# Patient Record
Sex: MALE | Race: WHITE | URBAN - METROPOLITAN AREA
[De-identification: names, ages, dates, MRNs, and addresses within clinical notes are randomized per-mention and may not be internally consistent; named-entity substitution may affect disease eponyms.]

---

## 2018-01-05 ENCOUNTER — OUTPATIENT (OUTPATIENT)
Dept: OUTPATIENT SERVICES | Facility: HOSPITAL | Age: 83
LOS: 1 days | Discharge: HOME | End: 2018-01-05
Payer: MEDICARE

## 2018-01-05 DIAGNOSIS — Z12.11 ENCOUNTER FOR SCREENING FOR MALIGNANT NEOPLASM OF COLON: ICD-10-CM

## 2018-01-05 DIAGNOSIS — Z01.818 ENCOUNTER FOR OTHER PREPROCEDURAL EXAMINATION: ICD-10-CM

## 2018-01-05 DIAGNOSIS — J31.0 CHRONIC RHINITIS: ICD-10-CM

## 2018-01-05 DIAGNOSIS — K57.30 DIVERTICULOSIS OF LARGE INTESTINE WITHOUT PERFORATION OR ABSCESS WITHOUT BLEEDING: ICD-10-CM

## 2018-01-05 DIAGNOSIS — Z86.010 PERSONAL HISTORY OF COLONIC POLYPS: ICD-10-CM

## 2018-01-05 DIAGNOSIS — I10 ESSENTIAL (PRIMARY) HYPERTENSION: ICD-10-CM

## 2018-01-05 DIAGNOSIS — K21.9 GASTRO-ESOPHAGEAL REFLUX DISEASE WITHOUT ESOPHAGITIS: ICD-10-CM

## 2018-01-05 DIAGNOSIS — R10.13 EPIGASTRIC PAIN: ICD-10-CM

## 2018-01-05 DIAGNOSIS — I35.1 NONRHEUMATIC AORTIC (VALVE) INSUFFICIENCY: ICD-10-CM

## 2018-01-05 LAB
ANION GAP SERPL CALC-SCNC: 6 MMOL/L — SIGNIFICANT CHANGE UP (ref 5–17)
BASOPHILS # BLD AUTO: 0.1 K/UL — SIGNIFICANT CHANGE UP (ref 0–0.2)
BASOPHILS NFR BLD AUTO: 1.5 % — SIGNIFICANT CHANGE UP (ref 0–2)
BUN SERPL-MCNC: 24 MG/DL — HIGH (ref 7–23)
CALCIUM SERPL-MCNC: 8.8 MG/DL — SIGNIFICANT CHANGE UP (ref 8.5–10.1)
CHLORIDE SERPL-SCNC: 107 MMOL/L — SIGNIFICANT CHANGE UP (ref 96–108)
CO2 SERPL-SCNC: 30 MMOL/L — SIGNIFICANT CHANGE UP (ref 22–31)
CREAT SERPL-MCNC: 0.93 MG/DL — SIGNIFICANT CHANGE UP (ref 0.5–1.3)
EOSINOPHIL # BLD AUTO: 0.4 K/UL — SIGNIFICANT CHANGE UP (ref 0–0.5)
EOSINOPHIL NFR BLD AUTO: 5.8 % — SIGNIFICANT CHANGE UP (ref 0–6)
GLUCOSE SERPL-MCNC: 94 MG/DL — SIGNIFICANT CHANGE UP (ref 70–99)
HCT VFR BLD CALC: 40.2 % — SIGNIFICANT CHANGE UP (ref 39–50)
HGB BLD-MCNC: 13.4 G/DL — SIGNIFICANT CHANGE UP (ref 13–17)
LYMPHOCYTES # BLD AUTO: 1.3 K/UL — SIGNIFICANT CHANGE UP (ref 1–3.3)
LYMPHOCYTES # BLD AUTO: 20.7 % — SIGNIFICANT CHANGE UP (ref 13–44)
MCHC RBC-ENTMCNC: 28.2 PG — SIGNIFICANT CHANGE UP (ref 27–34)
MCHC RBC-ENTMCNC: 33.2 GM/DL — SIGNIFICANT CHANGE UP (ref 32–36)
MCV RBC AUTO: 84.9 FL — SIGNIFICANT CHANGE UP (ref 80–100)
MONOCYTES # BLD AUTO: 0.5 K/UL — SIGNIFICANT CHANGE UP (ref 0–0.9)
MONOCYTES NFR BLD AUTO: 8.1 % — SIGNIFICANT CHANGE UP (ref 2–14)
NEUTROPHILS # BLD AUTO: 4.1 K/UL — SIGNIFICANT CHANGE UP (ref 1.8–7.4)
NEUTROPHILS NFR BLD AUTO: 64 % — SIGNIFICANT CHANGE UP (ref 43–77)
PLATELET # BLD AUTO: 208 K/UL — SIGNIFICANT CHANGE UP (ref 150–400)
POTASSIUM SERPL-MCNC: 4.5 MMOL/L — SIGNIFICANT CHANGE UP (ref 3.5–5.3)
POTASSIUM SERPL-SCNC: 4.5 MMOL/L — SIGNIFICANT CHANGE UP (ref 3.5–5.3)
RBC # BLD: 4.74 M/UL — SIGNIFICANT CHANGE UP (ref 4.2–5.8)
RBC # FLD: 12.5 % — SIGNIFICANT CHANGE UP (ref 10.3–14.5)
SODIUM SERPL-SCNC: 143 MMOL/L — SIGNIFICANT CHANGE UP (ref 135–145)
WBC # BLD: 6.4 K/UL — SIGNIFICANT CHANGE UP (ref 3.8–10.5)
WBC # FLD AUTO: 6.4 K/UL — SIGNIFICANT CHANGE UP (ref 3.8–10.5)

## 2018-01-05 PROCEDURE — 93010 ELECTROCARDIOGRAM REPORT: CPT

## 2018-01-23 ENCOUNTER — OUTPATIENT (OUTPATIENT)
Dept: OUTPATIENT SERVICES | Facility: HOSPITAL | Age: 83
LOS: 1 days | Discharge: HOME | End: 2018-01-23
Payer: MEDICARE

## 2018-01-23 VITALS
HEART RATE: 82 BPM | RESPIRATION RATE: 14 BRPM | OXYGEN SATURATION: 98 % | TEMPERATURE: 97 F | WEIGHT: 119.93 LBS | DIASTOLIC BLOOD PRESSURE: 108 MMHG | HEIGHT: 66 IN | SYSTOLIC BLOOD PRESSURE: 175 MMHG

## 2018-01-23 DIAGNOSIS — Z98.890 OTHER SPECIFIED POSTPROCEDURAL STATES: Chronic | ICD-10-CM

## 2018-01-23 PROCEDURE — 88305 TISSUE EXAM BY PATHOLOGIST: CPT | Mod: 26

## 2018-01-23 PROCEDURE — 88312 SPECIAL STAINS GROUP 1: CPT | Mod: 26

## 2018-01-23 PROCEDURE — 88313 SPECIAL STAINS GROUP 2: CPT | Mod: 26

## 2018-01-24 LAB — SURGICAL PATHOLOGY FINAL REPORT - CH: SIGNIFICANT CHANGE UP

## 2018-01-26 DIAGNOSIS — I10 ESSENTIAL (PRIMARY) HYPERTENSION: ICD-10-CM

## 2018-01-26 DIAGNOSIS — K21.9 GASTRO-ESOPHAGEAL REFLUX DISEASE WITHOUT ESOPHAGITIS: ICD-10-CM

## 2018-01-26 DIAGNOSIS — Z12.11 ENCOUNTER FOR SCREENING FOR MALIGNANT NEOPLASM OF COLON: ICD-10-CM

## 2018-01-26 DIAGNOSIS — K57.30 DIVERTICULOSIS OF LARGE INTESTINE WITHOUT PERFORATION OR ABSCESS WITHOUT BLEEDING: ICD-10-CM

## 2018-01-26 DIAGNOSIS — R13.10 DYSPHAGIA, UNSPECIFIED: ICD-10-CM

## 2018-01-26 DIAGNOSIS — Z86.010 PERSONAL HISTORY OF COLONIC POLYPS: ICD-10-CM

## 2018-01-26 DIAGNOSIS — I35.1 NONRHEUMATIC AORTIC (VALVE) INSUFFICIENCY: ICD-10-CM

## 2018-01-26 DIAGNOSIS — G20 PARKINSON'S DISEASE: ICD-10-CM

## 2018-01-26 DIAGNOSIS — Z85.46 PERSONAL HISTORY OF MALIGNANT NEOPLASM OF PROSTATE: ICD-10-CM

## 2018-01-26 DIAGNOSIS — H90.5 UNSPECIFIED SENSORINEURAL HEARING LOSS: ICD-10-CM

## 2018-01-26 DIAGNOSIS — J31.0 CHRONIC RHINITIS: ICD-10-CM

## 2018-03-01 ENCOUNTER — EMERGENCY (EMERGENCY)
Facility: HOSPITAL | Age: 83
LOS: 0 days | Discharge: HOME | End: 2018-03-01
Attending: FAMILY MEDICINE | Admitting: FAMILY MEDICINE
Payer: MEDICARE

## 2018-03-01 VITALS
HEART RATE: 74 BPM | RESPIRATION RATE: 18 BRPM | OXYGEN SATURATION: 99 % | HEIGHT: 66 IN | SYSTOLIC BLOOD PRESSURE: 159 MMHG | TEMPERATURE: 98 F | WEIGHT: 119.93 LBS | DIASTOLIC BLOOD PRESSURE: 98 MMHG

## 2018-03-01 VITALS
HEART RATE: 68 BPM | SYSTOLIC BLOOD PRESSURE: 146 MMHG | DIASTOLIC BLOOD PRESSURE: 80 MMHG | RESPIRATION RATE: 15 BRPM | OXYGEN SATURATION: 100 %

## 2018-03-01 DIAGNOSIS — G20 PARKINSON'S DISEASE: ICD-10-CM

## 2018-03-01 DIAGNOSIS — I10 ESSENTIAL (PRIMARY) HYPERTENSION: ICD-10-CM

## 2018-03-01 DIAGNOSIS — F43.29 ADJUSTMENT DISORDER WITH OTHER SYMPTOMS: ICD-10-CM

## 2018-03-01 DIAGNOSIS — Z98.890 OTHER SPECIFIED POSTPROCEDURAL STATES: Chronic | ICD-10-CM

## 2018-03-01 PROCEDURE — 93010 ELECTROCARDIOGRAM REPORT: CPT

## 2018-03-01 PROCEDURE — 99283 EMERGENCY DEPT VISIT LOW MDM: CPT

## 2018-03-01 RX ORDER — METOPROLOL TARTRATE 50 MG
1 TABLET ORAL
Qty: 0 | Refills: 0 | COMMUNITY

## 2018-03-01 RX ORDER — FLUOCINONIDE/EMOLLIENT BASE 0.05 %
0 CREAM (GRAM) TOPICAL
Qty: 0 | Refills: 0 | COMMUNITY

## 2018-03-01 RX ORDER — PANTOPRAZOLE SODIUM 20 MG/1
1 TABLET, DELAYED RELEASE ORAL
Qty: 0 | Refills: 0 | COMMUNITY

## 2018-03-01 RX ORDER — CARBIDOPA AND LEVODOPA 25; 100 MG/1; MG/1
0 TABLET ORAL
Qty: 0 | Refills: 0 | COMMUNITY
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
## 2019-05-21 NOTE — ED ADULT TRIAGE NOTE - CCCP TRG CHIEF CMPLNT
hypertension Plastic Surgeon Procedure Text (D): After obtaining clear surgical margins the patient was sent to plastics for surgical repair.  The patient understands they will receive post-surgical care and follow-up from the referring physician's office.

## 2020-05-16 ENCOUNTER — EMERGENCY (EMERGENCY)
Facility: HOSPITAL | Age: 85
LOS: 0 days | Discharge: HOME | End: 2020-05-16
Attending: FAMILY MEDICINE
Payer: MEDICARE

## 2020-05-16 VITALS — WEIGHT: 154.98 LBS

## 2020-05-16 VITALS
TEMPERATURE: 98 F | SYSTOLIC BLOOD PRESSURE: 158 MMHG | OXYGEN SATURATION: 100 % | DIASTOLIC BLOOD PRESSURE: 96 MMHG | RESPIRATION RATE: 16 BRPM | HEART RATE: 66 BPM

## 2020-05-16 DIAGNOSIS — K59.00 CONSTIPATION, UNSPECIFIED: ICD-10-CM

## 2020-05-16 DIAGNOSIS — R11.0 NAUSEA: ICD-10-CM

## 2020-05-16 DIAGNOSIS — R10.9 UNSPECIFIED ABDOMINAL PAIN: ICD-10-CM

## 2020-05-16 DIAGNOSIS — I10 ESSENTIAL (PRIMARY) HYPERTENSION: ICD-10-CM

## 2020-05-16 DIAGNOSIS — Z98.890 OTHER SPECIFIED POSTPROCEDURAL STATES: Chronic | ICD-10-CM

## 2020-05-16 DIAGNOSIS — M81.0 AGE-RELATED OSTEOPOROSIS WITHOUT CURRENT PATHOLOGICAL FRACTURE: ICD-10-CM

## 2020-05-16 DIAGNOSIS — G20 PARKINSON'S DISEASE: ICD-10-CM

## 2020-05-16 DIAGNOSIS — R33.9 RETENTION OF URINE, UNSPECIFIED: ICD-10-CM

## 2020-05-16 DIAGNOSIS — R10.13 EPIGASTRIC PAIN: ICD-10-CM

## 2020-05-16 LAB
ADD ON TEST-SPECIMEN IN LAB: SIGNIFICANT CHANGE UP
ALBUMIN SERPL ELPH-MCNC: 3.7 G/DL — SIGNIFICANT CHANGE UP (ref 3.3–5)
ALP SERPL-CCNC: 246 U/L — HIGH (ref 40–120)
ALT FLD-CCNC: 19 U/L — SIGNIFICANT CHANGE UP (ref 12–78)
ANION GAP SERPL CALC-SCNC: 5 MMOL/L — SIGNIFICANT CHANGE UP (ref 5–17)
APPEARANCE UR: CLEAR — SIGNIFICANT CHANGE UP
APTT BLD: 28.3 SEC — SIGNIFICANT CHANGE UP (ref 27.5–36.3)
AST SERPL-CCNC: 39 U/L — HIGH (ref 15–37)
BASOPHILS # BLD AUTO: 0.04 K/UL — SIGNIFICANT CHANGE UP (ref 0–0.2)
BASOPHILS NFR BLD AUTO: 0.5 % — SIGNIFICANT CHANGE UP (ref 0–2)
BILIRUB SERPL-MCNC: 0.8 MG/DL — SIGNIFICANT CHANGE UP (ref 0.2–1.2)
BILIRUB UR-MCNC: NEGATIVE — SIGNIFICANT CHANGE UP
BUN SERPL-MCNC: 22 MG/DL — SIGNIFICANT CHANGE UP (ref 7–23)
CALCIUM SERPL-MCNC: 9.3 MG/DL — SIGNIFICANT CHANGE UP (ref 8.5–10.1)
CHLORIDE SERPL-SCNC: 106 MMOL/L — SIGNIFICANT CHANGE UP (ref 96–108)
CO2 SERPL-SCNC: 30 MMOL/L — SIGNIFICANT CHANGE UP (ref 22–31)
COLOR SPEC: YELLOW — SIGNIFICANT CHANGE UP
CREAT SERPL-MCNC: 0.85 MG/DL — SIGNIFICANT CHANGE UP (ref 0.5–1.3)
DIFF PNL FLD: ABNORMAL
EOSINOPHIL # BLD AUTO: 0.16 K/UL — SIGNIFICANT CHANGE UP (ref 0–0.5)
EOSINOPHIL NFR BLD AUTO: 2 % — SIGNIFICANT CHANGE UP (ref 0–6)
GLUCOSE SERPL-MCNC: 99 MG/DL — SIGNIFICANT CHANGE UP (ref 70–99)
GLUCOSE UR QL: NEGATIVE MG/DL — SIGNIFICANT CHANGE UP
HCT VFR BLD CALC: 38.4 % — LOW (ref 39–50)
HGB BLD-MCNC: 12.7 G/DL — LOW (ref 13–17)
IMM GRANULOCYTES NFR BLD AUTO: 0.1 % — SIGNIFICANT CHANGE UP (ref 0–1.5)
INR BLD: 1.05 RATIO — SIGNIFICANT CHANGE UP (ref 0.88–1.16)
KETONES UR-MCNC: NEGATIVE — SIGNIFICANT CHANGE UP
LACTATE SERPL-SCNC: 0.8 MMOL/L — SIGNIFICANT CHANGE UP (ref 0.7–2)
LEUKOCYTE ESTERASE UR-ACNC: NEGATIVE — SIGNIFICANT CHANGE UP
LIDOCAIN IGE QN: 232 U/L — SIGNIFICANT CHANGE UP (ref 73–393)
LYMPHOCYTES # BLD AUTO: 1.22 K/UL — SIGNIFICANT CHANGE UP (ref 1–3.3)
LYMPHOCYTES # BLD AUTO: 15.3 % — SIGNIFICANT CHANGE UP (ref 13–44)
MCHC RBC-ENTMCNC: 28.5 PG — SIGNIFICANT CHANGE UP (ref 27–34)
MCHC RBC-ENTMCNC: 33.1 GM/DL — SIGNIFICANT CHANGE UP (ref 32–36)
MCV RBC AUTO: 86.1 FL — SIGNIFICANT CHANGE UP (ref 80–100)
MONOCYTES # BLD AUTO: 0.8 K/UL — SIGNIFICANT CHANGE UP (ref 0–0.9)
MONOCYTES NFR BLD AUTO: 10.1 % — SIGNIFICANT CHANGE UP (ref 2–14)
NEUTROPHILS # BLD AUTO: 5.72 K/UL — SIGNIFICANT CHANGE UP (ref 1.8–7.4)
NEUTROPHILS NFR BLD AUTO: 72 % — SIGNIFICANT CHANGE UP (ref 43–77)
NITRITE UR-MCNC: NEGATIVE — SIGNIFICANT CHANGE UP
PH UR: 6 — SIGNIFICANT CHANGE UP (ref 5–8)
PLATELET # BLD AUTO: 208 K/UL — SIGNIFICANT CHANGE UP (ref 150–400)
POTASSIUM SERPL-MCNC: 3.6 MMOL/L — SIGNIFICANT CHANGE UP (ref 3.5–5.3)
POTASSIUM SERPL-SCNC: 3.6 MMOL/L — SIGNIFICANT CHANGE UP (ref 3.5–5.3)
PROT SERPL-MCNC: 7.2 GM/DL — SIGNIFICANT CHANGE UP (ref 6–8.3)
PROT UR-MCNC: NEGATIVE MG/DL — SIGNIFICANT CHANGE UP
PROTHROM AB SERPL-ACNC: 11.7 SEC — SIGNIFICANT CHANGE UP (ref 10–12.9)
RBC # BLD: 4.46 M/UL — SIGNIFICANT CHANGE UP (ref 4.2–5.8)
RBC # FLD: 13.5 % — SIGNIFICANT CHANGE UP (ref 10.3–14.5)
SARS-COV-2 RNA SPEC QL NAA+PROBE: SIGNIFICANT CHANGE UP
SODIUM SERPL-SCNC: 141 MMOL/L — SIGNIFICANT CHANGE UP (ref 135–145)
SP GR SPEC: 1.01 — SIGNIFICANT CHANGE UP (ref 1.01–1.02)
TROPONIN I SERPL-MCNC: <0.015 NG/ML — SIGNIFICANT CHANGE UP (ref 0.01–0.04)
UROBILINOGEN FLD QL: NEGATIVE MG/DL — SIGNIFICANT CHANGE UP
WBC # BLD: 7.95 K/UL — SIGNIFICANT CHANGE UP (ref 3.8–10.5)
WBC # FLD AUTO: 7.95 K/UL — SIGNIFICANT CHANGE UP (ref 3.8–10.5)

## 2020-05-16 PROCEDURE — 83605 ASSAY OF LACTIC ACID: CPT

## 2020-05-16 PROCEDURE — 71045 X-RAY EXAM CHEST 1 VIEW: CPT

## 2020-05-16 PROCEDURE — 83690 ASSAY OF LIPASE: CPT

## 2020-05-16 PROCEDURE — 85610 PROTHROMBIN TIME: CPT

## 2020-05-16 PROCEDURE — 93010 ELECTROCARDIOGRAM REPORT: CPT

## 2020-05-16 PROCEDURE — 87635 SARS-COV-2 COVID-19 AMP PRB: CPT

## 2020-05-16 PROCEDURE — 74177 CT ABD & PELVIS W/CONTRAST: CPT

## 2020-05-16 PROCEDURE — 71045 X-RAY EXAM CHEST 1 VIEW: CPT | Mod: 26

## 2020-05-16 PROCEDURE — 85730 THROMBOPLASTIN TIME PARTIAL: CPT

## 2020-05-16 PROCEDURE — 80053 COMPREHEN METABOLIC PANEL: CPT

## 2020-05-16 PROCEDURE — 84153 ASSAY OF PSA TOTAL: CPT

## 2020-05-16 PROCEDURE — 81001 URINALYSIS AUTO W/SCOPE: CPT

## 2020-05-16 PROCEDURE — 86850 RBC ANTIBODY SCREEN: CPT

## 2020-05-16 PROCEDURE — 99284 EMERGENCY DEPT VISIT MOD MDM: CPT | Mod: 25

## 2020-05-16 PROCEDURE — 36415 COLL VENOUS BLD VENIPUNCTURE: CPT

## 2020-05-16 PROCEDURE — 74177 CT ABD & PELVIS W/CONTRAST: CPT | Mod: 26

## 2020-05-16 PROCEDURE — 86901 BLOOD TYPING SEROLOGIC RH(D): CPT

## 2020-05-16 PROCEDURE — 96374 THER/PROPH/DIAG INJ IV PUSH: CPT | Mod: XU

## 2020-05-16 PROCEDURE — 93005 ELECTROCARDIOGRAM TRACING: CPT

## 2020-05-16 PROCEDURE — 99284 EMERGENCY DEPT VISIT MOD MDM: CPT

## 2020-05-16 PROCEDURE — 96361 HYDRATE IV INFUSION ADD-ON: CPT

## 2020-05-16 PROCEDURE — 85025 COMPLETE CBC W/AUTO DIFF WBC: CPT

## 2020-05-16 PROCEDURE — 86900 BLOOD TYPING SEROLOGIC ABO: CPT

## 2020-05-16 PROCEDURE — 84484 ASSAY OF TROPONIN QUANT: CPT

## 2020-05-16 PROCEDURE — C9113: CPT

## 2020-05-16 RX ORDER — POLYETHYLENE GLYCOL 3350 17 G/17G
17 POWDER, FOR SOLUTION ORAL ONCE
Refills: 0 | Status: COMPLETED | OUTPATIENT
Start: 2020-05-16 | End: 2020-05-16

## 2020-05-16 RX ORDER — SODIUM CHLORIDE 9 MG/ML
1000 INJECTION INTRAMUSCULAR; INTRAVENOUS; SUBCUTANEOUS ONCE
Refills: 0 | Status: COMPLETED | OUTPATIENT
Start: 2020-05-16 | End: 2020-05-16

## 2020-05-16 RX ORDER — PANTOPRAZOLE SODIUM 20 MG/1
40 TABLET, DELAYED RELEASE ORAL ONCE
Refills: 0 | Status: COMPLETED | OUTPATIENT
Start: 2020-05-16 | End: 2020-05-16

## 2020-05-16 RX ADMIN — SODIUM CHLORIDE 1000 MILLILITER(S): 9 INJECTION INTRAMUSCULAR; INTRAVENOUS; SUBCUTANEOUS at 17:22

## 2020-05-16 RX ADMIN — SODIUM CHLORIDE 1000 MILLILITER(S): 9 INJECTION INTRAMUSCULAR; INTRAVENOUS; SUBCUTANEOUS at 21:47

## 2020-05-16 RX ADMIN — POLYETHYLENE GLYCOL 3350 17 GRAM(S): 17 POWDER, FOR SOLUTION ORAL at 21:47

## 2020-05-16 RX ADMIN — PANTOPRAZOLE SODIUM 40 MILLIGRAM(S): 20 TABLET, DELAYED RELEASE ORAL at 17:22

## 2020-05-16 NOTE — ED ADULT TRIAGE NOTE - CHIEF COMPLAINT QUOTE
pt c/o abdominal pain with constipation and difficulty passing Bm for 3-4 weeks, pt denies fever NVD. pt states pain worsened last night

## 2020-05-16 NOTE — ED PROVIDER NOTE - OBJECTIVE STATEMENT
85 y/o male with a PMHx of HTN, osteoporosis, Parkinson's, h/o b/l inguinal hernia repair presents to the ED c/o epigastric pain x1 month with associated constipation. Denies fever and cough. Pt states he felt nauseated today so came to ED for evaluation. Pt concerned that he has COVID. No other complaints at this time. PMD: Dr. Arredondo.

## 2020-05-16 NOTE — ED PROVIDER NOTE - NSFOLLOWUPINSTRUCTIONS_ED_ALL_ED_FT
Maintain taylor in and empty regularly. Follow up with urology. Drink lots of fluids and take miralax daily until good bm produced. Follow up with urologist and your medical doctor. Return to ER if worse.

## 2020-05-16 NOTE — ED ADULT NURSE NOTE - CHPI ED NUR SYMPTOMS NEG
no fever/no dizziness/no vomiting/no chills/no nausea/no pain/no decreased eating/drinking/no tingling/no weakness

## 2020-05-16 NOTE — ED PROVIDER NOTE - CARE PLAN
Principal Discharge DX:	Urinary retention  Secondary Diagnosis:	Constipation, unspecified constipation type

## 2020-05-16 NOTE — ED PROVIDER NOTE - PROGRESS NOTE DETAILS
I Estela Cardoso attest that this documentation has been prepared under the direction and in the presence of Doctor Townsend. Documentation as noted above by leann Cardoso. Agree with notes. Pop BURGESS

## 2020-05-16 NOTE — ED PROVIDER NOTE - CLINICAL SUMMARY MEDICAL DECISION MAKING FREE TEXT BOX
Pt with abd pain, constipation and mild nausea. Exam with RLQ tenderness. Plan: labs, IV fluids, CT.

## 2020-05-16 NOTE — ED PROVIDER NOTE - PATIENT PORTAL LINK FT
You can access the FollowMyHealth Patient Portal offered by [facility] by registering at the following website: [email or web address]. By joining [facility]’s FollowMyHealth portal, you will also be able to view your health information using other applications (apps) compatible with our system.

## 2020-05-17 PROBLEM — G20 PARKINSON'S DISEASE: Chronic | Status: ACTIVE | Noted: 2018-01-23

## 2020-05-17 PROBLEM — M81.0 AGE-RELATED OSTEOPOROSIS WITHOUT CURRENT PATHOLOGICAL FRACTURE: Chronic | Status: ACTIVE | Noted: 2018-01-23

## 2020-05-17 PROBLEM — I10 ESSENTIAL (PRIMARY) HYPERTENSION: Chronic | Status: ACTIVE | Noted: 2018-01-23

## 2020-05-17 NOTE — ED POST DISCHARGE NOTE - DETAILS
Spoke with pt. Pt states he was dx with prostate cancer and had a doctor from [place] that he follows up with every 3-4 months. Is due to follow up with shortly. -Pola Ely PA-C

## 2020-08-23 ENCOUNTER — INPATIENT (INPATIENT)
Facility: HOSPITAL | Age: 85
LOS: 3 days | Discharge: HOME HEALTH | DRG: 312 | End: 2020-08-27
Attending: STUDENT IN AN ORGANIZED HEALTH CARE EDUCATION/TRAINING PROGRAM | Admitting: EMERGENCY MEDICINE
Payer: MEDICARE

## 2020-08-23 VITALS
RESPIRATION RATE: 19 BRPM | TEMPERATURE: 98 F | SYSTOLIC BLOOD PRESSURE: 127 MMHG | WEIGHT: 169.98 LBS | OXYGEN SATURATION: 98 % | HEIGHT: 69 IN | HEART RATE: 115 BPM | DIASTOLIC BLOOD PRESSURE: 67 MMHG

## 2020-08-23 DIAGNOSIS — R55 SYNCOPE AND COLLAPSE: ICD-10-CM

## 2020-08-23 DIAGNOSIS — Z98.890 OTHER SPECIFIED POSTPROCEDURAL STATES: Chronic | ICD-10-CM

## 2020-08-23 LAB
ALBUMIN SERPL ELPH-MCNC: 3.4 G/DL — SIGNIFICANT CHANGE UP (ref 3.3–5)
ALP SERPL-CCNC: 111 U/L — SIGNIFICANT CHANGE UP (ref 40–120)
ALT FLD-CCNC: 9 U/L — LOW (ref 12–78)
AMMONIA BLD-MCNC: 11 UMOL/L — SIGNIFICANT CHANGE UP (ref 11–32)
ANION GAP SERPL CALC-SCNC: 6 MMOL/L — SIGNIFICANT CHANGE UP (ref 5–17)
APPEARANCE UR: CLEAR — SIGNIFICANT CHANGE UP
APTT BLD: 25.3 SEC — LOW (ref 27.5–35.5)
AST SERPL-CCNC: 32 U/L — SIGNIFICANT CHANGE UP (ref 15–37)
BASOPHILS # BLD AUTO: 0.06 K/UL — SIGNIFICANT CHANGE UP (ref 0–0.2)
BASOPHILS NFR BLD AUTO: 0.6 % — SIGNIFICANT CHANGE UP (ref 0–2)
BILIRUB SERPL-MCNC: 0.6 MG/DL — SIGNIFICANT CHANGE UP (ref 0.2–1.2)
BILIRUB UR-MCNC: NEGATIVE — SIGNIFICANT CHANGE UP
BUN SERPL-MCNC: 25 MG/DL — HIGH (ref 7–23)
CALCIUM SERPL-MCNC: 8.7 MG/DL — SIGNIFICANT CHANGE UP (ref 8.5–10.1)
CHLORIDE SERPL-SCNC: 110 MMOL/L — HIGH (ref 96–108)
CO2 SERPL-SCNC: 26 MMOL/L — SIGNIFICANT CHANGE UP (ref 22–31)
COLOR SPEC: YELLOW — SIGNIFICANT CHANGE UP
CREAT SERPL-MCNC: 0.87 MG/DL — SIGNIFICANT CHANGE UP (ref 0.5–1.3)
DIFF PNL FLD: ABNORMAL
EOSINOPHIL # BLD AUTO: 0.12 K/UL — SIGNIFICANT CHANGE UP (ref 0–0.5)
EOSINOPHIL NFR BLD AUTO: 1.3 % — SIGNIFICANT CHANGE UP (ref 0–6)
GLUCOSE SERPL-MCNC: 100 MG/DL — HIGH (ref 70–99)
GLUCOSE UR QL: NEGATIVE MG/DL — SIGNIFICANT CHANGE UP
HCT VFR BLD CALC: 39.1 % — SIGNIFICANT CHANGE UP (ref 39–50)
HGB BLD-MCNC: 12.8 G/DL — LOW (ref 13–17)
IMM GRANULOCYTES NFR BLD AUTO: 0.2 % — SIGNIFICANT CHANGE UP (ref 0–1.5)
INR BLD: 1.04 RATIO — SIGNIFICANT CHANGE UP (ref 0.88–1.16)
KETONES UR-MCNC: NEGATIVE — SIGNIFICANT CHANGE UP
LACTATE SERPL-SCNC: 0.9 MMOL/L — SIGNIFICANT CHANGE UP (ref 0.7–2)
LEUKOCYTE ESTERASE UR-ACNC: ABNORMAL
LYMPHOCYTES # BLD AUTO: 0.96 K/UL — LOW (ref 1–3.3)
LYMPHOCYTES # BLD AUTO: 10.4 % — LOW (ref 13–44)
MCHC RBC-ENTMCNC: 28.2 PG — SIGNIFICANT CHANGE UP (ref 27–34)
MCHC RBC-ENTMCNC: 32.7 GM/DL — SIGNIFICANT CHANGE UP (ref 32–36)
MCV RBC AUTO: 86.1 FL — SIGNIFICANT CHANGE UP (ref 80–100)
MONOCYTES # BLD AUTO: 0.73 K/UL — SIGNIFICANT CHANGE UP (ref 0–0.9)
MONOCYTES NFR BLD AUTO: 7.9 % — SIGNIFICANT CHANGE UP (ref 2–14)
NEUTROPHILS # BLD AUTO: 7.38 K/UL — SIGNIFICANT CHANGE UP (ref 1.8–7.4)
NEUTROPHILS NFR BLD AUTO: 79.6 % — HIGH (ref 43–77)
NITRITE UR-MCNC: NEGATIVE — SIGNIFICANT CHANGE UP
PH UR: 7 — SIGNIFICANT CHANGE UP (ref 5–8)
PLATELET # BLD AUTO: 186 K/UL — SIGNIFICANT CHANGE UP (ref 150–400)
POTASSIUM SERPL-MCNC: 4 MMOL/L — SIGNIFICANT CHANGE UP (ref 3.5–5.3)
POTASSIUM SERPL-SCNC: 4 MMOL/L — SIGNIFICANT CHANGE UP (ref 3.5–5.3)
PROT SERPL-MCNC: 6.7 GM/DL — SIGNIFICANT CHANGE UP (ref 6–8.3)
PROT UR-MCNC: NEGATIVE MG/DL — SIGNIFICANT CHANGE UP
PROTHROM AB SERPL-ACNC: 12 SEC — SIGNIFICANT CHANGE UP (ref 10.6–13.6)
RBC # BLD: 4.54 M/UL — SIGNIFICANT CHANGE UP (ref 4.2–5.8)
RBC # FLD: 13.5 % — SIGNIFICANT CHANGE UP (ref 10.3–14.5)
SARS-COV-2 IGG SERPL QL IA: NEGATIVE — SIGNIFICANT CHANGE UP
SARS-COV-2 IGM SERPL IA-ACNC: 0.09 INDEX — SIGNIFICANT CHANGE UP
SARS-COV-2 RNA SPEC QL NAA+PROBE: SIGNIFICANT CHANGE UP
SODIUM SERPL-SCNC: 142 MMOL/L — SIGNIFICANT CHANGE UP (ref 135–145)
SP GR SPEC: 1.01 — SIGNIFICANT CHANGE UP (ref 1.01–1.02)
TROPONIN I SERPL-MCNC: <0.015 NG/ML — SIGNIFICANT CHANGE UP (ref 0.01–0.04)
UROBILINOGEN FLD QL: NEGATIVE MG/DL — SIGNIFICANT CHANGE UP
WBC # BLD: 9.27 K/UL — SIGNIFICANT CHANGE UP (ref 3.8–10.5)
WBC # FLD AUTO: 9.27 K/UL — SIGNIFICANT CHANGE UP (ref 3.8–10.5)

## 2020-08-23 PROCEDURE — 95816 EEG AWAKE AND DROWSY: CPT

## 2020-08-23 PROCEDURE — A9500: CPT

## 2020-08-23 PROCEDURE — 78452 HT MUSCLE IMAGE SPECT MULT: CPT

## 2020-08-23 PROCEDURE — 36415 COLL VENOUS BLD VENIPUNCTURE: CPT

## 2020-08-23 PROCEDURE — 70551 MRI BRAIN STEM W/O DYE: CPT

## 2020-08-23 PROCEDURE — 99223 1ST HOSP IP/OBS HIGH 75: CPT | Mod: AI

## 2020-08-23 PROCEDURE — 76376 3D RENDER W/INTRP POSTPROCES: CPT | Mod: 26

## 2020-08-23 PROCEDURE — 97116 GAIT TRAINING THERAPY: CPT | Mod: GP

## 2020-08-23 PROCEDURE — 70450 CT HEAD/BRAIN W/O DYE: CPT | Mod: 26

## 2020-08-23 PROCEDURE — 72125 CT NECK SPINE W/O DYE: CPT | Mod: 26

## 2020-08-23 PROCEDURE — 71045 X-RAY EXAM CHEST 1 VIEW: CPT | Mod: 26

## 2020-08-23 PROCEDURE — 93017 CV STRESS TEST TRACING ONLY: CPT

## 2020-08-23 PROCEDURE — 97530 THERAPEUTIC ACTIVITIES: CPT | Mod: GP

## 2020-08-23 PROCEDURE — 85027 COMPLETE CBC AUTOMATED: CPT

## 2020-08-23 PROCEDURE — 80048 BASIC METABOLIC PNL TOTAL CA: CPT

## 2020-08-23 PROCEDURE — 70486 CT MAXILLOFACIAL W/O DYE: CPT | Mod: 26

## 2020-08-23 PROCEDURE — 97162 PT EVAL MOD COMPLEX 30 MIN: CPT | Mod: GP

## 2020-08-23 PROCEDURE — 93306 TTE W/DOPPLER COMPLETE: CPT

## 2020-08-23 RX ORDER — PANTOPRAZOLE SODIUM 20 MG/1
40 TABLET, DELAYED RELEASE ORAL
Refills: 0 | Status: DISCONTINUED | OUTPATIENT
Start: 2020-08-23 | End: 2020-08-27

## 2020-08-23 RX ORDER — LISINOPRIL 2.5 MG/1
5 TABLET ORAL DAILY
Refills: 0 | Status: DISCONTINUED | OUTPATIENT
Start: 2020-08-23 | End: 2020-08-27

## 2020-08-23 RX ORDER — SODIUM CHLORIDE 9 MG/ML
500 INJECTION INTRAMUSCULAR; INTRAVENOUS; SUBCUTANEOUS ONCE
Refills: 0 | Status: COMPLETED | OUTPATIENT
Start: 2020-08-23 | End: 2020-08-23

## 2020-08-23 RX ORDER — ONDANSETRON 8 MG/1
4 TABLET, FILM COATED ORAL EVERY 6 HOURS
Refills: 0 | Status: DISCONTINUED | OUTPATIENT
Start: 2020-08-23 | End: 2020-08-27

## 2020-08-23 RX ORDER — ATORVASTATIN CALCIUM 80 MG/1
40 TABLET, FILM COATED ORAL AT BEDTIME
Refills: 0 | Status: DISCONTINUED | OUTPATIENT
Start: 2020-08-23 | End: 2020-08-27

## 2020-08-23 RX ORDER — CARBIDOPA AND LEVODOPA 25; 100 MG/1; MG/1
1 TABLET ORAL THREE TIMES A DAY
Refills: 0 | Status: DISCONTINUED | OUTPATIENT
Start: 2020-08-23 | End: 2020-08-27

## 2020-08-23 RX ORDER — METOPROLOL TARTRATE 50 MG
12.5 TABLET ORAL AT BEDTIME
Refills: 0 | Status: DISCONTINUED | OUTPATIENT
Start: 2020-08-23 | End: 2020-08-27

## 2020-08-23 RX ORDER — SODIUM CHLORIDE 9 MG/ML
3 INJECTION INTRAMUSCULAR; INTRAVENOUS; SUBCUTANEOUS ONCE
Refills: 0 | Status: COMPLETED | OUTPATIENT
Start: 2020-08-23 | End: 2020-08-23

## 2020-08-23 RX ORDER — ASPIRIN/CALCIUM CARB/MAGNESIUM 324 MG
81 TABLET ORAL DAILY
Refills: 0 | Status: DISCONTINUED | OUTPATIENT
Start: 2020-08-23 | End: 2020-08-27

## 2020-08-23 RX ORDER — HEPARIN SODIUM 5000 [USP'U]/ML
5000 INJECTION INTRAVENOUS; SUBCUTANEOUS EVERY 12 HOURS
Refills: 0 | Status: DISCONTINUED | OUTPATIENT
Start: 2020-08-23 | End: 2020-08-27

## 2020-08-23 RX ORDER — ACETAMINOPHEN 500 MG
650 TABLET ORAL EVERY 6 HOURS
Refills: 0 | Status: DISCONTINUED | OUTPATIENT
Start: 2020-08-23 | End: 2020-08-27

## 2020-08-23 RX ORDER — SODIUM CHLORIDE 9 MG/ML
1000 INJECTION, SOLUTION INTRAVENOUS
Refills: 0 | Status: DISCONTINUED | OUTPATIENT
Start: 2020-08-23 | End: 2020-08-27

## 2020-08-23 RX ADMIN — Medication 12.5 MILLIGRAM(S): at 21:28

## 2020-08-23 RX ADMIN — CARBIDOPA AND LEVODOPA 1 TABLET(S): 25; 100 TABLET ORAL at 17:41

## 2020-08-23 RX ADMIN — SODIUM CHLORIDE 3 MILLILITER(S): 9 INJECTION INTRAMUSCULAR; INTRAVENOUS; SUBCUTANEOUS at 14:25

## 2020-08-23 RX ADMIN — CARBIDOPA AND LEVODOPA 1 TABLET(S): 25; 100 TABLET ORAL at 21:28

## 2020-08-23 RX ADMIN — ATORVASTATIN CALCIUM 40 MILLIGRAM(S): 80 TABLET, FILM COATED ORAL at 21:28

## 2020-08-23 RX ADMIN — HEPARIN SODIUM 5000 UNIT(S): 5000 INJECTION INTRAVENOUS; SUBCUTANEOUS at 21:28

## 2020-08-23 RX ADMIN — SODIUM CHLORIDE 1000 MILLILITER(S): 9 INJECTION INTRAMUSCULAR; INTRAVENOUS; SUBCUTANEOUS at 12:45

## 2020-08-23 RX ADMIN — SODIUM CHLORIDE 100 MILLILITER(S): 9 INJECTION, SOLUTION INTRAVENOUS at 17:37

## 2020-08-23 RX ADMIN — SODIUM CHLORIDE 500 MILLILITER(S): 9 INJECTION INTRAMUSCULAR; INTRAVENOUS; SUBCUTANEOUS at 14:52

## 2020-08-23 RX ADMIN — Medication 1 TABLET(S): at 17:39

## 2020-08-23 NOTE — H&P ADULT - NSHPPHYSICALEXAM_GEN_ALL_CORE
Vital Signs Last 24 Hrs  T(C): 36.9 (23 Aug 2020 12:29), Max: 36.9 (23 Aug 2020 12:29)  T(F): 98.5 (23 Aug 2020 12:29), Max: 98.5 (23 Aug 2020 12:29)  HR: 60 (23 Aug 2020 13:15) (57 - 115)  BP: 139/70 (23 Aug 2020 13:15) (127/67 - 152/74)  BP(mean): --  RR: 14 (23 Aug 2020 13:15) (14 - 19)  SpO2: 99% (23 Aug 2020 13:15) (98% - 100%)

## 2020-08-23 NOTE — ED ADULT NURSE NOTE - NSIMPLEMENTINTERV_GEN_ALL_ED
Implemented All Fall Risk Interventions:  [place] to call system. Call bell, personal items and telephone within reach. Instruct patient to call for assistance. Room bathroom lighting operational. Non-slip footwear when patient is off stretcher. Physically safe environment: no spills, clutter or unnecessary equipment. Stretcher in lowest position, wheels locked, appropriate side rails in place. Provide visual cue, wrist band, yellow gown, etc. Monitor gait and stability. Monitor for mental status changes and reorient to person, place, and time. Review medications for side effects contributing to fall risk. Reinforce activity limits and safety measures with patient and family.

## 2020-08-23 NOTE — PATIENT PROFILE ADULT - FALL HARM RISK TYPE OF ASSESSMENT
RN called to pts room for ice pack. When RN entered room a large clot was on pts pad. Fundal massage was performed and there were no further clots or gushes. Fundus was firm and midline.  Clot was measured for 80 grams and pt instructed to call RN for any m admission

## 2020-08-23 NOTE — ED PROVIDER NOTE - SKIN, MLM
Skin normal color for race, warm, dry and intact. No evidence of rash. +skin contusion to right side, Steri strips in palce

## 2020-08-23 NOTE — H&P ADULT - NSHPLABSRESULTS_GEN_ALL_CORE
12.8   9.27  )-----------( 186      ( 23 Aug 2020 12:50 )             39.1     23 Aug 2020 12:50    142    |  110    |  25     ----------------------------<  100    4.0     |  26     |  0.87     Ca    8.7        23 Aug 2020 12:50    TPro  6.7    /  Alb  3.4    /  TBili  0.6    /  DBili  x      /  AST  32     /  ALT  9      /  AlkPhos  111    23 Aug 2020 12:50    LIVER FUNCTIONS - ( 23 Aug 2020 12:50 )  Alb: 3.4 g/dL / Pro: 6.7 gm/dL / ALK PHOS: 111 U/L / ALT: 9 U/L / AST: 32 U/L / GGT: x           PT/INR - ( 23 Aug 2020 12:50 )   PT: 12.0 sec;   INR: 1.04 ratio      PTT - ( 23 Aug 2020 12:50 )  PTT:25.3 sec    POCT Blood Glucose.: 101 mg/dL (23 Aug 2020 12:29)    CARDIAC MARKERS ( 23 Aug 2020 12:50 )  <0.015 ng/mL / x     / x     / x     / x          Urinalysis Basic - ( 23 Aug 2020 12:50 )  Color: Yellow / Appearance: Clear / S.010 / pH: x  Gluc: x / Ketone: Negative  / Bili: Negative / Urobili: Negative mg/dL   Blood: x / Protein: Negative mg/dL / Nitrite: Negative   Leuk Esterase: Trace / RBC: 0-2 /HPF / WBC 3-5   Sq Epi: x / Non Sq Epi: Moderate / Bacteria: Few

## 2020-08-23 NOTE — H&P ADULT - ASSESSMENT
#Syncope  Likely vasovagal by history  Admit to telemetry  Trend troponins, so far negative  Monitor for arrhythmias  Cardio eval DR Arredondo  Hold HCTZ- keep on IVF for now  Grossly non-focal on neuro exam, but still slightly confused- ?etiology  Initial CT head neg  Will get MRI brain to r/o stroke  Neuro eval DR Ramirez  PT eval/ OOB  Keep on baby aspirin, statin- if no acute neuro findings on MRI statin can be discontinued    #Parkinson's  Cont Sinemet. PT    #BPH- monitor for voiding difficulties    #COVID pending, low suspicion    #DVT proph- heparin SQ    #Dispo- inpatient admit. D/w pt

## 2020-08-23 NOTE — H&P ADULT - HISTORY OF PRESENT ILLNESS
83yo/M with PMH Parkinson's, HTN, BPH presented after syncopal episode. Patient is currently confused and not able to recall the history completely. Per chart, he stood up and syncopized. No fever. NO head trauma reported. Patient denies having palpitations or chest pain. He admits he is not drinking enough fluids as does not want to void frequently.

## 2020-08-23 NOTE — ED PROVIDER NOTE - OBJECTIVE STATEMENT
86 y/o male with PMHx of Osteopetrosis, HTN, Parkinson's Disease presents to the ED s/p syncope. Spoke with daughter, Lisa ([phone]). As reported by daughter, pt was home with wife and watching Mass on TV. Pt stood up and then experienced syncope episode. Pt reported as pale and unresponsive for 15 minutes during syncope episode. Pt is currently awake with AMS. Currently does not speak, but normally speaks in full sentences. Pt was in usual health until today. Pt has no history of dementia. No other complaints at this time. NKDA. PCP: Maria Elena

## 2020-08-23 NOTE — ED ADULT NURSE NOTE - OBJECTIVE STATEMENT
pt BIBEMS, s/p "15 minutes of unresponsiveness". as per EMS "when we arrived he was gray and pale, he was responsive but did not talk much. his daughter said he had a period of unresponsiveness after he woke up, he does have parkinson's and frequent falls". pt normally ambulatory and a/ox3. in HHED pt responds to all answers with "rachel". pt disoriented.

## 2020-08-23 NOTE — ED PROVIDER NOTE - MUSCULOSKELETAL, MLM
Spine appears normal, range of motion is not limited, no muscle or joint tenderness +2 pitting edema

## 2020-08-23 NOTE — ED PROVIDER NOTE - CLINICAL SUMMARY MEDICAL DECISION MAKING FREE TEXT BOX
Pt presents with episode of unresponsiveness and is not acting at baseline. CT scan, labs, reassess.

## 2020-08-23 NOTE — PROVIDER CONTACT NOTE (OTHER) - DATE AND TIME:
23-Aug-2020 21:52 Surgeon/Pathologist Verbiage (Will Incorporate Name Of Surgeon From Intro If Not Blank): operated in two distinct and integrated capacities as the surgeon and pathologist.

## 2020-08-24 LAB
ANION GAP SERPL CALC-SCNC: 4 MMOL/L — LOW (ref 5–17)
BUN SERPL-MCNC: 21 MG/DL — SIGNIFICANT CHANGE UP (ref 7–23)
CALCIUM SERPL-MCNC: 7.9 MG/DL — LOW (ref 8.5–10.1)
CHLORIDE SERPL-SCNC: 111 MMOL/L — HIGH (ref 96–108)
CO2 SERPL-SCNC: 26 MMOL/L — SIGNIFICANT CHANGE UP (ref 22–31)
CREAT SERPL-MCNC: 0.7 MG/DL — SIGNIFICANT CHANGE UP (ref 0.5–1.3)
CULTURE RESULTS: SIGNIFICANT CHANGE UP
GLUCOSE SERPL-MCNC: 93 MG/DL — SIGNIFICANT CHANGE UP (ref 70–99)
HCT VFR BLD CALC: 35.4 % — LOW (ref 39–50)
HGB BLD-MCNC: 11.6 G/DL — LOW (ref 13–17)
MCHC RBC-ENTMCNC: 28.6 PG — SIGNIFICANT CHANGE UP (ref 27–34)
MCHC RBC-ENTMCNC: 32.8 GM/DL — SIGNIFICANT CHANGE UP (ref 32–36)
MCV RBC AUTO: 87.4 FL — SIGNIFICANT CHANGE UP (ref 80–100)
PLATELET # BLD AUTO: 174 K/UL — SIGNIFICANT CHANGE UP (ref 150–400)
POTASSIUM SERPL-MCNC: 3.9 MMOL/L — SIGNIFICANT CHANGE UP (ref 3.5–5.3)
POTASSIUM SERPL-SCNC: 3.9 MMOL/L — SIGNIFICANT CHANGE UP (ref 3.5–5.3)
RBC # BLD: 4.05 M/UL — LOW (ref 4.2–5.8)
RBC # FLD: 13.6 % — SIGNIFICANT CHANGE UP (ref 10.3–14.5)
SODIUM SERPL-SCNC: 141 MMOL/L — SIGNIFICANT CHANGE UP (ref 135–145)
SPECIMEN SOURCE: SIGNIFICANT CHANGE UP
WBC # BLD: 5.13 K/UL — SIGNIFICANT CHANGE UP (ref 3.8–10.5)
WBC # FLD AUTO: 5.13 K/UL — SIGNIFICANT CHANGE UP (ref 3.8–10.5)

## 2020-08-24 PROCEDURE — 99233 SBSQ HOSP IP/OBS HIGH 50: CPT

## 2020-08-24 PROCEDURE — 93018 CV STRESS TEST I&R ONLY: CPT

## 2020-08-24 PROCEDURE — 78452 HT MUSCLE IMAGE SPECT MULT: CPT | Mod: 26

## 2020-08-24 PROCEDURE — 70551 MRI BRAIN STEM W/O DYE: CPT | Mod: 26

## 2020-08-24 PROCEDURE — 93016 CV STRESS TEST SUPVJ ONLY: CPT

## 2020-08-24 PROCEDURE — 99223 1ST HOSP IP/OBS HIGH 75: CPT

## 2020-08-24 PROCEDURE — 93306 TTE W/DOPPLER COMPLETE: CPT | Mod: 26

## 2020-08-24 RX ORDER — REGADENOSON 0.08 MG/ML
0.4 INJECTION, SOLUTION INTRAVENOUS ONCE
Refills: 0 | Status: COMPLETED | OUTPATIENT
Start: 2020-08-24 | End: 2020-08-24

## 2020-08-24 RX ADMIN — REGADENOSON 0.4 MILLIGRAM(S): 0.08 INJECTION, SOLUTION INTRAVENOUS at 09:45

## 2020-08-24 RX ADMIN — CARBIDOPA AND LEVODOPA 1 TABLET(S): 25; 100 TABLET ORAL at 11:56

## 2020-08-24 RX ADMIN — HEPARIN SODIUM 5000 UNIT(S): 5000 INJECTION INTRAVENOUS; SUBCUTANEOUS at 11:57

## 2020-08-24 RX ADMIN — Medication 81 MILLIGRAM(S): at 11:56

## 2020-08-24 RX ADMIN — CARBIDOPA AND LEVODOPA 1 TABLET(S): 25; 100 TABLET ORAL at 21:40

## 2020-08-24 RX ADMIN — CARBIDOPA AND LEVODOPA 1 TABLET(S): 25; 100 TABLET ORAL at 06:28

## 2020-08-24 RX ADMIN — Medication 1 TABLET(S): at 11:56

## 2020-08-24 RX ADMIN — HEPARIN SODIUM 5000 UNIT(S): 5000 INJECTION INTRAVENOUS; SUBCUTANEOUS at 21:40

## 2020-08-24 RX ADMIN — LISINOPRIL 5 MILLIGRAM(S): 2.5 TABLET ORAL at 11:56

## 2020-08-24 RX ADMIN — ATORVASTATIN CALCIUM 40 MILLIGRAM(S): 80 TABLET, FILM COATED ORAL at 21:40

## 2020-08-24 RX ADMIN — PANTOPRAZOLE SODIUM 40 MILLIGRAM(S): 20 TABLET, DELAYED RELEASE ORAL at 06:28

## 2020-08-24 RX ADMIN — Medication 12.5 MILLIGRAM(S): at 21:40

## 2020-08-24 NOTE — PROGRESS NOTE ADULT - ASSESSMENT
#Syncope  Likely vasovagal by history  Admit to telemetry  Trend troponins, so far negative  Monitor for arrhythmias  Cardio eval DR Arredondo  Hold HCTZ- keep on IVF for now  Grossly non-focal on neuro exam, but still slightly confused- ?etiology  Initial CT head neg  Will get MRI brain to r/o stroke  Neuro eval DR Ramirez  PT eval/ OOB  Keep on baby aspirin, statin- if no acute neuro findings on MRI statin can be discontinued    #Parkinson's  Cont Sinemet. PT    #BPH- monitor for voiding difficulties    #COVID pending, low suspicion    #DVT proph- heparin SQ    #Dispo- inpatient admit. D/w pt 3yo/M with PMH Parkinson's, HTN, BPH, Prostate CA admitted for:       # Syncope. h/o falls    vasovagal vs arrhythmia vs neuro  Unlikely ACS, trop neg. Stress test is neg   C/w tele: SR, one episode of SR in 50s   CT head neg   trauma work up neg   Hold HCTZ  S/p IVF check orthostatic VS   MRI and EEG pending   PT eval   fall precautions   D/w DR Goldstein     # Encephalopathy, likely metabolic due to dehydration   r/o seizure and arrhythmia, r/o infection   F/u BCX and UCX  CXR neg for PNA  Supportive care       #Parkinson's  Cont Sinemet. PT    #BPH/ prostate Cancer   - monitor UO       # HTN  C/w Lisinopril and metoprolol       #DVT proph- heparin SQ        Dispo: EEG, MRI, PT

## 2020-08-24 NOTE — CONSULT NOTE ADULT - SUBJECTIVE AND OBJECTIVE BOX
CHIEF COMPLAINT:    HPI:  85yo/M with PMH Parkinson's, HTN, BPH presented after syncopal episode. Patient is currently confused and not able to recall the history completely. Per chart, he stood up and syncopized. No fever. NO head trauma reported. Patient denies having palpitations or chest pain. He admits he is not drinking enough fluids as does not want to void frequently. (23 Aug 2020 15:05)    2nd EKG showed sinus kristy, rate 59, septal infarct. Trop negative x1. COVID negative. Sinus kristy on monitor, rate 58. Will get Lexiscan MPI and echo today. If MPI negative for ischemia, pt can be D/C ed today and F/U with me in office. Do not restart HCTZ.      PAST MEDICAL & SURGICAL HISTORY:  Osteoporosis  HTN (hypertension)  Parkinsons disease  H/O hernia repair      Allergies    apple (Anaphylaxis)  fresh fruits- reynaldo family (Anaphylaxis)  No Known Drug Allergies  Originally Entered as [Unknown] reaction to [fresh fruits] (Unknown)    Intolerances        Occupation:  Alochol: Denied  Smoking: Denied  Drug Use: Denied  Marital Status:         FAMILY HISTORY:  No pertinent family history in first degree relatives      REVIEW OF SYSTEMS:    CONSTITUTIONAL: No weakness, fevers or chills  EYES/ENT: No visual changes;  No vertigo or throat pain   NECK: No pain or stiffness  RESPIRATORY: No cough, wheezing, hemoptysis; No shortness of breath  CARDIOVASCULAR: No chest pain or palpitations  GASTROINTESTINAL: No abdominal or epigastric pain. No nausea, vomiting, or hematemesis; No diarrhea or constipation. No melena or hematochezia.  GENITOURINARY: No dysuria, frequency or hematuria  NEUROLOGICAL: No numbness or weakness  SKIN: No itching, burning, rashes, or lesions   All other review of systems is negative unless indicated above    Vital Signs Last 24 Hrs  T(C): 36.2 (24 Aug 2020 06:13), Max: 36.9 (23 Aug 2020 12:29)  T(F): 97.2 (24 Aug 2020 06:13), Max: 98.5 (23 Aug 2020 12:29)  HR: 57 (24 Aug 2020 06:13) (57 - 115)  BP: 133/69 (24 Aug 2020 06:13) (127/67 - 169/83)  BP(mean): 98 (23 Aug 2020 16:01) (98 - 98)  RR: 18 (24 Aug 2020 06:13) (13 - 19)  SpO2: 100% (24 Aug 2020 06:13) (98% - 100%)    I&O's Summary    23 Aug 2020 07:01  -  24 Aug 2020 07:00  --------------------------------------------------------  IN: 820 mL / OUT: 225 mL / NET: 595 mL        PHYSICAL EXAM:    Constitutional: NAD, awake and alert, well-developed  HEENT: PERR, EOMI,  No oral cyananosis.  Neck:  supple,  No JVD  Respiratory: Breath sounds are clear bilaterally, No wheezing, rales or rhonchi  Cardiovascular: S1 and S2, regular rate and rhythm, no Murmurs, gallops or rubs  Gastrointestinal: Bowel Sounds present, soft, nontender.   Extremities: No peripheral edema. No clubbing or cyanosis.  Vascular: 2+ peripheral pulses  Neurological: A/O x 3, no focal deficits  Musculoskeletal: no calf tenderness.  Skin: No rashes.    MEDICATIONS:  MEDICATIONS  (STANDING):  aspirin enteric coated 81 milliGRAM(s) Oral daily  atorvastatin 40 milliGRAM(s) Oral at bedtime  carbidopa/levodopa  25/100 1 Tablet(s) Oral three times a day  heparin   Injectable 5000 Unit(s) SubCutaneous every 12 hours  lactated ringers. 1000 milliLiter(s) (100 mL/Hr) IV Continuous <Continuous>  lisinopril 5 milliGRAM(s) Oral daily  metoprolol succinate ER 12.5 milliGRAM(s) Oral at bedtime  multivitamin 1 Tablet(s) Oral daily  pantoprazole    Tablet 40 milliGRAM(s) Oral before breakfast      LABS: All Labs Reviewed:                        12.8   9.27  )-----------( 186      ( 23 Aug 2020 12:50 )             39.1     23 Aug 2020 12:50    142    |  110    |  25     ----------------------------<  100    4.0     |  26     |  0.87     Ca    8.7        23 Aug 2020 12:50    TPro  6.7    /  Alb  3.4    /  TBili  0.6    /  DBili  x      /  AST  32     /  ALT  9      /  AlkPhos  111    23 Aug 2020 12:50    PT/INR - ( 23 Aug 2020 12:50 )   PT: 12.0 sec;   INR: 1.04 ratio         PTT - ( 23 Aug 2020 12:50 )  PTT:25.3 sec  CARDIAC MARKERS ( 23 Aug 2020 12:50 )  <0.015 ng/mL / x     / x     / x     / x          Blood Culture:         RADIOLOGY/EKG:

## 2020-08-24 NOTE — CONSULT NOTE ADULT - ASSESSMENT
#Parkinson's  Cont Sinemet. PT    #Syncope  Likely vasovagal by history    - MRI brain  no IV tpa given because…    - MRI brain    -ASA/PLAVIX  -Atorvastatin  -DVT prophylaxis  -Dysphagia screen  -Speech and swallow eval  -PT eval/ rehab eval    Mangement d/w Pt / f 83 yo/M with PMHx of HTN, Parkinson's, was on Carbidopa-Levodopa 25/100 MG QID,was reduced to TID 3 weeks (was having hallucinations) presented to ED after syncopal episode. Pt was home watching Mass on TV, he stood up and then collapsed, was pale and unresponsive for 15 minutes,  he reports he could hear his wife faintly but was unable to respond, no seizure activity or urinary incontinence was noted. In ED, CT head/C-spine - no acute findings,  EKG sinus kristy - 59.     # Syncope, most likely vasovagal - less likely seizure or neurogenic origin    - obtain EEG    # Parkinsons ds, remote possibility of freezing (off phenomenon), Sinemet dose has been reduced    - Continue Sinemet - 25/100 mg TID for now, consider Stalevo as OP after D/W Dr. Uriarte  - PT    Management of PD discussed with pt and his wife

## 2020-08-24 NOTE — CONSULT NOTE ADULT - SUBJECTIVE AND OBJECTIVE BOX
CC: 84 y old  Male who presents with a chief complaint of syncope (24 Aug 2020 09:54)      HPI:  85 yo/M with PMH Parkinson's, HTN presented to ED after syncopal episode. Per ED note, per patients daughter, Lisa ([phone]), pt was home with wife and watching Mass on TV, he stood up and then collapsed, was pale and unresponsive for 15 minutes. No reported fever, head trauma, palpitations, seizure like activity or urinary incontinence. In ED, CT head and C-spine reveal no acute findings,  EKG showed sinus kristy - 59,     He admits he is not drinking enough fluids as does not want to void frequently. (23 Aug 2020 15:05)      PAST MEDICAL & SURGICAL HISTORY:  Osteoporosis  HTN (hypertension)  Parkinsons disease  H/O hernia repair      FAMILY HISTORY:  No pertinent family history in first degree relatives      Social Hx:  Nonsmoker, no drug or alcohol use    MEDICATIONS  (STANDING):  aspirin enteric coated 81 milliGRAM(s) Oral daily  atorvastatin 40 milliGRAM(s) Oral at bedtime  carbidopa/levodopa  25/100 1 Tablet(s) Oral three times a day  heparin   Injectable 5000 Unit(s) SubCutaneous every 12 hours  lactated ringers. 1000 milliLiter(s) (100 mL/Hr) IV Continuous <Continuous>  lisinopril 5 milliGRAM(s) Oral daily  metoprolol succinate ER 12.5 milliGRAM(s) Oral at bedtime  multivitamin 1 Tablet(s) Oral daily  pantoprazole    Tablet 40 milliGRAM(s) Oral before breakfast       Allergies    apple (Anaphylaxis)  fresh fruits- reynaldo family (Anaphylaxis)  No Known Drug Allergies  Originally Entered as [Unknown] reaction to [fresh fruits] (Unknown)    Intolerances        ROS: Pertinent positives in HPI, all other ROS were reviewed and are negative.        Vital Signs Last 24 Hrs  T(C): 36.8 (24 Aug 2020 08:55), Max: 36.9 (23 Aug 2020 12:29)  T(F): 98.2 (24 Aug 2020 08:55), Max: 98.5 (23 Aug 2020 12:29)  HR: 59 (24 Aug 2020 08:55) (57 - 115)  BP: 158/76 (24 Aug 2020 08:55) (127/67 - 169/83)  BP(mean): 98 (23 Aug 2020 16:01) (98 - 98)  RR: 18 (24 Aug 2020 08:55) (13 - 19)  SpO2: 100% (24 Aug 2020 08:55) (98% - 100%)        Constitutional: awake and alert.  HEENT: PERRLA, EOMI,   Neck: Supple.  Respiratory: Breath sounds are clear bilaterally  Cardiovascular: S1 and S2, regular  Extremities:  no edema  Vascular: Caritid Bruit - no  Musculoskeletal: no joint swelling/tenderness, no abnormal movements  Skin: No rashes      Neurological exam:  HF: A x O x 3. Appropriately interactive, normal affect. Speech fluent, No Aphasia or paraphasic errors. Naming /repetition intact   CN: AKUA, EOMI, VFF, facial sensation normal, no NLFD, tongue midline, Palate moves equally, SCM equal bilaterally  Motor: No pronator drift, Strength 5/5 in all 4 ext, normal bulk and tone, no tremor, rigidity or bradykinesia.    Sens: Intact to light touch / PP/ VS/ JS    Reflexes: Symmetric and normal . BJ 2+, BR 2+, KJ 2+, AJ 2+, downgoing toes b/l  Coord:  No FNFA, dysmetria, FRANSISCO intact   Gait/Balance: Normal/Cannot test    NIHSS:      Labs:   08-24    141  |  111<H>  |  21  ----------------------------<  93  3.9   |  26  |  0.70    Ca    7.9<L>      24 Aug 2020 08:38    TPro  6.7  /  Alb  3.4  /  TBili  0.6  /  DBili  x   /  AST  32  /  ALT  9<L>  /  AlkPhos  111  08-23                        11.6   5.13  )-----------( 174      ( 24 Aug 2020 08:38 )             35.4       Radiology:  - CT Head: < from: CT Head No Cont (08.23.20 @ 13:43) >  IMPRESSION:    No evidence of skull fracture acute infarct, intracranial hemorrhage or mass effect.    There is no orbital, nasal, maxillary or mandibular fracture.    There is no cervical spine fracture. CC: 84 y old  Male who presents with a chief complaint of syncope (24 Aug 2020 09:54)      HPI:  85 yo/M with PMH Parkinson's, HTN presented to ED after syncopal episode. Per patients wife, who provided history, pt was home watching Mass on TV, he stood up and then collapsed, was pale and unresponsive for 15 minutes, she tried to arouse him, he reports he could hear her faintly but was unable to move or respond, no seizure activity or urinary incontinence was noted. In ED, CT head and C-spine reveal no acute findings,  EKG showed sinus kristy - 59. Pt did admit he is not drinking enough fluids as does not want to void frequently.     Pt has history of PD, diagnosed 4 years ago, is under care of Dr. Uriarte, was on Carbidopa-Levodopa four times daily, it was reduced to thrice daily 3 weeks back as patient was having hallucinations per his wife.      PAST MEDICAL & SURGICAL HISTORY:  Osteoporosis  HTN (hypertension)  Parkinsons disease  H/O hernia repair      FAMILY HISTORY:  No pertinent family history in first degree relatives      Social Hx:  Nonsmoker, no drug or alcohol use    MEDICATIONS  (STANDING):  aspirin enteric coated 81 milliGRAM(s) Oral daily  atorvastatin 40 milliGRAM(s) Oral at bedtime  carbidopa/levodopa  25/100 1 Tablet(s) Oral three times a day  heparin   Injectable 5000 Unit(s) SubCutaneous every 12 hours  lactated ringers. 1000 milliLiter(s) (100 mL/Hr) IV Continuous <Continuous>  lisinopril 5 milliGRAM(s) Oral daily  metoprolol succinate ER 12.5 milliGRAM(s) Oral at bedtime  multivitamin 1 Tablet(s) Oral daily  pantoprazole    Tablet 40 milliGRAM(s) Oral before breakfast       Allergies    apple (Anaphylaxis)  fresh fruits- reynaldo family (Anaphylaxis)  No Known Drug Allergies  Originally Entered as [Unknown] reaction to [fresh fruits] (Unknown)    Intolerances      ROS: Pertinent positives in HPI, all other ROS were reviewed and are negative.        Vital Signs Last 24 Hrs  T(C): 36.8 (24 Aug 2020 08:55), Max: 36.9 (23 Aug 2020 12:29)  T(F): 98.2 (24 Aug 2020 08:55), Max: 98.5 (23 Aug 2020 12:29)  HR: 59 (24 Aug 2020 08:55) (57 - 115)  BP: 158/76 (24 Aug 2020 08:55) (127/67 - 169/83)  BP(mean): 98 (23 Aug 2020 16:01) (98 - 98)  RR: 18 (24 Aug 2020 08:55) (13 - 19)  SpO2: 100% (24 Aug 2020 08:55) (98% - 100%)      Gen exam:  Normocephalic, in no distress, awake and alert.  HEENT: PERRLA, EOMI,   Neck: Supple.  Respiratory: Breath sounds are clear bilaterally  Cardiovascular: S1 and S2, regular  Extremities:  no edema  Vascular: Caritid Bruit - no  Musculoskeletal: no joint swelling/tenderness, no abnormal movements  Skin: No rashes      Neurological exam:  HF: A x O x 3-. interactive. Speech - hypomimia, no dysarthria or Aphasia. Naming intact   CN: AKUA, EOMI, VFF, facial sensation normal, no NLFD, tongue midline, Palate moves equally, SCM equal bilaterally  Motor: No pronator drift, moves all 4 ext antigravity, tremors of right > left hand at rest and during action, rigidity + bradykinesia right > left.    Sens: Intact to light touch    Reflexes: Symmetric and normal . downgoing toes b/l  Coord:  No FNFA, FRANSISCO intact   Gait/Balance: Not tested      Labs:   08-24    141  |  111<H>  |  21  ----------------------------<  93  3.9   |  26  |  0.70    Ca    7.9<L>      24 Aug 2020 08:38    TPro  6.7  /  Alb  3.4  /  TBili  0.6  /  DBili  x   /  AST  32  /  ALT  9<L>  /  AlkPhos  111  08-23                        11.6   5.13  )-----------( 174      ( 24 Aug 2020 08:38 )             35.4       Radiology:  - CT Head: < from: CT Head No Cont (08.23.20 @ 13:43) >  IMPRESSION:    No evidence of skull fracture acute infarct, intracranial hemorrhage or mass effect.    There is no orbital, nasal, maxillary or mandibular fracture.    There is no cervical spine fracture.

## 2020-08-24 NOTE — CONSULT NOTE ADULT - ASSESSMENT
#Syncope  Likely vasovagal by history  Admit to telemetry  Trend troponins, so far negative  Monitor for arrhythmias  Cardio eval,, get Lexiscan MPI and echo  Hold HCTZ- keep on IVF for now, do not restart  Grossly non-focal on neuro exam, but still slightly confused- ?etiology  Initial CT head neg  Will get MRI brain to r/o stroke  Neuro eval DR Ramirez  PT eval/ OOB  Keep on baby aspirin, statin- if no acute neuro findings on MRI statin can be discontinued    #Parkinson's  Cont Sinemet. PT    #BPH- monitor for voiding difficulties    #COVID pending, low suspicion    #DVT proph- heparin SQ

## 2020-08-25 PROCEDURE — 99232 SBSQ HOSP IP/OBS MODERATE 35: CPT

## 2020-08-25 PROCEDURE — 95816 EEG AWAKE AND DROWSY: CPT | Mod: 26

## 2020-08-25 PROCEDURE — 99233 SBSQ HOSP IP/OBS HIGH 50: CPT

## 2020-08-25 RX ORDER — HALOPERIDOL DECANOATE 100 MG/ML
2 INJECTION INTRAMUSCULAR ONCE
Refills: 0 | Status: DISCONTINUED | OUTPATIENT
Start: 2020-08-25 | End: 2020-08-25

## 2020-08-25 RX ORDER — HALOPERIDOL DECANOATE 100 MG/ML
2 INJECTION INTRAMUSCULAR ONCE
Refills: 0 | Status: COMPLETED | OUTPATIENT
Start: 2020-08-25 | End: 2020-08-25

## 2020-08-25 RX ORDER — QUETIAPINE FUMARATE 200 MG/1
12.5 TABLET, FILM COATED ORAL DAILY
Refills: 0 | Status: DISCONTINUED | OUTPATIENT
Start: 2020-08-25 | End: 2020-08-27

## 2020-08-25 RX ADMIN — CARBIDOPA AND LEVODOPA 1 TABLET(S): 25; 100 TABLET ORAL at 21:54

## 2020-08-25 RX ADMIN — CARBIDOPA AND LEVODOPA 1 TABLET(S): 25; 100 TABLET ORAL at 13:18

## 2020-08-25 RX ADMIN — HEPARIN SODIUM 5000 UNIT(S): 5000 INJECTION INTRAVENOUS; SUBCUTANEOUS at 21:56

## 2020-08-25 RX ADMIN — QUETIAPINE FUMARATE 12.5 MILLIGRAM(S): 200 TABLET, FILM COATED ORAL at 13:19

## 2020-08-25 RX ADMIN — CARBIDOPA AND LEVODOPA 1 TABLET(S): 25; 100 TABLET ORAL at 05:17

## 2020-08-25 RX ADMIN — Medication 12.5 MILLIGRAM(S): at 21:55

## 2020-08-25 RX ADMIN — Medication 1 MILLIGRAM(S): at 10:12

## 2020-08-25 RX ADMIN — HALOPERIDOL DECANOATE 2 MILLIGRAM(S): 100 INJECTION INTRAMUSCULAR at 03:20

## 2020-08-25 RX ADMIN — ATORVASTATIN CALCIUM 40 MILLIGRAM(S): 80 TABLET, FILM COATED ORAL at 21:57

## 2020-08-25 NOTE — CHART NOTE - NSCHARTNOTEFT_GEN_A_CORE
RN called, patient is screaming, agitated, and extremely weak, also trying to get out of bed.  Patient is a high fall risk, taking over 2-3 nurses to watch the patient and protect him.  Patient will be given Haldol for agitation and confusion.

## 2020-08-25 NOTE — PROGRESS NOTE ADULT - ASSESSMENT
83 yo/M with PMHx of HTN, Parkinson's, was on Carbidopa-Levodopa 25/100 MG QID,was reduced to TID 3 weeks (was having hallucinations) presented to ED after syncopal episode. Pt was home watching Mass on TV, he stood up and then collapsed, was pale and unresponsive for 15 minutes,  he reports he could hear his wife faintly but was unable to respond, no seizure activity or urinary incontinence was noted. In ED, CT head/C-spine - no acute findings,  EKG sinus kristy - 59.     # Syncope, most likely vasovagal - less likely seizure or neurogenic origin, EEG shows no epileptiform or seizures - slowing +, c/w encephalopathy    # Parkinsons ds with acute psychosis    - start low dose Seroquel 12.5 mg bid, it has least EPS  - Consider Risperidone if sx persist  - Continue Sinemet - 25/100 mg TID for now, consider Stalevo as OP after D/W Dr. Uriarte  - PT    # Chronic lacunar infarcts in the bilateral CR and right thalamocapsular junction.    - continue ASA/Lipitor for stroke prophylaxis    Management of PD /psychosis discussed with Dr. Ramirez

## 2020-08-25 NOTE — PROGRESS NOTE ADULT - SUBJECTIVE AND OBJECTIVE BOX
CC: syncope (24 Aug 2020 08:04)    HPI:  83yo/M with PMH Parkinson's, HTN, BPH, Prostate CA  presented after syncopal episode Pt and wife report that  Pt was watching TV and was falling asleep couple of times, he woke up and then wife noted he passed out and was not responding, his eyes were closed and he was making yawning movements. Family called 911. Pt reports woke up in the ambulance. As per ED records was confused.  Pt also reports that had few falls past 2 weeks, once aty night, and another time was getting up from sitting.  Felt lightheaded but denies Palpitations.     In ED:  Tachycardic, HR at 115, rest of vitals stable.  Labs  significant for azotemia. S/p IVF      INTERVAL HPI/ OVERNIGHT EVENTS: Pt was seen and examined at bedside after stress test, tolerated well, wife at bedside, results and further plan discussed.   Pt reports that feels much better today     REVIEW OF SYSTEMS:    All other review of systems is negative unless indicated above    Vital Signs Last 24 Hrs  T(C): 36.6 (24 Aug 2020 21:26), Max: 36.6 (24 Aug 2020 21:26)  T(F): 97.8 (24 Aug 2020 21:26), Max: 97.8 (24 Aug 2020 21:26)  HR: 67 (24 Aug 2020 21:30) (59 - 67)  BP: 129/69 (24 Aug 2020 21:26) (129/69 - 137/72)  BP(mean): --  RR: 18 (24 Aug 2020 21:26) (18 - 18)  SpO2: 99% (24 Aug 2020 21:26) (99% - 100%)    I&O's Summary    24 Aug 2020 07:01  -  25 Aug 2020 07:00  --------------------------------------------------------  IN: 0 mL / OUT: 200 mL / NET: -200 mL      PHYSICAL EXAM:  General: Well developed; malnourished; in no acute distress  Eyes: PERRLA, EOMI; conjunctiva and sclera clear  Head: Normocephalic; L orbital ecchymosis   ENMT: No nasal discharge; airway clear  Neck: Supple; non tender; no masses  Respiratory: No wheezes, rales or rhonchi  Cardiovascular: Regular rate and rhythm. S1 and S2 Normal;   Gastrointestinal: Soft non-tender non-distended; Normal bowel sounds  Genitourinary: No  suprapubic  tenderness  Extremities: Non pitting  edema  Vascular: Peripheral pulses palpable   Neurological: Alert and oriented x3, non focal,  forgetful, some bradykinesia   Skin: Warm and dry. No acute rash  Musculoskeletal: Normal muscle tone, without deformities  Psychiatric: Cooperative and appropriate      Medications:  MEDICATIONS  (STANDING):  aspirin enteric coated 81 milliGRAM(s) Oral daily  atorvastatin 40 milliGRAM(s) Oral at bedtime  carbidopa/levodopa  25/100 1 Tablet(s) Oral three times a day  heparin   Injectable 5000 Unit(s) SubCutaneous every 12 hours  lactated ringers. 1000 milliLiter(s) (100 mL/Hr) IV Continuous <Continuous>  lisinopril 5 milliGRAM(s) Oral daily  metoprolol succinate ER 12.5 milliGRAM(s) Oral at bedtime  multivitamin 1 Tablet(s) Oral daily  pantoprazole    Tablet 40 milliGRAM(s) Oral before breakfast  QUEtiapine 12.5 milliGRAM(s) Oral daily      Labs: All Labs Reviewed:                        11.6   5.13  )-----------( 174      ( 24 Aug 2020 08:38 )             35.4     08-24    141  |  111<H>  |  21  ----------------------------<  93  3.9   |  26  |  0.70    Ca    7.9<L>      24 Aug 2020 08:38    Blood Culture: 08-23 @ 12:51  Organism --  Gram Stain Blood -- Gram Stain --  Specimen Source .Blood Blood-Peripheral  Culture-Blood --    No growth to date.  08-23 @ 12:50  Organism --  Gram Stain Blood -- Gram Stain --  Specimen Source .Urine None  Culture-Blood --    <10,000 CFU/mL Normal Urogenital Sri    Urine Culture: Organism: --  gram stain: --  08-23 @ 12:51     No growth to date.  Organism: --  gram stain: --  08-23 @ 12:50     <10,000 CFU/mL Normal Urogenital Sri    RADIOLOGY & ADDITIONAL TESTS: all tsts were reviewed     EXAM:  NM NUCLEAR STRESS MULTI PHARM                       PROCEDURE DATE:  08/24/2020      FINDINGS: The technical quality of the resting and post-stress perfusion images was satisfactory.  Review of resting and post-stress myocardial scintigrams revealed normal left ventricular perfusion. There was no evidence of reversible or fixed perfusion defects.    Gated wall motion study revealed normalleft ventricular contractility. There were no regional wall motion abnormalities or regions of decreased wall thickening. There was no paradoxical septal motion.    Calculated left ventricular ejection fraction post-stress was 84%, based on a left ventricular end diastolic volume of 69 mL and an end systolic volume of 11 mL. Stroke volume was 58 mL.    IMPRESSION: Normal SPECT Myocardial Perfusion Imaging.  No scan  evidence of reversible or fixed perfusion defects.  Normal left ventricular contractility with an ejection fraction of 84% (Normal: 50% or greater).  No regional wall motion abnormalities.  Please refer to cardiac stress test report for dosage of Regadenoson administered, EKG findings and symptoms during the procedur      EXAM:  CT MAXILLOFACIAL                        EXAM:  CT 3D RECONSTRUCT WO [facility]                        EXAM:  CT BRAIN                        EXAM:  CT CERVICAL SPINE                          PROCEDURE DATE:  08/23/2020       IMPRESSION:  No evidence of skull fracture acute infarct, intracranial hemorrhage or mass effect.  There is no orbital, nasal, maxillary or mandibular fracture.  There is no cervical spine fracture.      EXAM:  MR BRAIN                            PROCEDURE DATE:  08/24/2020          INTERPRETATION:  CLINICAL INDICATION: Syncope with altered mental status, evaluate for CVA.    TECHNIQUE: Multi-planar multi-sequential MR imaging of the brain was performed without intravenous contrast.    COMPARISON: CT head 8/23/2020.    FINDINGS:    There are scattered and patchy T2/FLAIR hyperintense changes in the periventricular and subcortical white matter and also in the erika, nonspecific finding, but most likely representing sequelae of moderate chronic microvascular ischemic disease. There are tiny chronic lacunar infarcts in the bilateral corona radiata and right thalamocapsular junction. There is diffuse cortical sulcal prominence related to underlying brain parenchymal volume loss.    No acute infarction, intracranial hemorrhage or mass.    There is no evidence of hydrocephalus. There are no extra-axial fluid collections. The visualized skull base flow voids are patent.    The visualized intraorbital contents are normal. There are mild mucosal inflammatory changes of the ethmoid air cells and also thickening in the right maxillary sinus. There is a small left mastoid air cell effusion. The right mastoid air cells are clear. The visualized soft tissues and osseous structures appear markable.    IMPRESSION:    Moderate chronic microvascular ischemic disease.    Tiny chronic lacunar infarcts in the bilateral corona radiata and right thalamocapsular junction.    DVT prophylaxis: heparin sq    Code status: full code    Dispo: 1:1 observation

## 2020-08-25 NOTE — PROGRESS NOTE ADULT - SUBJECTIVE AND OBJECTIVE BOX
Pt has been agitated, restless, unmanageable, kicking staff, possibly with hallucinations.    EEG shows no epileptiform or seizures - slowing +, c/w encephalopathy  MRI brain reveals chronic lacunar infarcts in the bilateral CR and right thalamocapsular junction.    ROS: As above, other ROS unable to obtain    MEDICATIONS  (STANDING):  aspirin enteric coated 81 milliGRAM(s) Oral daily  atorvastatin 40 milliGRAM(s) Oral at bedtime  carbidopa/levodopa  25/100 1 Tablet(s) Oral three times a day  heparin   Injectable 5000 Unit(s) SubCutaneous every 12 hours  lactated ringers. 1000 milliLiter(s) (100 mL/Hr) IV Continuous <Continuous>  lisinopril 5 milliGRAM(s) Oral daily  metoprolol succinate ER 12.5 milliGRAM(s) Oral at bedtime  multivitamin 1 Tablet(s) Oral daily  pantoprazole    Tablet 40 milliGRAM(s) Oral before breakfast  QUEtiapine 12.5 milliGRAM(s) Oral daily      Vital Signs Last 24 Hrs  T(C): 36.6 (24 Aug 2020 21:26), Max: 36.6 (24 Aug 2020 21:26)  T(F): 97.8 (24 Aug 2020 21:26), Max: 97.8 (24 Aug 2020 21:26)  HR: 67 (24 Aug 2020 21:30) (59 - 67)  BP: 129/69 (24 Aug 2020 21:26) (129/69 - 129/69)  BP(mean): --  RR: 18 (24 Aug 2020 21:26) (18 - 18)  SpO2: 99% (24 Aug 2020 21:26) (99% - 99%)     Neurological exam:  HF: A x 1, Agitated, restless, kicking, hypomimia, no dysarthria or Aphasia. Naming intact   CN: AKUA, EOMI, VFF, facial sensation normal, no NLFD, tongue midline, Palate moves equally, SCM equal bilaterally  Motor: No pronator drift, moves all 4 ext antigravity, tremors of right > left hand at rest and during action, rigidity + bradykinesia right > left.    Sens: Intact to light touch    Reflexes: Symmetric and normal . downgoing toes b/l          Coord:  No FNFA, FRANSISCO intact   Gait/Balance: Not tested    Labs:             11.6   5.13  )-----------( 174      ( 24 Aug 2020 08:38 )             35.4     08-24    141  |  111<H>  |  21  ----------------------------<  93  3.9   |  26  |  0.70    Ca    7.9<L>      24 Aug 2020 08:38      Radiology report:  - MRI brain: < from: MR Head No Cont (08.24.20 @ 17:53) >  Moderate chronic microvascular ischemic disease. Tiny chronic lacunar infarcts in the bilateral corona radiata and right thalamocapsular junction.    - EEG: EEG shows no epileptiform or seizures - slowing +, c/w encephalopathy

## 2020-08-25 NOTE — PROGRESS NOTE ADULT - ASSESSMENT
85yo/M with PMH Parkinson's, HTN, BPH, Prostate CA admitted for:     # Syncope. h/o falls    vasovagal vs arrhythmia vs neuro  Unlikely ACS, trop neg. Stress test is neg   C/w tele: SR, one episode of SR in 50s   CT head neg   trauma work up neg   Hold HCTZ  S/p IVF   MRI shows tiny lacunar infarcts in corona radiata   Cardiology consult appreciated       # Encephalopathy, likely metabolic due to dehydration   r/o seizure and arrhythmia, r/o infection   BCx and UCx - no growth   CXR - normal   f/u EEG  Neuro consult appreciated    # Agitation   - 1:1 observation for safety  - start seroquel 12.5 mg po      #Parkinson's  Cont Sinemet. PT    #BPH/ prostate Cancer   - monitor UO       # HTN  C/w Lisinopril and metoprolol 85yo/M with PMH Parkinson's, HTN, BPH, Prostate CA admitted for:     # Syncope. h/o falls    vasovagal vs arrhythmia vs neuro  Unlikely ACS, trop neg. Stress test is neg   C/w tele: SR, one episode of SR in 50s   CT head neg   trauma work up neg   Hold HCTZ  S/p IVF   MRI shows tiny chronic lacunar infarcts in corona radiata   Cardiology consult appreciated       # Encephalopathy, likely metabolic due to dehydration   r/o seizure and arrhythmia, r/o infection   BCx and UCx - no growth   CXR - normal   f/u EEG  Neuro consult appreciated    # Agitation   - 1:1 observation for safety  - start seroquel 12.5 mg po      #Parkinson's  Cont Sinemet. PT    #BPH/ prostate Cancer   - monitor UO       # HTN  C/w Lisinopril and metoprolol

## 2020-08-26 LAB
ANION GAP SERPL CALC-SCNC: 4 MMOL/L — LOW (ref 5–17)
BUN SERPL-MCNC: 18 MG/DL — SIGNIFICANT CHANGE UP (ref 7–23)
CALCIUM SERPL-MCNC: 8.5 MG/DL — SIGNIFICANT CHANGE UP (ref 8.5–10.1)
CHLORIDE SERPL-SCNC: 109 MMOL/L — HIGH (ref 96–108)
CO2 SERPL-SCNC: 30 MMOL/L — SIGNIFICANT CHANGE UP (ref 22–31)
CREAT SERPL-MCNC: 0.86 MG/DL — SIGNIFICANT CHANGE UP (ref 0.5–1.3)
GLUCOSE SERPL-MCNC: 118 MG/DL — HIGH (ref 70–99)
HCT VFR BLD CALC: 41.6 % — SIGNIFICANT CHANGE UP (ref 39–50)
HGB BLD-MCNC: 13.4 G/DL — SIGNIFICANT CHANGE UP (ref 13–17)
MCHC RBC-ENTMCNC: 28.2 PG — SIGNIFICANT CHANGE UP (ref 27–34)
MCHC RBC-ENTMCNC: 32.2 GM/DL — SIGNIFICANT CHANGE UP (ref 32–36)
MCV RBC AUTO: 87.6 FL — SIGNIFICANT CHANGE UP (ref 80–100)
PLATELET # BLD AUTO: 196 K/UL — SIGNIFICANT CHANGE UP (ref 150–400)
POTASSIUM SERPL-MCNC: 3.4 MMOL/L — LOW (ref 3.5–5.3)
POTASSIUM SERPL-SCNC: 3.4 MMOL/L — LOW (ref 3.5–5.3)
RBC # BLD: 4.75 M/UL — SIGNIFICANT CHANGE UP (ref 4.2–5.8)
RBC # FLD: 13.5 % — SIGNIFICANT CHANGE UP (ref 10.3–14.5)
SODIUM SERPL-SCNC: 143 MMOL/L — SIGNIFICANT CHANGE UP (ref 135–145)
WBC # BLD: 5.75 K/UL — SIGNIFICANT CHANGE UP (ref 3.8–10.5)
WBC # FLD AUTO: 5.75 K/UL — SIGNIFICANT CHANGE UP (ref 3.8–10.5)

## 2020-08-26 PROCEDURE — 99232 SBSQ HOSP IP/OBS MODERATE 35: CPT

## 2020-08-26 RX ORDER — ASPIRIN/CALCIUM CARB/MAGNESIUM 324 MG
1 TABLET ORAL
Qty: 0 | Refills: 0 | DISCHARGE

## 2020-08-26 RX ORDER — LISINOPRIL/HYDROCHLOROTHIAZIDE 10-12.5 MG
0.5 TABLET ORAL
Qty: 0 | Refills: 0 | DISCHARGE

## 2020-08-26 RX ORDER — POTASSIUM CHLORIDE 20 MEQ
40 PACKET (EA) ORAL ONCE
Refills: 0 | Status: DISCONTINUED | OUTPATIENT
Start: 2020-08-26 | End: 2020-08-26

## 2020-08-26 RX ORDER — PANTOPRAZOLE SODIUM 20 MG/1
1 TABLET, DELAYED RELEASE ORAL
Qty: 0 | Refills: 0 | DISCHARGE

## 2020-08-26 RX ORDER — POTASSIUM CHLORIDE 20 MEQ
40 PACKET (EA) ORAL ONCE
Refills: 0 | Status: COMPLETED | OUTPATIENT
Start: 2020-08-26 | End: 2020-08-26

## 2020-08-26 RX ADMIN — CARBIDOPA AND LEVODOPA 1 TABLET(S): 25; 100 TABLET ORAL at 15:02

## 2020-08-26 RX ADMIN — LISINOPRIL 5 MILLIGRAM(S): 2.5 TABLET ORAL at 10:06

## 2020-08-26 RX ADMIN — QUETIAPINE FUMARATE 12.5 MILLIGRAM(S): 200 TABLET, FILM COATED ORAL at 10:06

## 2020-08-26 RX ADMIN — Medication 81 MILLIGRAM(S): at 10:08

## 2020-08-26 RX ADMIN — CARBIDOPA AND LEVODOPA 1 TABLET(S): 25; 100 TABLET ORAL at 21:49

## 2020-08-26 RX ADMIN — Medication 40 MILLIEQUIVALENT(S): at 11:21

## 2020-08-26 RX ADMIN — PANTOPRAZOLE SODIUM 40 MILLIGRAM(S): 20 TABLET, DELAYED RELEASE ORAL at 05:24

## 2020-08-26 RX ADMIN — CARBIDOPA AND LEVODOPA 1 TABLET(S): 25; 100 TABLET ORAL at 05:23

## 2020-08-26 RX ADMIN — HEPARIN SODIUM 5000 UNIT(S): 5000 INJECTION INTRAVENOUS; SUBCUTANEOUS at 21:49

## 2020-08-26 RX ADMIN — Medication 1 TABLET(S): at 10:06

## 2020-08-26 RX ADMIN — HEPARIN SODIUM 5000 UNIT(S): 5000 INJECTION INTRAVENOUS; SUBCUTANEOUS at 10:06

## 2020-08-26 RX ADMIN — Medication 12.5 MILLIGRAM(S): at 21:49

## 2020-08-26 NOTE — PHYSICAL THERAPY INITIAL EVALUATION ADULT - BALANCE DISTURBANCE, IDENTIFIED IMPAIRMENT CONTRIBUTE, REHAB EVAL
impaired postural control/decreased strength/Pt has rigid posturing, retropulsion, decreased balance reactions,/impaired motor control

## 2020-08-26 NOTE — PROGRESS NOTE ADULT - ASSESSMENT
83yo/M with PMH Parkinson's, HTN, BPH, Prostate CA admitted for:     # Syncope. h/o falls    vasovagal vs arrhythmia vs neuro  Unlikely ACS, trop neg. Stress test is neg   C/w tele: SR, one episode of SR in 50s   CT head neg   trauma work up neg   Hold HCTZ  S/p IVF   MRI shows tiny lacunar infarcts in corona radiata   Cardiology consult appreciated       # Encephalopathy, likely metabolic due to dehydration   r/o seizure and arrhythmia, r/o infection   BCx and UCx - no growth   CXR - normal   EEG - read by neuro - no epileptiform or seizures - slowing +, c/w encephalopathy  Neuro consult appreciated    # Agitation   - start seroquel 12.5 mg po      #Parkinson's with acute psychosis  - cont. low dose seroquel  - consider risperidone if symptoms persist  - continue Sinemet - 25/100 mg TID for now, consider Stalevo as OP after D/W Dr. Uriarte  - PT recommends [facility] vs. home with PT   - Neuro consult appreciated     #BPH/ prostate Cancer   - monitor UO       # HTN  C/w Lisinopril and metoprolol 83yo/M with PMH Parkinson's, HTN, BPH, Prostate CA admitted for:     # Syncope. h/o falls    vasovagal vs arrhythmia vs neuro  Unlikely ACS, trop neg. Stress test is neg   C/w tele: SR, one episode of SR in 50s   CT head neg   trauma work up neg   Hold HCTZ  S/p IVF   MRI shows tiny chronic lacunar infarcts in corona radiata   Cardiology consult appreciated       # Encephalopathy, likely metabolic due to dehydration   r/o seizure and arrhythmia, r/o infection   BCx and UCx - no growth   CXR - normal   EEG - read by neuro - no epileptiform or seizures - slowing +, c/w encephalopathy  Neuro consult appreciated    # Agitation   - start seroquel 12.5 mg po      #Parkinson's with acute psychosis  - cont. low dose seroquel  - consider risperidone if symptoms persist  - continue Sinemet - 25/100 mg TID for now, consider Stalevo as OP after D/W Dr. Uriarte  - PT recommends [facility] vs. home with PT   - Neuro consult appreciated     #BPH/ prostate Cancer   - monitor UO       # HTN  C/w Lisinopril and metoprolol

## 2020-08-26 NOTE — PROGRESS NOTE ADULT - ASSESSMENT
83 yo/M with PMHx of HTN, Parkinson's, was on Carbidopa-Levodopa 25/100 MG QID,was reduced to TID 3 weeks (was having hallucinations) presented to ED after syncopal episode. Pt was home watching [place] on TV, he stood up and then collapsed, was pale and unresponsive for 15 minutes,  he reports he could hear his wife faintly but was unable to respond, no seizure activity or urinary incontinence was noted. In ED, CT head/C-spine - no acute findings,  EKG sinus kristy - 59.     # Syncope, most likely vasovagal - less likely seizure or neurogenic origin, EEG shows no epileptiform or seizures - slowing +, c/w encephalopathy    # Parkinsons ds with acute psychosis, improved    - continue Seroquel 12.5 mg bid, it has least EPS  - Continue Sinemet - 25/100 mg TID for now, consider Stalevo as OP after D/W Dr. Uriarte  - PT    # Chronic lacunar infarcts in the bilateral CR and right thalamocapsular junction.    - continue ASA/Lipitor for stroke prophylaxis    Management of PD /psychosis discussed with Dr. Ramirez    Call neuro if needed henceforth

## 2020-08-26 NOTE — PHYSICAL THERAPY INITIAL EVALUATION ADULT - IMPAIRMENTS CONTRIBUTING TO GAIT DEVIATIONS, PT EVAL
impaired coordination/impaired balance/impaired motor control/impaired postural control/decreased strength/decreased flexibility

## 2020-08-26 NOTE — PHYSICAL THERAPY INITIAL EVALUATION ADULT - GENERAL OBSERVATIONS, REHAB EVAL
Pt seen on 3N, 1:1 present in room, pt much better today and A and O, willing and cooperative. Mild tremors noted

## 2020-08-26 NOTE — PHYSICAL THERAPY INITIAL EVALUATION ADULT - LIVES WITH, PROFILE
spouse/Pt lives in a house w/ his wife and son. Pt states there are 5 EDUARDO and a full flight in the home to go to the bathroom.

## 2020-08-26 NOTE — PHYSICAL THERAPY INITIAL EVALUATION ADULT - DISCHARGE DISPOSITION, PT EVAL
Patient reports to the emergency room via ambulance for alleged overdose. Per EMS, PD was called for unconscious male found in bar bathroom. Patient was given two doses of Narcan by PD. Upon EMS arrival patient A&O X4. Patient able to slide himself over from stretcher to cot. Patient refusing to answer questions.  
[facility] vs. home w/ assist and HPT, pt has reported frequent falls at home. Pt has reduced balance reactions and needs assistance for ambulation for safety at this time.

## 2020-08-26 NOTE — PROGRESS NOTE ADULT - SUBJECTIVE AND OBJECTIVE BOX
CC: syncope (24 Aug 2020 08:04)    HPI:  85yo/M with PMH Parkinson's, HTN, BPH, Prostate CA  presented after syncopal episode Pt and wife report that  Pt was watching TV and was falling asleep couple of times, he woke up and then wife noted he passed out and was not responding, his eyes were closed and he was making yawning movements. Family called 911. Pt reports woke up in the ambulance. As per ED records was confused.  Pt also reports that had few falls past 2 weeks, once aty night, and another time was getting up from sitting.  Felt lightheaded but denies Palpitations.     In ED:  Tachycardic, HR at 115, rest of vitals stable.  Labs  significant for azotemia. S/p IVF      INTERVAL HPI/ OVERNIGHT EVENTS: Pt was seen and examined at bedside after stress test, tolerated well, wife at bedside, results and further plan discussed.   Pt reports that feels much better today     REVIEW OF SYSTEMS:  All other review of systems is negative unless indicated above    Vital Signs Last 24 Hrs  T(C): 36.5 (26 Aug 2020 08:38), Max: 36.7 (25 Aug 2020 21:49)  T(F): 97.7 (26 Aug 2020 08:38), Max: 98.1 (25 Aug 2020 21:49)  HR: 64 (26 Aug 2020 08:38) (56 - 71)  BP: 165/92 (26 Aug 2020 08:38) (138/82 - 179/82)  BP(mean): 99 (26 Aug 2020 00:08) (99 - 99)  RR: 18 (26 Aug 2020 08:38) (18 - 18)  SpO2: 99% (26 Aug 2020 08:38) (98% - 99%)    PHYSICAL EXAM:    General: Well developed; malnourished; in no acute distress  Eyes: PERRLA, EOMI; conjunctiva and sclera clear  Head: Normocephalic; L orbital ecchymosis   ENMT: No nasal discharge; airway clear  Neck: Supple; non tender; no masses  Respiratory: No wheezes, rales or rhonchi  Cardiovascular: Regular rate and rhythm. S1 and S2 Normal;   Gastrointestinal: Soft non-tender non-distended; Normal bowel sounds  Genitourinary: No  suprapubic  tenderness  Extremities: Non pitting  edema  Vascular: Peripheral pulses palpable   Neurological: Alert and oriented x3, non focal,  forgetful, some bradykinesia  agitated at times    Skin: Warm and dry. No acute rash  Musculoskeletal: Normal muscle tone, without deformities  Psychiatric: Cooperative and appropriate      Medications:  MEDICATIONS  (STANDING):  aspirin enteric coated 81 milliGRAM(s) Oral daily  atorvastatin 40 milliGRAM(s) Oral at bedtime  carbidopa/levodopa  25/100 1 Tablet(s) Oral three times a day  heparin   Injectable 5000 Unit(s) SubCutaneous every 12 hours  lactated ringers. 1000 milliLiter(s) (100 mL/Hr) IV Continuous <Continuous>  lisinopril 5 milliGRAM(s) Oral daily  metoprolol succinate ER 12.5 milliGRAM(s) Oral at bedtime  multivitamin 1 Tablet(s) Oral daily  pantoprazole    Tablet 40 milliGRAM(s) Oral before breakfast  QUEtiapine 12.5 milliGRAM(s) Oral daily      Labs: All Labs Reviewed:                        13.4   5.75  )-----------( 196      ( 26 Aug 2020 09:21 )             41.6     08-26    143  |  109<H>  |  18  ----------------------------<  118<H>  3.4<L>   |  30  |  0.86    Ca    8.5      26 Aug 2020 09:21                Blood Culture: 08-23 @ 12:51  Organism --  Gram Stain Blood -- Gram Stain --  Specimen Source .Blood Blood-Peripheral  Culture-Blood --    No growth to date.  08-23 @ 12:50  Organism --  Gram Stain Blood -- Gram Stain --  Specimen Source .Urine None  Culture-Blood --    <10,000 CFU/mL Normal Urogenital Sri    Urine Culture: Organism: --  gram stain: --  08-23 @ 12:51     No growth to date.  Organism: --  gram stain: --  08-23 @ 12:50     <10,000 CFU/mL Normal Urogenital Sri      RADIOLOGY & ADDITIONAL TESTS: all tsts were reviewed     EXAM:  NM NUCLEAR STRESS MULTI PHARM                       PROCEDURE DATE:  08/24/2020      FINDINGS: The technical quality of the resting and post-stress perfusion images was satisfactory.  Review of resting and post-stress myocardial scintigrams revealed normal left ventricular perfusion. There was no evidence of reversible or fixed perfusion defects.    Gated wall motion study revealed normalleft ventricular contractility. There were no regional wall motion abnormalities or regions of decreased wall thickening. There was no paradoxical septal motion.    Calculated left ventricular ejection fraction post-stress was 84%, based on a left ventricular end diastolic volume of 69 mL and an end systolic volume of 11 mL. Stroke volume was 58 mL.    IMPRESSION: Normal SPECT Myocardial Perfusion Imaging.  No scan  evidence of reversible or fixed perfusion defects.  Normal left ventricular contractility with an ejection fraction of 84% (Normal: 50% or greater).  No regional wall motion abnormalities.  Please refer to cardiac stress test report for dosage of Regadenoson administered, EKG findings and symptoms during the procedur      EXAM:  CT MAXILLOFACIAL                        [facility]  [facility]                        EXAM:  CT BRAIN                        EXAM:  CT CERVICAL SPINE                          PROCEDURE DATE:  08/23/2020       IMPRESSION:  No evidence of skull fracture acute infarct, intracranial hemorrhage or mass effect.  There is no orbital, nasal, maxillary or mandibular fracture.  There is no cervical spine fracture.      EXAM:  MR BRAIN                            PROCEDURE DATE:  08/24/2020          INTERPRETATION:  CLINICAL INDICATION: Syncope with altered mental status, evaluate for CVA.    TECHNIQUE: Multi-planar multi-sequential MR imaging of the brain was performed without intravenous contrast.    COMPARISON: CT head 8/23/2020.    FINDINGS:    There are scattered and patchy T2/FLAIR hyperintense changes in the periventricular and subcortical white matter and also in the erika, nonspecific finding, but most likely representing sequelae of moderate chronic microvascular ischemic disease. There are tiny chronic lacunar infarcts in the bilateral corona radiata and right thalamocapsular junction. There is diffuse cortical sulcal prominence related to underlying brain parenchymal volume loss.    No acute infarction, intracranial hemorrhage or mass.    There is no evidence of hydrocephalus. There are no extra-axial fluid collections. The visualized skull base flow voids are patent.    The visualized intraorbital contents are normal. There are mild mucosal inflammatory changes of the ethmoid air cells and also thickening in the right maxillary sinus. There is a small left mastoid air cell effusion. The right mastoid air cells are clear. The visualized soft tissues and osseous structures appear markable.    IMPRESSION:    Moderate chronic microvascular ischemic disease.    Tiny chronic lacunar infarcts in the bilateral corona radiata and right thalamocapsular junction.    DVT prophylaxis: heparin sq    Code status: full code    Dispo: d/c planning home with PT CC: syncope (24 Aug 2020 08:04)    HPI:  83yo/M with PMH Parkinson's, HTN, BPH, Prostate CA  presented after syncopal episode Pt and wife report that  Pt was watching TV and was falling asleep couple of times, he woke up and then wife noted he passed out and was not responding, his eyes were closed and he was making yawning movements. Family called 911. Pt reports woke up in the ambulance. As per ED records was confused.  Pt also reports that had few falls past 2 weeks, once aty night, and another time was getting up from sitting.  Felt lightheaded but denies Palpitations.     In ED:  Tachycardic, HR at 115, rest of vitals stable.  Labs  significant for azotemia. S/p IVF      INTERVAL HPI/ OVERNIGHT EVENTS: Pt was seen and examined at bedside after stress test, tolerated well, wife at bedside, results and further plan discussed.   Pt reports that feels much better today     REVIEW OF SYSTEMS:  All other review of systems is negative unless indicated above    Vital Signs Last 24 Hrs  T(C): 36.5 (26 Aug 2020 08:38), Max: 36.7 (25 Aug 2020 21:49)  T(F): 97.7 (26 Aug 2020 08:38), Max: 98.1 (25 Aug 2020 21:49)  HR: 64 (26 Aug 2020 08:38) (56 - 71)  BP: 165/92 (26 Aug 2020 08:38) (138/82 - 179/82)  BP(mean): 99 (26 Aug 2020 00:08) (99 - 99)  RR: 18 (26 Aug 2020 08:38) (18 - 18)  SpO2: 99% (26 Aug 2020 08:38) (98% - 99%)    PHYSICAL EXAM:    General: Well developed; malnourished; in no acute distress  Eyes: PERRLA, EOMI; conjunctiva and sclera clear  Head: Normocephalic; L orbital ecchymosis   ENMT: No nasal discharge; airway clear  Neck: Supple; non tender; no masses  Respiratory: No wheezes, rales or rhonchi  Cardiovascular: Regular rate and rhythm. S1 and S2 Normal;   Gastrointestinal: Soft non-tender non-distended; Normal bowel sounds  Genitourinary: No  suprapubic  tenderness  Extremities: Non pitting  edema  Vascular: Peripheral pulses palpable   Neurological: Alert and oriented x3, non focal,  forgetful, some bradykinesia  agitated at times    Skin: Warm and dry. No acute rash  Musculoskeletal: Normal muscle tone, without deformities  Psychiatric: Cooperative and appropriate      Medications:  MEDICATIONS  (STANDING):  aspirin enteric coated 81 milliGRAM(s) Oral daily  atorvastatin 40 milliGRAM(s) Oral at bedtime  carbidopa/levodopa  25/100 1 Tablet(s) Oral three times a day  heparin   Injectable 5000 Unit(s) SubCutaneous every 12 hours  lactated ringers. 1000 milliLiter(s) (100 mL/Hr) IV Continuous <Continuous>  lisinopril 5 milliGRAM(s) Oral daily  metoprolol succinate ER 12.5 milliGRAM(s) Oral at bedtime  multivitamin 1 Tablet(s) Oral daily  pantoprazole    Tablet 40 milliGRAM(s) Oral before breakfast  QUEtiapine 12.5 milliGRAM(s) Oral daily      Labs: All Labs Reviewed:                        13.4   5.75  )-----------( 196      ( 26 Aug 2020 09:21 )             41.6     08-26    143  |  109<H>  |  18  ----------------------------<  118<H>  3.4<L>   |  30  |  0.86    Ca    8.5      26 Aug 2020 09:21      Blood Culture: 08-23 @ 12:51  Organism --  Gram Stain Blood -- Gram Stain --  Specimen Source .Blood Blood-Peripheral  Culture-Blood --    No growth to date.  08-23 @ 12:50  Organism --  Gram Stain Blood -- Gram Stain --  Specimen Source .Urine None  Culture-Blood --    <10,000 CFU/mL Normal Urogenital Sri    Urine Culture: Organism: --  gram stain: --  08-23 @ 12:51     No growth to date.  Organism: --  gram stain: --  08-23 @ 12:50     <10,000 CFU/mL Normal Urogenital Sri      RADIOLOGY & ADDITIONAL TESTS: all tsts were reviewed     EXAM:  NM NUCLEAR STRESS MULTI PHARM                       PROCEDURE DATE:  08/24/2020      FINDINGS: The technical quality of the resting and post-stress perfusion images was satisfactory.  Review of resting and post-stress myocardial scintigrams revealed normal left ventricular perfusion. There was no evidence of reversible or fixed perfusion defects.    Gated wall motion study revealed normalleft ventricular contractility. There were no regional wall motion abnormalities or regions of decreased wall thickening. There was no paradoxical septal motion.    Calculated left ventricular ejection fraction post-stress was 84%, based on a left ventricular end diastolic volume of 69 mL and an end systolic volume of 11 mL. Stroke volume was 58 mL.    IMPRESSION: Normal SPECT Myocardial Perfusion Imaging.  No scan  evidence of reversible or fixed perfusion defects.  Normal left ventricular contractility with an ejection fraction of 84% (Normal: 50% or greater).  No regional wall motion abnormalities.  Please refer to cardiac stress test report for dosage of Regadenoson administered, EKG findings and symptoms during the procedur      EXAM:  CT MAXILLOFACIAL                        [facility]  [facility]                        EXAM:  CT BRAIN                        EXAM:  CT CERVICAL SPINE                          PROCEDURE DATE:  08/23/2020       IMPRESSION:  No evidence of skull fracture acute infarct, intracranial hemorrhage or mass effect.  There is no orbital, nasal, maxillary or mandibular fracture.  There is no cervical spine fracture.      EXAM:  MR BRAIN                            PROCEDURE DATE:  08/24/2020          INTERPRETATION:  CLINICAL INDICATION: Syncope with altered mental status, evaluate for CVA.    TECHNIQUE: Multi-planar multi-sequential MR imaging of the brain was performed without intravenous contrast.    COMPARISON: CT head 8/23/2020.    FINDINGS:    There are scattered and patchy T2/FLAIR hyperintense changes in the periventricular and subcortical white matter and also in the erika, nonspecific finding, but most likely representing sequelae of moderate chronic microvascular ischemic disease. There are tiny chronic lacunar infarcts in the bilateral corona radiata and right thalamocapsular junction. There is diffuse cortical sulcal prominence related to underlying brain parenchymal volume loss.    No acute infarction, intracranial hemorrhage or mass.    There is no evidence of hydrocephalus. There are no extra-axial fluid collections. The visualized skull base flow voids are patent.    The visualized intraorbital contents are normal. There are mild mucosal inflammatory changes of the ethmoid air cells and also thickening in the right maxillary sinus. There is a small left mastoid air cell effusion. The right mastoid air cells are clear. The visualized soft tissues and osseous structures appear markable.    IMPRESSION:    Moderate chronic microvascular ischemic disease.    Tiny chronic lacunar infarcts in the bilateral corona radiata and right thalamocapsular junction.    DVT prophylaxis: heparin sq    Code status: full code    Dispo: d/c planning home with PT

## 2020-08-26 NOTE — PROGRESS NOTE ADULT - SUBJECTIVE AND OBJECTIVE BOX
Pt Arm and yesterday, was started on Seroquel 12.5 mg at night, as being very appropriate this morning.    MRI brain revealed no acute stroke, old infarcts noted in CR and right Thalamus    ROS: As above, other ROS Negative    MEDICATIONS  (STANDING):  aspirin enteric coated 81 milliGRAM(s) Oral daily  atorvastatin 40 milliGRAM(s) Oral at bedtime  carbidopa/levodopa  25/100 1 Tablet(s) Oral three times a day  heparin   Injectable 5000 Unit(s) SubCutaneous every 12 hours  lactated ringers. 1000 milliLiter(s) (100 mL/Hr) IV Continuous <Continuous>  lisinopril 5 milliGRAM(s) Oral daily  metoprolol succinate ER 12.5 milliGRAM(s) Oral at bedtime  multivitamin 1 Tablet(s) Oral daily  pantoprazole    Tablet 40 milliGRAM(s) Oral before breakfast  QUEtiapine 12.5 milliGRAM(s) Oral daily      Vital Signs Last 24 Hrs  T(C): 36.5 (26 Aug 2020 08:38), Max: 36.7 (25 Aug 2020 21:49)  T(F): 97.7 (26 Aug 2020 08:38), Max: 98.1 (25 Aug 2020 21:49)  HR: 64 (26 Aug 2020 08:38) (56 - 71)  BP: 165/92 (26 Aug 2020 08:38) (138/82 - 179/82)  BP(mean): 99 (26 Aug 2020 00:08) (99 - 99)  RR: 18 (26 Aug 2020 08:38) (18 - 18)  SpO2: 99% (26 Aug 2020 08:38) (98% - 99%)    Neurological exam:  HF: A x self x hospital, decreased concen/attention, no dysarthria or Aphasia. Naming intact   CN: AKUA, EOMI, VFF, facial sensation normal, no NLFD, tongue midline, Palate moves equally, SCM equal bilaterally  Motor: No pronator drift, moves all 4 ext antigravity, tremors of right > left hand at rest and during action, rigidity + bradykinesia right > left.    Sens: Intact to light touch    Reflexes: Symmetric and normal . downgoing toes b/l          Coord:  No FNFA, FRANSISCO intact   Gait/Balance: Not tested                          13.4   5.75  )-----------( 196      ( 26 Aug 2020 09:21 )             41.6     08-26    143  |  109<H>  |  18  ----------------------------<  118<H>  3.4<L>   |  30  |  0.86    Ca    8.5      26 Aug 2020 09:21    Radiology report:  - MRI brain: < from: MR Head No Cont (08.24.20 @ 17:53) >  Moderate chronic microvascular ischemic disease. Tiny chronic lacunar infarcts in the bilateral corona radiata and right thalamocapsular junction.    - EEG: EEG shows no epileptiform or seizures - slowing +, c/w encephalopathy

## 2020-08-26 NOTE — PHYSICAL THERAPY INITIAL EVALUATION ADULT - MODALITIES TREATMENT COMMENTS
Pt OOB in chair, NAD, happy and calm throughout session, 1:1 present, CBIR, tray table in front, denies pain, RN aware.

## 2020-08-27 ENCOUNTER — TRANSCRIPTION ENCOUNTER (OUTPATIENT)
Age: 85
End: 2020-08-27

## 2020-08-27 VITALS
DIASTOLIC BLOOD PRESSURE: 82 MMHG | SYSTOLIC BLOOD PRESSURE: 149 MMHG | RESPIRATION RATE: 18 BRPM | HEART RATE: 57 BPM | OXYGEN SATURATION: 99 % | TEMPERATURE: 97 F

## 2020-08-27 LAB
ANION GAP SERPL CALC-SCNC: 3 MMOL/L — LOW (ref 5–17)
BUN SERPL-MCNC: 19 MG/DL — SIGNIFICANT CHANGE UP (ref 7–23)
CALCIUM SERPL-MCNC: 9.1 MG/DL — SIGNIFICANT CHANGE UP (ref 8.5–10.1)
CHLORIDE SERPL-SCNC: 107 MMOL/L — SIGNIFICANT CHANGE UP (ref 96–108)
CO2 SERPL-SCNC: 30 MMOL/L — SIGNIFICANT CHANGE UP (ref 22–31)
CREAT SERPL-MCNC: 0.76 MG/DL — SIGNIFICANT CHANGE UP (ref 0.5–1.3)
GLUCOSE SERPL-MCNC: 95 MG/DL — SIGNIFICANT CHANGE UP (ref 70–99)
HCT VFR BLD CALC: 41.3 % — SIGNIFICANT CHANGE UP (ref 39–50)
HGB BLD-MCNC: 13.2 G/DL — SIGNIFICANT CHANGE UP (ref 13–17)
MCHC RBC-ENTMCNC: 27.9 PG — SIGNIFICANT CHANGE UP (ref 27–34)
MCHC RBC-ENTMCNC: 32 GM/DL — SIGNIFICANT CHANGE UP (ref 32–36)
MCV RBC AUTO: 87.3 FL — SIGNIFICANT CHANGE UP (ref 80–100)
PLATELET # BLD AUTO: 218 K/UL — SIGNIFICANT CHANGE UP (ref 150–400)
POTASSIUM SERPL-MCNC: 3.9 MMOL/L — SIGNIFICANT CHANGE UP (ref 3.5–5.3)
POTASSIUM SERPL-SCNC: 3.9 MMOL/L — SIGNIFICANT CHANGE UP (ref 3.5–5.3)
RBC # BLD: 4.73 M/UL — SIGNIFICANT CHANGE UP (ref 4.2–5.8)
RBC # FLD: 13.5 % — SIGNIFICANT CHANGE UP (ref 10.3–14.5)
SODIUM SERPL-SCNC: 140 MMOL/L — SIGNIFICANT CHANGE UP (ref 135–145)
WBC # BLD: 7.85 K/UL — SIGNIFICANT CHANGE UP (ref 3.8–10.5)
WBC # FLD AUTO: 7.85 K/UL — SIGNIFICANT CHANGE UP (ref 3.8–10.5)

## 2020-08-27 PROCEDURE — 99239 HOSP IP/OBS DSCHRG MGMT >30: CPT

## 2020-08-27 RX ORDER — ATORVASTATIN CALCIUM 80 MG/1
1 TABLET, FILM COATED ORAL
Qty: 30 | Refills: 0
Start: 2020-08-27 | End: 2020-09-25

## 2020-08-27 RX ORDER — ATORVASTATIN CALCIUM 80 MG/1
1 TABLET, FILM COATED ORAL
Qty: 0 | Refills: 0 | DISCHARGE
Start: 2020-08-27

## 2020-08-27 RX ORDER — ASPIRIN/CALCIUM CARB/MAGNESIUM 324 MG
1 TABLET ORAL
Qty: 0 | Refills: 0 | DISCHARGE
Start: 2020-08-27

## 2020-08-27 RX ORDER — PANTOPRAZOLE SODIUM 20 MG/1
1 TABLET, DELAYED RELEASE ORAL
Qty: 30 | Refills: 0
Start: 2020-08-27 | End: 2020-09-25

## 2020-08-27 RX ORDER — SELEGILINE HYDROCHLORIDE 1.25 MG/1
1 TABLET, ORALLY DISINTEGRATING ORAL
Qty: 0 | Refills: 0 | DISCHARGE

## 2020-08-27 RX ORDER — LISINOPRIL 2.5 MG/1
1 TABLET ORAL
Qty: 30 | Refills: 0
Start: 2020-08-27

## 2020-08-27 RX ORDER — TAMSULOSIN HYDROCHLORIDE 0.4 MG/1
1 CAPSULE ORAL
Qty: 0 | Refills: 0 | DISCHARGE

## 2020-08-27 RX ORDER — QUETIAPINE FUMARATE 200 MG/1
0.5 TABLET, FILM COATED ORAL
Qty: 15 | Refills: 0
Start: 2020-08-27 | End: 2020-09-25

## 2020-08-27 RX ORDER — LISINOPRIL 2.5 MG/1
1 TABLET ORAL
Qty: 30 | Refills: 0
Start: 2020-08-27 | End: 2020-09-25

## 2020-08-27 RX ORDER — QUETIAPINE FUMARATE 200 MG/1
2 TABLET, FILM COATED ORAL
Qty: 0 | Refills: 0 | DISCHARGE
Start: 2020-08-27

## 2020-08-27 RX ORDER — LISINOPRIL 2.5 MG/1
1 TABLET ORAL
Qty: 0 | Refills: 0 | DISCHARGE

## 2020-08-27 RX ADMIN — CARBIDOPA AND LEVODOPA 1 TABLET(S): 25; 100 TABLET ORAL at 12:32

## 2020-08-27 RX ADMIN — Medication 1 TABLET(S): at 09:11

## 2020-08-27 RX ADMIN — HEPARIN SODIUM 5000 UNIT(S): 5000 INJECTION INTRAVENOUS; SUBCUTANEOUS at 09:11

## 2020-08-27 RX ADMIN — QUETIAPINE FUMARATE 12.5 MILLIGRAM(S): 200 TABLET, FILM COATED ORAL at 09:11

## 2020-08-27 RX ADMIN — LISINOPRIL 5 MILLIGRAM(S): 2.5 TABLET ORAL at 09:11

## 2020-08-27 RX ADMIN — PANTOPRAZOLE SODIUM 40 MILLIGRAM(S): 20 TABLET, DELAYED RELEASE ORAL at 05:54

## 2020-08-27 RX ADMIN — CARBIDOPA AND LEVODOPA 1 TABLET(S): 25; 100 TABLET ORAL at 05:54

## 2020-08-27 NOTE — DISCHARGE NOTE PROVIDER - HOSPITAL COURSE
83yo/M with PMH Parkinson's, HTN, BPH, Prostate CA admitted for: Syncope. h/o falls      suspect vasovagal, unlikely ACS, trop neg. Stress test is neg, CT head neg, trauma work up neg.  Nuclear stress test - normal.     Hold HCTZ, S/p IVF, MRI shows chronic tiny lacunar infarcts in corona radiata, EEG with o epileptiform or seizures - slowing +, c/w encephalopathy.    Pt. with Parkinson's with acute psychosis, started on seroquel with improvement of symptoms.    Pt. is stable for discharge home with home PT and will follow up with PCP/Cardiology - Dr. Arredondo and with neurology.         PHYSICAL EXAM:    Vital Signs Last 24 Hrs    T(C): 36.2 (27 Aug 2020 08:26), Max: 36.7 (26 Aug 2020 20:45)    T(F): 97.1 (27 Aug 2020 08:26), Max: 98 (26 Aug 2020 20:45)    HR: 57 (27 Aug 2020 08:26) (57 - 75)    BP: 149/82 (27 Aug 2020 08:26) (144/73 - 149/82)    BP(mean): --    RR: 18 (27 Aug 2020 08:26) (17 - 18)    SpO2: 99% (27 Aug 2020 08:26) (99% - 100%)    Constitutional: NAD, awake and alert, well-developed    HEENT: PERR, EOMI, Normal Hearing, MMM    Neck: Soft and supple, No LAD, No JVD    Respiratory: Breath sounds are clear bilaterally, No wheezing, rales or rhonchi    Cardiovascular: S1 and S2, regular rate and rhythm, no Murmurs, gallops or rubs    Gastrointestinal: Bowel Sounds present, soft, nontender, nondistended, no guarding, no rebound    Extremities: No peripheral edema    Vascular: 2+ peripheral pulses    Neurological: A/O x 3, no focal deficits    Musculoskeletal: 5/5 strength b/l upper and lower extremities    Skin: No rashes 83yo/M with PMH Parkinson's, HTN, BPH, Prostate CA admitted for: Syncope. h/o falls      suspect vasovagal, unlikely ACS, trop neg. Stress test is neg, CT head neg, trauma work up neg.  Nuclear stress test - normal.     Hold HCTZ, S/p IVF, MRI shows chronic tiny lacunar infarcts in corona radiata, EEG with o epileptiform or seizures - slowing +, c/w encephalopathy.    Pt. with Parkinson's with acute psychosis, started on seroquel with improvement of symptoms.    Pt. is stable for discharge home with home PT and will follow up with PCP/Cardiology - Dr. Arredondo and with neurology.         Care discussed with NP. No new complaints. Denies any HA, CP, SOB. d/c planning. We stopped patient psych meds ehsan patient's initial admission in the hospital with lethargy and AMS with aggitation.         PHYSICAL EXAM:    Vital Signs Last 24 Hrs    T(C): 36.2 (27 Aug 2020 08:26), Max: 36.7 (26 Aug 2020 20:45)    T(F): 97.1 (27 Aug 2020 08:26), Max: 98 (26 Aug 2020 20:45)    HR: 57 (27 Aug 2020 08:26) (57 - 75)    BP: 149/82 (27 Aug 2020 08:26) (144/73 - 149/82)    BP(mean): --    RR: 18 (27 Aug 2020 08:26) (17 - 18)    SpO2: 99% (27 Aug 2020 08:26) (99% - 100%)    Constitutional: NAD, awake and alert, well-developed    HEENT: PERR, EOMI, Normal Hearing, MMM    Neck: Soft and supple, No LAD, No JVD    Respiratory: Breath sounds are clear bilaterally, No wheezing, rales or rhonchi    Cardiovascular: S1 and S2, regular rate and rhythm, no Murmurs, gallops or rubs    Gastrointestinal: Bowel Sounds present, soft, nontender, nondistended, no guarding, no rebound    Extremities: No peripheral edema    Vascular: 2+ peripheral pulses    Neurological: A/O x 3, no focal deficits    Musculoskeletal: 5/5 strength b/l upper and lower extremities    Skin: No rashes

## 2020-08-27 NOTE — DISCHARGE NOTE PROVIDER - CARE PROVIDERS DIRECT ADDRESSES
[email or web address]

## 2020-08-27 NOTE — DISCHARGE NOTE PROVIDER - NSDCCPCAREPLAN_GEN_ALL_CORE_FT
PRINCIPAL DISCHARGE DIAGNOSIS  Diagnosis: Syncope, unspecified syncope type  Assessment and Plan of Treatment: vasovagal, hold HCTZ  MRI - old infarctsnuclear stress test - nromal      SECONDARY DISCHARGE DIAGNOSES  Diagnosis: Metabolic encephalopathy  Assessment and Plan of Treatment: cont. seroquel  EEG - no seizures

## 2020-08-27 NOTE — DISCHARGE NOTE PROVIDER - NSDCCPTREATMENT_GEN_ALL_CORE_FT
PRINCIPAL PROCEDURE  Procedure: MRI head  Findings and Treatment:   EXAM:  MR BRAIN                        PROCEDURE DATE:  08/24/2020    INTERPRETATION:  CLINICAL INDICATION: Syncope with altered mental status, evaluate for CVA.  TECHNIQUE: Multi-planar multi-sequential MR imaging of the brain was performed without intravenous contrast.  COMPARISON: CT head 8/23/2020.  FINDINGS:  There are scattered and patchy T2/FLAIR hyperintense changes in the periventricular and subcortical white matter and also in the erika, nonspecific finding, but most likely representing sequelae of moderate chronic microvascular ischemic disease. There are tiny chronic lacunar infarcts in the bilateral corona radiata and right thalamocapsular junction. There is diffuse cortical sulcal prominence related to underlying brain parenchymal volume loss.  No acute infarction, intracranial hemorrhage or mass.  There is no evidence of hydrocephalus. There are no extra-axial fluid collections. The visualized skull base flow voids are patent.  The visualized intraorbital contents are normal. There are mild mucosal inflammatory changes of the ethmoid air cells and also thickening in the right maxillary sinus. There is a small left mastoid air cell effusion. The right mastoid air cells are clear. The visualized soft tissues and osseous structures appear markable.  IMPRESSION:  Moderate chronic microvascular ischemic disease.  Tiny chronic lacunar infarcts in the bilateral corona radiata and right thalamocapsular junction.        SECONDARY PROCEDURE  Procedure: Nuclear medicine cardiopulmonary stress test  Findings and Treatment:   IMPRESSION: Normal SPECT Myocardial Perfusion Imaging.  No scan  evidence of reversible or fixed perfusion defects.  Normal left ventricular contractility with an ejection fraction of 84% (Normal: 50% or greater).  No regional wall motion abnormalities.  Please refer to cardiac stress test report for dosage of Regadenoson administered, EKG findings and symptoms during the procedure.

## 2020-08-27 NOTE — DISCHARGE NOTE NURSING/CASE MANAGEMENT/SOCIAL WORK - PATIENT PORTAL LINK FT
You can access the FollowMyHealth Patient Portal offered by [facility] by registering at the following website: [email or web address]. By joining [facility]’s FollowMyHealth portal, you will also be able to view your health information using other applications (apps) compatible with our system.

## 2020-08-27 NOTE — DISCHARGE NOTE PROVIDER - CARE PROVIDER_API CALL
Miko Arredondo  CARDIOVASCULAR DISEASE  [address] [place] [address]  Phone: [phone]  Fax: [phone]  Follow Up Time:     Maria R Uriarte  NEUROLOGY  [address] [place] [address]  Phone: [phone]  Fax: [phone]  Follow Up Time:

## 2020-08-27 NOTE — DISCHARGE NOTE PROVIDER - NSDCMRMEDTOKEN_GEN_ALL_CORE_FT
aspirin 81 mg oral delayed release tablet: 1 tab(s) orally once a day  atorvastatin 40 mg oral tablet: 1 tab(s) orally once a day (at bedtime)  carbidopa-levodopa 25 mg-100 mg oral tablet: 1 tab(s) orally 3 times a day  ***takes at 8am, 12pm, and 4pm***  Multiple Vitamins oral tablet: 1 tab(s) orally once a day  Nasal Saline 0.65% nasal spray: 1 spray(s) in each nostril once a day  Osteo Bi-Flex 250 mg-200 mg oral tablet: 1 tab(s) orally 2 times a day  Toprol-XL 25 mg oral tablet, extended release: 0.5 tab(s) orally once a day (at bedtime) aspirin 81 mg oral delayed release tablet: 1 tab(s) orally once a day  atorvastatin 40 mg oral tablet: 1 tab(s) orally once a day (at bedtime)  carbidopa-levodopa 25 mg-100 mg oral tablet: 1 tab(s) orally 3 times a day  ***takes at 8am, 12pm, and 4pm***  lisinopril 5 mg oral tablet: 1 tab(s) orally once a day  Multiple Vitamins oral tablet: 1 tab(s) orally once a day  Nasal Saline 0.65% nasal spray: 1 spray(s) in each nostril once a day  Osteo Bi-Flex 250 mg-200 mg oral tablet: 1 tab(s) orally 2 times a day  pantoprazole 40 mg oral delayed release tablet: 1 tab(s) orally once a day (before a meal)  SEROquel 25 mg oral tablet: 0.5 tab(s) orally once a day   Toprol-XL 25 mg oral tablet, extended release: 0.5 tab(s) orally once a day (at bedtime)

## 2020-08-28 LAB
CULTURE RESULTS: SIGNIFICANT CHANGE UP
CULTURE RESULTS: SIGNIFICANT CHANGE UP
SPECIMEN SOURCE: SIGNIFICANT CHANGE UP
SPECIMEN SOURCE: SIGNIFICANT CHANGE UP

## 2020-08-31 DIAGNOSIS — G20 PARKINSON'S DISEASE: ICD-10-CM

## 2020-08-31 DIAGNOSIS — C61 MALIGNANT NEOPLASM OF PROSTATE: ICD-10-CM

## 2020-08-31 DIAGNOSIS — N40.0 BENIGN PROSTATIC HYPERPLASIA WITHOUT LOWER URINARY TRACT SYMPTOMS: ICD-10-CM

## 2020-08-31 DIAGNOSIS — I10 ESSENTIAL (PRIMARY) HYPERTENSION: ICD-10-CM

## 2020-08-31 DIAGNOSIS — Z86.73 PERSONAL HISTORY OF TRANSIENT ISCHEMIC ATTACK (TIA), AND CEREBRAL INFARCTION WITHOUT RESIDUAL DEFICITS: ICD-10-CM

## 2020-08-31 DIAGNOSIS — F23 BRIEF PSYCHOTIC DISORDER: ICD-10-CM

## 2020-08-31 DIAGNOSIS — Z98.890 OTHER SPECIFIED POSTPROCEDURAL STATES: ICD-10-CM

## 2020-08-31 DIAGNOSIS — Z79.899 OTHER LONG TERM (CURRENT) DRUG THERAPY: ICD-10-CM

## 2020-08-31 DIAGNOSIS — Z91.018 ALLERGY TO OTHER FOODS: ICD-10-CM

## 2020-08-31 DIAGNOSIS — G93.41 METABOLIC ENCEPHALOPATHY: ICD-10-CM

## 2020-08-31 DIAGNOSIS — E86.0 DEHYDRATION: ICD-10-CM

## 2020-08-31 DIAGNOSIS — M81.0 AGE-RELATED OSTEOPOROSIS WITHOUT CURRENT PATHOLOGICAL FRACTURE: ICD-10-CM

## 2020-08-31 DIAGNOSIS — R55 SYNCOPE AND COLLAPSE: ICD-10-CM

## 2020-08-31 DIAGNOSIS — Z79.82 LONG TERM (CURRENT) USE OF ASPIRIN: ICD-10-CM

## 2020-09-28 ENCOUNTER — EMERGENCY (EMERGENCY)
Facility: HOSPITAL | Age: 85
LOS: 0 days | Discharge: HOME | End: 2020-09-28
Attending: EMERGENCY MEDICINE
Payer: MEDICARE

## 2020-09-28 VITALS
SYSTOLIC BLOOD PRESSURE: 174 MMHG | HEART RATE: 70 BPM | OXYGEN SATURATION: 96 % | DIASTOLIC BLOOD PRESSURE: 80 MMHG | RESPIRATION RATE: 16 BRPM

## 2020-09-28 VITALS
HEIGHT: 69 IN | SYSTOLIC BLOOD PRESSURE: 163 MMHG | TEMPERATURE: 98 F | HEART RATE: 65 BPM | OXYGEN SATURATION: 100 % | DIASTOLIC BLOOD PRESSURE: 107 MMHG | WEIGHT: 139.99 LBS | RESPIRATION RATE: 18 BRPM

## 2020-09-28 DIAGNOSIS — M81.0 AGE-RELATED OSTEOPOROSIS WITHOUT CURRENT PATHOLOGICAL FRACTURE: ICD-10-CM

## 2020-09-28 DIAGNOSIS — Z23 ENCOUNTER FOR IMMUNIZATION: ICD-10-CM
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
## 2020-09-28 DIAGNOSIS — S30.811A ABRASION OF ABDOMINAL WALL, INITIAL ENCOUNTER: ICD-10-CM

## 2020-09-28 DIAGNOSIS — Z98.890 OTHER SPECIFIED POSTPROCEDURAL STATES: Chronic | ICD-10-CM

## 2020-09-28 DIAGNOSIS — Z85.46 PERSONAL HISTORY OF MALIGNANT NEOPLASM OF PROSTATE: ICD-10-CM

## 2020-09-28 DIAGNOSIS — S01.01XA LACERATION WITHOUT FOREIGN BODY OF SCALP, INITIAL ENCOUNTER: ICD-10-CM

## 2020-09-28 DIAGNOSIS — I10 ESSENTIAL (PRIMARY) HYPERTENSION: ICD-10-CM

## 2020-09-28 DIAGNOSIS — G20 PARKINSON'S DISEASE: ICD-10-CM

## 2020-09-28 DIAGNOSIS — Z79.82 LONG TERM (CURRENT) USE OF ASPIRIN: ICD-10-CM

## 2020-09-28 DIAGNOSIS — S09.90XA UNSPECIFIED INJURY OF HEAD, INITIAL ENCOUNTER: ICD-10-CM
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
## 2020-09-28 DIAGNOSIS — S40.211A ABRASION OF RIGHT SHOULDER, INITIAL ENCOUNTER: ICD-10-CM

## 2020-09-28 LAB
ALBUMIN SERPL ELPH-MCNC: 3.5 G/DL — SIGNIFICANT CHANGE UP (ref 3.3–5)
ALP SERPL-CCNC: 300 U/L — HIGH (ref 40–120)
ALT FLD-CCNC: 23 U/L — SIGNIFICANT CHANGE UP (ref 12–78)
ANION GAP SERPL CALC-SCNC: 4 MMOL/L — LOW (ref 5–17)
APTT BLD: 29.4 SEC — SIGNIFICANT CHANGE UP (ref 27.5–35.5)
AST SERPL-CCNC: 34 U/L — SIGNIFICANT CHANGE UP (ref 15–37)
BASOPHILS # BLD AUTO: 0.06 K/UL — SIGNIFICANT CHANGE UP (ref 0–0.2)
BASOPHILS NFR BLD AUTO: 0.6 % — SIGNIFICANT CHANGE UP (ref 0–2)
BILIRUB SERPL-MCNC: 0.7 MG/DL — SIGNIFICANT CHANGE UP (ref 0.2–1.2)
BUN SERPL-MCNC: 32 MG/DL — HIGH (ref 7–23)
CALCIUM SERPL-MCNC: 8.8 MG/DL — SIGNIFICANT CHANGE UP (ref 8.5–10.1)
CHLORIDE SERPL-SCNC: 108 MMOL/L — SIGNIFICANT CHANGE UP (ref 96–108)
CK SERPL-CCNC: 149 U/L — SIGNIFICANT CHANGE UP (ref 26–308)
CO2 SERPL-SCNC: 30 MMOL/L — SIGNIFICANT CHANGE UP (ref 22–31)
CREAT SERPL-MCNC: 0.98 MG/DL — SIGNIFICANT CHANGE UP (ref 0.5–1.3)
EOSINOPHIL # BLD AUTO: 0.35 K/UL — SIGNIFICANT CHANGE UP (ref 0–0.5)
EOSINOPHIL NFR BLD AUTO: 3.4 % — SIGNIFICANT CHANGE UP (ref 0–6)
GLUCOSE SERPL-MCNC: 105 MG/DL — HIGH (ref 70–99)
HCT VFR BLD CALC: 39.7 % — SIGNIFICANT CHANGE UP (ref 39–50)
HGB BLD-MCNC: 13 G/DL — SIGNIFICANT CHANGE UP (ref 13–17)
IMM GRANULOCYTES NFR BLD AUTO: 0.6 % — SIGNIFICANT CHANGE UP (ref 0–1.5)
INR BLD: 0.99 RATIO — SIGNIFICANT CHANGE UP (ref 0.88–1.16)
LYMPHOCYTES # BLD AUTO: 1.29 K/UL — SIGNIFICANT CHANGE UP (ref 1–3.3)
LYMPHOCYTES # BLD AUTO: 12.7 % — LOW (ref 13–44)
MCHC RBC-ENTMCNC: 28.4 PG — SIGNIFICANT CHANGE UP (ref 27–34)
MCHC RBC-ENTMCNC: 32.7 GM/DL — SIGNIFICANT CHANGE UP (ref 32–36)
MCV RBC AUTO: 86.7 FL — SIGNIFICANT CHANGE UP (ref 80–100)
MONOCYTES # BLD AUTO: 0.64 K/UL — SIGNIFICANT CHANGE UP (ref 0–0.9)
MONOCYTES NFR BLD AUTO: 6.3 % — SIGNIFICANT CHANGE UP (ref 2–14)
NEUTROPHILS # BLD AUTO: 7.78 K/UL — HIGH (ref 1.8–7.4)
NEUTROPHILS NFR BLD AUTO: 76.4 % — SIGNIFICANT CHANGE UP (ref 43–77)
PLATELET # BLD AUTO: 191 K/UL — SIGNIFICANT CHANGE UP (ref 150–400)
POTASSIUM SERPL-MCNC: 4.1 MMOL/L — SIGNIFICANT CHANGE UP (ref 3.5–5.3)
POTASSIUM SERPL-SCNC: 4.1 MMOL/L — SIGNIFICANT CHANGE UP (ref 3.5–5.3)
PROT SERPL-MCNC: 7 GM/DL — SIGNIFICANT CHANGE UP (ref 6–8.3)
PROTHROM AB SERPL-ACNC: 11.6 SEC — SIGNIFICANT CHANGE UP (ref 10.6–13.6)
RBC # BLD: 4.58 M/UL — SIGNIFICANT CHANGE UP (ref 4.2–5.8)
RBC # FLD: 13.7 % — SIGNIFICANT CHANGE UP (ref 10.3–14.5)
SARS-COV-2 RNA SPEC QL NAA+PROBE: SIGNIFICANT CHANGE UP
SODIUM SERPL-SCNC: 142 MMOL/L — SIGNIFICANT CHANGE UP (ref 135–145)
TROPONIN I SERPL-MCNC: <0.015 NG/ML — SIGNIFICANT CHANGE UP (ref 0.01–0.04)
WBC # BLD: 10.18 K/UL — SIGNIFICANT CHANGE UP (ref 3.8–10.5)
WBC # FLD AUTO: 10.18 K/UL — SIGNIFICANT CHANGE UP (ref 3.8–10.5)

## 2020-09-28 PROCEDURE — 12002 RPR S/N/AX/GEN/TRNK2.6-7.5CM: CPT

## 2020-09-28 PROCEDURE — 86900 BLOOD TYPING SEROLOGIC ABO: CPT

## 2020-09-28 PROCEDURE — 82962 GLUCOSE BLOOD TEST: CPT

## 2020-09-28 PROCEDURE — 70450 CT HEAD/BRAIN W/O DYE: CPT | Mod: 26

## 2020-09-28 PROCEDURE — 99283 EMERGENCY DEPT VISIT LOW MDM: CPT

## 2020-09-28 PROCEDURE — 82550 ASSAY OF CK (CPK): CPT

## 2020-09-28 PROCEDURE — 84484 ASSAY OF TROPONIN QUANT: CPT

## 2020-09-28 PROCEDURE — 85730 THROMBOPLASTIN TIME PARTIAL: CPT

## 2020-09-28 PROCEDURE — 93010 ELECTROCARDIOGRAM REPORT: CPT

## 2020-09-28 PROCEDURE — 85610 PROTHROMBIN TIME: CPT

## 2020-09-28 PROCEDURE — 72125 CT NECK SPINE W/O DYE: CPT

## 2020-09-28 PROCEDURE — 71260 CT THORAX DX C+: CPT

## 2020-09-28 PROCEDURE — 74177 CT ABD & PELVIS W/CONTRAST: CPT

## 2020-09-28 PROCEDURE — 86901 BLOOD TYPING SEROLOGIC RH(D): CPT

## 2020-09-28 PROCEDURE — U0003: CPT

## 2020-09-28 PROCEDURE — 85025 COMPLETE CBC W/AUTO DIFF WBC: CPT

## 2020-09-28 PROCEDURE — 71260 CT THORAX DX C+: CPT | Mod: 26

## 2020-09-28 PROCEDURE — 72125 CT NECK SPINE W/O DYE: CPT | Mod: 26

## 2020-09-28 PROCEDURE — 99284 EMERGENCY DEPT VISIT MOD MDM: CPT | Mod: 25

## 2020-09-28 PROCEDURE — 74177 CT ABD & PELVIS W/CONTRAST: CPT | Mod: 26

## 2020-09-28 PROCEDURE — 80053 COMPREHEN METABOLIC PANEL: CPT

## 2020-09-28 PROCEDURE — 93005 ELECTROCARDIOGRAM TRACING: CPT | Mod: XU

## 2020-09-28 PROCEDURE — 36415 COLL VENOUS BLD VENIPUNCTURE: CPT

## 2020-09-28 PROCEDURE — 70450 CT HEAD/BRAIN W/O DYE: CPT

## 2020-09-28 PROCEDURE — 86850 RBC ANTIBODY SCREEN: CPT

## 2020-09-28 PROCEDURE — 90471 IMMUNIZATION ADMIN: CPT

## 2020-09-28 PROCEDURE — 90715 TDAP VACCINE 7 YRS/> IM: CPT

## 2020-09-28 RX ORDER — ACETAMINOPHEN 500 MG
650 TABLET ORAL ONCE
Refills: 0 | Status: COMPLETED | OUTPATIENT
Start: 2020-09-28 | End: 2020-09-28

## 2020-09-28 RX ORDER — TETANUS TOXOID, REDUCED DIPHTHERIA TOXOID AND ACELLULAR PERTUSSIS VACCINE, ADSORBED 5; 2.5; 8; 8; 2.5 [IU]/.5ML; [IU]/.5ML; UG/.5ML; UG/.5ML; UG/.5ML
0.5 SUSPENSION INTRAMUSCULAR ONCE
Refills: 0 | Status: COMPLETED | OUTPATIENT
Start: 2020-09-28 | End: 2020-09-28

## 2020-09-28 RX ADMIN — TETANUS TOXOID, REDUCED DIPHTHERIA TOXOID AND ACELLULAR PERTUSSIS VACCINE, ADSORBED 0.5 MILLILITER(S): 5; 2.5; 8; 8; 2.5 SUSPENSION INTRAMUSCULAR at 10:30

## 2020-09-28 NOTE — ED ADULT NURSE NOTE - CAS EDN DISCHARGE INTERVENTIONS
Encounter Summary
  Created on: 2020
 
 SayraSusana
 External Reference #: [id number]
 : 49
 Sex: Female
 
 Demographics
 
 
+-----------------------+---------------------------+
| Address               | [address]            |
|                       | [place]  [address] |
+-----------------------+---------------------------+
| Home Phone            | [phone]           |
+-----------------------+---------------------------+
| Preferred Language    | Unknown                   |
+-----------------------+---------------------------+
| Marital Status        |                    |
+-----------------------+---------------------------+
| Zoroastrian Affiliation | [id number]                      |
+-----------------------+---------------------------+
| Race                  | White                     |
+-----------------------+---------------------------+
| Ethnic Group          | Not  or     |
+-----------------------+---------------------------+
 
 
 Author
 
 
+--------------+--------------------------------------------+
| Author       | [facility]  |
|              | and [place]                                |
+--------------+--------------------------------------------+
| Organization | [facility]  |
|              | [facility] [place]                                |
+--------------+--------------------------------------------+
| Address      | Unknown                                    |
+--------------+--------------------------------------------+
| Phone        | Unavailable                                |
+--------------+--------------------------------------------+
 
 
 
 Support
 
 
+-------------+--------------+-------------------+-----------------+
| Name        | Relationship | Address           | Phone           |
+-------------+--------------+-------------------+-----------------+
| Eduar Colbert | ECON         | [address]       | [phone] |
|             |              | [place]   |                 |
|             |              | [id number]             |                 |
+-------------+--------------+-------------------+-----------------+
 
 
 
 
 Care Team Providers
 
 
+-------------------------+------+-----------------+
| Care Team Member Name   | Role | Phone           |
+-------------------------+------+-----------------+
| Tobin Alex MD | PCP  | [phone] |
+-------------------------+------+-----------------+
 
 
 
 Reason for Visit
 
 
+-------------+--------+----------------------------+
| Reason      | Onset  | Comments                   |
|             | Date   |                            |
+-------------+--------+----------------------------+
| Appointment | / | cancel egd,colon due to AF |
|             |    |                            |
+-------------+--------+----------------------------+
 
 
 
 Encounter Details
 
 
+--------+-----------+----------------------+----------------------+----------------------+
| Date   | Type      | Department           | Care Team            | Description          |
+--------+-----------+----------------------+----------------------+----------------------+
| / | Telephone |   [facility]          |   Mahin Leiva MD | Appointment (cancel  |
| 2015   |           | GASTROENTEROLOGY     |   [address] [place]  | egd,colon due to AF) |
|        |           | [address]  | [address] |                      |
|        |           | [address]  [place], [place] |  [address]  [phone] |                      |
|        |           |  [address]          |   [phone] (Fax) |                      |
|        |           | [phone]         |                      |                      |
+--------+-----------+----------------------+----------------------+----------------------+
 
 
 
 Social History
 
 
+---------------+-------+-----------+--------+------+
| Tobacco Use   | Types | Packs/Day | Years  | Date |
|               |       |           | Used   |      |
+---------------+-------+-----------+--------+------+
| Former Smoker |       |           |        |      |
+---------------+-------+-----------+--------+------+
 
 
 
+---------------------+---+---+---+
| Smokeless Tobacco:  |   |   |   |
| Never Used          |   |   |   |
+---------------------+---+---+---+
 
 
 
 
+------------------------+
| Comments: 10 years ago |
+------------------------+
 
 
 
+-------------+----------------------+---------+------------------+
| Alcohol Use | Drinks/Week          | oz/Week | Comments         |
+-------------+----------------------+---------+------------------+
| Yes         |   0 Standard drinks  | 0.0     | "social drinker" |
|             | or equivalent        |         |                  |
+-------------+----------------------+---------+------------------+
 
 
 
+------------------+---------------+
| Sex Assigned at  | Date Recorded |
| Birth            |               |
+------------------+---------------+
| Not on file      |               |
+------------------+---------------+
 documented as of this encounter
 
 Miscellaneous Notes
 Telephone Encounter - Nusrat Bustillos RN - 2015  2:05 PM PSTPatient states she 
started going into Atrial fib today. It started last Friday for a few hours.  It went away. 
It came back this afternoon. Spoke with Dr Leiva and he felt patient should be reevaluated b
cale Alex and or her cardiologist.  Once we have cardiology clearance we will  reschedule t
he EGD and colon.  Would consider using anesthesia.  Patient needs antibiotics.  Message sen
t to scheduling.Electronically signed by Nusrat Bustillos RN at 2015  2:15 PM PSTT
elephone Encounter - Clara Garcia - 2015  1:38 PM PSTPatient called and has a p
rocedure in the morning and has some questions prior to starting the prep at 4. Please give 
her a call backElectronically signed by Clara Garcia at 2015  1:38 PM PSTjanes ramos in this encounter
 
 Plan of Treatment
 
 
+--------+---------+-----------+----------------------+-------------+
| Date   | Type    | Specialty | Care Team            | Description |
+--------+---------+-----------+----------------------+-------------+
| 10/12/ | Office  | Neurology |   Jamni Brooks MD  [address] |             |
| 2020   | Visit   |           |  [address]      |             |
|        |         |           | [place]  |             |
|        |         |           | [place] [address]             |             |
|        |         |           | [phone]         |             |
|        |         |           | [phone] (Fax)   |             |
+--------+---------+-----------+----------------------+-------------+
 documented as of this encounter
 
 Visit Diagnoses
 Not on filedocumented in this encounter IV discontinued, cath removed intact

## 2020-09-28 NOTE — ED ADULT NURSE NOTE - NSIMPLEMENTINTERV_GEN_ALL_ED
Implemented All Fall Risk Interventions:  [place] to call system. Call bell, personal items and telephone within reach. Instruct patient to call for assistance. Room bathroom lighting operational. Non-slip footwear when patient is off stretcher. Physically safe environment: no spills, clutter or unnecessary equipment. Stretcher in lowest position, wheels locked, appropriate side rails in place. Provide visual cue, wrist band, yellow gown, etc. Monitor gait and stability. Monitor for mental status changes and reorient to person, place, and time. Review medications for side effects contributing to fall risk. Reinforce activity limits and safety measures with patient and family.

## 2020-09-28 NOTE — ED PROVIDER NOTE - NS_ ATTENDINGSCRIBEDETAILS _ED_A_ED_FT
I, Servando Montgomery MD,  performed the initial face to face bedside interview with this patient regarding history of present illness, review of symptoms and relevant past medical, social and family history.  I completed an independent physical examination.  I was the initial provider who evaluated this patient.  The history, relevant review of systems, past medical and surgical history, medical decision making, and physical examination was documented by the scribe in my presence and I attest to the accuracy of the documentation.

## 2020-09-28 NOTE — ED ADULT TRIAGE NOTE - CHIEF COMPLAINT QUOTE
pt presents to ED brought in by ambulance from home due to unwitnessed fall on ASA hit head unknown downtime TA called

## 2020-09-28 NOTE — ED PROVIDER NOTE - PHYSICAL EXAMINATION
Constitutional: NAD AAOx3  Eyes: PERRLA EOMI  Head: Normocephalic atraumatic  Mouth: MMM  Cardiac: regular rate   Resp: Lungs CTAB  GI: Abd s/nt/nd  Neuro: CN2-12 intact  Skin: No rashes. 3cm laceration to occiput. Abrasions to right shoulder and left flank.    MSK: No midline spinal tenderness. Tenderness left chest wall. No tenderness to left shoulder, elbow or wrist. Pelvis stable. No tenderness to hips, knees or ankles. Constitutional: NAD   Eyes: PERRLA EOMI  Head: Normocephalic atraumatic no raccoon eyes or valle sign  Mouth: MMM  Cardiac: regular rate   Resp: Lungs CTAB  GI: Abd s/nt/nd  Neuro: CN2-12 intact  Skin: No rashes. 3cm laceration to occiput. Abrasions to right shoulder and left flank.    MSK: No midline spinal tenderness. Tenderness left chest wall. No tenderness to shoulder, elbow or wrist. Pelvis stable. No tenderness to hips, knees or ankles.

## 2020-09-28 NOTE — ED PROVIDER NOTE - NS ED ROS FT
Constitutional: No fever or chills  Eyes: No visual changes  HEENT: No throat pain  CV: No chest pain  Resp: No SOB no cough  GI: No abd pain, nausea or vomiting  : No dysuria  MSK: No musculoskeletal pain  Skin: No rash. +abrasions, +laceration.   Neuro: No headache

## 2020-09-28 NOTE — ED PROVIDER NOTE - PATIENT PORTAL LINK FT
You can access the FollowMyHealth Patient Portal offered by [facility] by registering at the following website: [email or web address]. By joining [employer]’s FollowMyHealth portal, you will also be able to view your health information using other applications (apps) compatible with our system.

## 2020-09-28 NOTE — ED PROVIDER NOTE - OBJECTIVE STATEMENT
85 y/o male with a PMHx of HTN, osteoporosis, Parkinson's, prostate cancer presents to the ED BIBEMS from home s/p unwitnessed fall. Pt found on ground. +abrasions, +laceration. Denies CP, SOB, abd pain. No other complaints at this time. 85 y/o male with a PMHx of HTN, osteoporosis, Parkinson's, prostate cancer presents to the ED BIBEMS from home s/p unwitnessed fall. Pt found on ground. +abrasions, +laceration. Denies CP, SOB, abd pain. No other complaints at this time. pain mild constant non-radiating

## 2020-09-28 NOTE — ED PROVIDER NOTE - PROGRESS NOTE DETAILS
spoke with wife and son on phone - states that pt went downstairs hit head on stair lift at bottom of stairs . family thinks that they are sure that his fall was mechanical fall from his parkinsons. states he is at baseline with speech and movement. sent him in to r/o bleed. cts labs unremarkable. I questioned them to see if any of his movement/speech abnormalities are new and if so would rec admit r/o stroke. they state that it is not new and that they don't want him admitted as his movements and confusion get worse in the hospital. they feel comfortable taking him home and will return if anything worsens. I re-evaluated patient and again no ttp any joints ambulatory at baseline vss. Servando Montgomery M.D., Attending Physician

## 2020-09-28 NOTE — ED PROVIDER NOTE - CLINICAL SUMMARY MEDICAL DECISION MAKING FREE TEXT BOX
83 y/o male with hx of Parkinson's presents to the ED s/p unwitnessed fall. Pt found on ground. Here pt with laceration to occiput as well as abrasions to shoulders and flank. TA called. Will obtain CT, laceration repair and reassess.

## 2020-12-10 ENCOUNTER — EMERGENCY (EMERGENCY)
Facility: HOSPITAL | Age: 85
LOS: 0 days | Discharge: HOME | End: 2020-12-10
Attending: EMERGENCY MEDICINE
Payer: MEDICARE

## 2020-12-10 VITALS
HEART RATE: 72 BPM | OXYGEN SATURATION: 100 % | SYSTOLIC BLOOD PRESSURE: 165 MMHG | RESPIRATION RATE: 16 BRPM | TEMPERATURE: 98 F | DIASTOLIC BLOOD PRESSURE: 93 MMHG

## 2020-12-10 VITALS — WEIGHT: 119.93 LBS | HEIGHT: 69 IN
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
## 2020-12-10 DIAGNOSIS — Z98.890 OTHER SPECIFIED POSTPROCEDURAL STATES: Chronic | ICD-10-CM

## 2020-12-10 DIAGNOSIS — Z86.73 PERSONAL HISTORY OF TRANSIENT ISCHEMIC ATTACK (TIA), AND CEREBRAL INFARCTION WITHOUT RESIDUAL DEFICITS: ICD-10-CM

## 2020-12-10 DIAGNOSIS — I10 ESSENTIAL (PRIMARY) HYPERTENSION: ICD-10-CM

## 2020-12-10 DIAGNOSIS — K21.9 GASTRO-ESOPHAGEAL REFLUX DISEASE WITHOUT ESOPHAGITIS: ICD-10-CM

## 2020-12-10 DIAGNOSIS — Z20.828 CONTACT WITH AND (SUSPECTED) EXPOSURE TO OTHER VIRAL COMMUNICABLE DISEASES: ICD-10-CM

## 2020-12-10 DIAGNOSIS — G20 PARKINSON'S DISEASE: ICD-10-CM
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
## 2020-12-10 DIAGNOSIS — Z85.46 PERSONAL HISTORY OF MALIGNANT NEOPLASM OF PROSTATE: ICD-10-CM

## 2020-12-10 DIAGNOSIS — S61.210A LACERATION WITHOUT FOREIGN BODY OF RIGHT INDEX FINGER WITHOUT DAMAGE TO NAIL, INITIAL ENCOUNTER: ICD-10-CM

## 2020-12-10 DIAGNOSIS — M81.0 AGE-RELATED OSTEOPOROSIS WITHOUT CURRENT PATHOLOGICAL FRACTURE: ICD-10-CM

## 2020-12-10 DIAGNOSIS — N40.0 BENIGN PROSTATIC HYPERPLASIA WITHOUT LOWER URINARY TRACT SYMPTOMS: ICD-10-CM

## 2020-12-10 DIAGNOSIS — S61.411A LACERATION WITHOUT FOREIGN BODY OF RIGHT HAND, INITIAL ENCOUNTER: ICD-10-CM

## 2020-12-10 PROBLEM — C61 MALIGNANT NEOPLASM OF PROSTATE: Chronic | Status: ACTIVE | Noted: 2020-09-28

## 2020-12-10 LAB — SARS-COV-2 RNA SPEC QL NAA+PROBE: SIGNIFICANT CHANGE UP

## 2020-12-10 PROCEDURE — U0003: CPT

## 2020-12-10 PROCEDURE — 73140 X-RAY EXAM OF FINGER(S): CPT | Mod: RT

## 2020-12-10 PROCEDURE — 99284 EMERGENCY DEPT VISIT MOD MDM: CPT | Mod: 25

## 2020-12-10 PROCEDURE — 99284 EMERGENCY DEPT VISIT MOD MDM: CPT | Mod: CS

## 2020-12-10 PROCEDURE — 73140 X-RAY EXAM OF FINGER(S): CPT | Mod: 26,RT

## 2020-12-10 PROCEDURE — 12001 RPR S/N/AX/GEN/TRNK 2.5CM/<: CPT

## 2020-12-10 RX ORDER — CEPHALEXIN 500 MG
1 CAPSULE ORAL
Qty: 28 | Refills: 0
Start: 2020-12-10 | End: 2020-12-16

## 2020-12-10 NOTE — ED STATDOCS - OBJECTIVE STATEMENT
85 y/o M with PMHx of prostate CA, HTN, Parkinson's disease, OP, and s/p hernia repair presents to the ED from home s/p R hand was caught in a door. Attempted to pull hand from door while it was still caught. Notes +skin tears to R 2nd finger and R hand. Pt is still able to move finger. No fever. Tetanus UTD. NKDA. PCP: Dr. Miko Arredondo. 83 y/o M with PMHx of prostate CA, HTN, Parkinson's disease, TIA/cerebral infarction, aortic valve insufficiency, GERD, diverticulosis, OP, and s/p hernia repair presents to the ED from home s/p R hand was caught in a door. Attempted to pull hand from door while it was still caught. Notes +skin tears to R 2nd finger and R hand. Pt is still able to move finger. No fever. Tetanus UTD. NKDA. PCP: Dr. Miko Arredondo.

## 2020-12-10 NOTE — ED STATDOCS - PATIENT PORTAL LINK FT
You can access the FollowMyHealth Patient Portal offered by [facility] by registering at the following website: [email or web address]. By joining [employer]’s FollowMyHealth portal, you will also be able to view your health information using other applications (apps) compatible with our system.

## 2020-12-10 NOTE — ED STATDOCS - NSFOLLOWUPINSTRUCTIONS_ED_ALL_ED_FT
Laceration    WHAT YOU NEED TO KNOW:    A laceration is an injury to the skin and the soft tissue underneath it. Lacerations happen when you are cut or hit by something. They can happen anywhere on the body.     DISCHARGE INSTRUCTIONS:    Return to the emergency department if:     You have heavy bleeding or bleeding that does not stop after 10 minutes of holding firm, direct pressure over the wound.       Your wound opens up.     Contact your healthcare provider if:     You have a fever or chills.       Your laceration is red, warm, or swollen.      You have red streaks on your skin coming from your wound.      You have white or yellow drainage from the wound that smells bad.      You have pain that gets worse, even after treatment.       You have questions or concerns about your condition or care.     Medicines:     Prescription pain medicine may be given. Ask how to take this medicine safely.       Antibiotics help treat or prevent a bacterial infection.       Take your medicine as directed. Contact your healthcare provider if you think your medicine is not helping or if you have side effects. Tell him or her if you are allergic to any medicine. Keep a list of the medicines, vitamins, and herbs you take. Include the amounts, and when and why you take them. Bring the list or the pill bottles to follow-up visits. Carry your medicine list with you in case of an emergency.    Care for your wound as directed:     Do not get your wound wet until your healthcare provider says it is okay. Do not soak your wound in water. Do not go swimming until your healthcare provider says it is okay. Carefully wash the wound with soap and water. Gently pat the area dry or allow it to air dry.       Change your bandages when they get wet, dirty, or after washing. Apply new, clean bandages as directed. Do not apply elastic bandages or tape too tight. Do not put powders or lotions over your incision.       Apply antibiotic ointment as directed. Your healthcare provider may give you antibiotic ointment to put over your wound if you have stitches. If you have strips of tape over your incision, let them dry up and fall off on their own. If they do not fall off within 14 days, gently remove them. If you have glue over your wound, do not remove or pick at it. If your glue comes off, do not replace it with glue that you have at home.       Check your wound every day for signs of infection such as swelling, redness, or pus.     Self-care:     Apply ice on your wound for 15 to 20 minutes every hour or as directed. Use an ice pack, or put crushed ice in a plastic bag. Cover it with a towel. Ice helps prevent tissue damage and decreases swelling and pain.      Use a splint as directed. A splint will decrease movement and stress on your wound. It may help it heal faster. A splint may be used for lacerations over joints or areas of your body that bend. Ask your healthcare provider how to apply and remove a splint.       Decrease scarring of your wound by applying ointments as directed. Do not apply ointments until your healthcare provider says it is okay. You may need to wait until your wound is healed. Ask which ointment to buy and how often to use it. After your wound is healed, use sunscreen over the area when you are out in the sun. You should do this for at least 6 months to 1 year after your injury.     Follow up with your healthcare provider as directed: You may need to follow up in 24 to 48 hours to have your wound checked for infection. You will need to return in 3 to 14 days if you have stitches or staples so they can be removed. Care for your wound as directed to prevent infection and help it heal. Write down your questions so you remember to ask them during your visits. Laceration    WHAT YOU NEED TO KNOW:    A laceration is an injury to the skin and the soft tissue underneath it. Lacerations happen when you are cut or hit by something. They can happen anywhere on the body.     DISCHARGE INSTRUCTIONS:    Return to the emergency department if:     You have heavy bleeding or bleeding that does not stop after 10 minutes of holding firm, direct pressure over the wound.       Your wound opens up.     Contact your healthcare provider if:     You have a fever or chills.       Your laceration is red, warm, or swollen.      You have red streaks on your skin coming from your wound.      You have white or yellow drainage from the wound that smells bad.      You have pain that gets worse, even after treatment.       You have questions or concerns about your condition or care.     Medicines:     Prescription pain medicine may be given. Ask how to take this medicine safely.       Antibiotics help treat or prevent a bacterial infection.       Take your medicine as directed. Contact your healthcare provider if you think your medicine is not helping or if you have side effects. Tell him or her if you are allergic to any medicine. Keep a list of the medicines, vitamins, and herbs you take. Include the amounts, and when and why you take them. Bring the list or the pill bottles to follow-up visits. Carry your medicine list with you in case of an emergency.    Care for your wound as directed:     Do not get your wound wet until your healthcare provider says it is okay. Do not soak your wound in water. Do not go swimming until your healthcare provider says it is okay. Carefully wash the wound with soap and water. Gently pat the area dry or allow it to air dry.       Change your bandages when they get wet, dirty, or after washing. Apply new, clean bandages as directed. Do not apply elastic bandages or tape too tight. Do not put powders or lotions over your incision.       Apply antibiotic ointment as directed. Your healthcare provider may give you antibiotic ointment to put over your wound if you have stitches. If you have strips of tape over your incision, let them dry up and fall off on their own. If they do not fall off within 14 days, gently remove them. If you have glue over your wound, do not remove or pick at it. If your glue comes off, do not replace it with glue that you have at home.       Check your wound every day for signs of infection such as swelling, redness, or pus.     Self-care:     Apply ice on your wound for 15 to 20 minutes every hour or as directed. Use an ice pack, or put crushed ice in a plastic bag. Cover it with a towel. Ice helps prevent tissue damage and decreases swelling and pain.      Use a splint as directed. A splint will decrease movement and stress on your wound. It may help it heal faster. A splint may be used for lacerations over joints or areas of your body that bend. Ask your healthcare provider how to apply and remove a splint.       Decrease scarring of your wound by applying ointments as directed. Do not apply ointments until your healthcare provider says it is okay. You may need to wait until your wound is healed. Ask which ointment to buy and how often to use it. After your wound is healed, use sunscreen over the area when you are out in the sun. You should do this for at least 6 months to 1 year after your injury.     Follow up with your healthcare provider as directed: You may need to follow up in 24 to 48 hours to have your wound checked for infection. You will need to return in 3 to 14 days if you have stitches or staples so they can be removed. Care for your wound as directed to prevent infection and help it heal. Write down your questions so you remember to ask them during your visits.    Rest. Elevate affected hand.   Maintain dressing and splint to affected finger.   Take keflex as prescribed.  Follow up with Dr. Schwab in 1 week. Call for appointment.

## 2020-12-10 NOTE — CONSULT NOTE ADULT - SUBJECTIVE AND OBJECTIVE BOX
84y Male community ambulatory presents c/o R index Lacerations that occurred today after his finger got stuck in a door. Denies HS/LOC. Denies numbness/tingling. No other pain/injuries. Denies fevers/chills. The patient reports last tetanus UTD.      HEALTH ISSUES - PROBLEM Dx:        MEDICATIONS  (STANDING):    Allergies    apple (Anaphylaxis)  No Known Drug Allergies  Originally Entered as [Unknown] reaction to [fresh fruits] (Unknown)  raw, fresh fruits- reynaldo family (apple, pear, apricot, peach, fig); processed/cooked is ok. (Anaphylaxis)    Intolerances                Vital Signs Last 24 Hrs  T(C): 36.9 (12-10-20 @ 15:54), Max: 36.9 (12-10-20 @ 15:54)  T(F): 98.5 (12-10-20 @ 15:54), Max: 98.5 (12-10-20 @ 15:54)  HR: 72 (12-10-20 @ 15:54) (72 - 72)  BP: 165/93 (12-10-20 @ 15:54) (165/93 - 165/93)  BP(mean): 113 (12-10-20 @ 15:54) (113 - 113)  RR: 16 (12-10-20 @ 15:54) (16 - 16)  SpO2: 100% (12-10-20 @ 15:54) (100% - 100%)      Imaging: x-ray shows no fracture.    Physical Exam  Gen: NAD  r index: Warm/well perfused, multiple complex lacerations and skin tears noted about the dorsum of the index about the dorso ulnar side extending from proximal nailfold to second webspace. Full thickness in nature. Exposed but intact extensor tendons. Patient sensation intact throughout with intact flexion/extension at index FDS/FDP intact.  an additional 2-3cm linear laceration noted in the 2nd dorsal webspace was sutured. two superficial skin tears on dorsum of hand.    Procedure: R hand was prepped and draped in sterile fashion, 10cc of 1% lidocaine was used to perform a metacarpal digit block of the right index finger. The laceration was cleaned and approximated using multiple nylon and chromic gut sutures. The tourniquet was let down with subsequent brisk capillary refill at the tip of the digit. The laceration was dressed with xeroform and dry dressings. The patient tolerated the procedure well. NVI post-procedure.       A/P: 84y Male with right index complex skin tear type lacerations involving the extent of the finger  Pain control  Ice/elevate as needed  Keep dressing clean and dry, do not get wet  Antibiotics per ER physician   All question answered  Follow up with Dr. Schwab as outpatient within 1 week, call office for appointment   Ortho stable for discharge

## 2020-12-10 NOTE — ED ADULT NURSE NOTE - OBJECTIVE STATEMENT
pt presents to ED s/p R. 2nd finger caught in a door w/ lacerations. +ROM to the RUE, pt c/o mid burning to the area, similar to if it were cut or scratched. Pt AOx4, breathing symmetrical and unlabored, in no acute distress at this time, awaiting xrays results

## 2020-12-10 NOTE — ED ADULT TRIAGE NOTE - CHIEF COMPLAINT QUOTE
Patient comes to ED for right hand injury. Patient reports getting right index finger caught in door. Bleeding controlled. patient reports lacerations to hand

## 2020-12-10 NOTE — ED STATDOCS - CLINICAL SUMMARY MEDICAL DECISION MAKING FREE TEXT BOX
XR negative for fracture.  Appreciate orthopedics consult.  D/c home in good condition. F/u as scheduled.

## 2020-12-10 NOTE — ED STATDOCS - CARE PROVIDER_API CALL
Sunny Schwab)  Orthopaedic Surgery; Surgery of the Hand  [address] [place] [address]  Phone: [phone]  Fax: [phone]  Follow Up Time:

## 2020-12-10 NOTE — ED STATDOCS - PROGRESS NOTE DETAILS
84 yr. old male PMH: Prostate CA, HTN, Parkinson's disease, OP presents to ED with injury to right index finger after caught and attempting to pull it out of closed door today. Moving fingers. Seen and examined by attending in intake. Plan: X-Ray, Keflex and Hand Consult with Dr. Schwab. Will F/U with patient. Nick TOWNSEND 84 yr. old male PMH: Prostate CA, HTN, Parkinson's disease, OP presents to ED with injury to right index finger after caught and attempting to pull it out of closed door today. Moving fingers. Tdap UTD. Seen and examined by attending in intake. Plan: X-Ray, Keflex and Hand Consult with Dr. Schwab. Will F/U with patient. Nick TOWNSEND Dr. Schwab called for consult and will send Resident to evaluate patient in ED. Nick TOWNSEND Wound repair with Ortho Hand Resident at bedside. Nick TOWNSEND

## 2020-12-10 NOTE — ED STATDOCS - PMH
HTN (hypertension)    Osteoporosis    Parkinsons disease    Prostate cancer     Bacteremia    Benign prostatic hyperplasia with lower urinary tract symptoms    Chronic rhinitis    Colonic polyp    Diverticulosis    GERD (gastroesophageal reflux disease)    HTN (hypertension)    Necrotizing enterocolitis    Nonrheumatic aortic valve insufficiency    Osteoporosis    Parkinsons disease    Personal history of transient ischemic attack (TIA), and cerebral infarction without residual deficits    Prostate cancer    Sensorineural hearing loss (SNHL) of both ears    Syncope and collapse

## 2021-01-25 NOTE — ED ADULT NURSE NOTE - PAIN: PRESENCE, MLM
Per Dr. Clement CBC, CMP, direct bili GGT, ESR, CRP fecal calprotectin  Clinic appt    Mom states she has lab appt on Friday and Clinic in March.   complains of pain/discomfort

## 2021-07-19 ENCOUNTER — EMERGENCY (EMERGENCY)
Facility: HOSPITAL | Age: 86
LOS: 0 days | Discharge: HOME | End: 2021-07-19
Attending: EMERGENCY MEDICINE
Payer: MEDICARE

## 2021-07-19 VITALS
HEART RATE: 70 BPM | DIASTOLIC BLOOD PRESSURE: 92 MMHG | OXYGEN SATURATION: 100 % | SYSTOLIC BLOOD PRESSURE: 173 MMHG | RESPIRATION RATE: 16 BRPM | TEMPERATURE: 98 F

## 2021-07-19 VITALS
SYSTOLIC BLOOD PRESSURE: 218 MMHG | DIASTOLIC BLOOD PRESSURE: 102 MMHG | HEART RATE: 76 BPM | HEIGHT: 69 IN | TEMPERATURE: 98 F | OXYGEN SATURATION: 99 % | RESPIRATION RATE: 18 BRPM | WEIGHT: 149.91 LBS
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
## 2021-07-19 DIAGNOSIS — S00.03XA CONTUSION OF SCALP, INITIAL ENCOUNTER: ICD-10-CM

## 2021-07-19 DIAGNOSIS — H90.3 SENSORINEURAL HEARING LOSS, BILATERAL: ICD-10-CM

## 2021-07-19 DIAGNOSIS — Z98.890 OTHER SPECIFIED POSTPROCEDURAL STATES: Chronic | ICD-10-CM

## 2021-07-19 DIAGNOSIS — Z91.018 ALLERGY TO OTHER FOODS: ICD-10-CM

## 2021-07-19 DIAGNOSIS — N40.1 BENIGN PROSTATIC HYPERPLASIA WITH LOWER URINARY TRACT SYMPTOMS: ICD-10-CM

## 2021-07-19 DIAGNOSIS — Z85.46 PERSONAL HISTORY OF MALIGNANT NEOPLASM OF PROSTATE: ICD-10-CM

## 2021-07-19 DIAGNOSIS — S09.90XA UNSPECIFIED INJURY OF HEAD, INITIAL ENCOUNTER: ICD-10-CM

## 2021-07-19 DIAGNOSIS — Z20.822 CONTACT WITH AND (SUSPECTED) EXPOSURE TO COVID-19: ICD-10-CM
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
## 2021-07-19 DIAGNOSIS — Z79.82 LONG TERM (CURRENT) USE OF ASPIRIN: ICD-10-CM

## 2021-07-19 DIAGNOSIS — I10 ESSENTIAL (PRIMARY) HYPERTENSION: ICD-10-CM

## 2021-07-19 DIAGNOSIS — M81.0 AGE-RELATED OSTEOPOROSIS WITHOUT CURRENT PATHOLOGICAL FRACTURE: ICD-10-CM

## 2021-07-19 DIAGNOSIS — G20 PARKINSON'S DISEASE: ICD-10-CM

## 2021-07-19 DIAGNOSIS — Z87.19 PERSONAL HISTORY OF OTHER DISEASES OF THE DIGESTIVE SYSTEM: ICD-10-CM

## 2021-07-19 DIAGNOSIS — K21.9 GASTRO-ESOPHAGEAL REFLUX DISEASE WITHOUT ESOPHAGITIS: ICD-10-CM

## 2021-07-19 PROBLEM — J31.0 CHRONIC RHINITIS: Chronic | Status: ACTIVE | Noted: 2020-12-10

## 2021-07-19 PROBLEM — Z86.73 PERSONAL HISTORY OF TRANSIENT ISCHEMIC ATTACK (TIA), AND CEREBRAL INFARCTION WITHOUT RESIDUAL DEFICITS: Chronic | Status: ACTIVE | Noted: 2020-12-10

## 2021-07-19 PROBLEM — K63.5 POLYP OF COLON: Chronic | Status: ACTIVE | Noted: 2020-12-10

## 2021-07-19 PROBLEM — I35.1 NONRHEUMATIC AORTIC (VALVE) INSUFFICIENCY: Chronic | Status: ACTIVE | Noted: 2020-12-10

## 2021-07-19 PROBLEM — K55.30 NECROTIZING ENTEROCOLITIS, UNSPECIFIED: Chronic | Status: ACTIVE | Noted: 2020-12-10

## 2021-07-19 PROBLEM — K57.90 DIVERTICULOSIS OF INTESTINE, PART UNSPECIFIED, WITHOUT PERFORATION OR ABSCESS WITHOUT BLEEDING: Chronic | Status: ACTIVE | Noted: 2020-12-10

## 2021-07-19 PROBLEM — R55 SYNCOPE AND COLLAPSE: Chronic | Status: ACTIVE | Noted: 2020-12-10

## 2021-07-19 PROBLEM — R78.81 BACTEREMIA: Chronic | Status: ACTIVE | Noted: 2020-12-10

## 2021-07-19 LAB
ALBUMIN SERPL ELPH-MCNC: 3.7 G/DL — SIGNIFICANT CHANGE UP (ref 3.3–5)
ALP SERPL-CCNC: 213 U/L — HIGH (ref 40–120)
ALT FLD-CCNC: 62 U/L — SIGNIFICANT CHANGE UP (ref 12–78)
ANION GAP SERPL CALC-SCNC: 3 MMOL/L — LOW (ref 5–17)
APTT BLD: 30.2 SEC — SIGNIFICANT CHANGE UP (ref 27.5–35.5)
AST SERPL-CCNC: 65 U/L — HIGH (ref 15–37)
BASOPHILS # BLD AUTO: 0.06 K/UL — SIGNIFICANT CHANGE UP (ref 0–0.2)
BASOPHILS NFR BLD AUTO: 0.9 % — SIGNIFICANT CHANGE UP (ref 0–2)
BILIRUB SERPL-MCNC: 0.6 MG/DL — SIGNIFICANT CHANGE UP (ref 0.2–1.2)
BUN SERPL-MCNC: 20 MG/DL — SIGNIFICANT CHANGE UP (ref 7–23)
CALCIUM SERPL-MCNC: 9 MG/DL — SIGNIFICANT CHANGE UP (ref 8.5–10.1)
CHLORIDE SERPL-SCNC: 106 MMOL/L — SIGNIFICANT CHANGE UP (ref 96–108)
CO2 SERPL-SCNC: 32 MMOL/L — HIGH (ref 22–31)
CREAT SERPL-MCNC: 0.89 MG/DL — SIGNIFICANT CHANGE UP (ref 0.5–1.3)
EOSINOPHIL # BLD AUTO: 0.17 K/UL — SIGNIFICANT CHANGE UP (ref 0–0.5)
EOSINOPHIL NFR BLD AUTO: 2.6 % — SIGNIFICANT CHANGE UP (ref 0–6)
GLUCOSE SERPL-MCNC: 128 MG/DL — HIGH (ref 70–99)
HCT VFR BLD CALC: 39.7 % — SIGNIFICANT CHANGE UP (ref 39–50)
HGB BLD-MCNC: 13.5 G/DL — SIGNIFICANT CHANGE UP (ref 13–17)
IMM GRANULOCYTES NFR BLD AUTO: 0.2 % — SIGNIFICANT CHANGE UP (ref 0–1.5)
INR BLD: 1 RATIO — SIGNIFICANT CHANGE UP (ref 0.88–1.16)
LYMPHOCYTES # BLD AUTO: 0.97 K/UL — LOW (ref 1–3.3)
LYMPHOCYTES # BLD AUTO: 14.7 % — SIGNIFICANT CHANGE UP (ref 13–44)
MCHC RBC-ENTMCNC: 29.3 PG — SIGNIFICANT CHANGE UP (ref 27–34)
MCHC RBC-ENTMCNC: 34 GM/DL — SIGNIFICANT CHANGE UP (ref 32–36)
MCV RBC AUTO: 86.1 FL — SIGNIFICANT CHANGE UP (ref 80–100)
MONOCYTES # BLD AUTO: 0.54 K/UL — SIGNIFICANT CHANGE UP (ref 0–0.9)
MONOCYTES NFR BLD AUTO: 8.2 % — SIGNIFICANT CHANGE UP (ref 2–14)
NEUTROPHILS # BLD AUTO: 4.84 K/UL — SIGNIFICANT CHANGE UP (ref 1.8–7.4)
NEUTROPHILS NFR BLD AUTO: 73.4 % — SIGNIFICANT CHANGE UP (ref 43–77)
PLATELET # BLD AUTO: 203 K/UL — SIGNIFICANT CHANGE UP (ref 150–400)
POTASSIUM SERPL-MCNC: 4.2 MMOL/L — SIGNIFICANT CHANGE UP (ref 3.5–5.3)
POTASSIUM SERPL-SCNC: 4.2 MMOL/L — SIGNIFICANT CHANGE UP (ref 3.5–5.3)
PROT SERPL-MCNC: 7.3 GM/DL — SIGNIFICANT CHANGE UP (ref 6–8.3)
PROTHROM AB SERPL-ACNC: 11.7 SEC — SIGNIFICANT CHANGE UP (ref 10.6–13.6)
RBC # BLD: 4.61 M/UL — SIGNIFICANT CHANGE UP (ref 4.2–5.8)
RBC # FLD: 13.7 % — SIGNIFICANT CHANGE UP (ref 10.3–14.5)
SARS-COV-2 RNA SPEC QL NAA+PROBE: SIGNIFICANT CHANGE UP
SODIUM SERPL-SCNC: 141 MMOL/L — SIGNIFICANT CHANGE UP (ref 135–145)
TROPONIN I SERPL-MCNC: <0.015 NG/ML — SIGNIFICANT CHANGE UP (ref 0.01–0.04)
WBC # BLD: 6.59 K/UL — SIGNIFICANT CHANGE UP (ref 3.8–10.5)
WBC # FLD AUTO: 6.59 K/UL — SIGNIFICANT CHANGE UP (ref 3.8–10.5)

## 2021-07-19 PROCEDURE — 86901 BLOOD TYPING SEROLOGIC RH(D): CPT

## 2021-07-19 PROCEDURE — 36415 COLL VENOUS BLD VENIPUNCTURE: CPT

## 2021-07-19 PROCEDURE — 99285 EMERGENCY DEPT VISIT HI MDM: CPT

## 2021-07-19 PROCEDURE — 99284 EMERGENCY DEPT VISIT MOD MDM: CPT | Mod: 25

## 2021-07-19 PROCEDURE — 85025 COMPLETE CBC W/AUTO DIFF WBC: CPT

## 2021-07-19 PROCEDURE — 72125 CT NECK SPINE W/O DYE: CPT | Mod: 26,ME

## 2021-07-19 PROCEDURE — U0003: CPT

## 2021-07-19 PROCEDURE — 93010 ELECTROCARDIOGRAM REPORT: CPT

## 2021-07-19 PROCEDURE — 84484 ASSAY OF TROPONIN QUANT: CPT

## 2021-07-19 PROCEDURE — 70450 CT HEAD/BRAIN W/O DYE: CPT | Mod: 26,ME

## 2021-07-19 PROCEDURE — 93005 ELECTROCARDIOGRAM TRACING: CPT

## 2021-07-19 PROCEDURE — 86900 BLOOD TYPING SEROLOGIC ABO: CPT

## 2021-07-19 PROCEDURE — 70450 CT HEAD/BRAIN W/O DYE: CPT

## 2021-07-19 PROCEDURE — G1004: CPT

## 2021-07-19 PROCEDURE — 80053 COMPREHEN METABOLIC PANEL: CPT

## 2021-07-19 PROCEDURE — U0005: CPT

## 2021-07-19 PROCEDURE — 86850 RBC ANTIBODY SCREEN: CPT

## 2021-07-19 PROCEDURE — 85610 PROTHROMBIN TIME: CPT

## 2021-07-19 PROCEDURE — 85730 THROMBOPLASTIN TIME PARTIAL: CPT

## 2021-07-19 PROCEDURE — 72125 CT NECK SPINE W/O DYE: CPT

## 2021-07-19 NOTE — ED PROVIDER NOTE - CARE PLAN
Principal Discharge DX:	Injury of head, initial encounter  Secondary Diagnosis:	Fall, initial encounter

## 2021-07-19 NOTE — ED ADULT NURSE NOTE - OBJECTIVE STATEMENT
Patient presents to ED complaining of head injury s/p fall hitting his head on the concrete. Hematoma noted to back of head, denies LOC or blood thinners. Pt denies dizziness, blurred vision, chest pain. Hx: parkinson's

## 2021-07-19 NOTE — ED ADULT NURSE NOTE - NSIMPLEMENTINTERV_GEN_ALL_ED
Implemented All Universal Safety Interventions:  [place] to call system. Call bell, personal items and telephone within reach. Instruct patient to call for assistance. Room bathroom lighting operational. Non-slip footwear when patient is off stretcher. Physically safe environment: no spills, clutter or unnecessary equipment. Stretcher in lowest position, wheels locked, appropriate side rails in place.
independent

## 2021-07-19 NOTE — ED PROVIDER NOTE - SKIN, MLM
Skin normal color for race, warm, dry. No evidence of rash. +small hematoma and abrasion to posterior scalp.

## 2021-07-19 NOTE — ED PROVIDER NOTE - PATIENT PORTAL LINK FT
You can access the FollowMyHealth Patient Portal offered by [facility] by registering at the following website: [email or web address]. By joining [employer]’s FollowMyHealth portal, you will also be able to view your health information using other applications (apps) compatible with our system.

## 2021-07-19 NOTE — ED PROVIDER NOTE - CLINICAL SUMMARY MEDICAL DECISION MAKING FREE TEXT BOX
Labs, imaging, reassess. Labs, imaging, reassess-->ED workup benign.  wound care, Supportive measures and return precautions discussed.

## 2021-07-19 NOTE — ED ADULT TRIAGE NOTE - CHIEF COMPLAINT QUOTE
Patient presents to ED complaining of head injury s/p fall hitting his head on the concrete. Hematoma noted to back of head, denies LOC or blood thinners. Bleeding controlled in triage.

## 2021-07-19 NOTE — ED PROVIDER NOTE - OBJECTIVE STATEMENT
83 y/o M with PMHx of prostate CA, HTN, Parkinson's disease, TIA/cerebral infarction, aortic valve insufficiency, GERD, diverticulosis, OP, and s/p hernia repair presents to the ED c/o head injury s/p fall. Pt was turning around on some stairs, fell backwards and hit his head on concrete. Denies LOC or blood thinners. Pt's son helped him up. Pt uses assistance w/ ambulating at baseline but tries to walk on his own as much as possible, states he attends PT. No other complaints at this time.

## 2021-07-19 NOTE — ED PROVIDER NOTE - NSFOLLOWUPINSTRUCTIONS_ED_ALL_ED_FT
Follow up with your doctor this week.  Anything worsens or persists, return to ER for further care and evaluation.

## 2021-07-19 NOTE — ED PROVIDER NOTE - PMH
Bacteremia    Benign prostatic hyperplasia with lower urinary tract symptoms    Chronic rhinitis    Colonic polyp    Diverticulosis    GERD (gastroesophageal reflux disease)    HTN (hypertension)    Necrotizing enterocolitis    Nonrheumatic aortic valve insufficiency    Osteoporosis    Parkinsons disease    Personal history of transient ischemic attack (TIA), and cerebral infarction without residual deficits    Prostate cancer    Sensorineural hearing loss (SNHL) of both ears    Syncope and collapse

## 2021-08-10 NOTE — ED STATDOCS - CHPI ED RELIEVING FACTORS
Follow up in NOVEMBER. Please call in OCTOBER to schedule your appointment.  Please bring updated medication list ( prescribed and over the counter) to every visit.    Patient may continue  MS Contin; Ibuprofen  as prescribed.  Remember to call at least 48hrs in advance (Monday-Thursday 8am-4pm)-For your next medication refill     APN educated patient about opioids + ETOH     [facility] [address] [address]         Phone: [phone]    fax: [phone]  Clinic is CLOSED on Fridays    nothing

## 2021-10-21 NOTE — ASU PATIENT PROFILE, ADULT - ANESTHESIA, PREVIOUS REACTION, PROFILE
[] [facility]) [facility] &  Therapy  [address]  [phone]  F: [phone] [x] [address]  [place] [address]   [address]  P: [phone]  F: [phone] [] [place]  [address]  P: [phone]  F: [phone] [] [address]  P: [phone]  F: [phone] [] [address]  [facility]   [address]   [place]: [phone]  F: [phone]      Physical Therapy Daily Treatment Note    Date:  10/21/2021  Patient Name:  Adeel Cruz  \"C\"   :  1974  MRN: [id number]  Physician: Reina Solano MD ([facility] med)                           Insurance: [employer] (30 vs)  Medical Diagnosis: Chronic bilateral low back pain with bilateral sciatica  Rehab Codes: M25.60, M62.838, R53.1, R29.3, R26.9  Onset Date: 2021                     Next 's appt.: 21  Visit# / total visits: ; STGs at visit 8     Cancels/No Shows: 0/1    Subjective:    Pain:  [x] Yes  [] No Location: LB  Pain Rating: (0-10 scale) 5/10  Pain altered Tx:  [x] No  [] Yes  Action:    Comments: pt reports the back is terrible today. Nothing else to report.      Objective:  Modalities:   Precautions:  Exercises:  Exercise Reps/Time Weight/Level Comments   Scifit/Nustep 5 L3          SUPINE      SKTC 5x5\"ea Towel     LTR 10x5\"ea     HS stretch 3x20\"ea Strap           LUMBAR STAB   With abdominal bracing    Pelvic tilts 10x5\"     March  15xea     Alt UE/LE opposite       SLR  15xea     Bridge  15x           SIDE LYING      Hip abduction 15xea     Clamshells  15xea     Reverse clamshells             PRONE      Alt hip ext  15xea     Alt UE      Alt UE/LE opposite       Prop on elbows  ~3min     Press ups 2x10           SEATED      Lumbar flexion with physioball                        Other:      Treatment Charges: Mins Units   []  Modalities     [x]  Ther Exercise 45 3   []  Manual Therapy     []  Ther Activities     []  Aquatics     []  Vasocompression     []  Other     Total Treatment time 45 3       Assessment: [x] Progressing toward goals. Able to complete exercises charted with no complaints of increased pain. Pt notes the L leg feels like he pulled something in it and is notable fatigued after the exercises. Advised him to complete the HEP at least 1x/day. Pt to reports any changes next visit. [] No change. [] Other:  [x] Patient would continue to benefit from skilled physical therapy services in order to: reduce muscle spasm, restore lumbar flexibility, and increase core/lumbar strength to provide stability and strength in lumbar spine required for prolonged standing/ambulation    Problems:    [x]? ? Back Pain:                         []? ? Cervical Pain:        [x]? ? ROM:                     [x]? ? Strength:    [x]? ? Function:  []? Postural Deviations  [x]? Gait Deviations  [x]? Other: 35/50 Oswestry = 70% impairment, 30% fxn per pt                            STG: (to be met in 8 treatments)  1. ? Pain: No more than 4/10 max pain reported in mid-low back with standing/walking  2. ? ROM: No more than 25% deficit in lumbar extension and B rotation/sidebend AROM with no more than minimal pain reported at end range to indicate slowly improving lumbar flexibility to decrease pain/stiffness  3. ? Strength: Pt able to complete 10x leg lifts without increase in pain and good control throughout, indicating improve core strength and further lumbar stability  4. ? Function: Pt able to stand/ambulate for at least 30 min consistently without more than minimal increase in low back pain noted  5. Independent with Home Exercise Programs     LTG: (to be met in 12 treatments)  1.  Full lumbar AROM all planes without pain at end range to indicate restored joint mobility  2. Able to complete 10 reps of 35# deadlift from ground with appropriate form and no increase in low back pain to indicate improved functional lumbar strength  3. Pt able to stand/ambulate at least 60 min without increase in low back pain  4. Pt able to return to regular exercise program without increase in low back pain. 5. Pt able to self manage symptoms independently with appropriate HEP exercises without need for therapist cuing  6. At least 75% fxn on Oswestry to indicate subjective improvement in condition        Patient goals: \"to see if pain can get better and pinpoint problem\"    Pt. Education:  [x] Yes  [] No  [] Reviewed Prior HEP/Ed  Method of Education: [x] Verbal  [x] Demo  [x] Written HEP     Access Code: [email or web address]  URL: [email or web address]  Date: 10/21/2021  Prepared by: Amadeo Hemp    Exercises  Supine Lower Trunk Rotation - 2 x daily - 7 x weekly - 1 sets - 10 reps - 5 hold  Hooklying Single Knee to Chest Stretch - 2 x daily - 7 x weekly - 1 sets - 5 reps - 5 hold  Supine Posterior Pelvic Tilt - 2 x daily - 7 x weekly - 1 sets - 10 reps - 5 hold  Supine 90/90 Alternating Toe Touch - 2 x daily - 7 x weekly - 1 sets - 20 reps - no hold  Supine Transversus Abdominis Bracing with Leg Extension - 2 x daily - 7 x weekly - 1 sets - 20 reps - no hold  Clamshell with Resistance - 2 x daily - 7 x weekly - 1 sets - 20 reps - no hold  Sidelying Hip Abduction - 2 x daily - 7 x weekly - 1 sets - 20 reps - no hold  Prone Hip Extension - 2 x daily - 7 x weekly - 1 sets - 20 reps - no hold  Prone Press Up - 2 x daily - 7 x weekly - 3 sets - 10 reps - no hold    Comprehension of Education:  [x] Verbalizes understanding. [x] Demonstrates understanding. [x] Needs review. [] Demonstrates/verbalizes HEP/Ed previously given. Plan: [x] Continue current frequency toward long and short term goals.     [x] Specific Instructions for subsequent treatments: Specific Instructions for next treatment:  - lumbar flexion stretching with physioball, seated - consider multidirectional  - supine DKTC, SKTC; LTR  - core strengthening to tolerance; lumbar extension mobility exercises to tolerance with emphasis on       Time In:1:00pm            Time Out: 1:50pm    Electronically signed by:  Alisa Gilbert PTA none

## 2021-11-03 ENCOUNTER — EMERGENCY (EMERGENCY)
Facility: HOSPITAL | Age: 86
LOS: 0 days | Discharge: HOME | End: 2021-11-03
Attending: STUDENT IN AN ORGANIZED HEALTH CARE EDUCATION/TRAINING PROGRAM
Payer: MEDICARE

## 2021-11-03 VITALS — WEIGHT: 113.1 LBS | HEIGHT: 69 IN

## 2021-11-03 VITALS
RESPIRATION RATE: 18 BRPM | OXYGEN SATURATION: 98 % | DIASTOLIC BLOOD PRESSURE: 79 MMHG | TEMPERATURE: 98 F | HEART RATE: 70 BPM | SYSTOLIC BLOOD PRESSURE: 155 MMHG

## 2021-11-03 DIAGNOSIS — S02.2XXA FRACTURE OF NASAL BONES, INITIAL ENCOUNTER FOR CLOSED FRACTURE: ICD-10-CM
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
## 2021-11-03 DIAGNOSIS — M54.50 LOW BACK PAIN, UNSPECIFIED: ICD-10-CM

## 2021-11-03 DIAGNOSIS — G20 PARKINSON'S DISEASE: ICD-10-CM

## 2021-11-03 DIAGNOSIS — I10 ESSENTIAL (PRIMARY) HYPERTENSION: ICD-10-CM

## 2021-11-03 DIAGNOSIS — M81.0 AGE-RELATED OSTEOPOROSIS WITHOUT CURRENT PATHOLOGICAL FRACTURE: ICD-10-CM

## 2021-11-03 DIAGNOSIS — Z91.018 ALLERGY TO OTHER FOODS: ICD-10-CM

## 2021-11-03 DIAGNOSIS — Z86.73 PERSONAL HISTORY OF TRANSIENT ISCHEMIC ATTACK (TIA), AND CEREBRAL INFARCTION WITHOUT RESIDUAL DEFICITS: ICD-10-CM

## 2021-11-03 DIAGNOSIS — M54.9 DORSALGIA, UNSPECIFIED: ICD-10-CM

## 2021-11-03 DIAGNOSIS — Z98.890 OTHER SPECIFIED POSTPROCEDURAL STATES: Chronic | ICD-10-CM

## 2021-11-03 DIAGNOSIS — Z85.46 PERSONAL HISTORY OF MALIGNANT NEOPLASM OF PROSTATE: ICD-10-CM

## 2021-11-03 DIAGNOSIS — Z87.19 PERSONAL HISTORY OF OTHER DISEASES OF THE DIGESTIVE SYSTEM: ICD-10-CM
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
## 2021-11-03 DIAGNOSIS — K57.90 DIVERTICULOSIS OF INTESTINE, PART UNSPECIFIED, WITHOUT PERFORATION OR ABSCESS WITHOUT BLEEDING: ICD-10-CM

## 2021-11-03 DIAGNOSIS — Z79.82 LONG TERM (CURRENT) USE OF ASPIRIN: ICD-10-CM

## 2021-11-03 DIAGNOSIS — Z23 ENCOUNTER FOR IMMUNIZATION: ICD-10-CM

## 2021-11-03 PROCEDURE — 90471 IMMUNIZATION ADMIN: CPT

## 2021-11-03 PROCEDURE — 90715 TDAP VACCINE 7 YRS/> IM: CPT

## 2021-11-03 PROCEDURE — 99284 EMERGENCY DEPT VISIT MOD MDM: CPT | Mod: 25

## 2021-11-03 PROCEDURE — 72100 X-RAY EXAM L-S SPINE 2/3 VWS: CPT | Mod: 26

## 2021-11-03 PROCEDURE — 70450 CT HEAD/BRAIN W/O DYE: CPT | Mod: MA

## 2021-11-03 PROCEDURE — 76376 3D RENDER W/INTRP POSTPROCES: CPT

## 2021-11-03 PROCEDURE — 70486 CT MAXILLOFACIAL W/O DYE: CPT | Mod: 26,MA

## 2021-11-03 PROCEDURE — 70450 CT HEAD/BRAIN W/O DYE: CPT | Mod: 26,MA

## 2021-11-03 PROCEDURE — 70486 CT MAXILLOFACIAL W/O DYE: CPT | Mod: MA

## 2021-11-03 PROCEDURE — 76376 3D RENDER W/INTRP POSTPROCES: CPT | Mod: 26

## 2021-11-03 PROCEDURE — 72125 CT NECK SPINE W/O DYE: CPT | Mod: MA

## 2021-11-03 PROCEDURE — 72100 X-RAY EXAM L-S SPINE 2/3 VWS: CPT

## 2021-11-03 PROCEDURE — 72125 CT NECK SPINE W/O DYE: CPT | Mod: 26,MA

## 2021-11-03 PROCEDURE — 99284 EMERGENCY DEPT VISIT MOD MDM: CPT

## 2021-11-03 RX ORDER — ACETAMINOPHEN 500 MG
650 TABLET ORAL ONCE
Refills: 0 | Status: COMPLETED | OUTPATIENT
Start: 2021-11-03 | End: 2021-11-03

## 2021-11-03 RX ORDER — TETANUS TOXOID, REDUCED DIPHTHERIA TOXOID AND ACELLULAR PERTUSSIS VACCINE, ADSORBED 5; 2.5; 8; 8; 2.5 [IU]/.5ML; [IU]/.5ML; UG/.5ML; UG/.5ML; UG/.5ML
0.5 SUSPENSION INTRAMUSCULAR ONCE
Refills: 0 | Status: COMPLETED | OUTPATIENT
Start: 2021-11-03 | End: 2021-11-03

## 2021-11-03 RX ADMIN — Medication 650 MILLIGRAM(S): at 21:44

## 2021-11-03 RX ADMIN — TETANUS TOXOID, REDUCED DIPHTHERIA TOXOID AND ACELLULAR PERTUSSIS VACCINE, ADSORBED 0.5 MILLILITER(S): 5; 2.5; 8; 8; 2.5 SUSPENSION INTRAMUSCULAR at 21:44

## 2021-11-03 NOTE — ED PROVIDER NOTE - NSICDXPASTMEDICALHX_GEN_ALL_CORE_FT
PAST MEDICAL HISTORY:  Bacteremia     Benign prostatic hyperplasia with lower urinary tract symptoms     Chronic rhinitis     Colonic polyp     Diverticulosis     GERD (gastroesophageal reflux disease)     HTN (hypertension)     Necrotizing enterocolitis     Nonrheumatic aortic valve insufficiency     Osteoporosis     Parkinsons disease     Personal history of transient ischemic attack (TIA), and cerebral infarction without residual deficits     Prostate cancer     Sensorineural hearing loss (SNHL) of both ears     Syncope and collapse

## 2021-11-03 NOTE — ED PROVIDER NOTE - PHYSICAL EXAMINATION
Constitutional: NAD AAOx3  Eyes: PERRLA EOMI  Head: Normocephalic atraumatic  ENT:  no raccoon eyes, +liner abrasion to bridge of nose, swelling and mild displacement, +no nasal subtle hematoma  Mouth: MMM  Cardiac: regular rate   Resp: Lungs CTAB  GI: Abd s/nt/nd  Neuro: CN2-12 intact GCS 15, +mild tremor   Skin: No rashes, no bruising to chest, back, abdomen or extremities  Msk:+ ttp over LS junction , full ROM of neck, c-collar cleared clinically and with provocative testing, no ttp of facial bones, no ttp to chest wall, pelvis stable, full ROM of all extremities without any ttp of extremities

## 2021-11-03 NOTE — ED PROVIDER NOTE - OBJECTIVE STATEMENT
84 y/o M with PMHx of prostate CA, HTN, Parkinson's disease, TIA/cerebral infarction, aortic valve insufficiency, GERD, diverticulosis, OP, and s/p hernia repair presents to the ED c/o head injury s/p fall. Pts wife states she was helping him get dressed, and he lost balance and fell. Pt endorses back pain. Not on any blood thinners.

## 2021-11-03 NOTE — ED PROVIDER NOTE - CARE PROVIDER_API CALL
Nelia Peng)  Plastic Surgery  [address] [place] [address]  Phone: [phone]  Fax: [phone]  Established Patient  Follow Up Time: 4-6 Days

## 2021-11-03 NOTE — ED ADULT NURSE NOTE - OBJECTIVE STATEMENT
pt presents to Ed with complaints of trip and fall at home. pt had head strike, negative LOC and blood thinners. pt has hx of parkinsons disease. pt endorsing lower back pain

## 2021-11-03 NOTE — ED PROVIDER NOTE - CARE PLAN
Principal Discharge DX:	Nasal fracture  Secondary Diagnosis:	Fall  Secondary Diagnosis:	Back pain   1

## 2021-11-03 NOTE — ED ADULT NURSE NOTE - NSIMPLEMENTINTERV_GEN_ALL_ED
Implemented All Fall Risk Interventions:  [place] to call system. Call bell, personal items and telephone within reach. Instruct patient to call for assistance. Room bathroom lighting operational. Non-slip footwear when patient is off stretcher. Physically safe environment: no spills, clutter or unnecessary equipment. Stretcher in lowest position, wheels locked, appropriate side rails in place. Provide visual cue, wrist band, yellow gown, etc. Monitor gait and stability. Monitor for mental status changes and reorient to person, place, and time. Review medications for side effects contributing to fall risk. Reinforce activity limits and safety measures with patient and family.

## 2021-11-03 NOTE — ED ADULT TRIAGE NOTE - MEANS OF ARRIVAL
wheelchair
Patient AOX4 c/o of abscess to anterior, external throat x several years---worsening in last few days--- +redness, +ttp. Denies drainage. Patient endorsing chills. Speaking in full, complete sentences.

## 2021-11-03 NOTE — ED PROVIDER NOTE - PATIENT PORTAL LINK FT
You can access the FollowMyHealth Patient Portal offered by [facility] by registering at the following website: [email or web address]. By joining [employer]’s FollowMyHealth portal, you will also be able to view your health information using other applications (apps) compatible with our system.

## 2021-12-03 ENCOUNTER — INPATIENT (INPATIENT)
Facility: HOSPITAL | Age: 86
LOS: 4 days | Discharge: HOME | DRG: 304 | End: 2021-12-08
Attending: FAMILY MEDICINE | Admitting: INTERNAL MEDICINE
Payer: MEDICARE

## 2021-12-03 VITALS
RESPIRATION RATE: 16 BRPM | DIASTOLIC BLOOD PRESSURE: 107 MMHG | OXYGEN SATURATION: 99 % | WEIGHT: 145.06 LBS | TEMPERATURE: 98 F | SYSTOLIC BLOOD PRESSURE: 185 MMHG | HEIGHT: 69 IN | HEART RATE: 76 BPM

## 2021-12-03 DIAGNOSIS — Z98.890 OTHER SPECIFIED POSTPROCEDURAL STATES: Chronic | ICD-10-CM

## 2021-12-03 DIAGNOSIS — I16.0 HYPERTENSIVE URGENCY: ICD-10-CM

## 2021-12-03 LAB
ALBUMIN SERPL ELPH-MCNC: 3.1 G/DL — LOW (ref 3.3–5)
ALP SERPL-CCNC: 241 U/L — HIGH (ref 40–120)
ALT FLD-CCNC: 22 U/L — SIGNIFICANT CHANGE UP (ref 12–78)
ANION GAP SERPL CALC-SCNC: 7 MMOL/L — SIGNIFICANT CHANGE UP (ref 5–17)
APPEARANCE UR: CLEAR — SIGNIFICANT CHANGE UP
AST SERPL-CCNC: 44 U/L — HIGH (ref 15–37)
BASOPHILS # BLD AUTO: 0.05 K/UL — SIGNIFICANT CHANGE UP (ref 0–0.2)
BASOPHILS NFR BLD AUTO: 0.5 % — SIGNIFICANT CHANGE UP (ref 0–2)
BILIRUB SERPL-MCNC: 0.5 MG/DL — SIGNIFICANT CHANGE UP (ref 0.2–1.2)
BILIRUB UR-MCNC: NEGATIVE — SIGNIFICANT CHANGE UP
BUN SERPL-MCNC: 25 MG/DL — HIGH (ref 7–23)
CALCIUM SERPL-MCNC: 8.6 MG/DL — SIGNIFICANT CHANGE UP (ref 8.5–10.1)
CHLORIDE SERPL-SCNC: 107 MMOL/L — SIGNIFICANT CHANGE UP (ref 96–108)
CK SERPL-CCNC: 74 U/L — SIGNIFICANT CHANGE UP (ref 26–308)
CO2 SERPL-SCNC: 27 MMOL/L — SIGNIFICANT CHANGE UP (ref 22–31)
COLOR SPEC: YELLOW — SIGNIFICANT CHANGE UP
CREAT SERPL-MCNC: 0.84 MG/DL — SIGNIFICANT CHANGE UP (ref 0.5–1.3)
DIFF PNL FLD: NEGATIVE — SIGNIFICANT CHANGE UP
EOSINOPHIL # BLD AUTO: 0.16 K/UL — SIGNIFICANT CHANGE UP (ref 0–0.5)
EOSINOPHIL NFR BLD AUTO: 1.6 % — SIGNIFICANT CHANGE UP (ref 0–6)
GLUCOSE SERPL-MCNC: 115 MG/DL — HIGH (ref 70–99)
GLUCOSE UR QL: NEGATIVE MG/DL — SIGNIFICANT CHANGE UP
HCT VFR BLD CALC: 36.9 % — LOW (ref 39–50)
HGB BLD-MCNC: 12.2 G/DL — LOW (ref 13–17)
IMM GRANULOCYTES NFR BLD AUTO: 0.4 % — SIGNIFICANT CHANGE UP (ref 0–1.5)
KETONES UR-MCNC: NEGATIVE — SIGNIFICANT CHANGE UP
LEUKOCYTE ESTERASE UR-ACNC: NEGATIVE — SIGNIFICANT CHANGE UP
LYMPHOCYTES # BLD AUTO: 0.83 K/UL — LOW (ref 1–3.3)
LYMPHOCYTES # BLD AUTO: 8.4 % — LOW (ref 13–44)
MAGNESIUM SERPL-MCNC: 2.2 MG/DL — SIGNIFICANT CHANGE UP (ref 1.6–2.6)
MCHC RBC-ENTMCNC: 29 PG — SIGNIFICANT CHANGE UP (ref 27–34)
MCHC RBC-ENTMCNC: 33.1 GM/DL — SIGNIFICANT CHANGE UP (ref 32–36)
MCV RBC AUTO: 87.6 FL — SIGNIFICANT CHANGE UP (ref 80–100)
MONOCYTES # BLD AUTO: 0.7 K/UL — SIGNIFICANT CHANGE UP (ref 0–0.9)
MONOCYTES NFR BLD AUTO: 7.1 % — SIGNIFICANT CHANGE UP (ref 2–14)
NEUTROPHILS # BLD AUTO: 8.08 K/UL — HIGH (ref 1.8–7.4)
NEUTROPHILS NFR BLD AUTO: 82 % — HIGH (ref 43–77)
NITRITE UR-MCNC: NEGATIVE — SIGNIFICANT CHANGE UP
PH UR: 6.5 — SIGNIFICANT CHANGE UP (ref 5–8)
PLATELET # BLD AUTO: 189 K/UL — SIGNIFICANT CHANGE UP (ref 150–400)
POTASSIUM SERPL-MCNC: 3.7 MMOL/L — SIGNIFICANT CHANGE UP (ref 3.5–5.3)
POTASSIUM SERPL-SCNC: 3.7 MMOL/L — SIGNIFICANT CHANGE UP (ref 3.5–5.3)
PROT SERPL-MCNC: 7.1 GM/DL — SIGNIFICANT CHANGE UP (ref 6–8.3)
PROT UR-MCNC: 30 MG/DL
RBC # BLD: 4.21 M/UL — SIGNIFICANT CHANGE UP (ref 4.2–5.8)
RBC # FLD: 13.5 % — SIGNIFICANT CHANGE UP (ref 10.3–14.5)
SARS-COV-2 RNA SPEC QL NAA+PROBE: SIGNIFICANT CHANGE UP
SODIUM SERPL-SCNC: 141 MMOL/L — SIGNIFICANT CHANGE UP (ref 135–145)
SP GR SPEC: 1.01 — SIGNIFICANT CHANGE UP (ref 1.01–1.02)
TROPONIN I, HIGH SENSITIVITY RESULT: 12.48 NG/L — SIGNIFICANT CHANGE UP
TROPONIN I, HIGH SENSITIVITY RESULT: 14.73 NG/L — SIGNIFICANT CHANGE UP
UROBILINOGEN FLD QL: NEGATIVE MG/DL — SIGNIFICANT CHANGE UP
WBC # BLD: 9.86 K/UL — SIGNIFICANT CHANGE UP (ref 3.8–10.5)
WBC # FLD AUTO: 9.86 K/UL — SIGNIFICANT CHANGE UP (ref 3.8–10.5)

## 2021-12-03 PROCEDURE — 93306 TTE W/DOPPLER COMPLETE: CPT

## 2021-12-03 PROCEDURE — 70496 CT ANGIOGRAPHY HEAD: CPT | Mod: 26,MA

## 2021-12-03 PROCEDURE — 95816 EEG AWAKE AND DROWSY: CPT | Mod: 26

## 2021-12-03 PROCEDURE — 84443 ASSAY THYROID STIM HORMONE: CPT

## 2021-12-03 PROCEDURE — 80053 COMPREHEN METABOLIC PANEL: CPT

## 2021-12-03 PROCEDURE — 97116 GAIT TRAINING THERAPY: CPT | Mod: GP

## 2021-12-03 PROCEDURE — 85027 COMPLETE CBC AUTOMATED: CPT

## 2021-12-03 PROCEDURE — 80048 BASIC METABOLIC PNL TOTAL CA: CPT

## 2021-12-03 PROCEDURE — 82607 VITAMIN B-12: CPT

## 2021-12-03 PROCEDURE — 97162 PT EVAL MOD COMPLEX 30 MIN: CPT | Mod: GP

## 2021-12-03 PROCEDURE — 99283 EMERGENCY DEPT VISIT LOW MDM: CPT

## 2021-12-03 PROCEDURE — 82746 ASSAY OF FOLIC ACID SERUM: CPT

## 2021-12-03 PROCEDURE — 36415 COLL VENOUS BLD VENIPUNCTURE: CPT

## 2021-12-03 PROCEDURE — 97530 THERAPEUTIC ACTIVITIES: CPT | Mod: GP

## 2021-12-03 PROCEDURE — 99222 1ST HOSP IP/OBS MODERATE 55: CPT

## 2021-12-03 PROCEDURE — 71045 X-RAY EXAM CHEST 1 VIEW: CPT | Mod: 26

## 2021-12-03 PROCEDURE — 99285 EMERGENCY DEPT VISIT HI MDM: CPT

## 2021-12-03 PROCEDURE — 95816 EEG AWAKE AND DROWSY: CPT

## 2021-12-03 RX ORDER — ONDANSETRON 8 MG/1
4 TABLET, FILM COATED ORAL EVERY 6 HOURS
Refills: 0 | Status: DISCONTINUED | OUTPATIENT
Start: 2021-12-03 | End: 2021-12-08

## 2021-12-03 RX ORDER — HALOPERIDOL DECANOATE 100 MG/ML
2 INJECTION INTRAMUSCULAR ONCE
Refills: 0 | Status: COMPLETED | OUTPATIENT
Start: 2021-12-03 | End: 2021-12-03

## 2021-12-03 RX ORDER — ATORVASTATIN CALCIUM 80 MG/1
10 TABLET, FILM COATED ORAL AT BEDTIME
Refills: 0 | Status: DISCONTINUED | OUTPATIENT
Start: 2021-12-03 | End: 2021-12-08

## 2021-12-03 RX ORDER — HYDRALAZINE HCL 50 MG
10 TABLET ORAL ONCE
Refills: 0 | Status: COMPLETED | OUTPATIENT
Start: 2021-12-03 | End: 2021-12-03

## 2021-12-03 RX ORDER — HYDRALAZINE HCL 50 MG
10 TABLET ORAL EVERY 6 HOURS
Refills: 0 | Status: DISCONTINUED | OUTPATIENT
Start: 2021-12-03 | End: 2021-12-08

## 2021-12-03 RX ORDER — ENOXAPARIN SODIUM 100 MG/ML
40 INJECTION SUBCUTANEOUS DAILY
Refills: 0 | Status: DISCONTINUED | OUTPATIENT
Start: 2021-12-03 | End: 2021-12-08

## 2021-12-03 RX ORDER — LISINOPRIL 2.5 MG/1
2.5 TABLET ORAL DAILY
Refills: 0 | Status: DISCONTINUED | OUTPATIENT
Start: 2021-12-03 | End: 2021-12-08

## 2021-12-03 RX ORDER — GLUCOSAMINE HCL/CHONDROITIN SU 500-400 MG
1 CAPSULE ORAL
Qty: 0 | Refills: 0 | DISCHARGE

## 2021-12-03 RX ORDER — ACETAMINOPHEN 500 MG
650 TABLET ORAL EVERY 6 HOURS
Refills: 0 | Status: DISCONTINUED | OUTPATIENT
Start: 2021-12-03 | End: 2021-12-08

## 2021-12-03 RX ORDER — DONEPEZIL HYDROCHLORIDE 10 MG/1
5 TABLET, FILM COATED ORAL AT BEDTIME
Refills: 0 | Status: DISCONTINUED | OUTPATIENT
Start: 2021-12-03 | End: 2021-12-08

## 2021-12-03 RX ORDER — CARBIDOPA AND LEVODOPA 25; 100 MG/1; MG/1
1 TABLET ORAL
Qty: 0 | Refills: 0 | DISCHARGE

## 2021-12-03 RX ORDER — METOPROLOL TARTRATE 50 MG
12.5 TABLET ORAL AT BEDTIME
Refills: 0 | Status: DISCONTINUED | OUTPATIENT
Start: 2021-12-03 | End: 2021-12-08

## 2021-12-03 RX ORDER — SODIUM CHLORIDE 0.65 %
1 AEROSOL, SPRAY (ML) NASAL
Qty: 0 | Refills: 0 | DISCHARGE

## 2021-12-03 RX ORDER — QUETIAPINE FUMARATE 200 MG/1
50 TABLET, FILM COATED ORAL AT BEDTIME
Refills: 0 | Status: DISCONTINUED | OUTPATIENT
Start: 2021-12-03 | End: 2021-12-08

## 2021-12-03 RX ORDER — LANOLIN ALCOHOL/MO/W.PET/CERES
3 CREAM (GRAM) TOPICAL AT BEDTIME
Refills: 0 | Status: DISCONTINUED | OUTPATIENT
Start: 2021-12-03 | End: 2021-12-08

## 2021-12-03 RX ORDER — CARBIDOPA AND LEVODOPA 25; 100 MG/1; MG/1
2 TABLET ORAL
Refills: 0 | Status: DISCONTINUED | OUTPATIENT
Start: 2021-12-03 | End: 2021-12-08

## 2021-12-03 RX ORDER — SELEGILINE HYDROCHLORIDE 1.25 MG/1
5 TABLET, ORALLY DISINTEGRATING ORAL
Refills: 0 | Status: DISCONTINUED | OUTPATIENT
Start: 2021-12-03 | End: 2021-12-08

## 2021-12-03 RX ORDER — CARBIDOPA AND LEVODOPA 25; 100 MG/1; MG/1
1 TABLET ORAL
Refills: 0 | Status: DISCONTINUED | OUTPATIENT
Start: 2021-12-03 | End: 2021-12-06

## 2021-12-03 RX ADMIN — Medication 3 MILLIGRAM(S): at 21:47

## 2021-12-03 RX ADMIN — ATORVASTATIN CALCIUM 10 MILLIGRAM(S): 80 TABLET, FILM COATED ORAL at 21:48

## 2021-12-03 RX ADMIN — CARBIDOPA AND LEVODOPA 1 TABLET(S): 25; 100 TABLET ORAL at 20:18

## 2021-12-03 RX ADMIN — DONEPEZIL HYDROCHLORIDE 5 MILLIGRAM(S): 10 TABLET, FILM COATED ORAL at 21:47

## 2021-12-03 RX ADMIN — SELEGILINE HYDROCHLORIDE 5 MILLIGRAM(S): 1.25 TABLET, ORALLY DISINTEGRATING ORAL at 17:13

## 2021-12-03 RX ADMIN — Medication 10 MILLIGRAM(S): at 08:02

## 2021-12-03 RX ADMIN — Medication 10 MILLIGRAM(S): at 17:12

## 2021-12-03 RX ADMIN — ENOXAPARIN SODIUM 40 MILLIGRAM(S): 100 INJECTION SUBCUTANEOUS at 17:13

## 2021-12-03 RX ADMIN — LISINOPRIL 2.5 MILLIGRAM(S): 2.5 TABLET ORAL at 14:56

## 2021-12-03 RX ADMIN — QUETIAPINE FUMARATE 50 MILLIGRAM(S): 200 TABLET, FILM COATED ORAL at 21:50

## 2021-12-03 RX ADMIN — Medication 1 TABLET(S): at 17:13

## 2021-12-03 RX ADMIN — HALOPERIDOL DECANOATE 2 MILLIGRAM(S): 100 INJECTION INTRAMUSCULAR at 23:33

## 2021-12-03 RX ADMIN — Medication 12.5 MILLIGRAM(S): at 22:08

## 2021-12-03 RX ADMIN — CARBIDOPA AND LEVODOPA 2 TABLET(S): 25; 100 TABLET ORAL at 14:57

## 2021-12-03 NOTE — ED ADULT NURSE REASSESSMENT NOTE - NS ED NURSE REASSESS COMMENT FT1
Pt. is resting in bed- Pt. undressed for admission- Pt. confused- Cardiac monitor in place- Will cont to monitor patient closely- Safety maintained

## 2021-12-03 NOTE — ED ADULT NURSE NOTE - NSIMPLEMENTINTERV_GEN_ALL_ED
Implemented All Fall with Harm Risk Interventions:  [place] to call system. Call bell, personal items and telephone within reach. Instruct patient to call for assistance. Room bathroom lighting operational. Non-slip footwear when patient is off stretcher. Physically safe environment: no spills, clutter or unnecessary equipment. Stretcher in lowest position, wheels locked, appropriate side rails in place. Provide visual cue, wrist band, yellow gown, etc. Monitor gait and stability. Monitor for mental status changes and reorient to person, place, and time. Review medications for side effects contributing to fall risk. Reinforce activity limits and safety measures with patient and family. Provide visual clues: red socks.

## 2021-12-03 NOTE — H&P ADULT - HISTORY OF PRESENT ILLNESS
85/M, poor historian, with PMHx of Parkinson's disease, prostate CA, HTN, GERD, brought to ED for sudden onset of a staring spell and collapse without LOC per his son that happened about an hour ago.  Pt was assisted to the floor by his son and was noted to bite his tongue.  PT is less alert after this episode.   He was noted to have diffuse tremors by EMS.  He normally is AAO x 1-2 and is noted to state his 1st and last name.  Pt has no facial droop or focal weakness on triage exam.  EMS glucose was 112.     CTA neg in ED    Found to have elevated /107 in ED

## 2021-12-03 NOTE — ED ADULT NURSE REASSESSMENT NOTE - NS ED NURSE REASSESS COMMENT FT1
Pt w/ stable VS. Back from CT, pending CT results. Offers no complaints at this time. Call bell in reach. In NAD.

## 2021-12-03 NOTE — ED PROVIDER NOTE - ENMT, MLM
Airway patent, Nasal mucosa clear. Mouth with dry mucosa. Throat has no vesicles, no oropharyngeal exudates and uvula is midline. < 1 cm abrasion on distal tongue

## 2021-12-03 NOTE — H&P ADULT - NSHPPHYSICALEXAM_GEN_ALL_CORE
PHYSICAL EXAM:    Daily Height in cm: 175.26 (03 Dec 2021 00:41)    Daily Weight in k (03 Dec 2021 12:38)    Vital Signs Last 24 Hrs  T(C): 36.7 (03 Dec 2021 12:39), Max: 36.7 (03 Dec 2021 11:45)  T(F): 98.1 (03 Dec 2021 12:39), Max: 98.1 (03 Dec 2021 12:39)  HR: 87 (03 Dec 2021 12:39) (68 - 98)  BP: 177/104 (03 Dec 2021 12:39) (154/77 - 185/107)  BP(mean): 125 (03 Dec 2021 07:17) (125 - 125)  RR: 18 (03 Dec 2021 12:39) (16 - 18)  SpO2: 99% (03 Dec 2021 12:39) (99% - 100%)    Constitutional: Weak  appearing  HEENT: Atraumatic, VICENTE, Normal, No congestion  Respiratory: Breath Sounds normal, no rhonchi/wheeze  Cardiovascular: N S1S2;   Gastrointestinal: Abdomen soft, non tender, Bowel Sounds present  Extremities: No edema, peripheral pulses present  Neurological: AAO x 0, no gross focal motor deficits  Skin: Non cellulitic, no rash, ulcers  Lymph Nodes: No lymphadenopathy noted  Back: No CVA tenderness   Musculoskeletal: non tender  Breasts: Deferred  Genitourinary: deferred  Rectal: Deferred

## 2021-12-03 NOTE — PATIENT PROFILE ADULT - FALL HARM RISK - HARM RISK INTERVENTIONS

## 2021-12-03 NOTE — ED PROVIDER NOTE - MUSCULOSKELETAL, MLM
Spine appears normal, range of motion is not limited, no muscle or joint tenderness  Resting and intention tremor of BUE

## 2021-12-03 NOTE — H&P ADULT - NSHPLABSRESULTS_GEN_ALL_CORE
All Labs/EKG/Radiology/Meds reviewed by me.     Lab Results:  CBC  CBC Full  -  ( 03 Dec 2021 01:10 )  WBC Count : 9.86 K/uL  RBC Count : 4.21 M/uL  Hemoglobin : 12.2 g/dL  Hematocrit : 36.9 %  Platelet Count - Automated : 189 K/uL  Mean Cell Volume : 87.6 fl  Mean Cell Hemoglobin : 29.0 pg  Mean Cell Hemoglobin Concentration : 33.1 gm/dL  Auto Neutrophil # : 8.08 K/uL  Auto Lymphocyte # : 0.83 K/uL  Auto Monocyte # : 0.70 K/uL  Auto Eosinophil # : 0.16 K/uL  Auto Basophil # : 0.05 K/uL  Auto Neutrophil % : 82.0 %  Auto Lymphocyte % : 8.4 %  Auto Monocyte % : 7.1 %  Auto Eosinophil % : 1.6 %  Auto Basophil % : 0.5 %    .		Differential:	[] Automated		[] Manual  Chemistry                        12.2   9.86  )-----------( 189      ( 03 Dec 2021 01:10 )             36.9     12-03    141  |  107  |  25<H>  ----------------------------<  115<H>  3.7   |  27  |  0.84    Ca    8.6      03 Dec 2021 01:10  Mg     2.2     12-03    TPro  7.1  /  Alb  3.1<L>  /  TBili  0.5  /  DBili  x   /  AST  44<H>  /  ALT  22  /  AlkPhos  241<H>  12-03    LIVER FUNCTIONS - ( 03 Dec 2021 01:10 )  Alb: 3.1 g/dL / Pro: 7.1 gm/dL / ALK PHOS: 241 U/L / ALT: 22 U/L / AST: 44 U/L / GGT: x             Urinalysis Basic - ( 03 Dec 2021 05:21 )    Color: Yellow / Appearance: Clear / S.015 / pH: x  Gluc: x / Ketone: Negative  / Bili: Negative / Urobili: Negative mg/dL   Blood: x / Protein: 30 mg/dL / Nitrite: Negative   Leuk Esterase: Negative / RBC: 0-2 /HPF / WBC Negative   Sq Epi: x / Non Sq Epi: Negative / Bacteria: Occasional    EKG: nsr 78    RADIOLOGY RESULTS:    < from: CT Angio Head w/ IV Cont (21 @ 03:33) >    NONCONTRAST HEAD CT SCAN:  1.  No CT evidence of acute intracranial pathology.  2.  Moderate paranasal sinus mucosal thickening in the frontal sinuses, maxillary sinuses and ethmoid air cells.  No evidence of sinus obstruction or an air-fluid level.    CT ANGIOGRAPHY BRAIN: No vessel occlusion, flow-limiting stenosis or aneurysm is identified about the [place].    < end of copied text >        MEDICATIONS  (STANDING):  atorvastatin 10 milliGRAM(s) Oral at bedtime  carbidopa/levodopa  25/100 1 Tablet(s) Oral <User Schedule>  carbidopa/levodopa  25/100 2 Tablet(s) Oral <User Schedule>  donepezil 5 milliGRAM(s) Oral at bedtime  enoxaparin Injectable 40 milliGRAM(s) SubCutaneous daily  lisinopril 2.5 milliGRAM(s) Oral daily  metoprolol succinate ER 12.5 milliGRAM(s) Oral at bedtime  multivitamin 1 Tablet(s) Oral daily  QUEtiapine 50 milliGRAM(s) Oral at bedtime  selegiline Oral Tab/Cap 5 milliGRAM(s) Oral <User Schedule>    MEDICATIONS  (PRN):  acetaminophen     Tablet .. 650 milliGRAM(s) Oral every 6 hours PRN Mild Pain (1 - 3)  aluminum hydroxide/magnesium hydroxide/simethicone Suspension 30 milliLiter(s) Oral every 4 hours PRN Dyspepsia  hydrALAZINE Injectable 10 milliGRAM(s) IV Push every 6 hours PRN For SBP more than 160  melatonin 3 milliGRAM(s) Oral at bedtime PRN Insomnia  ondansetron Injectable 4 milliGRAM(s) IV Push every 6 hours PRN Nausea and/or Vomiting

## 2021-12-03 NOTE — CONSULT NOTE ADULT - SUBJECTIVE AND OBJECTIVE BOX
Neurology Consult requested by:   Patient is a 85y old  Male who presents with a chief complaint of    HPI:  85 year old man BBA after family called due to episode of unresponsiveness. As per wife, patient was getting ready for bed, washing up. She noticed he was unresponsive, slid to the floor, was unable to be aroused. She called grandson, who advised calling the EMT. He doesn't recall event, no c/o of headache, dizziness, weakness. Wife reports a similar episode a year or so ago.  Hx of parkinson disease, dementia, recurrent falls.    PAST MEDICAL & SURGICAL HISTORY:  Parkinsons disease    HTN (hypertension)    Osteoporosis    Prostate cancer    Personal history of transient ischemic attack (TIA), and cerebral infarction without residual deficits    Syncope and collapse    Bacteremia    Benign prostatic hyperplasia with lower urinary tract symptoms    Necrotizing enterocolitis    Sensorineural hearing loss (SNHL) of both ears    GERD (gastroesophageal reflux disease)    Colonic polyp    Diverticulosis    Nonrheumatic aortic valve insufficiency    Chronic rhinitis    H/O hernia repair      FAMILY HISTORY:  No pertinent family history in first degree relatives      Social Hx:  Nonsmoker, no drug or alcohol use  Medications and Allergies ReviewedMEDICATIONS  (STANDING):     ROS: Pertinent positives in HPI, all other ROS were reviewed and are negative.      Examination:   Vital Signs Last 24 Hrs  T(C): 36.4 (03 Dec 2021 07:17), Max: 36.4 (03 Dec 2021 00:41)  T(F): 97.6 (03 Dec 2021 07:17), Max: 97.6 (03 Dec 2021 00:41)  HR: 98 (03 Dec 2021 10:24) (68 - 98)  BP: 155/98 (03 Dec 2021 10:24) (155/98 - 185/107)  BP(mean): 125 (03 Dec 2021 07:17) (125 - 125)  RR: 16 (03 Dec 2021 07:17) (16 - 16)  SpO2: 100% (03 Dec 2021 07:17) (99% - 100%)  General: Cooperative, NAD   NECK: supple, no masses  ENT: Normal hearing   Vascular : no carotid bruits,   Lungs: CTAB  Chest: RRR, no murmurs  Extremities: nontender, no edema  Musculoskeletal: no adventitious movements, no joint stiffness  Skin: no rash    Neurological Examination:  NIHSS:0  MS: AOx2. Appropriately interactive, normal affect. Speech fluent w/o paraphasic error, repetition, naming is intact   CN: VFFTC, PERLL, EOMI, V1-3 sensation intact, face symmetric, hearing intact,  tongue midline, SCM equal bilaterally  Motor: normal bulk and tone, + UEs tremor, +rigidity and bradykinesia.  5-/5 all over   Sens: Intact to light touch.    Reflexes: 0-1/4 all over, downgoing toes b/l  Coord:  No dysmetria    Gait: Cannot test    Labs: Reviewed  Comprehensive Metabolic Panel (12.03.21 @ 01:10)   Sodium, Serum: 141 mmol/L   Potassium, Serum: 3.7 mmol/L   Chloride, Serum: 107 mmol/L   Carbon Dioxide, Serum: 27 mmol/L   Anion Gap, Serum: 7 mmol/L   Blood Urea Nitrogen, Serum: 25 mg/dL   Creatinine, Serum: 0.84 mg/dL   Glucose, Serum: 115 mg/dL   Calcium, Total Serum: 8.6 mg/dL   Protein Total, Serum: 7.1 gm/dL   Albumin, Serum: 3.1 g/dL   Bilirubin Total, Serum: 0.5 mg/dL   Alkaline Phosphatase, Serum: 241 U/L   Aspartate Aminotransferase (AST/SGOT): 44 U/L   Alanine Aminotransferase (ALT/SGPT): 22 U/L   eGFR if Non : 80      Imagining:   < from: CT Angio Head w/ IV Cont (12.03.21 @ 03:33) >  IMPRESSION:  NONCONTRAST HEAD CT SCAN:  1.  No CT evidence of acute intracranial pathology.  2.  Moderate paranasal sinus mucosal thickening in the frontal sinuses, maxillary sinuses and ethmoid air cells.  No evidence of sinus obstruction or an air-fluid level.    CT ANGIOGRAPHY BRAIN: No vessel occlusion, flow-limiting stenosis or aneurysm is identified about the [place].    < end of copied text >  < from: CT Head No Cont (11.03.21 @ 20:07) >  BRAIN  IMPRESSION:    1)  chronic ischemic changes noted in both hemispheres with volume loss.  2)  no hemorrhage, contusion, or extracerebral collections are identified    CERVICAL IMPRESSION:    No acute cervical fracture identified. Multilevel degenerative changes.    FACIAL BONES IMPRESSION :    Comminuted, depressed right-sided nasal fracture with swelling. Facial bones are otherwise intact.    < end of copied text >    < from: EEG Awake or Drowsy (08.25.20 @ 09:00) >  EEG Summary/Classification:  This was an  abnormal EEG study due to generalized slowing may be on the basis  of Metabolic, Toxic or Structural pathology. Clinical correlation recommended.    EEG Impression/Clinical Correlate:  No epileptiform activity was seen and no clinical events or seizures were recorded.    < end of copied text >

## 2021-12-03 NOTE — ED ADULT TRIAGE NOTE - CHIEF COMPLAINT QUOTE
patient brought in by EMS from home c/o AMS.  per EMS, son witnessed patient standing, then patients eyes "zoned out" and patients legs started to give out.  son lowered patient to floor.  - LOC, - head strike.  .  tongue bite noted.  per EMS, son states patient is off from baseline mental status.  patient is A&Ox1 in ED.  Dr. Contreras called to triage for eval- no code stroke at this time.

## 2021-12-03 NOTE — ED ADULT NURSE NOTE - OBJECTIVE STATEMENT
Pt is an 85yr old male, A&OX3, HX of Parkinson's and HTN, sent in to the hospital for episode of weakness while brushing his teeth. As per patient, he is unaware as to why he is in the hospital but currently is A&Ox3. As per wife, pt has been having "leg weakness" and fatigue x few days. Pt was brushing teeth and legs gave out. Lowered to the floor. No LOC, no hitting head. No obvious noted trauma to head or body. Equal  strength bilaterally. +ROM in all extremities. No noted facial droop or slurred speech. No noted bilateral arm or leg drift. Sensation and strength intact. Resp. even and unlabored. Denies CP, SOB, HA, dizziness, abd. pain, N/V, fever/chills, cough, and urinary symptoms. VS as noted. NSR on the CM. In NAD.

## 2021-12-03 NOTE — ED PROVIDER NOTE - CLINICAL SUMMARY MEDICAL DECISION MAKING FREE TEXT BOX
Pt with h/o PD, prostate CA BIBEMS for weakness after "staring spell" Plan: CT head, cardiac workup, admit for syncope workup and cardiology consult

## 2021-12-03 NOTE — H&P ADULT - NSHPREVIEWOFSYSTEMS_GEN_ALL_CORE
REVIEW OF SYSTEMS:    CONSTITUTIONAL: No weakness, fevers or chills  EYES/ENT: No visual changes;  No vertigo or throat pain   NECK: No pain or stiffness  RESPIRATORY: No cough, wheezing, hemoptysis; No shortness of breath  CARDIOVASCULAR: No Chest pain or palpitations  GASTROINTESTINAL: No abdominal or epigastric pain. No nausea, vomiting; No diarrhea or constipation.   GENITOURINARY: No dysuria, frequency or hematuria  NEUROLOGICAL: No numbness or weakness  SKIN: No itching, rashes

## 2021-12-03 NOTE — ED PROVIDER NOTE - OBJECTIVE STATEMENT
84 y/o M with h/o Parkinsons disease, prostate CA, HTN, GERD BIBEMS for sudden onset of a "staring spell" and collapse without LOC per his son that happened about an hour ago.  Pt was assisted to the floor by his son and was noted to bite his tongue.  PT is less alert after this episode.   He was noted to have diffuse tremors by EMS.  He normally is AAO x 1-2 and is noted to state his 1st and last name.  Pt has no facial droop or focal weakness on triage exam.  EMS glucose was 112.

## 2021-12-03 NOTE — H&P ADULT - ASSESSMENT
85/M, poor historian, with PMHx of Parkinson's disease, prostate CA, HTN, GERD, brought to ED for sudden onset of a staring spell and collapse without LOC per his son that happened about an hour ago.  Pt was assisted to the floor by his son and was noted to bite his tongue.  PT is less alert after this episode.   He was noted to have diffuse tremors by EMS.  He normally is AAO x 1-2 and is noted to state his 1st and last name.  Pt has no facial droop or focal weakness on triage exam.  EMS glucose was 112.     CTA neg in ED    Found to have elevated /107 in ED    Pt admitted with     HTN sive urgency  HTNsive encephalopathy  NO CVA  No MI    PLAN: admit to tele  2 trops neg  control BP  add hydralazine 10 mg iv q 6 hrs prn for SBP more than 160  cont home meds; might need dose titration for optimal BP control  appreciate Neurology consult  EEG  PTx eval  orthostatics    poc discussed with pt, team     85/M, poor historian, with PMHx of Parkinson's disease, prostate CA, HTN, GERD, brought to ED for sudden onset of a staring spell and collapse without LOC per his son that happened about an hour ago.  Pt was assisted to the floor by his son and was noted to bite his tongue.  PT is less alert after this episode.   He was noted to have diffuse tremors by EMS.  He normally is AAO x 1-2 and is noted to state his 1st and last name.  Pt has no facial droop or focal weakness on triage exam.  EMS glucose was 112.     CTA neg in ED    Found to have elevated /107 in ED    Pt admitted with     HTN sive urgency  ? Syncope  HTNsive encephalopathy  NO CVA  No MI    PLAN: admit to tele  2 trops neg  control BP  add hydralazine 10 mg iv q 6 hrs prn for SBP more than 160  cardio eval  echo  cont home meds; might need dose titration for optimal BP control  appreciate Neurology consult  EEG  PTx eval  orthostatics    poc discussed with pt, team

## 2021-12-03 NOTE — PHARMACOTHERAPY INTERVENTION NOTE - COMMENTS
Med history complete, reviewed medications and allergies with patients wife via phone and confirmed medication list with doctor first med profile, all medication related questions answered

## 2021-12-04 PROCEDURE — 99233 SBSQ HOSP IP/OBS HIGH 50: CPT

## 2021-12-04 PROCEDURE — 99232 SBSQ HOSP IP/OBS MODERATE 35: CPT

## 2021-12-04 PROCEDURE — 93306 TTE W/DOPPLER COMPLETE: CPT | Mod: 26

## 2021-12-04 RX ORDER — HALOPERIDOL DECANOATE 100 MG/ML
2 INJECTION INTRAMUSCULAR ONCE
Refills: 0 | Status: COMPLETED | OUTPATIENT
Start: 2021-12-04 | End: 2021-12-04

## 2021-12-04 RX ADMIN — LISINOPRIL 2.5 MILLIGRAM(S): 2.5 TABLET ORAL at 11:16

## 2021-12-04 RX ADMIN — Medication 1 TABLET(S): at 11:16

## 2021-12-04 RX ADMIN — DONEPEZIL HYDROCHLORIDE 5 MILLIGRAM(S): 10 TABLET, FILM COATED ORAL at 15:42

## 2021-12-04 RX ADMIN — QUETIAPINE FUMARATE 50 MILLIGRAM(S): 200 TABLET, FILM COATED ORAL at 22:38

## 2021-12-04 RX ADMIN — CARBIDOPA AND LEVODOPA 2 TABLET(S): 25; 100 TABLET ORAL at 17:43

## 2021-12-04 RX ADMIN — ATORVASTATIN CALCIUM 10 MILLIGRAM(S): 80 TABLET, FILM COATED ORAL at 22:38

## 2021-12-04 RX ADMIN — Medication 3 MILLIGRAM(S): at 22:38

## 2021-12-04 RX ADMIN — CARBIDOPA AND LEVODOPA 1 TABLET(S): 25; 100 TABLET ORAL at 13:57

## 2021-12-04 RX ADMIN — CARBIDOPA AND LEVODOPA 2 TABLET(S): 25; 100 TABLET ORAL at 11:16

## 2021-12-04 RX ADMIN — HALOPERIDOL DECANOATE 2 MILLIGRAM(S): 100 INJECTION INTRAMUSCULAR at 03:53

## 2021-12-04 RX ADMIN — SELEGILINE HYDROCHLORIDE 5 MILLIGRAM(S): 1.25 TABLET, ORALLY DISINTEGRATING ORAL at 11:15

## 2021-12-04 RX ADMIN — CARBIDOPA AND LEVODOPA 1 TABLET(S): 25; 100 TABLET ORAL at 22:37

## 2021-12-04 RX ADMIN — Medication 12.5 MILLIGRAM(S): at 18:33

## 2021-12-04 RX ADMIN — ENOXAPARIN SODIUM 40 MILLIGRAM(S): 100 INJECTION SUBCUTANEOUS at 11:16

## 2021-12-04 NOTE — CONSULT NOTE ADULT - SUBJECTIVE AND OBJECTIVE BOX
HPI:  85/M, poor historian, with PMHx of AV disease,  Parkinson's disease, prostate CA, HTN, GERD, brought to ED for sudden onset of a staring spell and collapse without LOC. Pt was assisted to the floor by his son and was noted to bite his tongue.  PAtient states he has been falling alot lately.  He was diagnosed with orthostatic hypotension back in 2018 with Dr. Arredondo in my office ([facility] ).  Attempts at orthostatics here in hospital since he has been here have been unsuccessful.  There are reports that the patient is non-compliant with his parkinson'd medications.  He normally is AAO x 1-2.  CTA neg in ED. Found to have elevated /107 in ED.  Denies chest pain, shortness of breath at rest, orthopnea, paroxysmal nocturnal dyspnea, or claudication.    PAST MEDICAL & SURGICAL HISTORY:  Parkinsons disease  HTN (hypertension)  Osteoporosis  Prostate cancer  Personal history of transient ischemic attack (TIA), and cerebral infarction without residual deficits  Syncope and collapse  Bacteremia  Benign prostatic hyperplasia with lower urinary tract symptoms  Necrotizing enterocolitis  Sensorineural hearing loss (SNHL) of both ears  GERD (gastroesophageal reflux disease)  Colonic polyp  Diverticulosis  Nonrheumatic aortic valve insufficiency  Chronic rhinitis  H/O hernia repair    SOCIAL HISTORY: Non-Smoker/No ETOH/ No Ilicit Drug use.    FAMILY HISTORY:  Family history unobtainable due to patient&#x27;s condition    Allergies  apple (Anaphylaxis)  No Known Drug Allergies  Originally Entered as [Unknown] reaction to [fresh fruits] (Unknown)  raw, fresh fruits- reynaldo family (apple, pear, apricot, peach, fig); processed/cooked is ok. (Anaphylaxis)    Intolerances        Home Medications:  atorvastatin 10 mg oral tablet: 1 tab(s) orally once a day (03 Dec 2021 14:29)  carbidopa-levodopa 25 mg-100 mg oral tablet: 1 tab(s) orally 2 times a day @ 12p and 8P (03 Dec 2021 14:29)  carbidopa-levodopa 25 mg-100 mg oral tablet: 2 tab(s) orally 2 times a day @ 8AM and 4PM (03 Dec 2021 14:29)  donepezil 5 mg oral tablet: 1 tab(s) orally once a day (03 Dec 2021 14:29)  lisinopril 2.5 mg oral tablet: 1 tab(s) orally once a day (03 Dec 2021 14:29)  Moderna COVID-19 Vaccine  mcg/0.5 mL intramuscular suspension: 0.5 milliliter(s) intramuscular once  **Second dose April 14th*** (03 Dec 2021 14:29)  Multiple Vitamins oral tablet: 1 tab(s) orally once a day (03 Dec 2021 14:29)  selegiline 5 mg oral tablet: 1 tab(s) orally once a day (03 Dec 2021 14:29)  SEROquel 25 mg oral tablet: 2 tab(s) orally once a day (at bedtime) (03 Dec 2021 14:29)  Toprol-XL 25 mg oral tablet, extended release: 0.5 tab(s) orally once a day (at bedtime) (03 Dec 2021 14:29)    HOSPITAL MEDICATIONS:   MEDICATIONS  (STANDING):  atorvastatin 10 milliGRAM(s) Oral at bedtime  carbidopa/levodopa  25/100 1 Tablet(s) Oral <User Schedule>  carbidopa/levodopa  25/100 2 Tablet(s) Oral <User Schedule>  donepezil 5 milliGRAM(s) Oral at bedtime  enoxaparin Injectable 40 milliGRAM(s) SubCutaneous daily  lisinopril 2.5 milliGRAM(s) Oral daily  metoprolol succinate ER 12.5 milliGRAM(s) Oral at bedtime  multivitamin 1 Tablet(s) Oral daily  QUEtiapine 50 milliGRAM(s) Oral at bedtime  selegiline Oral Tab/Cap 5 milliGRAM(s) Oral <User Schedule>    MEDICATIONS  (PRN):  acetaminophen     Tablet .. 650 milliGRAM(s) Oral every 6 hours PRN Mild Pain (1 - 3)  aluminum hydroxide/magnesium hydroxide/simethicone Suspension 30 milliLiter(s) Oral every 4 hours PRN Dyspepsia  hydrALAZINE Injectable 10 milliGRAM(s) IV Push every 6 hours PRN For SBP more than 160  melatonin 3 milliGRAM(s) Oral at bedtime PRN Insomnia  ondansetron Injectable 4 milliGRAM(s) IV Push every 6 hours PRN Nausea and/or Vomiting      REVIEW OF SYSTEMS: 13 systems were reviewed and all negative except for comments above.  Vital Signs Last 24 Hrs  T(C): 36.7 (03 Dec 2021 17:00), Max: 36.7 (03 Dec 2021 17:00)  T(F): 98 (03 Dec 2021 17:00), Max: 98 (03 Dec 2021 17:00)  HR: 96 (04 Dec 2021 14:04) (80 - 105)  BP: 146/88 (04 Dec 2021 14:04) (125/78 - 177/102)  BP(mean): --  RR: 18 (03 Dec 2021 17:00) (18 - 18)  SpO2: 99% (03 Dec 2021 17:00) (99% - 99%)Daily     Daily I&O's Summary    PHYSICAL EXAM:  Constitutional: NAD, awake, well-developed, mildly disheveled  HEENT: PERRLA, EOMI,  No oral cyanosis. Oropharynx Clean and Dry.  Neck:  supple,  No JVD, No Thyroid enlargement. No Carotid Bruits bilaterally.  Respiratory: Breath sounds are clear bilaterally, No wheezing, rales or rhonchi  Cardiovascular: NL S1 and S2, RRR, 1/6 DM, No s3, No s4. no rubs  Gastrointestinal: Bowel Sounds present, soft   Extremities: No peripheral edema. No clubbing or cyanosis.  Vascular: 1+ peripheral pulses in LE   Neurological: A/O x 1, moves all extremities   Musculoskeletal: no calf tenderness.  Skin: No rashes.      LABS: All Labs Reviewed:                        12.2   9.86  )-----------( 189      ( 03 Dec 2021 01:10 )             36.9     03 Dec 2021 01:10    141    |  107    |  25     ----------------------------<  115    3.7     |  27     |  0.84     Ca    8.6        03 Dec 2021 01:10  Mg     2.2       03 Dec 2021 01:10    TPro  7.1    /  Alb  3.1    /  TBili  0.5    /  DBili  x      /  AST  44     /  ALT  22     /  AlkPhos  241    03 Dec 2021 01:10      CARDIAC MARKERS ( 03 Dec 2021 01:10 )  x     / x     / 74 U/L / x     / x        RADIOLOGY:    EKG:    ECHO:  < from: TTE Echo Complete w/o Contrast w/ Doppler (08.24.20 @ 11:39) >   Summary     Fibrocalcific changes noted to the mitral valve leaflets withpreserved   leaflet excursion.   Mild (1+) mitral regurgitation is present.   Fibrocalcific changes noted to the Aortic valve leaflets with preserved   leaflet excursion.   Trace aortic regurgitation is present.   The left ventricle is normal in size, wall thickness, wall motion and   contractility.   Estimated left ventricular ejection fraction is 60 %.   Normal appearing right ventricle structure and function.   No evidence of pericardial effusion.    < end of copied text >    CARDIAC CATHTERIZATION/STRESS TEST:   HPI:  85/M, poor historian, with PMHx of AV disease,  Parkinson's disease, prostate CA, HTN, GERD, brought to ED for sudden onset of a staring spell and collapse without LOC. Pt was assisted to the floor by his son and was noted to bite his tongue.  PAtient states he has been falling alot lately.  He was diagnosed with orthostatic hypotension back in 2018 with Dr. Arredondo in my office ([facility] ).  Attempts at orthostatics here in hospital since he has been here have been unsuccessful.  There are reports that the patient is non-compliant with his parkinson'd medications.  He normally is AAO x 1-2.  CTA neg in ED. Found to have elevated /107 in ED.  Denies chest pain, shortness of breath at rest, orthopnea, paroxysmal nocturnal dyspnea, or claudication.    PAST MEDICAL & SURGICAL HISTORY:  Parkinsons disease  HTN (hypertension)  Osteoporosis  Prostate cancer  Personal history of transient ischemic attack (TIA), and cerebral infarction without residual deficits  Syncope and collapse  Bacteremia  Benign prostatic hyperplasia with lower urinary tract symptoms  Necrotizing enterocolitis  Sensorineural hearing loss (SNHL) of both ears  GERD (gastroesophageal reflux disease)  Colonic polyp  Diverticulosis  Nonrheumatic aortic valve insufficiency  Chronic rhinitis  H/O hernia repair    SOCIAL HISTORY: Non-Smoker/No ETOH/ No Ilicit Drug use.    FAMILY HISTORY:  Family history unobtainable due to patient&#x27;s condition    Allergies  apple (Anaphylaxis)  No Known Drug Allergies  Originally Entered as [Unknown] reaction to [fresh fruits] (Unknown)  raw, fresh fruits- reynaldo family (apple, pear, apricot, peach, fig); processed/cooked is ok. (Anaphylaxis)    Intolerances        Home Medications:  atorvastatin 10 mg oral tablet: 1 tab(s) orally once a day (03 Dec 2021 14:29)  carbidopa-levodopa 25 mg-100 mg oral tablet: 1 tab(s) orally 2 times a day @ 12p and 8P (03 Dec 2021 14:29)  carbidopa-levodopa 25 mg-100 mg oral tablet: 2 tab(s) orally 2 times a day @ 8AM and 4PM (03 Dec 2021 14:29)  donepezil 5 mg oral tablet: 1 tab(s) orally once a day (03 Dec 2021 14:29)  lisinopril 2.5 mg oral tablet: 1 tab(s) orally once a day (03 Dec 2021 14:29)  Moderna COVID-19 Vaccine  mcg/0.5 mL intramuscular suspension: 0.5 milliliter(s) intramuscular once  **Second dose April 14th*** (03 Dec 2021 14:29)  Multiple Vitamins oral tablet: 1 tab(s) orally once a day (03 Dec 2021 14:29)  selegiline 5 mg oral tablet: 1 tab(s) orally once a day (03 Dec 2021 14:29)  SEROquel 25 mg oral tablet: 2 tab(s) orally once a day (at bedtime) (03 Dec 2021 14:29)  Toprol-XL 25 mg oral tablet, extended release: 0.5 tab(s) orally once a day (at bedtime) (03 Dec 2021 14:29)    HOSPITAL MEDICATIONS:   MEDICATIONS  (STANDING):  atorvastatin 10 milliGRAM(s) Oral at bedtime  carbidopa/levodopa  25/100 1 Tablet(s) Oral <User Schedule>  carbidopa/levodopa  25/100 2 Tablet(s) Oral <User Schedule>  donepezil 5 milliGRAM(s) Oral at bedtime  enoxaparin Injectable 40 milliGRAM(s) SubCutaneous daily  lisinopril 2.5 milliGRAM(s) Oral daily  metoprolol succinate ER 12.5 milliGRAM(s) Oral at bedtime  multivitamin 1 Tablet(s) Oral daily  QUEtiapine 50 milliGRAM(s) Oral at bedtime  selegiline Oral Tab/Cap 5 milliGRAM(s) Oral <User Schedule>    MEDICATIONS  (PRN):  acetaminophen     Tablet .. 650 milliGRAM(s) Oral every 6 hours PRN Mild Pain (1 - 3)  aluminum hydroxide/magnesium hydroxide/simethicone Suspension 30 milliLiter(s) Oral every 4 hours PRN Dyspepsia  hydrALAZINE Injectable 10 milliGRAM(s) IV Push every 6 hours PRN For SBP more than 160  melatonin 3 milliGRAM(s) Oral at bedtime PRN Insomnia  ondansetron Injectable 4 milliGRAM(s) IV Push every 6 hours PRN Nausea and/or Vomiting      REVIEW OF SYSTEMS: 13 systems were reviewed and all negative except for comments above.  Vital Signs Last 24 Hrs  T(C): 36.7 (03 Dec 2021 17:00), Max: 36.7 (03 Dec 2021 17:00)  T(F): 98 (03 Dec 2021 17:00), Max: 98 (03 Dec 2021 17:00)  HR: 96 (04 Dec 2021 14:04) (80 - 105)  BP: 146/88 (04 Dec 2021 14:04) (125/78 - 177/102)  BP(mean): --  RR: 18 (03 Dec 2021 17:00) (18 - 18)  SpO2: 99% (03 Dec 2021 17:00) (99% - 99%)Daily     Daily I&O's Summary    PHYSICAL EXAM:  Constitutional: NAD, awake, well-developed, mildly disheveled  HEENT: PERRLA, EOMI,  No oral cyanosis. Oropharynx Clean and Dry.  Neck:  supple,  No JVD, No Thyroid enlargement. No Carotid Bruits bilaterally.  Respiratory: Breath sounds are clear bilaterally, No wheezing, rales or rhonchi  Cardiovascular: NL S1 and S2, RRR, 1/6 DM, No s3, No s4. no rubs  Gastrointestinal: Bowel Sounds present, soft   Extremities: No peripheral edema. No clubbing or cyanosis.  Vascular: 1+ peripheral pulses in LE   Neurological: A/O x 1, moves all extremities   Musculoskeletal: no calf tenderness.  Skin: No rashes.      LABS: All Labs Reviewed:                        12.2   9.86  )-----------( 189      ( 03 Dec 2021 01:10 )             36.9     03 Dec 2021 01:10    141    |  107    |  25     ----------------------------<  115    3.7     |  27     |  0.84     Ca    8.6        03 Dec 2021 01:10  Mg     2.2       03 Dec 2021 01:10    TPro  7.1    /  Alb  3.1    /  TBili  0.5    /  DBili  x      /  AST  44     /  ALT  22     /  AlkPhos  241    03 Dec 2021 01:10      CARDIAC MARKERS ( 03 Dec 2021 01:10 )  x     / x     / 74 U/L / x     / x        RADIOLOGY:  < from: Xray Chest 1 View-PORTABLE IMMEDIATE (12.03.21 @ 07:18) >  December 3, 2021 at 12:48 AM. Patient has chest pain.    Heart suggest normal size.    Some linear atelectasis over the right hilum is again suggested.    Chest is similar to CAT scan of September 28, 2020.    IMPRESSION: As above.    < end of copied text >    EKG:  NSR, Lateral MI, Poor rwave progression.    ECHO:  < from: TTE Echo Complete w/o Contrast w/ Doppler (08.24.20 @ 11:39) >   Summary     Fibrocalcific changes noted to the mitral valve leaflets withpreserved   leaflet excursion.   Mild (1+) mitral regurgitation is present.   Fibrocalcific changes noted to the Aortic valve leaflets with preserved   leaflet excursion.   Trace aortic regurgitation is present.   The left ventricle is normal in size, wall thickness, wall motion and   contractility.   Estimated left ventricular ejection fraction is 60 %.   Normal appearing right ventricle structure and function.   No evidence of pericardial effusion.    < end of copied text >

## 2021-12-04 NOTE — PROGRESS NOTE ADULT - ASSESSMENT
85/M, poor historian, with PMHx of Parkinson's disease, prostate CA, HTN, GERD admitted for:       1. Episode of unresponsiveness. Metabolic encephalopathy on Parkinsons Dementia   orthostasis vs vasovagal vs seizure  CT head neg for acute findings  Trop neg   Monitor on tele: SR-Stach, HR 's, episode with shorst PSVT with aberrancy   Check Orthostatics  EEG done and diffuse slowing, no seizure activity   ECHO  Check B12/folate/TSH   Fall precautions  D/w DR Goldstein and Dr Lee, suspect orthostasis related to advanced parkinson Dz, might need to  be started on meds        2. HTN sive urgency  BP  better   C/w Lisinopril and toprol  Adjust meds as needed       3. Parkinson Dz  as per wife  reports Pt is not compliant with meds at home as well  H/o falls  C/w carbidopa/Levodopa and Selegiline   Supportive acre       4. Suspect urinary retention   bladder scan , might need Tello       5. DVT PPX: lovenox     Dispo: c/w current management, tay in am

## 2021-12-04 NOTE — PROGRESS NOTE ADULT - ASSESSMENT
86 yo/M with PMHx of HTN, Parkinson's, presented to ED after syncopal episode. In ED, CT head/C-spine - no acute findings,  similar episode last year. Exam c/w parkinson disease, confused. possible vasovagal. Doubt CVA/TIA, seizure.  Suggest:  check orthostatic B/p  hydration  restart parkinson meds.  EEG  PT evaluation 86 yo/M with PMHx of HTN, Parkinson's ds presented to ED after syncopal episode. In ED, CT head/C-spine - no acute findings,  similar episode last year. Exam c/w parkinson disease      # Syncope possibly vasovagal, rule out Orthostatic hypotension.     # Doubt CVA/TIA, seizure; EEG shows no epileptiform activity, slowing c/w encephlopathy    # Encephalopathy; confused /agitated since early this morning, did not get his Sinemet till 11 AM,  shows no epileptiform activity, slowing c/w encephlopathy        Suggest:  check orthostatic B/p  hydration  restart parkinson meds, needs Sinemet on time, can switch to Parcaopa  Rule out metabolic imbalance  PT evaluation    Above D/W Dr. Sandoval

## 2021-12-04 NOTE — PROGRESS NOTE ADULT - SUBJECTIVE AND OBJECTIVE BOX
CC: HTNsive urgency (03 Dec 2021 14:22)    HPI:  85/M, poor historian, with PMHx of Parkinson's disease, prostate CA, HTN, GERD, brought to ED for sudden onset of a staring spell and collapse without LOC per his son that happened about an hour ago.  Pt was assisted to the floor by his son and was noted to bite his tongue.  PT is less alert after this episode.   He was noted to have diffuse tremors by EMS.  He normally is AAO x 1-2 and is noted to state his 1st and last name.  Pt has no facial droop or focal weakness on triage exam.  EMS glucose was 112.     CTA neg in ED    Found to have elevated /107 in ED   (03 Dec 2021 14:22)    INTERVAL HPI/OVERNIGHT EVENTS:    Vital Signs Last 24 Hrs  T(C): 36.7 (03 Dec 2021 17:00), Max: 36.7 (03 Dec 2021 11:45)  T(F): 98 (03 Dec 2021 17:00), Max: 98.1 (03 Dec 2021 12:39)  HR: 105 (03 Dec 2021 18:15) (80 - 105)  BP: 125/78 (03 Dec 2021 18:15) (125/78 - 177/104)  BP(mean): --  RR: 18 (03 Dec 2021 17:00) (18 - 18)  SpO2: 99% (03 Dec 2021 17:00) (99% - 100%)  I&O's Detail    REVIEW OF SYSTEMS:    CONSTITUTIONAL: No weakness, fevers or chills  EYES/ENT: No visual changes;  No vertigo or throat pain   NECK: No pain or stiffness  RESPIRATORY: No cough, wheezing, hemoptysis; No shortness of breath  CARDIOVASCULAR: No chest pain or palpitations  GASTROINTESTINAL: No abdominal or epigastric pain. No nausea, vomiting, or hematemesis; No diarrhea or constipation. No melena or hematochezia.  GENITOURINARY: No dysuria, frequency or hematuria  NEUROLOGICAL: No numbness or weakness  SKIN: No itching, burning, rashes, or lesions   All other review of systems is negative unless indicated above.  PHYSICAL EXAM:    General: Well developed; well nourished; in no acute distress  Eyes: PERRLA, EOMI; conjunctiva and sclera clear  Head: Normocephalic; atraumatic  ENMT: No nasal discharge; airway clear  Neck: Supple; non tender; no masses  Respiratory: No wheezes, rales or rhonchi  Cardiovascular: Regular rate and rhythm. S1 and S2 Normal; No murmurs, gallops or rubs  Gastrointestinal: Soft non-tender non-distended; Normal bowel sounds  Genitourinary: No  suprapubic  tenderness  Extremities: Normal range of motion, No clubbing, cyanosis or edema  Vascular: Peripheral pulses palpable 2+ bilaterally  Neurological: Alert and oriented x4  Skin: Warm and dry. No acute rash  Lymph Nodes: No acute cervical adenopathy  Musculoskeletal: Normal muscle tone, without deformities  Psychiatric: Cooperative and appropriate  CARDIAC MARKERS ( 03 Dec 2021 01:10 )  x     / x     / 74 U/L / x     / x                                12.2   9.86  )-----------( 189      ( 03 Dec 2021 01:10 )             36.9     03 Dec 2021 01:10    141    |  107    |  25     ----------------------------<  115    3.7     |  27     |  0.84     Ca    8.6        03 Dec 2021 01:10  Mg     2.2       03 Dec 2021 01:10    TPro  7.1    /  Alb  3.1    /  TBili  0.5    /  DBili  x      /  AST  44     /  ALT  22     /  AlkPhos  241    03 Dec 2021 01:10      CAPILLARY BLOOD GLUCOSE        LIVER FUNCTIONS - ( 03 Dec 2021 01:10 )  Alb: 3.1 g/dL / Pro: 7.1 gm/dL / ALK PHOS: 241 U/L / ALT: 22 U/L / AST: 44 U/L / GGT: x           Urinalysis Basic - ( 03 Dec 2021 05:21 )    Color: Yellow / Appearance: Clear / S.015 / pH: x  Gluc: x / Ketone: Negative  / Bili: Negative / Urobili: Negative mg/dL   Blood: x / Protein: 30 mg/dL / Nitrite: Negative   Leuk Esterase: Negative / RBC: 0-2 /HPF / WBC Negative   Sq Epi: x / Non Sq Epi: Negative / Bacteria: Occasional        MEDICATIONS  (STANDING):  atorvastatin 10 milliGRAM(s) Oral at bedtime  carbidopa/levodopa  25/100 1 Tablet(s) Oral <User Schedule>  carbidopa/levodopa  25/100 2 Tablet(s) Oral <User Schedule>  donepezil 5 milliGRAM(s) Oral at bedtime  enoxaparin Injectable 40 milliGRAM(s) SubCutaneous daily  lisinopril 2.5 milliGRAM(s) Oral daily  metoprolol succinate ER 12.5 milliGRAM(s) Oral at bedtime  multivitamin 1 Tablet(s) Oral daily  QUEtiapine 50 milliGRAM(s) Oral at bedtime  selegiline Oral Tab/Cap 5 milliGRAM(s) Oral <User Schedule>    MEDICATIONS  (PRN):  acetaminophen     Tablet .. 650 milliGRAM(s) Oral every 6 hours PRN Mild Pain (1 - 3)  aluminum hydroxide/magnesium hydroxide/simethicone Suspension 30 milliLiter(s) Oral every 4 hours PRN Dyspepsia  hydrALAZINE Injectable 10 milliGRAM(s) IV Push every 6 hours PRN For SBP more than 160  melatonin 3 milliGRAM(s) Oral at bedtime PRN Insomnia  ondansetron Injectable 4 milliGRAM(s) IV Push every 6 hours PRN Nausea and/or Vomiting      RADIOLOGY & ADDITIONAL TESTS: CC: HTNsive urgency (03 Dec 2021 14:22)    HPI: 85/M, poor historian, with PMHx of Parkinson's disease, prostate CA, HTN, GERD, brought to ED for sudden onset of a staring spell and collapse without LOC per his son that happened about an hour ago.  Pt was assisted to the floor by his son and was noted to bite his tongue.  PT is less alert after this episode.   He was noted to have diffuse tremors by EMS.  He normally is AAO x 1-2 and is noted to state his 1st and last name.  Pt has no facial droop or focal weakness on triage exam.  EMS glucose was 112.     CTA neg in ED  Found to have elevated /107 in ED    INTERVAL HPI/ OVERNIGHT EVENTS: chart reviewed, Pt was seen and examined, awake and alert but confused, not cooperative with assessment, was refusing meds or food.  Unable to provide history. Denies CP, no difficulty breathing, states that would want to Urinate. A sper reports Pt urinated small amount, but took his meds and water.     Vital Signs Last 24 Hrs  T(C): 36.7 (03 Dec 2021 17:00), Max: 36.7 (03 Dec 2021 11:45)  T(F): 98 (03 Dec 2021 17:00), Max: 98.1 (03 Dec 2021 12:39)  HR: 105 (03 Dec 2021 18:15) (80 - 105)  BP: 125/78 (03 Dec 2021 18:15) (125/78 - 177/104)  RR: 18 (03 Dec 2021 17:00) (18 - 18)  SpO2: 99% (03 Dec 2021 17:00) (99% - 100%)      REVIEW OF SYSTEMS:  All other review of systems is negative unless indicated above.      PHYSICAL EXAM:  General: Well developed; malnourished; in no acute distress  Eyes: EOMI; conjunctiva and sclera clear  Head: Normocephalic; atraumatic  ENMT: No nasal discharge; airway clear  Neck: Supple; non tender; no masses  Respiratory: No wheezes, rales or rhonchi  Cardiovascular: Regular rate and rhythm. S1 and S2 Normal;   Gastrointestinal: Soft non-tender non-distended; Normal bowel sounds  Genitourinary: + suprapubic  fullness  Extremities: No  edema  Vascular: Peripheral pulses palpable 2+ bilaterally  Neurological: Alert and oriented x1, non focal, + tremors   Skin: Warm and dry. No acute rash  Musculoskeletal: Normal muscle strength         LABS:   CARDIAC MARKERS ( 03 Dec 2021 01:10 )  x     / x     / 74 U/L / x     / x                                12.2   9.86  )-----------( 189      ( 03 Dec 2021 01:10 )             36.9     03 Dec 2021 01:10    141    |  107    |  25     ----------------------------<  115    3.7     |  27     |  0.84     Ca    8.6        03 Dec 2021 01:10  Mg     2.2       03 Dec 2021 01:10    TPro  7.1    /  Alb  3.1    /  TBili  0.5    /  DBili  x      /  AST  44     /  ALT  22     /  AlkPhos  241    03 Dec 2021 01:10  LIVER FUNCTIONS - ( 03 Dec 2021 01:10 )  Alb: 3.1 g/dL / Pro: 7.1 gm/dL / ALK PHOS: 241 U/L / ALT: 22 U/L / AST: 44 U/L / GGT: x           Urinalysis Basic - ( 03 Dec 2021 05:21 )  Color: Yellow / Appearance: Clear / S.015 / pH: x  Gluc: x / Ketone: Negative  / Bili: Negative / Urobili: Negative mg/dL   Blood: x / Protein: 30 mg/dL / Nitrite: Negative   Leuk Esterase: Negative / RBC: 0-2 /HPF / WBC Negative   Sq Epi: x / Non Sq Epi: Negative / Bacteria: Occasional        MEDICATIONS  (STANDING):  atorvastatin 10 milliGRAM(s) Oral at bedtime  carbidopa/levodopa  25/100 1 Tablet(s) Oral <User Schedule>  carbidopa/levodopa  25/100 2 Tablet(s) Oral <User Schedule>  donepezil 5 milliGRAM(s) Oral at bedtime  enoxaparin Injectable 40 milliGRAM(s) SubCutaneous daily  lisinopril 2.5 milliGRAM(s) Oral daily  metoprolol succinate ER 12.5 milliGRAM(s) Oral at bedtime  multivitamin 1 Tablet(s) Oral daily  QUEtiapine 50 milliGRAM(s) Oral at bedtime  selegiline Oral Tab/Cap 5 milliGRAM(s) Oral <User Schedule>    MEDICATIONS  (PRN):  acetaminophen     Tablet .. 650 milliGRAM(s) Oral every 6 hours PRN Mild Pain (1 - 3)  aluminum hydroxide/magnesium hydroxide/simethicone Suspension 30 milliLiter(s) Oral every 4 hours PRN Dyspepsia  hydrALAZINE Injectable 10 milliGRAM(s) IV Push every 6 hours PRN For SBP more than 160  melatonin 3 milliGRAM(s) Oral at bedtime PRN Insomnia  ondansetron Injectable 4 milliGRAM(s) IV Push every 6 hours PRN Nausea and/or Vomiting      RADIOLOGY & ADDITIONAL TESTS:    EXAM:  XR CHEST PORTABLE IMMED 1V                        PROCEDURE DATE:  2021      INTERPRETATION:  AP chest on December 3, 2021 at 12:48 AM. Patient has chest pain.    Heart suggest normal size.    Some linear atelectasis over the right hilum is again suggested.        EXAM:  CT ANGIO BRAIN (W)AW IC                        PROCEDURE DATE:  2021      FINDINGS:  NONCONTRAST HEAD CT SCAN:  There is no CT evidence of acute intracranial hemorrhage, mass effect, midline shift or acute territorial infarct.    Moderate generalized cerebral volume loss and mild periventricular white matter hypoattenuation compatible chronic microvascular ischemic disease are stable..    There is moderate patchy mucosal thickening in the and frontal sinuses, maxillary sinuses and ethmoid air cells.  The sphenoid sinuses are grossly clear.    The mastoids are clear bilaterally.    The calvarium, skull base and orbits appear within normal limits.    There is a chronic right nasal bone fracture.    CT ANGIOGRAPHY BRAIN:  Anterior circulation:  The distal internal carotid arteries, anterior cerebral arteries and middle cerebral arteries are patent bilaterally without flow-limiting stenosis or aneurysm.  There is atherosclerotic calcification in the cavernous, clinoid and proximal supraclinoid segments of both internal carotid arteries without stenosis.  The A1 segment of the right anterior cerebral artery is mildly hypoplastic.  There is an azygos A2 segment of the anterior cerebral arteries.  Posterior communicating arteries are not visualized.    Posterior circulation:  The bilateral intradural vertebral arteries, basilar artery and bilateral posterior cerebral arteries are patent without flow-limiting stenosis or aneurysm.  The distal left vertebral artery is dominant.  Posterior inferior cerebellar arteries and superior cerebellar arteries are patent bilaterally.  Anterior inferior cerebellar arteries are not visualized.    Dural venous sinuses: The dural venous sinuses are suboptimally opacified due to acquisition during peak arterial phase.    IMPRESSION:  NONCONTRAST HEAD CT SCAN:  1.  No CT evidence of acute intracranial pathology.  2.  Moderate paranasal sinus mucosal thickening in the frontal sinuses, maxillary sinuses and ethmoid air cells.  No evidence of sinus obstruction or an air-fluid level.    CT ANGIOGRAPHY BRAIN: No vessel occlusion, flow-limiting stenosis or aneurysm is identified about the [place].    < from: Xray Lumbosacral Spine (21 @ 21:35) >    EXAM:  XR LS SPINE AP LAT 2-3 VIEWS                            PROCEDURE DATE:  2021          INTERPRETATION:  Radiographs of the lumbar spine    CLINICAL INFORMATION:  Injury with  Pain.    TECHNIQUE:  Frontal, lateral  and lateral coned-down view of the lumbosacral junction were obtained.    FINDINGS:  No previous examinations are available for review.    There is a minimal levoscoliosis of the thoracolumbar spine..   Lumbar vertebral body heights are preserved.  Pedicles are intact.  No fracture or destructive bone lesion.    Lumbar intervertebral disc space heights are maintained.    No significant degenerative productive changes.    The sacroiliac joints are  intact.    IMPRESSION:   No acute radiographic lumbar spine osseous pathology.  If pain persist despite conservative therapy and occult fracture or disc herniation causing central canal or foraminal nerve root compression is clinically suspected, further assessment with CT or MRI recommended.      < from: CT Maxillofacial No Cont (21 @ 20:08) >    EXAM:  CT CERVICAL SPINE                          EXAM:  CT 3D RECONSTRUCT EDWARDO CRUMPMILES                          EXAM:  CT BRAIN                          EXAM:  CT MAXILLOFACIAL                            PROCEDURE DATE:  2021          INTERPRETATION:  INDICATION:  Status post trauma.    Facial pain. Headache. Neck pain.  TECHNIQUE:  A non contrast 2.5mm axial CT study of the brain was performed from skull base to vertex. Coronal and sagittal reformations were generated from the axial data.  COMPARISON EXAMINATION:  CT brain 2021    FINDINGS:    HEMISPHERES:  Small vessel ischemic changes are noted in both hemispheres with volume loss. No acute infarct, hemorrhage, or mass identified.  VENTRICLES:  Midline with ex vacuo enlargement  POSTERIOR FOSSA:  The brain stem and cerebellum are unremarkable.  No CP angle lesion noted.  EXTRACEREBRAL SPACES:  No subdural or epidural collections are noted.  SKULL BASE AND CALVARIUM:  Appears intact.  No fracture or destructive lesion is identified.  SINUSES AND MASTOIDS:  Clear.    CT CERVICAL    INDICATIONS:  neck pain. Trauma.facial pain.  TECHNIQUE:  Thin section CT imaging or the cervical spine and facial bones was conducted.  3-D, Coronal and sagittal reformations were generated from the axial data.    FINDINGS:    There is alteration of the cervical lordosis which may reflect positioning or spasm.    C1/C2  :  The anterior and posterior arches of C1 appear to be intact. There is no C1-C2 subluxation. No odontoid fracture is noted. Base of C2 appears to be intact    The mid and lower cervical vertebral bodies appear to be intact. No upper thoracic acute fracture is noted.    Disc spaces are diffusely narrowed with spondylotic and arthritic degenerative changes.    Emphysematous or fibrotic changes are noted in both lung apices.    ORBITS:  No orbital abnormality orbital fracture is identified    FACIAL BONES:   Facial bones appear to be intact. There is a partially edentulous mandible and maxilla with bony erosive changes.    NASAL BONES:  A comminuted, depressed right nasal fracture is noted with swelling.    PARANASAL SINUSES:  Pansinus mucosal thickening is noted.        BRAIN  IMPRESSION:  1)  chronic ischemic changes noted in both hemispheres with volume loss.  2)  no hemorrhage, contusion, or extracerebral collections are identified    CERVICAL IMPRESSION:  No acute cervical fracture identified. Multilevel degenerative changes.    FACIAL BONES IMPRESSION :  Comminuted, depressed right-sided nasal fracture with swelling. Facial bones are otherwise intact.

## 2021-12-04 NOTE — PROGRESS NOTE ADULT - SUBJECTIVE AND OBJECTIVE BOX
unresponsive, slid to the floor, was unable to be aroused. She called grandson, who advised calling the EMT. He doesn't recall event, no c/o of headache, dizziness, weakness. Wife reports a similar episode a year or so ago.  Hx of parkinson disease, dementia, recurrent falls.    EEG shows no epileptiform activity, slowing c/w encephlaopthy    ROS: As above, other ROS Negative    MEDICATIONS  (STANDING):  atorvastatin 10 milliGRAM(s) Oral at bedtime  carbidopa/levodopa  25/100 1 Tablet(s) Oral <User Schedule>  carbidopa/levodopa  25/100 2 Tablet(s) Oral <User Schedule>  donepezil 5 milliGRAM(s) Oral at bedtime  enoxaparin Injectable 40 milliGRAM(s) SubCutaneous daily  lisinopril 2.5 milliGRAM(s) Oral daily  metoprolol succinate ER 12.5 milliGRAM(s) Oral at bedtime  multivitamin 1 Tablet(s) Oral daily  QUEtiapine 50 milliGRAM(s) Oral at bedtime  selegiline Oral Tab/Cap 5 milliGRAM(s) Oral <User Schedule>      Vital Signs Last 24 Hrs  T(C): 36.7 (03 Dec 2021 17:00), Max: 36.7 (03 Dec 2021 11:45)  T(F): 98 (03 Dec 2021 17:00), Max: 98.1 (03 Dec 2021 12:39)  HR: 105 (03 Dec 2021 18:15) (80 - 105)  BP: 125/78 (03 Dec 2021 18:15) (125/78 - 177/104)  BP(mean): --  RR: 18 (03 Dec 2021 17:00) (18 - 18)  SpO2: 99% (03 Dec 2021 17:00) (99% - 100%)    General: Cooperative, NAD   NECK: supple, no masses  ENT: Normal hearing             Neurological Examination:  MS: AxOx2. Appropriately interactive, normal affect. Speech fluent w/o paraphasic error, repetition, naming is intact   CN: VFFTC, PERLL, EOMI, V1-3 sensation intact, face symmetric, hearing intact,  tongue midline, SCM equal bilaterally  Motor: normal bulk and tone, + UEs tremor, +rigidity and bradykinesia.  5-/5 all over   Sens: Intact to light touch.    Reflexes: 0-1/4 all over, downgoing toes b/l  Coord:  No dysmetria    Gait: Cannot test     Labs:             12.2   9.86  )-----------( 189      ( 03 Dec 2021 01:10 )             36.9     12-03    141  |  107  |  25<H>  ----------------------------<  115<H>  3.7   |  27  |  0.84    Ca    8.6      03 Dec 2021 01:10  Mg     2.2     12-03    TPro  7.1  /  Alb  3.1<L>  /  TBili  0.5  /  DBili  x   /  AST  44<H>  /  ALT  22  /  AlkPhos  241<H>  12-03    < from: EEG Awake or Drowsy (12.03.21 @ 16:30) >  Impression: Abnormal EEG because of mild slowing of the background activity, consistent with a diffuse cerebral dysfunction, maybe on the basis of a diffuse metabolic, toxic or structural abnormality. Clinical correlation recommended.    Radiology report:  -IMPRESSION:  NONCONTRAST HEAD CT SCAN:  1.  No CT evidence of acute intracranial pathology.  2.  Moderate paranasal sinus mucosal thickening in the frontal sinuses, maxillary sinuses and ethmoid air cells.  No evidence of sinus obstruction or an air-fluid level.    CTA Brain: No vessel occlusion, flow-limiting stenosis or aneurysm is identified about the [place]. Patient is confused, has been awake and agitated since early this morning, did not get his Sinemet till 11 AM, he has not had any more episodes of unresponsiveness.    EEG shows no epileptiform activity, slowing c/w encephlopathy      ROS: As above, other ROS Negative      MEDICATIONS  (STANDING):  atorvastatin 10 milliGRAM(s) Oral at bedtime  carbidopa/levodopa  25/100 1 Tablet(s) Oral <User Schedule>  carbidopa/levodopa  25/100 2 Tablet(s) Oral <User Schedule>  donepezil 5 milliGRAM(s) Oral at bedtime  enoxaparin Injectable 40 milliGRAM(s) SubCutaneous daily  lisinopril 2.5 milliGRAM(s) Oral daily  metoprolol succinate ER 12.5 milliGRAM(s) Oral at bedtime  multivitamin 1 Tablet(s) Oral daily  QUEtiapine 50 milliGRAM(s) Oral at bedtime  selegiline Oral Tab/Cap 5 milliGRAM(s) Oral <User Schedule>      Vital Signs Last 24 Hrs  T(C): 36.7 (03 Dec 2021 17:00), Max: 36.7 (03 Dec 2021 11:45)  T(F): 98 (03 Dec 2021 17:00), Max: 98.1 (03 Dec 2021 12:39)  HR: 105 (03 Dec 2021 18:15) (80 - 105)  BP: 125/78 (03 Dec 2021 18:15) (125/78 - 177/104)  BP(mean): --  RR: 18 (03 Dec 2021 17:00) (18 - 18)  SpO2: 99% (03 Dec 2021 17:00) (99% - 100%)    General: Cooperative, NAD   NECK: supple, no masses  ENT: Normal hearing             Neurological Examination:  MS: AxOx2. Appropriately interactive, normal affect. Speech fluent w/o paraphasic error, repetition, naming is intact   CN: VFFTC, PERLL, EOMI, V1-3 sensation intact, face symmetric, hearing intact,  tongue midline, SCM equal bilaterally  Motor: normal bulk and tone, + UEs tremor, +rigidity and bradykinesia.  5-/5 all over   Sens: Intact to light touch.    Reflexes: 0-1/4 all over, downgoing toes b/l  Coord:  No dysmetria    Gait: Cannot test     Labs:             12.2   9.86  )-----------( 189      ( 03 Dec 2021 01:10 )             36.9     12-03    141  |  107  |  25<H>  ----------------------------<  115<H>  3.7   |  27  |  0.84    Ca    8.6      03 Dec 2021 01:10  Mg     2.2     12-03    TPro  7.1  /  Alb  3.1<L>  /  TBili  0.5  /  DBili  x   /  AST  44<H>  /  ALT  22  /  AlkPhos  241<H>  12-03    < from: EEG Awake or Drowsy (12.03.21 @ 16:30) >  Impression: Abnormal EEG because of mild slowing of the background activity, consistent with a diffuse cerebral dysfunction, maybe on the basis of a diffuse metabolic, toxic or structural abnormality. Clinical correlation recommended.    Radiology report:  -IMPRESSION:  NONCONTRAST HEAD CT SCAN:  1.  No CT evidence of acute intracranial pathology.  2.  Moderate paranasal sinus mucosal thickening in the frontal sinuses, maxillary sinuses and ethmoid air cells.  No evidence of sinus obstruction or an air-fluid level.    CTA Brain: No vessel occlusion, flow-limiting stenosis or aneurysm is identified about the [place].

## 2021-12-05 DIAGNOSIS — C61 MALIGNANT NEOPLASM OF PROSTATE: ICD-10-CM

## 2021-12-05 DIAGNOSIS — R33.9 RETENTION OF URINE, UNSPECIFIED: ICD-10-CM

## 2021-12-05 DIAGNOSIS — N40.1 BENIGN PROSTATIC HYPERPLASIA WITH LOWER URINARY TRACT SYMPTOMS: ICD-10-CM

## 2021-12-05 LAB
ANION GAP SERPL CALC-SCNC: 6 MMOL/L — SIGNIFICANT CHANGE UP (ref 5–17)
BUN SERPL-MCNC: 21 MG/DL — SIGNIFICANT CHANGE UP (ref 7–23)
CALCIUM SERPL-MCNC: 8.6 MG/DL — SIGNIFICANT CHANGE UP (ref 8.5–10.1)
CHLORIDE SERPL-SCNC: 109 MMOL/L — HIGH (ref 96–108)
CO2 SERPL-SCNC: 24 MMOL/L — SIGNIFICANT CHANGE UP (ref 22–31)
CREAT SERPL-MCNC: 0.68 MG/DL — SIGNIFICANT CHANGE UP (ref 0.5–1.3)
FOLATE SERPL-MCNC: 14.7 NG/ML — SIGNIFICANT CHANGE UP
GLUCOSE SERPL-MCNC: 85 MG/DL — SIGNIFICANT CHANGE UP (ref 70–99)
HCT VFR BLD CALC: 37.2 % — LOW (ref 39–50)
HGB BLD-MCNC: 12.4 G/DL — LOW (ref 13–17)
MCHC RBC-ENTMCNC: 28.8 PG — SIGNIFICANT CHANGE UP (ref 27–34)
MCHC RBC-ENTMCNC: 33.3 GM/DL — SIGNIFICANT CHANGE UP (ref 32–36)
MCV RBC AUTO: 86.5 FL — SIGNIFICANT CHANGE UP (ref 80–100)
PLATELET # BLD AUTO: 189 K/UL — SIGNIFICANT CHANGE UP (ref 150–400)
POTASSIUM SERPL-MCNC: 3.8 MMOL/L — SIGNIFICANT CHANGE UP (ref 3.5–5.3)
POTASSIUM SERPL-SCNC: 3.8 MMOL/L — SIGNIFICANT CHANGE UP (ref 3.5–5.3)
RBC # BLD: 4.3 M/UL — SIGNIFICANT CHANGE UP (ref 4.2–5.8)
RBC # FLD: 13.5 % — SIGNIFICANT CHANGE UP (ref 10.3–14.5)
SODIUM SERPL-SCNC: 139 MMOL/L — SIGNIFICANT CHANGE UP (ref 135–145)
TSH SERPL-MCNC: 1.15 UU/ML — SIGNIFICANT CHANGE UP (ref 0.34–4.82)
VIT B12 SERPL-MCNC: 330 PG/ML — SIGNIFICANT CHANGE UP (ref 232–1245)
WBC # BLD: 8.9 K/UL — SIGNIFICANT CHANGE UP (ref 3.8–10.5)
WBC # FLD AUTO: 8.9 K/UL — SIGNIFICANT CHANGE UP (ref 3.8–10.5)

## 2021-12-05 PROCEDURE — 99222 1ST HOSP IP/OBS MODERATE 55: CPT

## 2021-12-05 PROCEDURE — 99233 SBSQ HOSP IP/OBS HIGH 50: CPT

## 2021-12-05 RX ORDER — TAMSULOSIN HYDROCHLORIDE 0.4 MG/1
0.4 CAPSULE ORAL AT BEDTIME
Refills: 0 | Status: DISCONTINUED | OUTPATIENT
Start: 2021-12-05 | End: 2021-12-08

## 2021-12-05 RX ORDER — PREGABALIN 225 MG/1
1000 CAPSULE ORAL DAILY
Refills: 0 | Status: DISCONTINUED | OUTPATIENT
Start: 2021-12-05 | End: 2021-12-08

## 2021-12-05 RX ADMIN — Medication 3 MILLIGRAM(S): at 21:51

## 2021-12-05 RX ADMIN — ATORVASTATIN CALCIUM 10 MILLIGRAM(S): 80 TABLET, FILM COATED ORAL at 21:52

## 2021-12-05 RX ADMIN — CARBIDOPA AND LEVODOPA 1 TABLET(S): 25; 100 TABLET ORAL at 12:00

## 2021-12-05 RX ADMIN — SELEGILINE HYDROCHLORIDE 5 MILLIGRAM(S): 1.25 TABLET, ORALLY DISINTEGRATING ORAL at 08:45

## 2021-12-05 RX ADMIN — QUETIAPINE FUMARATE 50 MILLIGRAM(S): 200 TABLET, FILM COATED ORAL at 21:50

## 2021-12-05 RX ADMIN — TAMSULOSIN HYDROCHLORIDE 0.4 MILLIGRAM(S): 0.4 CAPSULE ORAL at 21:51

## 2021-12-05 RX ADMIN — CARBIDOPA AND LEVODOPA 2 TABLET(S): 25; 100 TABLET ORAL at 17:06

## 2021-12-05 RX ADMIN — Medication 12.5 MILLIGRAM(S): at 21:51

## 2021-12-05 RX ADMIN — PREGABALIN 1000 MICROGRAM(S): 225 CAPSULE ORAL at 17:10

## 2021-12-05 RX ADMIN — CARBIDOPA AND LEVODOPA 2 TABLET(S): 25; 100 TABLET ORAL at 08:44

## 2021-12-05 RX ADMIN — CARBIDOPA AND LEVODOPA 1 TABLET(S): 25; 100 TABLET ORAL at 19:49

## 2021-12-05 RX ADMIN — LISINOPRIL 2.5 MILLIGRAM(S): 2.5 TABLET ORAL at 08:46

## 2021-12-05 RX ADMIN — Medication 1 TABLET(S): at 08:46

## 2021-12-05 RX ADMIN — DONEPEZIL HYDROCHLORIDE 5 MILLIGRAM(S): 10 TABLET, FILM COATED ORAL at 09:28

## 2021-12-05 RX ADMIN — ENOXAPARIN SODIUM 40 MILLIGRAM(S): 100 INJECTION SUBCUTANEOUS at 08:45

## 2021-12-05 NOTE — PHYSICAL THERAPY INITIAL EVALUATION ADULT - RANGE OF MOTION EXAMINATION, REHAB EVAL
grossly WFL, however pt is very stiff and rigid due to PD./bilateral upper extremity ROM was WFL (within functional limits)/bilateral lower extremity ROM was WFL (within functional limits)

## 2021-12-05 NOTE — PROGRESS NOTE ADULT - ASSESSMENT
Recurrent falls and syncope.  Hx. of orthostasis. in setting of Parkinson's disease.   Suggest starting flurinef and midrone and stopping lisinorpil.  Cont BB.  F/u echo    12/5/2021:  Florinef and midridrone not started yet as not able to check orthostaics and BP stable.  Await this measurement and can then reassess  need. No arrythmias on tele.     D/w Dr. Sandoval, staff and patient.

## 2021-12-05 NOTE — PROGRESS NOTE ADULT - SUBJECTIVE AND OBJECTIVE BOX
CC: HTNsive urgency (03 Dec 2021 14:22)    HPI: 85/M, poor historian, with PMHx of Parkinson's disease, prostate CA, HTN, GERD, brought to ED for sudden onset of a staring spell and collapse without LOC per his son that happened about an hour ago.  Pt was assisted to the floor by his son and was noted to bite his tongue.  PT is less alert after this episode.   He was noted to have diffuse tremors by EMS.  He normally is AAO x 1-2 and is noted to state his 1st and last name.  Pt has no facial droop or focal weakness on triage exam.  EMS glucose was 112.     CTA neg in ED  Found to have elevated /107 in ED    INTERVAL HPI/ OVERNIGHT EVENTS:  Pt was seen and examined, awake and alert, less confused, now cooperative.  Reports feels better, had good night  rest.  Better oral intake and calm today. Denies pain, no  SOB. As per RN report very shaky  and weak when stands up.    Vital Signs Last 24 Hrs  T(C): 36.8 (05 Dec 2021 08:05), Max: 36.9 (04 Dec 2021 23:34)  T(F): 98.2 (05 Dec 2021 08:05), Max: 98.4 (04 Dec 2021 23:34)  HR: 64 (05 Dec 2021 08:05) (64 - 99)  BP: 134/89 (05 Dec 2021 08:05) (134/89 - 148/86)  RR: 17 (05 Dec 2021 08:05) (17 - 18)  SpO2: 97% (05 Dec 2021 08:05) (97% - 98%)    REVIEW OF SYSTEMS:  All other review of systems is negative unless indicated above.      PHYSICAL EXAM:  General: Well developed; malnourished; in no acute distress  Eyes: EOMI; conjunctiva and sclera clear  Head: Normocephalic; atraumatic  ENMT: No nasal discharge; airway clear  Neck: Supple; non tender; no masses  Respiratory: No wheezes, rales or rhonchi  Cardiovascular: Regular rate and rhythm. S1 and S2 Normal;   Gastrointestinal: Soft non-tender non-distended; Normal bowel sounds  Genitourinary: + suprapubic  fullness  Extremities: No  edema  Vascular: Peripheral pulses palpable 2+ bilaterally  Neurological: Alert and oriented x1-2, non focal, + tremors , + rigidity LE>UE  Skin: Warm and dry. No acute rash  Musculoskeletal: Normal muscle strength         LABS:                         12.4   8.90  )-----------( 189      ( 05 Dec 2021 07:49 )             37.2     12    139  |  109<H>  |  21  ----------------------------<  85  3.8   |  24  |  0.68    Ca    8.6      05 Dec 2021 07:49    CARDIAC MARKERS ( 03 Dec 2021 01:10 )  x     / x     / 74 U/L / x     / x                                12.2   9.86  )-----------( 189      ( 03 Dec 2021 01:10 )             36.9     03 Dec 2021 01:10    141    |  107    |  25     ----------------------------<  115    3.7     |  27     |  0.84     Ca    8.6        03 Dec 2021 01:10  Mg     2.2       03 Dec 2021 01:10    TPro  7.1    /  Alb  3.1    /  TBili  0.5    /  DBili  x      /  AST  44     /  ALT  22     /  AlkPhos  241    03 Dec 2021 01:10  LIVER FUNCTIONS - ( 03 Dec 2021 01:10 )  Alb: 3.1 g/dL / Pro: 7.1 gm/dL / ALK PHOS: 241 U/L / ALT: 22 U/L / AST: 44 U/L / GGT: x           Urinalysis Basic - ( 03 Dec 2021 05:21 )  Color: Yellow / Appearance: Clear / S.015 / pH: x  Gluc: x / Ketone: Negative  / Bili: Negative / Urobili: Negative mg/dL   Blood: x / Protein: 30 mg/dL / Nitrite: Negative   Leuk Esterase: Negative / RBC: 0-2 /HPF / WBC Negative   Sq Epi: x / Non Sq Epi: Negative / Bacteria: Occasional        MEDICATIONS  (STANDING):  atorvastatin 10 milliGRAM(s) Oral at bedtime  carbidopa/levodopa  25/100 1 Tablet(s) Oral <User Schedule>  carbidopa/levodopa  25/100 2 Tablet(s) Oral <User Schedule>  donepezil 5 milliGRAM(s) Oral at bedtime  enoxaparin Injectable 40 milliGRAM(s) SubCutaneous daily  lisinopril 2.5 milliGRAM(s) Oral daily  metoprolol succinate ER 12.5 milliGRAM(s) Oral at bedtime  multivitamin 1 Tablet(s) Oral daily  QUEtiapine 50 milliGRAM(s) Oral at bedtime  selegiline Oral Tab/Cap 5 milliGRAM(s) Oral <User Schedule>    MEDICATIONS  (PRN):  acetaminophen     Tablet .. 650 milliGRAM(s) Oral every 6 hours PRN Mild Pain (1 - 3)  aluminum hydroxide/magnesium hydroxide/simethicone Suspension 30 milliLiter(s) Oral every 4 hours PRN Dyspepsia  hydrALAZINE Injectable 10 milliGRAM(s) IV Push every 6 hours PRN For SBP more than 160  melatonin 3 milliGRAM(s) Oral at bedtime PRN Insomnia  ondansetron Injectable 4 milliGRAM(s) IV Push every 6 hours PRN Nausea and/or Vomiting        RADIOLOGY & ADDITIONAL TESTS:    EXAM:  XR CHEST PORTABLE IMMED 1V                        PROCEDURE DATE:  2021      INTERPRETATION:  AP chest on December 3, 2021 at 12:48 AM. Patient has chest pain.    Heart suggest normal size.    Some linear atelectasis over the right hilum is again suggested.        EXAM:  CT ANGIO BRAIN (W)AW IC                        PROCEDURE DATE:  2021      FINDINGS:  NONCONTRAST HEAD CT SCAN:  There is no CT evidence of acute intracranial hemorrhage, mass effect, midline shift or acute territorial infarct.    Moderate generalized cerebral volume loss and mild periventricular white matter hypoattenuation compatible chronic microvascular ischemic disease are stable..    There is moderate patchy mucosal thickening in the and frontal sinuses, maxillary sinuses and ethmoid air cells.  The sphenoid sinuses are grossly clear.    The mastoids are clear bilaterally.    The calvarium, skull base and orbits appear within normal limits.    There is a chronic right nasal bone fracture.    CT ANGIOGRAPHY BRAIN:  Anterior circulation:  The distal internal carotid arteries, anterior cerebral arteries and middle cerebral arteries are patent bilaterally without flow-limiting stenosis or aneurysm.  There is atherosclerotic calcification in the cavernous, clinoid and proximal supraclinoid segments of both internal carotid arteries without stenosis.  The A1 segment of the right anterior cerebral artery is mildly hypoplastic.  There is an azygos A2 segment of the anterior cerebral arteries.  Posterior communicating arteries are not visualized.    Posterior circulation:  The bilateral intradural vertebral arteries, basilar artery and bilateral posterior cerebral arteries are patent without flow-limiting stenosis or aneurysm.  The distal left vertebral artery is dominant.  Posterior inferior cerebellar arteries and superior cerebellar arteries are patent bilaterally.  Anterior inferior cerebellar arteries are not visualized.    Dural venous sinuses: The dural venous sinuses are suboptimally opacified due to acquisition during peak arterial phase.    IMPRESSION:  NONCONTRAST HEAD CT SCAN:  1.  No CT evidence of acute intracranial pathology.  2.  Moderate paranasal sinus mucosal thickening in the frontal sinuses, maxillary sinuses and ethmoid air cells.  No evidence of sinus obstruction or an air-fluid level.    CT ANGIOGRAPHY BRAIN: No vessel occlusion, flow-limiting stenosis or aneurysm is identified about the [place].    < from: Xray Lumbosacral Spine (21 @ 21:35) >    EXAM:  XR LS SPINE AP LAT 2-3 VIEWS                            PROCEDURE DATE:  2021          INTERPRETATION:  Radiographs of the lumbar spine    CLINICAL INFORMATION:  Injury with  Pain.    TECHNIQUE:  Frontal, lateral  and lateral coned-down view of the lumbosacral junction were obtained.    FINDINGS:  No previous examinations are available for review.    There is a minimal levoscoliosis of the thoracolumbar spine..   Lumbar vertebral body heights are preserved.  Pedicles are intact.  No fracture or destructive bone lesion.    Lumbar intervertebral disc space heights are maintained.    No significant degenerative productive changes.    The sacroiliac joints are  intact.    IMPRESSION:   No acute radiographic lumbar spine osseous pathology.  If pain persist despite conservative therapy and occult fracture or disc herniation causing central canal or foraminal nerve root compression is clinically suspected, further assessment with CT or MRI recommended.      < from: CT Maxillofacial No Cont (21 @ 20:08) >    EXAM:  CT CERVICAL SPINE                          EXAM:  CT 3D RECONSTRUCT [facility]                          EXAM:  CT BRAIN                          EXAM:  CT MAXILLOFACIAL                            PROCEDURE DATE:  2021          INTERPRETATION:  INDICATION:  Status post trauma.    Facial pain. Headache. Neck pain.  TECHNIQUE:  A non contrast 2.5mm axial CT study of the brain was performed from skull base to vertex. Coronal and sagittal reformations were generated from the axial data.  COMPARISON EXAMINATION:  CT brain 2021    FINDINGS:    HEMISPHERES:  Small vessel ischemic changes are noted in both hemispheres with volume loss. No acute infarct, hemorrhage, or mass identified.  VENTRICLES:  Midline with ex vacuo enlargement  POSTERIOR FOSSA:  The brain stem and cerebellum are unremarkable.  No CP angle lesion noted.  EXTRACEREBRAL SPACES:  No subdural or epidural collections are noted.  SKULL BASE AND CALVARIUM:  Appears intact.  No fracture or destructive lesion is identified.  SINUSES AND MASTOIDS:  Clear.    CT CERVICAL    INDICATIONS:  neck pain. Trauma.facial pain.  TECHNIQUE:  Thin section CT imaging or the cervical spine and facial bones was conducted.  3-D, Coronal and sagittal reformations were generated from the axial data.    FINDINGS:    There is alteration of the cervical lordosis which may reflect positioning or spasm.    C1/C2  :  The anterior and posterior arches of C1 appear to be intact. There is no C1-C2 subluxation. No odontoid fracture is noted. Base of C2 appears to be intact    The mid and lower cervical vertebral bodies appear to be intact. No upper thoracic acute fracture is noted.    Disc spaces are diffusely narrowed with spondylotic and arthritic degenerative changes.    Emphysematous or fibrotic changes are noted in both lung apices.    ORBITS:  No orbital abnormality orbital fracture is identified    FACIAL BONES:   Facial bones appear to be intact. There is a partially edentulous mandible and maxilla with bony erosive changes.    NASAL BONES:  A comminuted, depressed right nasal fracture is noted with swelling.    PARANASAL SINUSES:  Pansinus mucosal thickening is noted.        BRAIN  IMPRESSION:  1)  chronic ischemic changes noted in both hemispheres with volume loss.  2)  no hemorrhage, contusion, or extracerebral collections are identified    CERVICAL IMPRESSION:  No acute cervical fracture identified. Multilevel degenerative changes.    FACIAL BONES IMPRESSION :  Comminuted, depressed right-sided nasal fracture with swelling. Facial bones are otherwise intact.

## 2021-12-05 NOTE — PROGRESS NOTE ADULT - ASSESSMENT
85/M, poor historian, with PMHx of Parkinson's disease, prostate CA, HTN, GERD admitted for:       1. Episode of unresponsiveness. Metabolic encephalopathy on Parkinsons Dementia   orthostasis vs vasovagal   CT head neg for acute findings  Trop neg   Monitor on tele: SR-Stach, HR 's, episode with shorst PSVT with aberrancy   Unable to do  Orthostatics as Pt cant stand up  EEG done and diffuse slowing, no seizure activity   ECHO: EF 65%, Mild LVH  B12/folate/TSH  WNL. B12 on the lower side, will give Po supplementation   Fall precautions  D/w DR Goldstein and Dr Lee, suspect orthostasis related to advanced parkinson Dz, might need to  be started on meds        2. HTN sive urgency  BP  better   C/w Lisinopril and toprol  Adjust meds as needed       3. Parkinson Dz  H/o falls  C/w carbidopa/Levodopa and Selegiline   Supportive care  PT       4.Urinary retention. H.o prostate CA  C/w  Tello   urology eval       5. DVT PPX: lovenox     Pts wife contacted, cell , spoke on conference call with daughters. Results and POC discussed. As per family Pt had few falls recently, last time was brought to ED for evaluation and  had elevated BP.          Dispo: c/w current management, tay in am

## 2021-12-05 NOTE — CONSULT NOTE ADULT - ASSESSMENT
86 yo/M with PMHx of HTN, Parkinson's, presented to ED after syncopal episode. In ED, CT head/C-spine - no acute findings,  similar episode last year. Exam c/w parkinson disease, confused. possible vasovagal. Doubt CVA/TIA, seizure.  Suggest:  check orthostatic B/p  hydration  restart parkinson meds.  EEG  PT evaluation
Recurrent falls and syncope.  Hx. of orthostasis. in setting of Parkinson's disease.   Suggest starting flurinef and midrone and stopping lisinorpil.  Cont BB.  F/u echo  D/w Dr. Sandoval, staff and patient. 
Although pt has had Urolift will start Flomax.   Trial of void before discharge, check PVR if significant reinsert Tello.   Follow up as out patient with Dr Alexander.

## 2021-12-05 NOTE — CONSULT NOTE ADULT - SUBJECTIVE AND OBJECTIVE BOX
CHIEF COMPLAINT:  Urinary retention    HISTORY OF PRESENT ILLNESS:  84 yo male admitted to the hospital after presenting with  staring spell and collapse.   Found to have urinary retention and Tello placed.   Wife and Daughter by bedside.   Follows with Dr Alexander([facility]) for h/o BPH s/p Urolift and Prostate cancer s/p radiation.     PAST MEDICAL & SURGICAL HISTORY:  Parkinsons disease    HTN (hypertension)    Osteoporosis    Prostate cancer    Personal history of transient ischemic attack (TIA), and cerebral infarction without residual deficits    Syncope and collapse    Bacteremia    Benign prostatic hyperplasia with lower urinary tract symptoms    Necrotizing enterocolitis    Sensorineural hearing loss (SNHL) of both ears    GERD (gastroesophageal reflux disease)    Colonic polyp    Diverticulosis    Nonrheumatic aortic valve insufficiency    Chronic rhinitis    H/O hernia repair        REVIEW OF SYSTEMS:  All other review of systems is negative unless indicated above.    MEDICATIONS  (STANDING):  atorvastatin 10 milliGRAM(s) Oral at bedtime  carbidopa/levodopa  25/100 1 Tablet(s) Oral <User Schedule>  carbidopa/levodopa  25/100 2 Tablet(s) Oral <User Schedule>  cyanocobalamin 1000 MICROGram(s) Oral daily  donepezil 5 milliGRAM(s) Oral at bedtime  enoxaparin Injectable 40 milliGRAM(s) SubCutaneous daily  lisinopril 2.5 milliGRAM(s) Oral daily  metoprolol succinate ER 12.5 milliGRAM(s) Oral at bedtime  multivitamin 1 Tablet(s) Oral daily  QUEtiapine 50 milliGRAM(s) Oral at bedtime  selegiline Oral Tab/Cap 5 milliGRAM(s) Oral <User Schedule>    MEDICATIONS  (PRN):  acetaminophen     Tablet .. 650 milliGRAM(s) Oral every 6 hours PRN Mild Pain (1 - 3)  aluminum hydroxide/magnesium hydroxide/simethicone Suspension 30 milliLiter(s) Oral every 4 hours PRN Dyspepsia  hydrALAZINE Injectable 10 milliGRAM(s) IV Push every 6 hours PRN For SBP more than 160  melatonin 3 milliGRAM(s) Oral at bedtime PRN Insomnia  ondansetron Injectable 4 milliGRAM(s) IV Push every 6 hours PRN Nausea and/or Vomiting      Allergies    apple (Anaphylaxis)  No Known Drug Allergies  Originally Entered as [Unknown] reaction to [fresh fruits] (Unknown)  raw, fresh fruits- re[place] family (apple, pear, apricot, peach, fig); processed/cooked is ok. (Anaphylaxis)    Intolerances        SOCIAL HISTORY:    FAMILY HISTORY:  Family history unobtainable due to patient&#x27;s condition        Vital Signs Last 24 Hrs  T(C): 36.8 (05 Dec 2021 08:05), Max: 36.9 (04 Dec 2021 23:34)  T(F): 98.2 (05 Dec 2021 08:05), Max: 98.4 (04 Dec 2021 23:34)  HR: 64 (05 Dec 2021 08:05) (64 - 95)  BP: 134/89 (05 Dec 2021 08:05) (134/89 - 148/86)  BP(mean): --  RR: 17 (05 Dec 2021 08:05) (17 - 17)  SpO2: 97% (05 Dec 2021 08:05) (97% - 98%)    PHYSICAL EXAM:    Constitutional: No acute distress  HEENT: EOMI, Normal Hearing  Neck: Supple  Back: No costovertebral angle tenderness  Respiratory: Normal respiratory effort    Cardiovascular: Normal peripheral circulation   Abd: Soft, non distended, non tender  Tello to drainage bag- clear urine   Extremities: No peripheral edema  Neurological: No focal deficits  Psychiatric: Normal mood, normal affect  Musculoskeletal: Moving all 4 extremities  Skin: No rashes    LABS:                        12.4   8.90  )-----------( 189      ( 05 Dec 2021 07:49 )             37.2     12-05    139  |  109<H>  |  21  ----------------------------<  85  3.8   |  24  |  0.68    Ca    8.6      05 Dec 2021 07:49

## 2021-12-05 NOTE — PHYSICAL THERAPY INITIAL EVALUATION ADULT - PERTINENT HX OF CURRENT PROBLEM, REHAB EVAL
85/M, poor historian, with PMHx of Parkinson's disease, prostate CA, HTN, GERD, brought to ED for sudden onset of a staring spell and collapse without LOC per his son that happened about an hour ago.  Pt was assisted to the floor by his son and was noted to bite his tongue.  PT is less alert after this episode.   He was noted to have diffuse tremors by EMS.  He normally is AAO x 1-2 and is noted to state his 1st and last name.  Pt has no facial droop or focal weakness on triage exam.

## 2021-12-06 ENCOUNTER — TRANSCRIPTION ENCOUNTER (OUTPATIENT)
Age: 86
End: 2021-12-06

## 2021-12-06 LAB
ALBUMIN SERPL ELPH-MCNC: 2.4 G/DL — LOW (ref 3.3–5)
ALP SERPL-CCNC: 177 U/L — HIGH (ref 40–120)
ALT FLD-CCNC: 33 U/L — SIGNIFICANT CHANGE UP (ref 12–78)
ANION GAP SERPL CALC-SCNC: 7 MMOL/L — SIGNIFICANT CHANGE UP (ref 5–17)
AST SERPL-CCNC: 32 U/L — SIGNIFICANT CHANGE UP (ref 15–37)
BILIRUB SERPL-MCNC: 0.7 MG/DL — SIGNIFICANT CHANGE UP (ref 0.2–1.2)
BUN SERPL-MCNC: 23 MG/DL — SIGNIFICANT CHANGE UP (ref 7–23)
CALCIUM SERPL-MCNC: 8.3 MG/DL — LOW (ref 8.5–10.1)
CHLORIDE SERPL-SCNC: 108 MMOL/L — SIGNIFICANT CHANGE UP (ref 96–108)
CO2 SERPL-SCNC: 25 MMOL/L — SIGNIFICANT CHANGE UP (ref 22–31)
CREAT SERPL-MCNC: 0.73 MG/DL — SIGNIFICANT CHANGE UP (ref 0.5–1.3)
GLUCOSE SERPL-MCNC: 96 MG/DL — SIGNIFICANT CHANGE UP (ref 70–99)
POTASSIUM SERPL-MCNC: 3.9 MMOL/L — SIGNIFICANT CHANGE UP (ref 3.5–5.3)
POTASSIUM SERPL-SCNC: 3.9 MMOL/L — SIGNIFICANT CHANGE UP (ref 3.5–5.3)
PROT SERPL-MCNC: 6 GM/DL — SIGNIFICANT CHANGE UP (ref 6–8.3)
SODIUM SERPL-SCNC: 140 MMOL/L — SIGNIFICANT CHANGE UP (ref 135–145)

## 2021-12-06 PROCEDURE — 99232 SBSQ HOSP IP/OBS MODERATE 35: CPT

## 2021-12-06 PROCEDURE — 99233 SBSQ HOSP IP/OBS HIGH 50: CPT

## 2021-12-06 RX ADMIN — CARBIDOPA AND LEVODOPA 1 TABLET(S): 25; 100 TABLET ORAL at 20:44

## 2021-12-06 RX ADMIN — CARBIDOPA AND LEVODOPA 2 TABLET(S): 25; 100 TABLET ORAL at 09:21

## 2021-12-06 RX ADMIN — Medication 12.5 MILLIGRAM(S): at 21:39

## 2021-12-06 RX ADMIN — PREGABALIN 1000 MICROGRAM(S): 225 CAPSULE ORAL at 09:22

## 2021-12-06 RX ADMIN — QUETIAPINE FUMARATE 50 MILLIGRAM(S): 200 TABLET, FILM COATED ORAL at 21:41

## 2021-12-06 RX ADMIN — SELEGILINE HYDROCHLORIDE 5 MILLIGRAM(S): 1.25 TABLET, ORALLY DISINTEGRATING ORAL at 09:22

## 2021-12-06 RX ADMIN — Medication 1 TABLET(S): at 09:21

## 2021-12-06 RX ADMIN — TAMSULOSIN HYDROCHLORIDE 0.4 MILLIGRAM(S): 0.4 CAPSULE ORAL at 21:38

## 2021-12-06 RX ADMIN — ATORVASTATIN CALCIUM 10 MILLIGRAM(S): 80 TABLET, FILM COATED ORAL at 21:40

## 2021-12-06 RX ADMIN — ENOXAPARIN SODIUM 40 MILLIGRAM(S): 100 INJECTION SUBCUTANEOUS at 09:21

## 2021-12-06 RX ADMIN — DONEPEZIL HYDROCHLORIDE 5 MILLIGRAM(S): 10 TABLET, FILM COATED ORAL at 09:22

## 2021-12-06 RX ADMIN — LISINOPRIL 2.5 MILLIGRAM(S): 2.5 TABLET ORAL at 09:22

## 2021-12-06 RX ADMIN — CARBIDOPA AND LEVODOPA 1 TABLET(S): 25; 100 TABLET ORAL at 12:12

## 2021-12-06 RX ADMIN — CARBIDOPA AND LEVODOPA 2 TABLET(S): 25; 100 TABLET ORAL at 16:45

## 2021-12-06 NOTE — PROGRESS NOTE ADULT - ASSESSMENT
86 yo/M with PMHx of HTN, Parkinson's ds presented to ED after syncopal episode. In ED, CT head/C-spine - no acute findings,  similar episode last year. Exam c/w parkinson disease    # Syncope possibly vasovagal, rule out Orthostatic hypotension, unable to check OH due to inability to stand.     # Doubt CVA/TIA, seizure; EEG shows no epileptiform activity, slowing c/w encephlopathy    # Encephalopathy; confused /agitated; improving      Suggest:  - Increase Carbidopa/Levidopa 25/100 mg 2 tablets 4 times a day  - check orthostatics  - PT evaluation    Above D/W Dr. Sandoval

## 2021-12-06 NOTE — PROGRESS NOTE ADULT - SUBJECTIVE AND OBJECTIVE BOX
Pt is less confused, eating breakfast, feels better, had good night  rest.  Better oral intake.   This morning per RN report was very shaky  and weak when stands up, due to this issue unable to do Orthostatics .  EEG shows no epileptiform activity, slowing c/w encephlopathy    B12 low 330, folate/TSH  WNL     ROS: As above, other ROS Negative      MEDICATIONS  (STANDING):  atorvastatin 10 milliGRAM(s) Oral at bedtime  carbidopa/levodopa  25/100 1 Tablet(s) Oral <User Schedule>  carbidopa/levodopa  25/100 2 Tablet(s) Oral <User Schedule>  cyanocobalamin 1000 MICROGram(s) Oral daily  donepezil 5 milliGRAM(s) Oral at bedtime  enoxaparin Injectable 40 milliGRAM(s) SubCutaneous daily  lisinopril 2.5 milliGRAM(s) Oral daily  metoprolol succinate ER 12.5 milliGRAM(s) Oral at bedtime  multivitamin 1 Tablet(s) Oral daily  QUEtiapine 50 milliGRAM(s) Oral at bedtime  selegiline Oral Tab/Cap 5 milliGRAM(s) Oral <User Schedule>  tamsulosin 0.4 milliGRAM(s) Oral at bedtime      Vital Signs Last 24 Hrs  T(C): 37 (06 Dec 2021 17:25), Max: 37 (06 Dec 2021 17:25)  T(F): 98.6 (06 Dec 2021 17:25), Max: 98.6 (06 Dec 2021 17:25)  HR: 86 (06 Dec 2021 17:25) (76 - 90)  BP: 125/63 (06 Dec 2021 17:25) (120/66 - 127/82)  BP(mean): --  RR: 18 (06 Dec 2021 17:25) (17 - 18)  SpO2: 96% (06 Dec 2021 17:25) (96% - 97%)    General: Cooperative, NAD   NECK: supple, no masses  ENT: Normal hearing             Neurological Examination:  MS: AxOx2. Appropriately interactive, normal affect. Speech fluent w/o paraphasic error, repetition, naming is intact   CN: VFFTC, PERLL, EOMI, V1-3 sensation intact, face symmetric, hearing intact,  tongue midline, SCM equal bilaterally  Motor: UEs tremor, +rigidity and bradykinesia.  Rigidity more in LE than upper   Sens: Intact to light touch.    Reflexes: 0-1/4 all over, downgoing toes b/l  Coord:  No dysmetria    Gait: Cannot test     Vitamin B12, Serum: 330 pg/mL                         12.4   8.90  )-----------( 189      ( 05 Dec 2021 07:49 )             37.2     12-06    140  |  108  |  23  ----------------------------<  96  3.9   |  25  |  0.73    Ca    8.3<L>      06 Dec 2021 08:40    TPro  6.0  /  Alb  2.4<L>  /  TBili  0.7  /  DBili  x   /  AST  32  /  ALT  33  /  AlkPhos  177<H>  12-06    Radiology report:  -IMPRESSION:  NONCONTRAST HEAD CT SCAN:  1.  No CT evidence of acute intracranial pathology.  2.  Moderate paranasal sinus mucosal thickening in the frontal sinuses, maxillary sinuses and ethmoid air cells.  No evidence of sinus obstruction or an air-fluid level.    CTA Brain: No vessel occlusion, flow-limiting stenosis or aneurysm is identified about the [place].

## 2021-12-06 NOTE — PROGRESS NOTE ADULT - ASSESSMENT
85/M, poor historian, with PMHx of Parkinson's disease, prostate CA, HTN, GERD admitted for:       1. Episode of unresponsiveness. Metabolic encephalopathy on Parkinsons Dementia   orthostasis vs vasovagal   CT head neg for acute findings  Trop neg   Monitor on tele: SR-Stach, HR 's, episode with shorst PSVT with aberrancy   Unable to do  Orthostatics as Pt cant stand up  EEG done and diffuse slowing, no seizure activity   ECHO: EF 65%, Mild LVH  B12/folate/TSH  WNL. B12 on the lower side, will give Po supplementation   Fall precautions  D/w DR Goldstein and Dr Lee, suspect orthostasis related to advanced parkinson Dz, might need to  be started on meds        2. HTN sive urgency  BP  better   C/w Lisinopril and toprol  Adjust meds as needed       3. Parkinson Dz  H/o falls  C/w carbidopa/Levodopa and Selegiline   Supportive care  PT       4.Urinary retention. H.o prostate CA  C/w  Tello   urology eval       5. DVT PPX: lovenox     Pts wife contacted, cell , spoke on conference call with daughters. Results and POC discussed. As per family Pt had few falls recently, last time was brought to ED for evaluation and  had elevated BP.          Dispo: c/w current management, tay in am  85/M, poor historian, with PMHx of Parkinson's disease, prostate CA, HTN, GERD admitted for:       1. Episode of unresponsiveness. Metabolic encephalopathy on Parkinsons Dementia   suspect orthostasis vs vasovagal   CT head neg for acute findings  Trop neg   Monitor on tele: SR-Stach, HR 's  Orthostatics pending   EEG done and diffuse slowing, no seizure activity   ECHO: EF 65%, Mild LVH  B12/folate/TSH  WNL. B12 on the lower side, will give Po supplementation   Fall precautions  D/w DR Goldstein and Dr Lee, suspect orthostasis related to advanced parkinson Dz, might need to  be started on meds        2. HTN sive urgency  BP  better   C/w Lisinopril and toprol  Adjust meds as needed       3. Parkinson Dz  H/o falls  C/w carbidopa/Levodopa, still with significant leg stiffness, will increase his meds to 2 tabs QID as D/w Dr Goldstein  C/w  Selegiline   Supportive care  PT       4.Urinary retention. H.o prostate CA  C/w  Tello   urology eval       5. DVT PPX: lovenox     Pts wife contact number:  cell [phone]      Dispo: c/w current management, check orthostatics

## 2021-12-06 NOTE — PROGRESS NOTE ADULT - SUBJECTIVE AND OBJECTIVE BOX
CC: HTNsive urgency (03 Dec 2021 14:22)    HPI: 85/M, poor historian, with PMHx of Parkinson's disease, prostate CA, HTN, GERD, brought to ED for sudden onset of a staring spell and collapse without LOC per his son that happened about an hour ago.  Pt was assisted to the floor by his son and was noted to bite his tongue.  PT is less alert after this episode.   He was noted to have diffuse tremors by EMS.  He normally is AAO x 1-2 and is noted to state his 1st and last name.  Pt has no facial droop or focal weakness on triage exam.  EMS glucose was 112.     CTA neg in ED  Found to have elevated /107 in ED    INTERVAL HPI/ OVERNIGHT EVENTS:  Pt was seen and examined, awake and alert, less confused, now cooperative.  Reports feels better, had good night  rest.  Better oral intake and calm today. Denies pain, no  SOB. As per RN report very shaky  and weak when stands up.    Vital Signs Last 24 Hrs  T(C): 36.4 (06 Dec 2021 06:00), Max: 36.6 (05 Dec 2021 21:03)  T(F): 97.5 (06 Dec 2021 06:00), Max: 97.9 (05 Dec 2021 21:03)  HR: 76 (06 Dec 2021 06:00) (76 - 90)  BP: 120/66 (06 Dec 2021 06:00) (120/66 - 127/82)  RR: 18 (06 Dec 2021 06:00) (17 - 18)  SpO2: 97% (06 Dec 2021 06:00) (97% - 97%)    REVIEW OF SYSTEMS:  All other review of systems is negative unless indicated above.      PHYSICAL EXAM:  General: Well developed; malnourished; in no acute distress  Eyes: EOMI; conjunctiva and sclera clear  Head: Normocephalic; atraumatic  ENMT: No nasal discharge; airway clear  Neck: Supple; non tender; no masses  Respiratory: No wheezes, rales or rhonchi  Cardiovascular: Regular rate and rhythm. S1 and S2 Normal;   Gastrointestinal: Soft non-tender non-distended; Normal bowel sounds  Genitourinary: + suprapubic  fullness  Extremities: No  edema  Vascular: Peripheral pulses palpable 2+ bilaterally  Neurological: Alert and oriented x1-2, non focal, + tremors , + rigidity LE>UE  Skin: Warm and dry. No acute rash  Musculoskeletal: Normal muscle strength         LABS:                         12.4   8.90  )-----------( 189      ( 05 Dec 2021 07:49 )             37.2     12    139  |  109<H>  |  21  ----------------------------<  85  3.8   |  24  |  0.68    Ca    8.6      05 Dec 2021 07:49    CARDIAC MARKERS ( 03 Dec 2021 01:10 )  x     / x     / 74 U/L / x     / x                                12.2   9.86  )-----------( 189      ( 03 Dec 2021 01:10 )             36.9     03 Dec 2021 01:10    141    |  107    |  25     ----------------------------<  115    3.7     |  27     |  0.84     Ca    8.6        03 Dec 2021 01:10  Mg     2.2       03 Dec 2021 01:10    TPro  7.1    /  Alb  3.1    /  TBili  0.5    /  DBili  x      /  AST  44     /  ALT  22     /  AlkPhos  241    03 Dec 2021 01:10  LIVER FUNCTIONS - ( 03 Dec 2021 01:10 )  Alb: 3.1 g/dL / Pro: 7.1 gm/dL / ALK PHOS: 241 U/L / ALT: 22 U/L / AST: 44 U/L / GGT: x           Urinalysis Basic - ( 03 Dec 2021 05:21 )  Color: Yellow / Appearance: Clear / S.015 / pH: x  Gluc: x / Ketone: Negative  / Bili: Negative / Urobili: Negative mg/dL   Blood: x / Protein: 30 mg/dL / Nitrite: Negative   Leuk Esterase: Negative / RBC: 0-2 /HPF / WBC Negative   Sq Epi: x / Non Sq Epi: Negative / Bacteria: Occasional        MEDICATIONS  (STANDING):  atorvastatin 10 milliGRAM(s) Oral at bedtime  carbidopa/levodopa  25/100 1 Tablet(s) Oral <User Schedule>  carbidopa/levodopa  25/100 2 Tablet(s) Oral <User Schedule>  donepezil 5 milliGRAM(s) Oral at bedtime  enoxaparin Injectable 40 milliGRAM(s) SubCutaneous daily  lisinopril 2.5 milliGRAM(s) Oral daily  metoprolol succinate ER 12.5 milliGRAM(s) Oral at bedtime  multivitamin 1 Tablet(s) Oral daily  QUEtiapine 50 milliGRAM(s) Oral at bedtime  selegiline Oral Tab/Cap 5 milliGRAM(s) Oral <User Schedule>    MEDICATIONS  (PRN):  acetaminophen     Tablet .. 650 milliGRAM(s) Oral every 6 hours PRN Mild Pain (1 - 3)  aluminum hydroxide/magnesium hydroxide/simethicone Suspension 30 milliLiter(s) Oral every 4 hours PRN Dyspepsia  hydrALAZINE Injectable 10 milliGRAM(s) IV Push every 6 hours PRN For SBP more than 160  melatonin 3 milliGRAM(s) Oral at bedtime PRN Insomnia  ondansetron Injectable 4 milliGRAM(s) IV Push every 6 hours PRN Nausea and/or Vomiting        RADIOLOGY & ADDITIONAL TESTS:    EXAM:  XR CHEST PORTABLE IMMED 1V                        PROCEDURE DATE:  2021      INTERPRETATION:  AP chest on December 3, 2021 at 12:48 AM. Patient has chest pain.    Heart suggest normal size.    Some linear atelectasis over the right hilum is again suggested.        EXAM:  CT ANGIO BRAIN (W)AW IC                        PROCEDURE DATE:  2021      FINDINGS:  NONCONTRAST HEAD CT SCAN:  There is no CT evidence of acute intracranial hemorrhage, mass effect, midline shift or acute territorial infarct.    Moderate generalized cerebral volume loss and mild periventricular white matter hypoattenuation compatible chronic microvascular ischemic disease are stable..    There is moderate patchy mucosal thickening in the and frontal sinuses, maxillary sinuses and ethmoid air cells.  The sphenoid sinuses are grossly clear.    The mastoids are clear bilaterally.    The calvarium, skull base and orbits appear within normal limits.    There is a chronic right nasal bone fracture.    CT ANGIOGRAPHY BRAIN:  Anterior circulation:  The distal internal carotid arteries, anterior cerebral arteries and middle cerebral arteries are patent bilaterally without flow-limiting stenosis or aneurysm.  There is atherosclerotic calcification in the cavernous, clinoid and proximal supraclinoid segments of both internal carotid arteries without stenosis.  The A1 segment of the right anterior cerebral artery is mildly hypoplastic.  There is an azygos A2 segment of the anterior cerebral arteries.  Posterior communicating arteries are not visualized.    Posterior circulation:  The bilateral intradural vertebral arteries, basilar artery and bilateral posterior cerebral arteries are patent without flow-limiting stenosis or aneurysm.  The distal left vertebral artery is dominant.  Posterior inferior cerebellar arteries and superior cerebellar arteries are patent bilaterally.  Anterior inferior cerebellar arteries are not visualized.    Dural venous sinuses: The dural venous sinuses are suboptimally opacified due to acquisition during peak arterial phase.    IMPRESSION:  NONCONTRAST HEAD CT SCAN:  1.  No CT evidence of acute intracranial pathology.  2.  Moderate paranasal sinus mucosal thickening in the frontal sinuses, maxillary sinuses and ethmoid air cells.  No evidence of sinus obstruction or an air-fluid level.    CT ANGIOGRAPHY BRAIN: No vessel occlusion, flow-limiting stenosis or aneurysm is identified about the [place].    < from: Xray Lumbosacral Spine (21 @ 21:35) >    EXAM:  XR LS SPINE AP LAT 2-3 VIEWS                            PROCEDURE DATE:  2021          INTERPRETATION:  Radiographs of the lumbar spine    CLINICAL INFORMATION:  Injury with  Pain.    TECHNIQUE:  Frontal, lateral  and lateral coned-down view of the lumbosacral junction were obtained.    FINDINGS:  No previous examinations are available for review.    There is a minimal levoscoliosis of the thoracolumbar spine..   Lumbar vertebral body heights are preserved.  Pedicles are intact.  No fracture or destructive bone lesion.    Lumbar intervertebral disc space heights are maintained.    No significant degenerative productive changes.    The sacroiliac joints are  intact.    IMPRESSION:   No acute radiographic lumbar spine osseous pathology.  If pain persist despite conservative therapy and occult fracture or disc herniation causing central canal or foraminal nerve root compression is clinically suspected, further assessment with CT or MRI recommended.      < from: CT Maxillofacial No Cont (21 @ 20:08) >    EXAM:  CT CERVICAL SPINE                          EXAM:  CT 3D RECONSTRUCT [facility]                          EXAM:  CT BRAIN                          EXAM:  CT MAXILLOFACIAL                            PROCEDURE DATE:  2021          INTERPRETATION:  INDICATION:  Status post trauma.    Facial pain. Headache. Neck pain.  TECHNIQUE:  A non contrast 2.5mm axial CT study of the brain was performed from skull base to vertex. Coronal and sagittal reformations were generated from the axial data.  COMPARISON EXAMINATION:  CT brain 2021    FINDINGS:    HEMISPHERES:  Small vessel ischemic changes are noted in both hemispheres with volume loss. No acute infarct, hemorrhage, or mass identified.  VENTRICLES:  Midline with ex vacuo enlargement  POSTERIOR FOSSA:  The brain stem and cerebellum are unremarkable.  No CP angle lesion noted.  EXTRACEREBRAL SPACES:  No subdural or epidural collections are noted.  SKULL BASE AND CALVARIUM:  Appears intact.  No fracture or destructive lesion is identified.  SINUSES AND MASTOIDS:  Clear.    CT CERVICAL    INDICATIONS:  neck pain. Trauma.facial pain.  TECHNIQUE:  Thin section CT imaging or the cervical spine and facial bones was conducted.  3-D, Coronal and sagittal reformations were generated from the axial data.    FINDINGS:    There is alteration of the cervical lordosis which may reflect positioning or spasm.    C1/C2  :  The anterior and posterior arches of C1 appear to be intact. There is no C1-C2 subluxation. No odontoid fracture is noted. Base of C2 appears to be intact    The mid and lower cervical vertebral bodies appear to be intact. No upper thoracic acute fracture is noted.    Disc spaces are diffusely narrowed with spondylotic and arthritic degenerative changes.    Emphysematous or fibrotic changes are noted in both lung apices.    ORBITS:  No orbital abnormality orbital fracture is identified    FACIAL BONES:   Facial bones appear to be intact. There is a partially edentulous mandible and maxilla with bony erosive changes.    NASAL BONES:  A comminuted, depressed right nasal fracture is noted with swelling.    PARANASAL SINUSES:  Pansinus mucosal thickening is noted.        BRAIN  IMPRESSION:  1)  chronic ischemic changes noted in both hemispheres with volume loss.  2)  no hemorrhage, contusion, or extracerebral collections are identified    CERVICAL IMPRESSION:  No acute cervical fracture identified. Multilevel degenerative changes.    FACIAL BONES IMPRESSION :  Comminuted, depressed right-sided nasal fracture with swelling. Facial bones are otherwise intact.         CC: HTNsive urgency (03 Dec 2021 14:22)    HPI: 85/M, poor historian, with PMHx of Parkinson's disease, prostate CA, HTN, GERD, brought to ED for sudden onset of a staring spell and collapse without LOC per his son that happened about an hour ago.  Pt was assisted to the floor by his son and was noted to bite his tongue.  PT is less alert after this episode.   He was noted to have diffuse tremors by EMS.  He normally is AAO x 1-2 and is noted to state his 1st and last name.  Pt has no facial droop or focal weakness on triage exam.  EMS glucose was 112.     CTA neg in ED  Found to have elevated /107 in ED    INTERVAL HPI/ OVERNIGHT EVENTS:  Pt was seen and examined, awake and more alert,  and interactive now,  cooperative. Trying feeding himself, needs some help.  Reports feels well.       Vital Signs Last 24 Hrs  T(C): 36.4 (06 Dec 2021 06:00), Max: 36.6 (05 Dec 2021 21:03)  T(F): 97.5 (06 Dec 2021 06:00), Max: 97.9 (05 Dec 2021 21:03)  HR: 76 (06 Dec 2021 06:00) (76 - 90)  BP: 120/66 (06 Dec 2021 06:00) (120/66 - 127/82)  RR: 18 (06 Dec 2021 06:00) (17 - 18)  SpO2: 97% (06 Dec 2021 06:00) (97% - 97%)    REVIEW OF SYSTEMS:  All other review of systems is negative unless indicated above.      PHYSICAL EXAM:  General: Well developed; malnourished; in no acute distress  Eyes: EOMI; conjunctiva and sclera clear  Head: Normocephalic; atraumatic  ENMT: No nasal discharge; airway clear  Neck: Supple; non tender; no masses  Respiratory: No wheezes, rales or rhonchi  Cardiovascular: Regular rate and rhythm. S1 and S2 Normal;   Gastrointestinal: Soft non-tender non-distended; Normal bowel sounds  Genitourinary: + suprapubic  fullness  Extremities: No  edema  Vascular: Peripheral pulses palpable 2+ bilaterally  Neurological: Alert and oriented x1-2, non focal, + tremors , + rigidity mostly  LE b/l, UE rigidity improved  Skin: Warm and dry. No acute rash  Musculoskeletal: Normal muscle strength         LABS:                         12.4   8.90  )-----------( 189      ( 05 Dec 2021 07:49 )             37.2     12-06    140  |  108  |  23  ----------------------------<  96  3.9   |  25  |  0.73    Ca    8.3<L>      06 Dec 2021 08:40    TPro  6.0  /  Alb  2.4<L>  /  TBili  0.7  /  DBili  x   /  AST  32  /  ALT  33  /  AlkPhos  177<H>  12-06    LIVER FUNCTIONS - ( 06 Dec 2021 08:40 )  Alb: 2.4 g/dL / Pro: 6.0 gm/dL / ALK PHOS: 177 U/L / ALT: 33 U/L / AST: 32 U/L / GGT: x                                   12.4   8.90  )-----------( 189      ( 05 Dec 2021 07:49 )             37.2     12-05    139  |  109<H>  |  21  ----------------------------<  85  3.8   |  24  |  0.68    Ca    8.6      05 Dec 2021 07:49    CARDIAC MARKERS ( 03 Dec 2021 01:10 )  x     / x     / 74 U/L / x     / x                                12.2   9.86  )-----------( 189      ( 03 Dec 2021 01:10 )             36.9     03 Dec 2021 01:10    141    |  107    |  25     ----------------------------<  115    3.7     |  27     |  0.84     Ca    8.6        03 Dec 2021 01:10  Mg     2.2       03 Dec 2021 01:10    TPro  7.1    /  Alb  3.1    /  TBili  0.5    /  DBili  x      /  AST  44     /  ALT  22     /  AlkPhos  241    03 Dec 2021 01:10  LIVER FUNCTIONS - ( 03 Dec 2021 01:10 )  Alb: 3.1 g/dL / Pro: 7.1 gm/dL / ALK PHOS: 241 U/L / ALT: 22 U/L / AST: 44 U/L / GGT: x           Urinalysis Basic - ( 03 Dec 2021 05:21 )  Color: Yellow / Appearance: Clear / S.015 / pH: x  Gluc: x / Ketone: Negative  / Bili: Negative / Urobili: Negative mg/dL   Blood: x / Protein: 30 mg/dL / Nitrite: Negative   Leuk Esterase: Negative / RBC: 0-2 /HPF / WBC Negative   Sq Epi: x / Non Sq Epi: Negative / Bacteria: Occasional      MEDICATIONS  (STANDING):  atorvastatin 10 milliGRAM(s) Oral at bedtime  carbidopa/levodopa  25/100 1 Tablet(s) Oral <User Schedule>  carbidopa/levodopa  25/100 2 Tablet(s) Oral <User Schedule>  cyanocobalamin 1000 MICROGram(s) Oral daily  donepezil 5 milliGRAM(s) Oral at bedtime  enoxaparin Injectable 40 milliGRAM(s) SubCutaneous daily  lisinopril 2.5 milliGRAM(s) Oral daily  metoprolol succinate ER 12.5 milliGRAM(s) Oral at bedtime  multivitamin 1 Tablet(s) Oral daily  QUEtiapine 50 milliGRAM(s) Oral at bedtime  selegiline Oral Tab/Cap 5 milliGRAM(s) Oral <User Schedule>  tamsulosin 0.4 milliGRAM(s) Oral at bedtime    MEDICATIONS  (PRN):  acetaminophen     Tablet .. 650 milliGRAM(s) Oral every 6 hours PRN Mild Pain (1 - 3)  aluminum hydroxide/magnesium hydroxide/simethicone Suspension 30 milliLiter(s) Oral every 4 hours PRN Dyspepsia  hydrALAZINE Injectable 10 milliGRAM(s) IV Push every 6 hours PRN For SBP more than 160  melatonin 3 milliGRAM(s) Oral at bedtime PRN Insomnia  ondansetron Injectable 4 milliGRAM(s) IV Push every 6 hours PRN Nausea and/or Vomiting        RADIOLOGY & ADDITIONAL TESTS:    EXAM:  XR CHEST PORTABLE IMMED 1V                        PROCEDURE DATE:  2021      INTERPRETATION:  AP chest on December 3, 2021 at 12:48 AM. Patient has chest pain.    Heart suggest normal size.    Some linear atelectasis over the right hilum is again suggested.        EXAM:  CT ANGIO BRAIN (W)AW IC                        PROCEDURE DATE:  2021      FINDINGS:  NONCONTRAST HEAD CT SCAN:  There is no CT evidence of acute intracranial hemorrhage, mass effect, midline shift or acute territorial infarct.    Moderate generalized cerebral volume loss and mild periventricular white matter hypoattenuation compatible chronic microvascular ischemic disease are stable..    There is moderate patchy mucosal thickening in the and frontal sinuses, maxillary sinuses and ethmoid air cells.  The sphenoid sinuses are grossly clear.    The mastoids are clear bilaterally.    The calvarium, skull base and orbits appear within normal limits.    There is a chronic right nasal bone fracture.    CT ANGIOGRAPHY BRAIN:  Anterior circulation:  The distal internal carotid arteries, anterior cerebral arteries and middle cerebral arteries are patent bilaterally without flow-limiting stenosis or aneurysm.  There is atherosclerotic calcification in the cavernous, clinoid and proximal supraclinoid segments of both internal carotid arteries without stenosis.  The A1 segment of the right anterior cerebral artery is mildly hypoplastic.  There is an azygos A2 segment of the anterior cerebral arteries.  Posterior communicating arteries are not visualized.    Posterior circulation:  The bilateral intradural vertebral arteries, basilar artery and bilateral posterior cerebral arteries are patent without flow-limiting stenosis or aneurysm.  The distal left vertebral artery is dominant.  Posterior inferior cerebellar arteries and superior cerebellar arteries are patent bilaterally.  Anterior inferior cerebellar arteries are not visualized.    Dural venous sinuses: The dural venous sinuses are suboptimally opacified due to acquisition during peak arterial phase.    IMPRESSION:  NONCONTRAST HEAD CT SCAN:  1.  No CT evidence of acute intracranial pathology.  2.  Moderate paranasal sinus mucosal thickening in the frontal sinuses, maxillary sinuses and ethmoid air cells.  No evidence of sinus obstruction or an air-fluid level.    CT ANGIOGRAPHY BRAIN: No vessel occlusion, flow-limiting stenosis or aneurysm is identified about the [place].    < from: Xray Lumbosacral Spine (21 @ 21:35) >    EXAM:  XR LS SPINE AP LAT 2-3 VIEWS                            PROCEDURE DATE:  2021          INTERPRETATION:  Radiographs of the lumbar spine    CLINICAL INFORMATION:  Injury with  Pain.    TECHNIQUE:  Frontal, lateral  and lateral coned-down view of the lumbosacral junction were obtained.    FINDINGS:  No previous examinations are available for review.    There is a minimal levoscoliosis of the thoracolumbar spine..   Lumbar vertebral body heights are preserved.  Pedicles are intact.  No fracture or destructive bone lesion.    Lumbar intervertebral disc space heights are maintained.    No significant degenerative productive changes.    The sacroiliac joints are  intact.    IMPRESSION:   No acute radiographic lumbar spine osseous pathology.  If pain persist despite conservative therapy and occult fracture or disc herniation causing central canal or foraminal nerve root compression is clinically suspected, further assessment with CT or MRI recommended.      < from: CT Maxillofacial No Cont (21 @ 20:08) >    EXAM:  CT CERVICAL SPINE                          EXAM:  CT 3D RECONSTRUCT [facility]                          EXAM:  CT BRAIN                          EXAM:  CT MAXILLOFACIAL                            PROCEDURE DATE:  2021          INTERPRETATION:  INDICATION:  Status post trauma.    Facial pain. Headache. Neck pain.  TECHNIQUE:  A non contrast 2.5mm axial CT study of the brain was performed from skull base to vertex. Coronal and sagittal reformations were generated from the axial data.  COMPARISON EXAMINATION:  CT brain 2021    FINDINGS:    HEMISPHERES:  Small vessel ischemic changes are noted in both hemispheres with volume loss. No acute infarct, hemorrhage, or mass identified.  VENTRICLES:  Midline with ex vacuo enlargement  POSTERIOR FOSSA:  The brain stem and cerebellum are unremarkable.  No CP angle lesion noted.  EXTRACEREBRAL SPACES:  No subdural or epidural collections are noted.  SKULL BASE AND CALVARIUM:  Appears intact.  No fracture or destructive lesion is identified.  SINUSES AND MASTOIDS:  Clear.    CT CERVICAL    INDICATIONS:  neck pain. Trauma.facial pain.  TECHNIQUE:  Thin section CT imaging or the cervical spine and facial bones was conducted.  3-D, Coronal and sagittal reformations were generated from the axial data.    FINDINGS:    There is alteration of the cervical lordosis which may reflect positioning or spasm.    C1/C2  :  The anterior and posterior arches of C1 appear to be intact. There is no C1-C2 subluxation. No odontoid fracture is noted. Base of C2 appears to be intact    The mid and lower cervical vertebral bodies appear to be intact. No upper thoracic acute fracture is noted.    Disc spaces are diffusely narrowed with spondylotic and arthritic degenerative changes.    Emphysematous or fibrotic changes are noted in both lung apices.    ORBITS:  No orbital abnormality orbital fracture is identified    FACIAL BONES:   Facial bones appear to be intact. There is a partially edentulous mandible and maxilla with bony erosive changes.    NASAL BONES:  A comminuted, depressed right nasal fracture is noted with swelling.    PARANASAL SINUSES:  Pansinus mucosal thickening is noted.        BRAIN  IMPRESSION:  1)  chronic ischemic changes noted in both hemispheres with volume loss.  2)  no hemorrhage, contusion, or extracerebral collections are identified    CERVICAL IMPRESSION:  No acute cervical fracture identified. Multilevel degenerative changes.    FACIAL BONES IMPRESSION :  Comminuted, depressed right-sided nasal fracture with swelling. Facial bones are otherwise intact.

## 2021-12-07 PROCEDURE — 99232 SBSQ HOSP IP/OBS MODERATE 35: CPT

## 2021-12-07 RX ADMIN — LISINOPRIL 2.5 MILLIGRAM(S): 2.5 TABLET ORAL at 10:01

## 2021-12-07 RX ADMIN — QUETIAPINE FUMARATE 50 MILLIGRAM(S): 200 TABLET, FILM COATED ORAL at 22:16

## 2021-12-07 RX ADMIN — Medication 12.5 MILLIGRAM(S): at 22:16

## 2021-12-07 RX ADMIN — DONEPEZIL HYDROCHLORIDE 5 MILLIGRAM(S): 10 TABLET, FILM COATED ORAL at 10:00

## 2021-12-07 RX ADMIN — CARBIDOPA AND LEVODOPA 2 TABLET(S): 25; 100 TABLET ORAL at 22:15

## 2021-12-07 RX ADMIN — TAMSULOSIN HYDROCHLORIDE 0.4 MILLIGRAM(S): 0.4 CAPSULE ORAL at 22:16

## 2021-12-07 RX ADMIN — ATORVASTATIN CALCIUM 10 MILLIGRAM(S): 80 TABLET, FILM COATED ORAL at 22:16

## 2021-12-07 RX ADMIN — SELEGILINE HYDROCHLORIDE 5 MILLIGRAM(S): 1.25 TABLET, ORALLY DISINTEGRATING ORAL at 08:33

## 2021-12-07 RX ADMIN — PREGABALIN 1000 MICROGRAM(S): 225 CAPSULE ORAL at 10:01

## 2021-12-07 RX ADMIN — CARBIDOPA AND LEVODOPA 2 TABLET(S): 25; 100 TABLET ORAL at 08:34

## 2021-12-07 RX ADMIN — CARBIDOPA AND LEVODOPA 2 TABLET(S): 25; 100 TABLET ORAL at 11:49

## 2021-12-07 RX ADMIN — CARBIDOPA AND LEVODOPA 2 TABLET(S): 25; 100 TABLET ORAL at 17:36

## 2021-12-07 RX ADMIN — Medication 1 TABLET(S): at 10:01

## 2021-12-07 RX ADMIN — ENOXAPARIN SODIUM 40 MILLIGRAM(S): 100 INJECTION SUBCUTANEOUS at 10:01

## 2021-12-07 NOTE — PROGRESS NOTE ADULT - ASSESSMENT
85/M, poor historian, with PMHx of Parkinson's disease, prostate CA, HTN, GERD admitted for:     # Episode of unresponsiveness. Metabolic encephalopathy on Parkinsons Dementia   orthsotatics - negative   CT head neg for acute findings  Trop neg   Monitor on tele: SR-Stach, HR 's  EEG done and diffuse slowing, no seizure activity   ECHO: EF 65%, Mild LVH  B12/folate/TSH  WNL. B12 on the lower side, will give Po supplementation   - sinemet ose increased by neurology   Fall precautions  - neurology consult appreciated      # HTN sive urgency  BP  better   C/w Lisinopril and toprol  Adjust meds as needed     #. Parkinson Dz  H/o falls  C/w carbidopa/Levodopa, still with significant leg stiffness, will increase his meds to 2 tabs QID as D/w Dr Goldstein  C/w  Selegiline   Supportive care  PT     # Urinary retention. H.o prostate CA  - started on flomax  - D/C Tello with void trial tonight   urology eval appreciated       # DVT PPX: lovenox    # Dispo: DC/ Tello/void trial tonight   # DIspo: dc planning tomorrow

## 2021-12-07 NOTE — DISCHARGE NOTE NURSING/CASE MANAGEMENT/SOCIAL WORK - NSDCVIVACCINE_GEN_ALL_CORE_FT
Tdap; 28-Sep-2020 10:30; Abdulaziz Martins (RN); [employer]; [id number] (Exp. Date: 09-Jul-2022); IntraMuscular; Deltoid Left.; 0.5 milliLiter(s); VIS (VIS Published: 09-May-2013, VIS Presented: 28-Sep-2020);   Tdap; 03-Nov-2021 21:44; Terese Feliciano (JORDAN); [employer]; [id number] (Exp. Date: 15-Jul-2023); IntraMuscular; Deltoid Left.; 0.5 milliLiter(s); VIS (VIS Published: 09-May-2013, VIS Presented: 03-Nov-2021);

## 2021-12-07 NOTE — PROGRESS NOTE ADULT - SUBJECTIVE AND OBJECTIVE BOX
CC: HTNsive urgency (03 Dec 2021 14:22)    HPI: 85/M, poor historian, with PMHx of Parkinson's disease, prostate CA, HTN, GERD, brought to ED for sudden onset of a staring spell and collapse without LOC per his son that happened about an hour ago.  Pt was assisted to the floor by his son and was noted to bite his tongue.  PT is less alert after this episode.   He was noted to have diffuse tremors by EMS.  He normally is AAO x 1-2 and is noted to state his 1st and last name.  Pt has no facial droop or focal weakness on triage exam.  EMS glucose was 112.     CTA neg in ED  Found to have elevated /107 in ED    INTERVAL HPI/ OVERNIGHT EVENTS:  Pt was seen and examined, awake and more alert,  and interactive now,  cooperative. Trying feeding himself, needs some help.  Reports feels well.     Vital Signs Last 24 Hrs  T(C): 36.2 (07 Dec 2021 10:02), Max: 37.1 (06 Dec 2021 21:36)  T(F): 97.2 (07 Dec 2021 10:02), Max: 98.7 (06 Dec 2021 21:36)  HR: 84 (07 Dec 2021 10:02) (84 - 92)  BP: 119/72 (07 Dec 2021 10:02) (115/70 - 125/63)  BP(mean): --  RR: 18 (07 Dec 2021 10:02) (18 - 18)  SpO2: 96% (07 Dec 2021 10:02) (95% - 96%)    REVIEW OF SYSTEMS:  All other review of systems is negative unless indicated above.    PHYSICAL EXAM:  General: Well developed; malnourished; in no acute distress  Eyes: EOMI; conjunctiva and sclera clear  Head: Normocephalic; atraumatic  ENMT: No nasal discharge; airway clear  Neck: Supple; non tender; no masses  Respiratory: No wheezes, rales or rhonchi  Cardiovascular: Regular rate and rhythm. S1 and S2 Normal;   Gastrointestinal: Soft non-tender non-distended; Normal bowel sounds  Genitourinary: + suprapubic  fullness  Extremities: No  edema  Vascular: Peripheral pulses palpable 2+ bilaterally  Neurological: Alert and oriented x1-2, non focal, + tremors , + rigidity mostly  LE b/l, UE rigidity improved  Skin: Warm and dry. No acute rash  Musculoskeletal: Normal muscle strength         LABS:                         12.4   8.90  )-----------( 189      ( 05 Dec 2021 07:49 )             37.2     12-06    140  |  108  |  23  ----------------------------<  96  3.9   |  25  |  0.73    Ca    8.3<L>      06 Dec 2021 08:40    TPro  6.0  /  Alb  2.4<L>  /  TBili  0.7  /  DBili  x   /  AST  32  /  ALT  33  /  AlkPhos  177<H>  12-06    LIVER FUNCTIONS - ( 06 Dec 2021 08:40 )  Alb: 2.4 g/dL / Pro: 6.0 gm/dL / ALK PHOS: 177 U/L / ALT: 33 U/L / AST: 32 U/L / GGT: x                                   12.4   8.90  )-----------( 189      ( 05 Dec 2021 07:49 )             37.2     12-05    139  |  109<H>  |  21  ----------------------------<  85  3.8   |  24  |  0.68    Ca    8.6      05 Dec 2021 07:49    CARDIAC MARKERS ( 03 Dec 2021 01:10 )  x     / x     / 74 U/L / x     / x                                12.2   9.86  )-----------( 189      ( 03 Dec 2021 01:10 )             36.9     03 Dec 2021 01:10    141    |  107    |  25     ----------------------------<  115    3.7     |  27     |  0.84     Ca    8.6        03 Dec 2021 01:10  Mg     2.2       03 Dec 2021 01:10    TPro  7.1    /  Alb  3.1    /  TBili  0.5    /  DBili  x      /  AST  44     /  ALT  22     /  AlkPhos  241    03 Dec 2021 01:10  LIVER FUNCTIONS - ( 03 Dec 2021 01:10 )  Alb: 3.1 g/dL / Pro: 7.1 gm/dL / ALK PHOS: 241 U/L / ALT: 22 U/L / AST: 44 U/L / GGT: x           Urinalysis Basic - ( 03 Dec 2021 05:21 )  Color: Yellow / Appearance: Clear / S.015 / pH: x  Gluc: x / Ketone: Negative  / Bili: Negative / Urobili: Negative mg/dL   Blood: x / Protein: 30 mg/dL / Nitrite: Negative   Leuk Esterase: Negative / RBC: 0-2 /HPF / WBC Negative   Sq Epi: x / Non Sq Epi: Negative / Bacteria: Occasional      MEDICATIONS  (STANDING):  atorvastatin 10 milliGRAM(s) Oral at bedtime  carbidopa/levodopa  25/100 1 Tablet(s) Oral <User Schedule>  carbidopa/levodopa  25/100 2 Tablet(s) Oral <User Schedule>  cyanocobalamin 1000 MICROGram(s) Oral daily  donepezil 5 milliGRAM(s) Oral at bedtime  enoxaparin Injectable 40 milliGRAM(s) SubCutaneous daily  lisinopril 2.5 milliGRAM(s) Oral daily  metoprolol succinate ER 12.5 milliGRAM(s) Oral at bedtime  multivitamin 1 Tablet(s) Oral daily  QUEtiapine 50 milliGRAM(s) Oral at bedtime  selegiline Oral Tab/Cap 5 milliGRAM(s) Oral <User Schedule>  tamsulosin 0.4 milliGRAM(s) Oral at bedtime    MEDICATIONS  (PRN):  acetaminophen     Tablet .. 650 milliGRAM(s) Oral every 6 hours PRN Mild Pain (1 - 3)  aluminum hydroxide/magnesium hydroxide/simethicone Suspension 30 milliLiter(s) Oral every 4 hours PRN Dyspepsia  hydrALAZINE Injectable 10 milliGRAM(s) IV Push every 6 hours PRN For SBP more than 160  melatonin 3 milliGRAM(s) Oral at bedtime PRN Insomnia  ondansetron Injectable 4 milliGRAM(s) IV Push every 6 hours PRN Nausea and/or Vomiting        RADIOLOGY & ADDITIONAL TESTS:    EXAM:  XR CHEST PORTABLE IMMED 1V                        PROCEDURE DATE:  2021      INTERPRETATION:  AP chest on December 3, 2021 at 12:48 AM. Patient has chest pain.    Heart suggest normal size.    Some linear atelectasis over the right hilum is again suggested.        EXAM:  CT ANGIO BRAIN (W)AW IC                        PROCEDURE DATE:  2021      FINDINGS:  NONCONTRAST HEAD CT SCAN:  There is no CT evidence of acute intracranial hemorrhage, mass effect, midline shift or acute territorial infarct.    Moderate generalized cerebral volume loss and mild periventricular white matter hypoattenuation compatible chronic microvascular ischemic disease are stable..    There is moderate patchy mucosal thickening in the and frontal sinuses, maxillary sinuses and ethmoid air cells.  The sphenoid sinuses are grossly clear.    The mastoids are clear bilaterally.    The calvarium, skull base and orbits appear within normal limits.    There is a chronic right nasal bone fracture.    CT ANGIOGRAPHY BRAIN:  Anterior circulation:  The distal internal carotid arteries, anterior cerebral arteries and middle cerebral arteries are patent bilaterally without flow-limiting stenosis or aneurysm.  There is atherosclerotic calcification in the cavernous, clinoid and proximal supraclinoid segments of both internal carotid arteries without stenosis.  The A1 segment of the right anterior cerebral artery is mildly hypoplastic.  There is an azygos A2 segment of the anterior cerebral arteries.  Posterior communicating arteries are not visualized.    Posterior circulation:  The bilateral intradural vertebral arteries, basilar artery and bilateral posterior cerebral arteries are patent without flow-limiting stenosis or aneurysm.  The distal left vertebral artery is dominant.  Posterior inferior cerebellar arteries and superior cerebellar arteries are patent bilaterally.  Anterior inferior cerebellar arteries are not visualized.    Dural venous sinuses: The dural venous sinuses are suboptimally opacified due to acquisition during peak arterial phase.    IMPRESSION:  NONCONTRAST HEAD CT SCAN:  1.  No CT evidence of acute intracranial pathology.  2.  Moderate paranasal sinus mucosal thickening in the frontal sinuses, maxillary sinuses and ethmoid air cells.  No evidence of sinus obstruction or an air-fluid level.    CT ANGIOGRAPHY BRAIN: No vessel occlusion, flow-limiting stenosis or aneurysm is identified about the [place].    < from: Xray Lumbosacral Spine (21 @ 21:35) >    EXAM:  XR LS SPINE AP LAT 2-3 VIEWS                            PROCEDURE DATE:  2021          INTERPRETATION:  Radiographs of the lumbar spine    CLINICAL INFORMATION:  Injury with  Pain.    TECHNIQUE:  Frontal, lateral  and lateral coned-down view of the lumbosacral junction were obtained.    FINDINGS:  No previous examinations are available for review.    There is a minimal levoscoliosis of the thoracolumbar spine..   Lumbar vertebral body heights are preserved.  Pedicles are intact.  No fracture or destructive bone lesion.    Lumbar intervertebral disc space heights are maintained.    No significant degenerative productive changes.    The sacroiliac joints are  intact.    IMPRESSION:   No acute radiographic lumbar spine osseous pathology.  If pain persist despite conservative therapy and occult fracture or disc herniation causing central canal or foraminal nerve root compression is clinically suspected, further assessment with CT or MRI recommended.      < from: CT Maxillofacial No Cont (21 @ 20:08) >    EXAM:  CT CERVICAL SPINE                          EXAM:  CT 3D RECONSTRUCT EDWARDO LOBO                          EXAM:  CT BRAIN                          EXAM:  CT MAXILLOFACIAL                            PROCEDURE DATE:  2021          INTERPRETATION:  INDICATION:  Status post trauma.    Facial pain. Headache. Neck pain.  TECHNIQUE:  A non contrast 2.5mm axial CT study of the brain was performed from skull base to vertex. Coronal and sagittal reformations were generated from the axial data.  COMPARISON EXAMINATION:  CT brain 2021    FINDINGS:    HEMISPHERES:  Small vessel ischemic changes are noted in both hemispheres with volume loss. No acute infarct, hemorrhage, or mass identified.  VENTRICLES:  Midline with ex vacuo enlargement  POSTERIOR FOSSA:  The brain stem and cerebellum are unremarkable.  No CP angle lesion noted.  EXTRACEREBRAL SPACES:  No subdural or epidural collections are noted.  SKULL BASE AND CALVARIUM:  Appears intact.  No fracture or destructive lesion is identified.  SINUSES AND MASTOIDS:  Clear.    CT CERVICAL    INDICATIONS:  neck pain. Trauma.facial pain.  TECHNIQUE:  Thin section CT imaging or the cervical spine and facial bones was conducted.  3-D, Coronal and sagittal reformations were generated from the axial data.    FINDINGS:    There is alteration of the cervical lordosis which may reflect positioning or spasm.    C1/C2  :  The anterior and posterior arches of C1 appear to be intact. There is no C1-C2 subluxation. No odontoid fracture is noted. Base of C2 appears to be intact    The mid and lower cervical vertebral bodies appear to be intact. No upper thoracic acute fracture is noted.    Disc spaces are diffusely narrowed with spondylotic and arthritic degenerative changes.    Emphysematous or fibrotic changes are noted in both lung apices.    ORBITS:  No orbital abnormality orbital fracture is identified    FACIAL BONES:   Facial bones appear to be intact. There is a partially edentulous mandible and maxilla with bony erosive changes.    NASAL BONES:  A comminuted, depressed right nasal fracture is noted with swelling.    PARANASAL SINUSES:  Pansinus mucosal thickening is noted.        BRAIN  IMPRESSION:  1)  chronic ischemic changes noted in both hemispheres with volume loss.  2)  no hemorrhage, contusion, or extracerebral collections are identified    CERVICAL IMPRESSION:  No acute cervical fracture identified. Multilevel degenerative changes.    FACIAL BONES IMPRESSION :  Comminuted, depressed right-sided nasal fracture with swelling. Facial bones are otherwise intact.

## 2021-12-07 NOTE — PROGRESS NOTE ADULT - CONVERSATION DETAILS
pt. is confused, discussed with pt's wife - pt. will remain full code and benefit of all treatment options, including invasive procedures if needed

## 2021-12-07 NOTE — DISCHARGE NOTE NURSING/CASE MANAGEMENT/SOCIAL WORK - PATIENT PORTAL LINK FT
You can access the FollowMyHealth Patient Portal offered by [facility] by registering at the following website: [email or web address]. By joining [facility]’s FollowMyHealth portal, you will also be able to view your health information using other applications (apps) compatible with our system.

## 2021-12-07 NOTE — DISCHARGE NOTE NURSING/CASE MANAGEMENT/SOCIAL WORK - NSDCPEFALRISK_GEN_ALL_CORE
For information on Fall & Injury Prevention, visit: [email or web address] OR  [email or web address] OR  https://www.cdc.gov/steadi/patient.html

## 2021-12-08 ENCOUNTER — TRANSCRIPTION ENCOUNTER (OUTPATIENT)
Age: 86
End: 2021-12-08

## 2021-12-08 VITALS
SYSTOLIC BLOOD PRESSURE: 135 MMHG | DIASTOLIC BLOOD PRESSURE: 84 MMHG | RESPIRATION RATE: 18 BRPM | HEART RATE: 99 BPM | OXYGEN SATURATION: 100 %

## 2021-12-08 LAB
ALBUMIN SERPL ELPH-MCNC: 2.8 G/DL — LOW (ref 3.3–5)
ALP SERPL-CCNC: 215 U/L — HIGH (ref 40–120)
ALT FLD-CCNC: 12 U/L — SIGNIFICANT CHANGE UP (ref 12–78)
ANION GAP SERPL CALC-SCNC: 5 MMOL/L — SIGNIFICANT CHANGE UP (ref 5–17)
AST SERPL-CCNC: 34 U/L — SIGNIFICANT CHANGE UP (ref 15–37)
BILIRUB SERPL-MCNC: 0.4 MG/DL — SIGNIFICANT CHANGE UP (ref 0.2–1.2)
BUN SERPL-MCNC: 22 MG/DL — SIGNIFICANT CHANGE UP (ref 7–23)
CALCIUM SERPL-MCNC: 8.7 MG/DL — SIGNIFICANT CHANGE UP (ref 8.5–10.1)
CHLORIDE SERPL-SCNC: 108 MMOL/L — SIGNIFICANT CHANGE UP (ref 96–108)
CO2 SERPL-SCNC: 28 MMOL/L — SIGNIFICANT CHANGE UP (ref 22–31)
CREAT SERPL-MCNC: 0.63 MG/DL — SIGNIFICANT CHANGE UP (ref 0.5–1.3)
GLUCOSE SERPL-MCNC: 105 MG/DL — HIGH (ref 70–99)
HCT VFR BLD CALC: 38.2 % — LOW (ref 39–50)
HGB BLD-MCNC: 12.6 G/DL — LOW (ref 13–17)
MCHC RBC-ENTMCNC: 28.8 PG — SIGNIFICANT CHANGE UP (ref 27–34)
MCHC RBC-ENTMCNC: 33 GM/DL — SIGNIFICANT CHANGE UP (ref 32–36)
MCV RBC AUTO: 87.2 FL — SIGNIFICANT CHANGE UP (ref 80–100)
PLATELET # BLD AUTO: 226 K/UL — SIGNIFICANT CHANGE UP (ref 150–400)
POTASSIUM SERPL-MCNC: 3.9 MMOL/L — SIGNIFICANT CHANGE UP (ref 3.5–5.3)
POTASSIUM SERPL-SCNC: 3.9 MMOL/L — SIGNIFICANT CHANGE UP (ref 3.5–5.3)
PROT SERPL-MCNC: 6.4 GM/DL — SIGNIFICANT CHANGE UP (ref 6–8.3)
RBC # BLD: 4.38 M/UL — SIGNIFICANT CHANGE UP (ref 4.2–5.8)
RBC # FLD: 13.1 % — SIGNIFICANT CHANGE UP (ref 10.3–14.5)
SODIUM SERPL-SCNC: 141 MMOL/L — SIGNIFICANT CHANGE UP (ref 135–145)
WBC # BLD: 7.55 K/UL — SIGNIFICANT CHANGE UP (ref 3.8–10.5)
WBC # FLD AUTO: 7.55 K/UL — SIGNIFICANT CHANGE UP (ref 3.8–10.5)

## 2021-12-08 PROCEDURE — 99239 HOSP IP/OBS DSCHRG MGMT >30: CPT

## 2021-12-08 RX ORDER — PREGABALIN 225 MG/1
1 CAPSULE ORAL
Qty: 30 | Refills: 0
Start: 2021-12-08 | End: 2022-01-06

## 2021-12-08 RX ORDER — CARBIDOPA AND LEVODOPA 25; 100 MG/1; MG/1
2 TABLET ORAL
Qty: 0 | Refills: 0 | DISCHARGE

## 2021-12-08 RX ORDER — TAMSULOSIN HYDROCHLORIDE 0.4 MG/1
1 CAPSULE ORAL
Qty: 30 | Refills: 0
Start: 2021-12-08 | End: 2022-01-06

## 2021-12-08 RX ORDER — CARBIDOPA AND LEVODOPA 25; 100 MG/1; MG/1
2 TABLET ORAL
Qty: 240 | Refills: 0
Start: 2021-12-08 | End: 2022-01-06

## 2021-12-08 RX ORDER — CARBIDOPA AND LEVODOPA 25; 100 MG/1; MG/1
2 TABLET ORAL
Qty: 60 | Refills: 0
Start: 2021-12-08 | End: 2022-01-06

## 2021-12-08 RX ORDER — CX-024414 0.2 MG/ML
0.5 INJECTION, SUSPENSION INTRAMUSCULAR
Qty: 0 | Refills: 0 | DISCHARGE

## 2021-12-08 RX ORDER — LISINOPRIL 2.5 MG/1
2.5 TABLET ORAL DAILY
Refills: 0 | Status: DISCONTINUED | OUTPATIENT
Start: 2021-12-09 | End: 2021-12-08

## 2021-12-08 RX ORDER — CARBIDOPA AND LEVODOPA 25; 100 MG/1; MG/1
1 TABLET ORAL
Qty: 240 | Refills: 0 | DISCHARGE
Start: 2021-12-08 | End: 2022-01-06

## 2021-12-08 RX ORDER — CARBIDOPA AND LEVODOPA 25; 100 MG/1; MG/1
1 TABLET ORAL
Qty: 0 | Refills: 0 | DISCHARGE

## 2021-12-08 RX ORDER — LISINOPRIL 2.5 MG/1
2.5 TABLET ORAL ONCE
Refills: 0 | Status: DISCONTINUED | OUTPATIENT
Start: 2021-12-08 | End: 2021-12-08

## 2021-12-08 RX ADMIN — ENOXAPARIN SODIUM 40 MILLIGRAM(S): 100 INJECTION SUBCUTANEOUS at 09:09

## 2021-12-08 RX ADMIN — SELEGILINE HYDROCHLORIDE 5 MILLIGRAM(S): 1.25 TABLET, ORALLY DISINTEGRATING ORAL at 08:26

## 2021-12-08 RX ADMIN — CARBIDOPA AND LEVODOPA 2 TABLET(S): 25; 100 TABLET ORAL at 08:25

## 2021-12-08 RX ADMIN — LISINOPRIL 2.5 MILLIGRAM(S): 2.5 TABLET ORAL at 09:08

## 2021-12-08 RX ADMIN — CARBIDOPA AND LEVODOPA 2 TABLET(S): 25; 100 TABLET ORAL at 15:27

## 2021-12-08 RX ADMIN — CARBIDOPA AND LEVODOPA 2 TABLET(S): 25; 100 TABLET ORAL at 12:22

## 2021-12-08 RX ADMIN — PREGABALIN 1000 MICROGRAM(S): 225 CAPSULE ORAL at 09:08

## 2021-12-08 RX ADMIN — Medication 1 TABLET(S): at 09:08

## 2021-12-08 NOTE — DISCHARGE NOTE PROVIDER - CARE PROVIDER_API CALL
Dar Lee)  Cardiovascular Disease  [address] [place] [address]  Phone: [phone]  Fax: [phone]  Follow Up Time:     Rosa Goldstein)  Neurology  [address], [address]  [place] [address]  Phone: [phone]  Fax: [phone]  Follow Up Time:     STEVIE GROVE  Urology  [address]  [place] [address]  Phone: [phone]  Fax: [phone]  Follow Up Time:

## 2021-12-08 NOTE — DISCHARGE NOTE PROVIDER - HOSPITAL COURSE
85/M, poor historian, with PMHx of Parkinson's disease, prostate CA, HTN, GERD admitted for:     # Episode of unresponsiveness. 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 B12 on the lower side, will give Po supplementation   Sinemet home dose was increased to 2 tabs QID  follow up with neurology outpatient      #  HTNsive urgency  BP  better   C/w Lisinopril and toprol    # Parkinson Dz  H/o falls  increased sinemet to 2 tabs QID as D/w Dr Goldstein  C/w  Selegiline   Supportive care  Home PT     # Urinary retention. H.o prostate CA  started on flomax 0.4 mg po daily - will cont. on discharge  - Failed void trial - retaining urine  - Tello was reinserted -will be dced with Tello cath   and will have void trial outpatient at urology office - Dr. Alexander - d/w pt's wife     Pt is stable for discharge, will follow up with cardiology/PCP - Dr. Roberts,  neurology and urology next week.     PHYSICAL EXAM:  Vital Signs Last 24 Hrs  T(C): 36.5 (08 Dec 2021 08:47), Max: 36.5 (07 Dec 2021 16:11)  T(F): 97.7 (08 Dec 2021 08:47), Max: 97.7 (07 Dec 2021 16:11)  HR: 99 (08 Dec 2021 11:09) (64 - 99)  BP: 135/84 (08 Dec 2021 11:09) (125/73 - 184/92)  BP(mean): --  RR: 18 (08 Dec 2021 11:09) (18 - 18)  SpO2: 100% (08 Dec 2021 11:09) (98% - 100%)  General: Well developed; malnourished; in no acute distress  Eyes: EOMI; conjunctiva and sclera clear  Head: Normocephalic; atraumatic  ENMT: No nasal discharge; airway clear  Neck: Supple; non tender; no masses  Respiratory: No wheezes, rales or rhonchi  Cardiovascular: Regular rate and rhythm. S1 and S2 Normal;   Gastrointestinal: Soft non-tender non-distended; Normal bowel sounds  Genitourinary: + suprapubic  fullness  Extremities: No  edema  Vascular: Peripheral pulses palpable 2+ bilaterally  Neurological: Alert and oriented x1-2, non focal, + tremors , + rigidity mostly  LE b/l, UE rigidity improved  Skin: Warm and dry. No acute rash  Musculoskeletal: Normal muscle strength

## 2021-12-08 NOTE — DISCHARGE NOTE PROVIDER - PROVIDER TOKENS
PROVIDER:[TOKEN:[[id number]]],PROVIDER:[TOKEN:[[id number]]],PROVIDER:[TOKEN:[[id number]]]

## 2021-12-08 NOTE — DISCHARGE NOTE PROVIDER - NSDCCPCAREPLAN_GEN_ALL_CORE_FT
PRINCIPAL DISCHARGE DIAGNOSIS  Diagnosis: Hypertensive urgency  Assessment and Plan of Treatment: #  HTNsive urgency  BP  better   C/w Lisinopril and toprol        SECONDARY DISCHARGE DIAGNOSES  Diagnosis: Metabolic encephalopathy  Assessment and Plan of Treatment: # Episode of unresponsiveness. 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 B12 on the lower side, will give Po supplementation   Sinemet home dose was increased to 2 tabs QID  follow up with neurology outpatient        Diagnosis: Parkinsons  Assessment and Plan of Treatment: # Parkinson Dz  H/o falls  increased sinemet to 2 tabs QID as D/w Dr Goldstein  C/w  Selegiline   Supportive care  Home PT       Diagnosis: Urinary retention  Assessment and Plan of Treatment: # Urinary retention. H.o prostate CA  started on flomax 0.4 mg po daily - will cont. on discharge  - Failed void trial - retaining urine  - Tello was reinserted -will be dced with Tello cath   and will have void trial outpatient at urology office - Dr. Alexander - d/w pt's wife

## 2021-12-08 NOTE — DISCHARGE NOTE PROVIDER - NSDCMRMEDTOKEN_GEN_ALL_CORE_FT
atorvastatin 10 mg oral tablet: 1 tab(s) orally once a day  carbidopa-levodopa 25 mg-100 mg oral tablet: 2 tab(s) orally 4 times a day at 8AM, 12PM, 4PM and 8PM   cyanocobalamin 1000 mcg oral tablet: 1 tab(s) orally once a day  donepezil 5 mg oral tablet: 1 tab(s) orally once a day  lisinopril 2.5 mg oral tablet: 1 tab(s) orally once a day  Multiple Vitamins oral tablet: 1 tab(s) orally once a day  selegiline 5 mg oral tablet: 1 tab(s) orally once a day  SEROquel 25 mg oral tablet: 2 tab(s) orally once a day (at bedtime)  tamsulosin 0.4 mg oral capsule: 1 cap(s) orally once a day (at bedtime)  Toprol-XL 25 mg oral tablet, extended release: 0.5 tab(s) orally once a day (at bedtime)

## 2021-12-08 NOTE — DISCHARGE NOTE PROVIDER - CARE PROVIDERS DIRECT ADDRESSES
[email or web address]

## 2021-12-13 DIAGNOSIS — N40.0 BENIGN PROSTATIC HYPERPLASIA WITHOUT LOWER URINARY TRACT SYMPTOMS: ICD-10-CM

## 2021-12-13 DIAGNOSIS — G20 PARKINSON'S DISEASE: ICD-10-CM

## 2021-12-13 DIAGNOSIS — Z85.46 PERSONAL HISTORY OF MALIGNANT NEOPLASM OF PROSTATE: ICD-10-CM

## 2021-12-13 DIAGNOSIS — G93.41 METABOLIC ENCEPHALOPATHY: ICD-10-CM

## 2021-12-13 DIAGNOSIS — K21.9 GASTRO-ESOPHAGEAL REFLUX DISEASE WITHOUT ESOPHAGITIS: ICD-10-CM

## 2021-12-13 DIAGNOSIS — I16.0 HYPERTENSIVE URGENCY: ICD-10-CM

## 2021-12-13 DIAGNOSIS — R33.9 RETENTION OF URINE, UNSPECIFIED: ICD-10-CM

## 2021-12-13 DIAGNOSIS — Z86.73 PERSONAL HISTORY OF TRANSIENT ISCHEMIC ATTACK (TIA), AND CEREBRAL INFARCTION WITHOUT RESIDUAL DEFICITS: ICD-10-CM

## 2021-12-13 DIAGNOSIS — R55 SYNCOPE AND COLLAPSE: ICD-10-CM

## 2021-12-13 DIAGNOSIS — I10 ESSENTIAL (PRIMARY) HYPERTENSION: ICD-10-CM

## 2021-12-13 DIAGNOSIS — F02.80 DEMENTIA IN OTHER DISEASES CLASSIFIED ELSEWHERE, UNSPECIFIED SEVERITY, WITHOUT BEHAVIORAL DISTURBANCE, PSYCHOTIC DISTURBANCE, MOOD DISTURBANCE, AND ANXIETY: ICD-10-CM

## 2021-12-13 DIAGNOSIS — E86.0 DEHYDRATION: ICD-10-CM

## 2021-12-13 DIAGNOSIS — Z91.018 ALLERGY TO OTHER FOODS: ICD-10-CM

## 2021-12-14 NOTE — ED PROVIDER NOTE - SECONDARY DIAGNOSIS.
Pulmonary Critical Care   Consult Note    Patient - Johana Camara  Date of Admission -  12/10/2021  3:28 PM  Date of Evaluation -  2021  Room and Bed Number -  3020/3020-01   Hospital Day - 4    CHIEF COMPLAINT : ACUTE HYPOXIC RESPIRATORY FAILURE DUE TO COVID -19 PNEUMONIA   HPI:   Johana Camara  59 y.o. female     admitted for worsening shortness of breath since last Monday. Patient has cough fever nausea . She came to the ER. Tested positive for COVID-19 with elevated CRP and D-dimer. CT angiogram of the chest was done which shows bilateral pulmonary infiltrates due to COVID-19. She is presently on high flow oxygen 40 L 70% FiO2. She has BiPAP available but has not used it. ABG shows no CO2 retention and PaO2 of 178. This is on high flow 40 L 70% FiO2. She has 2 packs/day smoking history and stopped in January. She has expiratory rhonchi. Denies purulent sputum or hemoptysis. She has pyoderma gangrenosum    She is unvaccinated for COVID-19      History of alcohol abuse.     2021   Subjective   on bipap   Will get NG tube today     Ros unable to perform due to ams   OBJECTIVE:     VITAL SIGNS:  BP (!) 146/122   Pulse 51   Temp 97.5 °F (36.4 °C)   Resp 28   Ht 5' 6\" (1.676 m)   Wt 158 lb 1.6 oz (71.7 kg)   SpO2 97%   BMI 25.52 kg/m²   Tmax over 24 hours:  Temp (24hrs), Av.9 °F (36.6 °C), Min:96 °F (35.6 °C), Max:99.4 °F (37.4 °C)      Patient Vitals for the past 8 hrs:   BP Pulse Resp SpO2 Height Weight   21 1129  51 28 97 % 5' 6\" (1.676 m)    21 0804  53 (!) 33 95 %     21 0600 (!) 146/122 76 25 95 %  158 lb 1.6 oz (71.7 kg)         Intake/Output Summary (Last 24 hours) at 2021 1323  Last data filed at 2021 0600  Gross per 24 hour   Intake 2245.66 ml   Output 400 ml   Net 1845.66 ml     Date 21 0000 - 21 2359   Shift 9642-0848 0363-6905 1430-1232 24 Hour Total   INTAKE   I.V.(mL/kg) 641.6(8.9)   641.6(8.9)   Shift Total(mL/kg) mg, Q6H PRN  polyethylene glycol, 17 g, Daily PRN  acetaminophen, 650 mg, Q6H PRN   Or  acetaminophen, 650 mg, Q6H PRN  dextromethorphan-guaiFENesin, 5 mL, Q4H PRN        SUPPORT DEVICES: [] Ventilator [x] BIPAP  []high flow nasal Cannula [] Room Air      ABGs:     Lab Results   Component Value Date    XYL1QNT NOT REPORTED 12/11/2021    FIO2 100.0 12/11/2021       DATA:  Complete Blood Count:   Recent Labs     12/12/21  0521 12/13/21 0434 12/14/21  0631   WBC 9.0 10.8 18.4*   RBC 3.20* 2.91* 3.42*   HGB 11.0* 10.1* 12.0   HCT 34.6* 31.2* 37.0   .1* 107.2* 108.2*   MCH 34.4* 34.7* 35.1*   MCHC 31.8 32.4 32.4   RDW 13.8 13.9 14.0    410 342   MPV 10.2 10.4 10.3        Last 3 Blood Glucose:   Recent Labs     12/12/21  0521 12/13/21  0434 12/14/21  0631   GLUCOSE 119* 130* 141*        PT/INR:    Lab Results   Component Value Date    PROTIME 10.6 01/21/2021    INR 1.0 01/21/2021     PTT:  No results found for: APTT, PTT    Comprehensive Metabolic Profile:   Recent Labs     12/12/21 0521 12/13/21 0434 12/14/21  0631    146* 141   K 3.0* 3.0* 4.2   * 115* 112*   CO2 18* 17* 17*   BUN 17 17 15   CREATININE 0.50 0.49* 0.38*   GLUCOSE 119* 130* 141*   CALCIUM 8.8 8.4* 8.0*   PROT 5.3*  --   --    LABALBU 2.8* 2.8* 3.1*   BILITOT 0.34  --   --    ALKPHOS 127*  --   --    *  --   --    *  --   --       Magnesium:   Lab Results   Component Value Date    MG 2.4 12/14/2021    MG 2.3 12/13/2021    MG 1.9 12/10/2021     Phosphorus:   Lab Results   Component Value Date    PHOS 2.2 12/14/2021    PHOS 1.6 12/13/2021     Ionized Calcium: No results found for: ROCK     Urinalysis:   Lab Results   Component Value Date    NITRU NEGATIVE 07/13/2021    COLORU YELLOW 07/13/2021    PHUR 7.0 07/13/2021    WBCUA 0 TO 2 07/13/2021    RBCUA 0 TO 2 07/13/2021    MUCUS NOT REPORTED 07/13/2021    TRICHOMONAS NOT REPORTED 07/13/2021    YEAST NOT REPORTED 07/13/2021    BACTERIA NOT REPORTED 07/13/2021 Sexually Abused: Not on file   Housing Stability:     Unable to Pay for Housing in the Last Year: Not on file    Number of Places Lived in the Last Year: Not on file    Unstable Housing in the Last Year: Not on file       Immunization History   Administered Date(s) Administered    Influenza, Quadv, IM, (6 mo and older Fluzone, Flulaval, Fluarix and 3 yrs and older Afluria) 01/03/2018, 12/30/2019    Influenza, Serafin Aas, IM, PF (6 mo and older Fluzone, Flulaval, Fluarix, and 3 yrs and older Afluria) 10/30/2018, 10/14/2020    Pneumococcal Polysaccharide ([id number]) 01/12/2017         Family History   Problem Relation Age of Onset    Coronary Art Dis Mother     Arthritis Mother     Heart Surgery Mother         CABG    Coronary Art Dis Father     Heart Disease Father     Heart Surgery Father         CABG    Coronary Art Dis Sister     Heart Surgery Sister         CABG         Past Surgical History:   Procedure Laterality Date    ABDOMINOPLASTY  1997    BACK SURGERY      BLEPHAROPLASTY Bilateral 1997    BREAST ENHANCEMENT SURGERY Bilateral 1999    breast augmentation    CARDIAC CATHETERIZATION  2019    Dr. Ced Lopez, blockage but no stents yet    CATARACT REMOVAL WITH IMPLANT Bilateral 2016    COLONOSCOPY  2015    No Polyps    COSMETIC SURGERY      eyelid lift    FINGER SURGERY Right     thumb lesion excised    FIXATION KYPHOPLASTY  2013    Back    FOOT DEBRIDEMENT Left 10/11/2019    SURGICAL PREP WOUND BED LEFT FOOT WITH APPLICATION OF EPIFIX LEFT FOOT 3X4 performed by Annelise Parekh DPM at [address] [facility]      kyphoplasty for lumbar fx 2013 Dr. Darius Reis ARTHROSCOPY Left 2006   Hoang Hilts Left 1/2/2020    FOOT  [address] performed by Ines Silver MD at [place] [address]  2014   [facility]    diskectomy and spinal fusion    OTHER SURGICAL HISTORY 10/03/2019    Left leg angiogram    AR OFFICE/OUTPT VISIT,PROCEDURE ONLY Right 9/11/2018    EXCISION MASS THUMB, 3080 TABLE performed by Qiana Nix DO at [facility] Left 12/3/2019    LEFT FOOT DEBRIDEMENT WITH SKIN GRAFT SPLIT THICKNESS; SKIN GRAFT TAKEN FROM RIGHT ANTERIOR THIGH performed by Lizbeth Lizama MD at [facility] Left 2/25/2020    DEBRIDEMENT, SPLIT THICKNESS SKIN GRAFT WITH WOUND VAC PLACEMENT FOOT performed by Anthony Curry MD at [facility] Left 6/30/2020    DEBRIDEMENT, SKIN GRAFT SPLIT THICKNESS FOOT  ([facility] [address], [address] [facility]) performed by Anthony Curry MD at [address] [place] [address]      as a child    TOTAL KNEE ARTHROPLASTY Right 10/13/2020    KNEE TOTAL ARTHROPLASTY    TOTAL KNEE ARTHROPLASTY Right 10/13/2020    KNEE TOTAL ARTHROPLASTY- MICROPORT, \ ADVANCED performed by Qiana Nix DO at [address] [facility]:  Vent Information  Skin Assessment: Clean, dry, & intact  Equipment ID: [id number]  Equipment Changed: Humidification  FiO2 : 65 %  SpO2: 97 %  SpO2/FiO2 ratio: 149.23  I Time/ I Time %: 0.8 s  Humidification Source: Heated wire  Humidification Temp: 35  Mask Type: Full face mask  Mask Size: Small     PaO2/FiO2 RATIO:  Recent Labs     12/11/21  1601   POCPO2 178.7*      FiO2 : 65 %       LABS:  ABGs:   Recent Labs     12/11/21  1601   POCPH 7.446   POCPCO2 32.3*   POCPO2 178.7*   POCHCO3 22.2   [id number] 100*            ASSESSMENT:     Principal Problem:    Pneumonia due to COVID-19 virus  Active Problems:    Hypertension    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  *              Acute hypoxic respiratory failure secondary to COVID 19   Bilateral multifocal pneumonia due to COVID 19 infection   Covid -19 pandemic emergency    COPD exacerbation    LOS: 4  PLAN:    D/w RT  D/w RN   Fentanyl prn for pain control   Once NG placed use oral roxicodone and of break through pain use fentanyl   Ativan prn - ciwa scale dc yesterday   Wean precedex gtt as estela   Protonix iv   Multivitamin via ng   TF   Albuterol inhaled       D/w  over phone on 13/13/21 in presence of Art  - explained progressive decline . He understood and did reiterate that patient did not want intubation or resuscitative measures - DNRCCA . Airborne isolation and droplet precautions to be continued  Continue supportive care   Cont treatment with medications for COVID-Actemra on 12/11/2021  IV Solu-Medrol to help with COPD exacerbation and COVID-19- change to PO taper once NG in place   Will obtain xray chest and ABG as needed  Recommend palliative care consult              Treatment plan Discussed with nursing staff in detail , all questions answered . Total critical care time caring for this patient with life threatening, unstable organ failure, including direct patient contact, management of life support systems, review of data including imaging and labs, discussions with other team members and physicians at least 27   Min so far today, excluding procedures. Electronically signed by Chapo Lindsey MD on 12/14/2021 at 1:23 PM       This patient was evaluated in the context of the global SARS-CoV-2 (COVID-19) pandemic, which necessitated considerations that the patient either has COVID-19 infection or is at risk of infection with COVID-19. Institutional protocols and algorithms that pertain to the evaluation & management of patients with COVID-19 or those at risk for COVID-19 are in a state of rapid changes based on information released by regulatory bodies including the CDC and federal and state organizations.  These policies and algorithms were followed during the patient's care.  Please note that this chart was generated using voice recognition Dragon dictation software. Although every effort was made to ensure the accuracy of this automated transcription, some errors in transcription may have occurred. Altered mental status, unspecified altered mental status type

## 2022-03-10 NOTE — PROGRESS NOTE ADULT - SUBJECTIVE AND OBJECTIVE BOX
HPI:  85/M, poor historian, with PMHx of AV disease,  Parkinson's disease, prostate CA, HTN, GERD, brought to ED for sudden onset of a staring spell and collapse without LOC. Pt was assisted to the floor by his son and was noted to bite his tongue.  PAtient states he has been falling alot lately.  He was diagnosed with orthostatic hypotension back in 2018 with Dr. Arredondo in my office ([facility] ).  Attempts at orthostatics here in hospital since he has been here have been unsuccessful.  There are reports that the patient is non-compliant with his parkinson'd medications.  He normally is AAO x 1-2.  CTA neg in ED. Found to have elevated /107 in ED.  Denies chest pain, shortness of breath at rest, orthopnea, paroxysmal nocturnal dyspnea, or claudication.    12/5/2021: Feels better but he took his parkinSkytree's meds finally.  BP High and given his metoprolol and lisinopril.  No orthostatics checked yet as not able to adequately stand. NO CP or SOB,      PAST MEDICAL & SURGICAL HISTORY:  Parkinsons disease  HTN (hypertension)  Osteoporosis  Prostate cancer  Personal history of transient ischemic attack (TIA), and cerebral infarction without residual deficits  Syncope and collapse  Bacteremia  Benign prostatic hyperplasia with lower urinary tract symptoms  Necrotizing enterocolitis  Sensorineural hearing loss (SNHL) of both ears  GERD (gastroesophageal reflux disease)  Colonic polyp  Diverticulosis  Nonrheumatic aortic valve insufficiency  Chronic rhinitis  H/O hernia repair    SOCIAL HISTORY: Non-Smoker/No ETOH/ No Ilicit Drug use.    FAMILY HISTORY:  Family history unobtainable due to patient&#x27;s condition    Allergies  apple (Anaphylaxis)  No Known Drug Allergies  Originally Entered as [Unknown] reaction to [fresh fruits] (Unknown)  raw, fresh fruits- reynaldo family (apple, pear, apricot, peach, fig); processed/cooked is ok. (Anaphylaxis)    Intolerances    MEDICATIONS  (STANDING):  atorvastatin 10 milliGRAM(s) Oral at bedtime  carbidopa/levodopa  25/100 1 Tablet(s) Oral <User Schedule>  carbidopa/levodopa  25/100 2 Tablet(s) Oral <User Schedule>  donepezil 5 milliGRAM(s) Oral at bedtime  enoxaparin Injectable 40 milliGRAM(s) SubCutaneous daily  lisinopril 2.5 milliGRAM(s) Oral daily  metoprolol succinate ER 12.5 milliGRAM(s) Oral at bedtime  multivitamin 1 Tablet(s) Oral daily  QUEtiapine 50 milliGRAM(s) Oral at bedtime  selegiline Oral Tab/Cap 5 milliGRAM(s) Oral <User Schedule>    MEDICATIONS  (PRN):  acetaminophen     Tablet .. 650 milliGRAM(s) Oral every 6 hours PRN Mild Pain (1 - 3)  aluminum hydroxide/magnesium hydroxide/simethicone Suspension 30 milliLiter(s) Oral every 4 hours PRN Dyspepsia  hydrALAZINE Injectable 10 milliGRAM(s) IV Push every 6 hours PRN For SBP more than 160  melatonin 3 milliGRAM(s) Oral at bedtime PRN Insomnia  ondansetron Injectable 4 milliGRAM(s) IV Push every 6 hours PRN Nausea and/or Vomiting      Vital Signs Last 24 Hrs  T(C): 36.8 (05 Dec 2021 08:05), Max: 36.9 (04 Dec 2021 23:34)  T(F): 98.2 (05 Dec 2021 08:05), Max: 98.4 (04 Dec 2021 23:34)  HR: 64 (05 Dec 2021 08:05) (64 - 99)  BP: 134/89 (05 Dec 2021 08:05) (134/89 - 148/86)  BP(mean): --  RR: 17 (05 Dec 2021 08:05) (17 - 18)  SpO2: 97% (05 Dec 2021 08:05) (97% - 98%)    I&O's Detail    04 Dec 2021 07:01  -  05 Dec 2021 07:00  --------------------------------------------------------  IN:  Total IN: 0 mL    OUT:    Voided (mL): 1500 mL  Total OUT: 1500 mL    Total NET: -1500 mL          Daily     Daily       PHYSICAL EXAM:  Constitutional: NAD, awake, well-developed, mildly disheveled  HEENT: PERRLA, EOMI,  No oral cyanosis. Oropharynx Clean and Dry.  Neck:  supple,  No JVD, No Thyroid enlargement. No Carotid Bruits bilaterally.  Respiratory: Breath sounds are clear bilaterally, No wheezing, rales or rhonchi  Cardiovascular: NL S1 and S2, RRR, 1/6 DM, No s3, No s4. no rubs  Gastrointestinal: Bowel Sounds present, soft   Extremities: No peripheral edema. No clubbing or cyanosis.  Vascular: 1+ peripheral pulses in LE   Neurological: A/O x 1, moves all extremities   Musculoskeletal: no calf tenderness.  Skin: No rashes.      LABS: All Labs Reviewed:                                   12.4   8.90  )-----------( 189      ( 05 Dec 2021 07:49 )             37.2     12-05    139  |  109<H>  |  21  ----------------------------<  85  3.8   |  24  |  0.68    Ca    8.6      05 Dec 2021 07:49    RADIOLOGY:  < from: Xray Chest 1 View-PORTABLE IMMEDIATE (12.03.21 @ 07:18) >  December 3, 2021 at 12:48 AM. Patient has chest pain.    Heart suggest normal size.    Some linear atelectasis over the right hilum is again suggested.    Chest is similar to CAT scan of September 28, 2020.    IMPRESSION: As above.    < end of copied text >    EKG:  NSR, Lateral MI, Poor rwave progression.    ECHO:  < from: TTE Echo Complete w/o Contrast w/ Doppler (08.24.20 @ 11:39) >   Summary     Fibrocalcific changes noted to the mitral valve leaflets withpreserved   leaflet excursion.   Mild (1+) mitral regurgitation is present.   Fibrocalcific changes noted to the Aortic valve leaflets with preserved   leaflet excursion.   Trace aortic regurgitation is present.   The left ventricle is normal in size, wall thickness, wall motion and   contractility.   Estimated left ventricular ejection fraction is 60 %.   Normal appearing right ventricle structure and function.   No evidence of pericardial effusion.    < end of copied text >    < from: TTE Echo Complete w/o Contrast w/ Doppler (12.04.21 @ 16:15) >   Summary     The mitral valve leaflets appear normal.   EA reversal of the mitral inflow consistent with reduced compliance of the   left ventricle.   Fibrocalcific changes noted to the Aortic valve leaflets with preserved   leaflet excursion.   Trace aortic regurgitation is present.   Normal appearing tricuspid valve structure and function.   The left atrium appears normal.   Estimated leftventricular ejection fraction is 65 %.   Endocardium is not well visualized, however, overall left ventricular   systolic function appears normal. Technically Difficult Study.   Mild concentric left ventricular hypertrophy is present.   The right atrium is normal.   The right ventricle is normal in size.    < end of copied text >   hearing aids, with family

## 2022-03-16 NOTE — PHYSICAL THERAPY INITIAL EVALUATION ADULT - PRECAUTIONS/LIMITATIONS, REHAB EVAL
fall precautions Doxycycline Pregnancy And Lactation Text: This medication is Pregnancy Category D and not consider safe during pregnancy. It is also excreted in breast milk but is considered safe for shorter treatment courses.

## 2022-07-07 NOTE — PATIENT PROFILE ADULT - NSPROPTRIGHTBILLOFRIGHTS_GEN_A_NUR
patient Imiquimod Counseling:  I discussed with the patient the risks of imiquimod including but not limited to erythema, scaling, itching, weeping, crusting, and pain.  Patient understands that the inflammatory response to imiquimod is variable from person to person and was educated regarded proper titration schedule.  If flu-like symptoms develop, patient knows to discontinue the medication and contact us.

## 2022-08-09 NOTE — DISCHARGE NOTE PROVIDER - NSDCQMCOGNITION_NEU_ALL_CORE
Notified patient of recent ekg results and MD recommendations.  Patient verbalizes understanding, no questions at this time   Difficulty making decisions/Difficulty remembering/Difficulty concentrating

## 2022-10-17 ENCOUNTER — EMERGENCY (EMERGENCY)
Facility: HOSPITAL | Age: 87
LOS: 0 days | Discharge: HOME | End: 2022-10-18
Attending: EMERGENCY MEDICINE
Payer: MEDICARE

## 2022-10-17 VITALS
RESPIRATION RATE: 18 BRPM | HEIGHT: 69 IN | WEIGHT: 115.08 LBS | TEMPERATURE: 98 F | DIASTOLIC BLOOD PRESSURE: 109 MMHG | SYSTOLIC BLOOD PRESSURE: 201 MMHG | HEART RATE: 76 BPM | OXYGEN SATURATION: 100 %

## 2022-10-17 DIAGNOSIS — K55.30 NECROTIZING ENTEROCOLITIS, UNSPECIFIED: ICD-10-CM

## 2022-10-17 DIAGNOSIS — Z87.19 PERSONAL HISTORY OF OTHER DISEASES OF THE DIGESTIVE SYSTEM: ICD-10-CM

## 2022-10-17 DIAGNOSIS — R07.9 CHEST PAIN, UNSPECIFIED: ICD-10-CM

## 2022-10-17 DIAGNOSIS — K57.90 DIVERTICULOSIS OF INTESTINE, PART UNSPECIFIED, WITHOUT PERFORATION OR ABSCESS WITHOUT BLEEDING: ICD-10-CM

## 2022-10-17 DIAGNOSIS — M81.0 AGE-RELATED OSTEOPOROSIS WITHOUT CURRENT PATHOLOGICAL FRACTURE: ICD-10-CM

## 2022-10-17 DIAGNOSIS — S42.001A FRACTURE OF UNSPECIFIED PART OF RIGHT CLAVICLE, INITIAL ENCOUNTER FOR CLOSED FRACTURE: ICD-10-CM

## 2022-10-17 DIAGNOSIS — G20 PARKINSON'S DISEASE: ICD-10-CM

## 2022-10-17 DIAGNOSIS — N40.1 BENIGN PROSTATIC HYPERPLASIA WITH LOWER URINARY TRACT SYMPTOMS: ICD-10-CM

## 2022-10-17 DIAGNOSIS — C61 MALIGNANT NEOPLASM OF PROSTATE: ICD-10-CM

## 2022-10-17 DIAGNOSIS — Z91.018 ALLERGY TO OTHER FOODS: ICD-10-CM

## 2022-10-17 DIAGNOSIS — I10 ESSENTIAL (PRIMARY) HYPERTENSION: ICD-10-CM

## 2022-10-17 DIAGNOSIS — Z98.890 OTHER SPECIFIED POSTPROCEDURAL STATES: Chronic | ICD-10-CM

## 2022-10-17 DIAGNOSIS — I35.1 NONRHEUMATIC AORTIC (VALVE) INSUFFICIENCY: ICD-10-CM

## 2022-10-17 DIAGNOSIS — Z20.822 CONTACT WITH AND (SUSPECTED) EXPOSURE TO COVID-19: ICD-10-CM

## 2022-10-17 DIAGNOSIS — Z86.73 PERSONAL HISTORY OF TRANSIENT ISCHEMIC ATTACK (TIA), AND CEREBRAL INFARCTION WITHOUT RESIDUAL DEFICITS: ICD-10-CM
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
## 2022-10-17 DIAGNOSIS — M25.511 PAIN IN RIGHT SHOULDER: ICD-10-CM

## 2022-10-17 DIAGNOSIS — H90.3 SENSORINEURAL HEARING LOSS, BILATERAL: ICD-10-CM

## 2022-10-17 DIAGNOSIS — Z79.82 LONG TERM (CURRENT) USE OF ASPIRIN: ICD-10-CM
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
## 2022-10-17 LAB
BASOPHILS # BLD AUTO: 0.05 K/UL — SIGNIFICANT CHANGE UP (ref 0–0.2)
BASOPHILS NFR BLD AUTO: 0.9 % — SIGNIFICANT CHANGE UP (ref 0–2)
EOSINOPHIL # BLD AUTO: 0.29 K/UL — SIGNIFICANT CHANGE UP (ref 0–0.5)
EOSINOPHIL NFR BLD AUTO: 5.2 % — SIGNIFICANT CHANGE UP (ref 0–6)
HCT VFR BLD CALC: 34.2 % — LOW (ref 39–50)
HGB BLD-MCNC: 11.7 G/DL — LOW (ref 13–17)
IMM GRANULOCYTES NFR BLD AUTO: 0.2 % — SIGNIFICANT CHANGE UP (ref 0–0.9)
LYMPHOCYTES # BLD AUTO: 0.91 K/UL — LOW (ref 1–3.3)
LYMPHOCYTES # BLD AUTO: 16.2 % — SIGNIFICANT CHANGE UP (ref 13–44)
MCHC RBC-ENTMCNC: 29.5 PG — SIGNIFICANT CHANGE UP (ref 27–34)
MCHC RBC-ENTMCNC: 34.2 GM/DL — SIGNIFICANT CHANGE UP (ref 32–36)
MCV RBC AUTO: 86.4 FL — SIGNIFICANT CHANGE UP (ref 80–100)
MONOCYTES # BLD AUTO: 0.45 K/UL — SIGNIFICANT CHANGE UP (ref 0–0.9)
MONOCYTES NFR BLD AUTO: 8 % — SIGNIFICANT CHANGE UP (ref 2–14)
NEUTROPHILS # BLD AUTO: 3.9 K/UL — SIGNIFICANT CHANGE UP (ref 1.8–7.4)
NEUTROPHILS NFR BLD AUTO: 69.5 % — SIGNIFICANT CHANGE UP (ref 43–77)
PLATELET # BLD AUTO: 206 K/UL — SIGNIFICANT CHANGE UP (ref 150–400)
RBC # BLD: 3.96 M/UL — LOW (ref 4.2–5.8)
RBC # FLD: 13.5 % — SIGNIFICANT CHANGE UP (ref 10.3–14.5)
WBC # BLD: 5.61 K/UL — SIGNIFICANT CHANGE UP (ref 3.8–10.5)
WBC # FLD AUTO: 5.61 K/UL — SIGNIFICANT CHANGE UP (ref 3.8–10.5)

## 2022-10-17 PROCEDURE — 86900 BLOOD TYPING SEROLOGIC ABO: CPT

## 2022-10-17 PROCEDURE — 71250 CT THORAX DX C-: CPT | Mod: MA

## 2022-10-17 PROCEDURE — 86901 BLOOD TYPING SEROLOGIC RH(D): CPT

## 2022-10-17 PROCEDURE — 73060 X-RAY EXAM OF HUMERUS: CPT | Mod: RT

## 2022-10-17 PROCEDURE — 85025 COMPLETE CBC W/AUTO DIFF WBC: CPT

## 2022-10-17 PROCEDURE — 74176 CT ABD & PELVIS W/O CONTRAST: CPT | Mod: MA

## 2022-10-17 PROCEDURE — 85730 THROMBOPLASTIN TIME PARTIAL: CPT

## 2022-10-17 PROCEDURE — 72125 CT NECK SPINE W/O DYE: CPT | Mod: MA

## 2022-10-17 PROCEDURE — 93010 ELECTROCARDIOGRAM REPORT: CPT

## 2022-10-17 PROCEDURE — 99285 EMERGENCY DEPT VISIT HI MDM: CPT | Mod: 25

## 2022-10-17 PROCEDURE — 23500 CLTX CLAVICULAR FX W/O MNPJ: CPT | Mod: RT

## 2022-10-17 PROCEDURE — 96361 HYDRATE IV INFUSION ADD-ON: CPT | Mod: XU

## 2022-10-17 PROCEDURE — 83605 ASSAY OF LACTIC ACID: CPT

## 2022-10-17 PROCEDURE — 96360 HYDRATION IV INFUSION INIT: CPT | Mod: XU

## 2022-10-17 PROCEDURE — 86850 RBC ANTIBODY SCREEN: CPT

## 2022-10-17 PROCEDURE — 99284 EMERGENCY DEPT VISIT MOD MDM: CPT

## 2022-10-17 PROCEDURE — 93005 ELECTROCARDIOGRAM TRACING: CPT

## 2022-10-17 PROCEDURE — 80053 COMPREHEN METABOLIC PANEL: CPT

## 2022-10-17 PROCEDURE — 72170 X-RAY EXAM OF PELVIS: CPT

## 2022-10-17 PROCEDURE — 83690 ASSAY OF LIPASE: CPT

## 2022-10-17 PROCEDURE — 36415 COLL VENOUS BLD VENIPUNCTURE: CPT

## 2022-10-17 PROCEDURE — 80307 DRUG TEST PRSMV CHEM ANLYZR: CPT

## 2022-10-17 PROCEDURE — 71045 X-RAY EXAM CHEST 1 VIEW: CPT

## 2022-10-17 PROCEDURE — 85610 PROTHROMBIN TIME: CPT

## 2022-10-17 PROCEDURE — 0225U NFCT DS DNA&RNA 21 SARSCOV2: CPT

## 2022-10-17 PROCEDURE — 70450 CT HEAD/BRAIN W/O DYE: CPT | Mod: MA

## 2022-10-17 PROCEDURE — 73030 X-RAY EXAM OF SHOULDER: CPT | Mod: RT

## 2022-10-17 RX ORDER — SODIUM CHLORIDE 9 MG/ML
250 INJECTION INTRAMUSCULAR; INTRAVENOUS; SUBCUTANEOUS ONCE
Refills: 0 | Status: COMPLETED | OUTPATIENT
Start: 2022-10-17 | End: 2022-10-17

## 2022-10-17 RX ORDER — ACETAMINOPHEN 500 MG
650 TABLET ORAL ONCE
Refills: 0 | Status: COMPLETED | OUTPATIENT
Start: 2022-10-17 | End: 2022-10-17

## 2022-10-17 NOTE — ED ADULT TRIAGE NOTE - CHIEF COMPLAINT QUOTE
Witnessed trip and fall. Denies hitting head. Hx Parkinson's, state he lost his balance and fell. Complaining of right shoulder pain, sling applied PTA. Denies any other complaints. No anticoagulants. HTN noted at triage.

## 2022-10-17 NOTE — ED PROVIDER NOTE - NSFOLLOWUPINSTRUCTIONS_ED_ALL_ED_FT
Clavicle Fracture       A clavicle fracture is a break in the long bone that connects your shoulder to your chest wall (clavicle). The clavicle is also called the collarbone. This is a common injury that can happen at any age.      What are the causes?    Common causes of this condition include:  •A hard, direct hit to the shoulder.      •A motor vehicle accident.       •A fall.        What increases the risk?    You are more likely to develop this condition if:  •You are younger than 25 years of age or older than 75 years of age. Most clavicle fractures happen to people who are younger than 25 years of age.      •You are male.      •You play contact sports.        What are the signs or symptoms?    Symptoms of this condition include:  •Pain near the injured shoulder and in the arm.      •Trouble moving the arm on your injured side.      •A shoulder that drops downward and forward.      •Pain when you try to lift the shoulder.      •Bruising, swelling, and tenderness over your shoulder.      •A grinding noise when you try to move the shoulder.      •A bump over your injured shoulder.        How is this treated?    Treatment for this condition depends on the injury.  •If the broken ends of the bone are not out of place, your doctor may put your arm in a sling.      •If the broken ends of the bone are out of place, you may need surgery to put the bones back together.      You may be given medicines for pain.    You may need to do physical therapy exercises to help your shoulder move better and get stronger after your injury has healed.      Follow these instructions at home:    Medicines     •Take over-the-counter and prescription medicines only as told by your doctor.    •Ask your doctor if the medicine prescribed to you:  •Requires you to avoid driving or using machinery.    •Can cause trouble pooping (constipation). You may need to take these actions to prevent or treat trouble pooping:   •Drink enough fluid to keep your pee (urine) pale yellow.      •Take over-the-counter or prescription medicines.      •Eat foods that are high in fiber. These include beans, whole grains, and fresh fruits and vegetables.       •Limit foods that are high in fat and sugars. These include fried or sweet foods.          If you have a sling:     •Wear the sling as told by your doctor. Remove it only as told by your doctor.    •Loosen it if your fingers:  •Tingle.      •Become numb.      •Turn cold and blue.        • Do not lift your arm. Keep it across your chest.      •Keep the sling clean.    •Ask your doctor if you may remove the sling when you take a bath or shower.  •If not, and the sling is not waterproof, do not let it get wet. Cover it with a watertight covering when you take a bath or shower.      •If you may remove it when you take a bath or shower, keep your shoulder in the same position as when the sling is on.          Managing pain, stiffness, and swelling    •If told, put ice on the injured area. To do this:  •If you have a removable sling, remove it as told by your doctor.      •Put ice in a plastic bag.      •Place a towel between your skin and the bag.      •Leave the ice on for 20 minutes, 2–3 times a day.        •Move your fingers often.      •Raise (elevate) the injured area above the level of your heart while you are sitting or lying down.        Activity      •Avoid activities that make your symptoms worse for 4–6 weeks, or as long as told.      • Do not lift anything that is heavier than 10 lb (4.5 kg), or the limit that you are told, until your doctor says that it is safe.      • Do not put weight through your arm on the injured side until your doctor says it is safe. Do not pull or push things or try to support your body weight with that arm.      •Ask your doctor when it is safe for you to drive.      •Do exercises as told by your doctor.      •Return to your normal activities as told by your doctor. Ask your doctor what activities are safe for you.      General instructions     • Do not use any products that contain nicotine or tobacco, such as cigarettes, e-cigarettes, and chewing tobacco. These can delay bone healing. If you need help quitting, ask your doctor.      •Keep all follow-up visits as told by your doctor. This is important.        Contact a doctor if:    •Your medicine is not making you feel less pain.      •Your medicine is not making swelling better.        Get help right away if:    •Your arm is numb. This means that you cannot feel it.      •Your arm is cold.      •Your arm is pale.        Summary    •A clavicle fracture is a break in the long bone that connects your shoulder to your chest wall.      •Treatment depends on whether the broken ends of the bone are out of place or not.      •If you have a sling, wear it as told by your doctor.      •Do exercises when your doctor says you can. The exercises will help your shoulder move better and get stronger.      This information is not intended to replace advice given to you by your health care provider. Make sure you discuss any questions you have with your health care provider.

## 2022-10-17 NOTE — ED PROVIDER NOTE - PROGRESS NOTE DETAILS
spoke to ortho for right displaced distal collar bone fracture, will give pt his am medications, uncertain if pt can be safely d/c to home will order sw consult, will so to am doctor ortho consult and disposition pending CAITLIN Joseph DO Pt re-evaluated by social work this morning, seen by ortho, ortho advised outpatient f/u, sw d/w wife, wife would like to take him home, pt given ortho f/u, return precautions and dc in stable condition. Pt re-evaluated by social work this morning, seen by ortho, ortho advised outpatient f/u, laura d/w wife, wife would like to take him home, pt given ortho f/u, pt will benefit ffrom home pt, laura will arrange return precautions and dc in stable condition.

## 2022-10-17 NOTE — ED PROVIDER NOTE - OBJECTIVE STATEMENT
86-year-old male brought in by ambulance from home past medical history Parkinson's disease on aspirin presents with right shoulder and chest pain status post mechanical fall.  Patient states he tripped over a box at home and asked what made him fall.  No loss of consciousness positive contusion to the right side of head.

## 2022-10-17 NOTE — ED PROVIDER NOTE - PHYSICAL EXAMINATION
Gen:  Well appearing, elderly, thin man with right shoulder pain  Head:  NC/AT  HEENT: pupils perrl,no pharyngeal erythema, uvula midline  Cardiac: S1S2, RRR  Abd: Soft, non tender  Resp: No distress, CTA   musculoskeletal::  able to raise both lower legs off the stretcher, no extensor lag, right shoulder with ttp anteriorly, with swelling, mru intact, +2 radial pulse  Skin: warm and dry as visualized, no rashes  Neuro: STINSON, aao x 4  Psych:alert, cooperative, appropriate mood and affect for situation

## 2022-10-17 NOTE — ED PROVIDER NOTE - CARE PROVIDER_API CALL
Ameya Velez)  Orthopaedic Surgery  [address] [place] [address], [address]  [place] [address]  Phone: [phone]  Fax: [phone]  Follow Up Time:

## 2022-10-17 NOTE — ED PROVIDER NOTE - CLINICAL SUMMARY MEDICAL DECISION MAKING FREE TEXT BOX
86-year-old with fall history of Parkinson's contusion to head and chest and right shoulder.  Will pan scan rule out traumatic injury and give Tylenol for pain at this time.

## 2022-10-17 NOTE — ED PROVIDER NOTE - PATIENT PORTAL LINK FT
You can access the FollowMyHealth Patient Portal offered by [facility] by registering at the following website: [email or web address]. By joining [employer]’s FollowMyHealth portal, you will also be able to view your health information using other applications (apps) compatible with our system.

## 2022-10-18 VITALS
SYSTOLIC BLOOD PRESSURE: 174 MMHG | DIASTOLIC BLOOD PRESSURE: 98 MMHG | OXYGEN SATURATION: 100 % | RESPIRATION RATE: 18 BRPM | HEART RATE: 75 BPM | TEMPERATURE: 98 F

## 2022-10-18 LAB
ALBUMIN SERPL ELPH-MCNC: 3.3 G/DL — SIGNIFICANT CHANGE UP (ref 3.3–5)
ALP SERPL-CCNC: 181 U/L — HIGH (ref 40–120)
ALT FLD-CCNC: 28 U/L — SIGNIFICANT CHANGE UP (ref 12–78)
ANION GAP SERPL CALC-SCNC: 5 MMOL/L — SIGNIFICANT CHANGE UP (ref 5–17)
APTT BLD: 28.5 SEC — SIGNIFICANT CHANGE UP (ref 27.5–35.5)
AST SERPL-CCNC: 36 U/L — SIGNIFICANT CHANGE UP (ref 15–37)
BILIRUB SERPL-MCNC: 0.4 MG/DL — SIGNIFICANT CHANGE UP (ref 0.2–1.2)
BUN SERPL-MCNC: 22 MG/DL — SIGNIFICANT CHANGE UP (ref 7–23)
CALCIUM SERPL-MCNC: 8.7 MG/DL — SIGNIFICANT CHANGE UP (ref 8.5–10.1)
CHLORIDE SERPL-SCNC: 108 MMOL/L — SIGNIFICANT CHANGE UP (ref 96–108)
CO2 SERPL-SCNC: 29 MMOL/L — SIGNIFICANT CHANGE UP (ref 22–31)
CREAT SERPL-MCNC: 0.79 MG/DL — SIGNIFICANT CHANGE UP (ref 0.5–1.3)
EGFR: 87 ML/MIN/1.73M2 — SIGNIFICANT CHANGE UP
ETHANOL SERPL-MCNC: <10 MG/DL — SIGNIFICANT CHANGE UP (ref 0–10)
GLUCOSE SERPL-MCNC: 136 MG/DL — HIGH (ref 70–99)
INR BLD: 1.01 RATIO — SIGNIFICANT CHANGE UP (ref 0.88–1.16)
LACTATE SERPL-SCNC: 1.2 MMOL/L — SIGNIFICANT CHANGE UP (ref 0.7–2)
LIDOCAIN IGE QN: 647 U/L — HIGH (ref 73–393)
POTASSIUM SERPL-MCNC: 3.7 MMOL/L — SIGNIFICANT CHANGE UP (ref 3.5–5.3)
POTASSIUM SERPL-SCNC: 3.7 MMOL/L — SIGNIFICANT CHANGE UP (ref 3.5–5.3)
PROT SERPL-MCNC: 6.7 GM/DL — SIGNIFICANT CHANGE UP (ref 6–8.3)
PROTHROM AB SERPL-ACNC: 11.7 SEC — SIGNIFICANT CHANGE UP (ref 10.5–13.4)
SODIUM SERPL-SCNC: 142 MMOL/L — SIGNIFICANT CHANGE UP (ref 135–145)

## 2022-10-18 PROCEDURE — 71250 CT THORAX DX C-: CPT | Mod: 26,MA

## 2022-10-18 PROCEDURE — 73060 X-RAY EXAM OF HUMERUS: CPT | Mod: 26,RT

## 2022-10-18 PROCEDURE — 72170 X-RAY EXAM OF PELVIS: CPT | Mod: 26

## 2022-10-18 PROCEDURE — 71045 X-RAY EXAM CHEST 1 VIEW: CPT | Mod: 26

## 2022-10-18 PROCEDURE — 74176 CT ABD & PELVIS W/O CONTRAST: CPT | Mod: 26,MA

## 2022-10-18 PROCEDURE — 70450 CT HEAD/BRAIN W/O DYE: CPT | Mod: 26,MA

## 2022-10-18 PROCEDURE — 72125 CT NECK SPINE W/O DYE: CPT | Mod: 26,MA

## 2022-10-18 PROCEDURE — 73030 X-RAY EXAM OF SHOULDER: CPT | Mod: 26,RT

## 2022-10-18 RX ORDER — ACETAMINOPHEN 500 MG
650 TABLET ORAL ONCE
Refills: 0 | Status: COMPLETED | OUTPATIENT
Start: 2022-10-18 | End: 2022-10-18

## 2022-10-18 RX ORDER — LISINOPRIL 2.5 MG/1
2.5 TABLET ORAL DAILY
Refills: 0 | Status: DISCONTINUED | OUTPATIENT
Start: 2022-10-18 | End: 2022-10-18

## 2022-10-18 RX ORDER — CARBIDOPA AND LEVODOPA 25; 100 MG/1; MG/1
1 TABLET ORAL ONCE
Refills: 0 | Status: COMPLETED | OUTPATIENT
Start: 2022-10-18 | End: 2022-10-18

## 2022-10-18 RX ORDER — QUETIAPINE FUMARATE 200 MG/1
25 TABLET, FILM COATED ORAL ONCE
Refills: 0 | Status: COMPLETED | OUTPATIENT
Start: 2022-10-18 | End: 2022-10-18

## 2022-10-18 RX ADMIN — LISINOPRIL 2.5 MILLIGRAM(S): 2.5 TABLET ORAL at 05:54

## 2022-10-18 RX ADMIN — SODIUM CHLORIDE 250 MILLILITER(S): 9 INJECTION INTRAMUSCULAR; INTRAVENOUS; SUBCUTANEOUS at 00:28

## 2022-10-18 RX ADMIN — Medication 650 MILLIGRAM(S): at 00:28

## 2022-10-18 RX ADMIN — Medication 650 MILLIGRAM(S): at 00:58

## 2022-10-18 RX ADMIN — SODIUM CHLORIDE 250 MILLILITER(S): 9 INJECTION INTRAMUSCULAR; INTRAVENOUS; SUBCUTANEOUS at 06:29

## 2022-10-18 RX ADMIN — Medication 650 MILLIGRAM(S): at 06:23

## 2022-10-18 RX ADMIN — Medication 650 MILLIGRAM(S): at 05:53

## 2022-10-18 RX ADMIN — CARBIDOPA AND LEVODOPA 1 TABLET(S): 25; 100 TABLET ORAL at 05:53

## 2022-10-18 RX ADMIN — QUETIAPINE FUMARATE 25 MILLIGRAM(S): 200 TABLET, FILM COATED ORAL at 05:53

## 2022-10-18 NOTE — ED ADULT NURSE REASSESSMENT NOTE - NS ED NURSE REASSESS COMMENT FT1
Wife and daughter going home, wife is emergency contact, Liz [phone]. Voiced concern regarding patients morning medications. States if patient is still here at 8am she would like his morning meds given. Metoprolol 25mg, Lisinopril 2.5mg, Carvedopa (Rytaryer) 56. x2 capsules. Wife and daughter going home, wife is emergency contact, Liz [phone]. Voiced concern regarding patients morning medications. States if patient is still here at 8am she would like his morning meds given. Metoprolol 25mg, Lisinopril 2.5mg, Carbidopa-Rytary 56. x2 capsules. Wife and daughter going home, wife is emergency contact, Liz [phone]. Voiced concern regarding patients morning medications. States if patient is still here at 8am she would like his morning meds given. Metoprolol 25mg, Lisinopril 2.5mg, Carbidopa-Rytary 56. x2 capsules. RN made aware.

## 2022-10-18 NOTE — CONSULT NOTE ADULT - SUBJECTIVE AND OBJECTIVE BOX
86y Male presents with RIGHT shoulder pain s/p mechanical fall. Denies headstrike/LOC/other orthopedic injuries at this time. Denies numbness/tingling/paresthesias in the affected extremity. Patient is RIGHT hand dominant.       PAST MEDICAL & SURGICAL HISTORY:  Parkinsons disease      HTN (hypertension)      Osteoporosis      Prostate cancer      Personal history of transient ischemic attack (TIA), and cerebral infarction without residual deficits      Syncope and collapse      Bacteremia      Benign prostatic hyperplasia with lower urinary tract symptoms      Necrotizing enterocolitis      Sensorineural hearing loss (SNHL) of both ears      GERD (gastroesophageal reflux disease)      Colonic polyp      Diverticulosis      Nonrheumatic aortic valve insufficiency      Chronic rhinitis      H/O hernia repair        MEDICATIONS  (STANDING):  lisinopril 2.5 milliGRAM(s) Oral daily    Allergies    apple (Anaphylaxis)  No Known Drug Allergies  Originally Entered as [Unknown] reaction to [fresh fruits] (Unknown)  raw, fresh fruits- reynaldo family (apple, pear, apricot, peach, fig); processed/cooked is ok. (Anaphylaxis)    Intolerances                              11.7   5.61  )-----------( 206      ( 17 Oct 2022 23:46 )             34.2     17 Oct 2022 23:46    142    |  108    |  22     ----------------------------<  136    3.7     |  29     |  0.79     Ca    8.7        17 Oct 2022 23:46    TPro  6.7    /  Alb  3.3    /  TBili  0.4    /  DBili  x      /  AST  36     /  ALT  28     /  AlkPhos  181    17 Oct 2022 23:46    PT/INR - ( 17 Oct 2022 23:46 )   PT: 11.7 sec;   INR: 1.01 ratio         PTT - ( 17 Oct 2022 23:46 )  PTT:28.5 sec      Vital Signs Last 24 Hrs  T(C): 36.4 (10-17-22 @ 23:07), Max: 36.4 (10-17-22 @ 23:07)  T(F): 97.6 (10-17-22 @ 23:07), Max: 97.6 (10-17-22 @ 23:07)  HR: 72 (10-18-22 @ 03:00) (72 - 76)  BP: 172/99 (10-18-22 @ 03:00) (172/99 - 201/109)  BP(mean): --  RR: 17 (10-18-22 @ 03:00) (17 - 18)  SpO2: 98% (10-18-22 @ 03:00) (98% - 100%)      PHYSICAL EXAM  GENERAL: NAD, AAOx3    RIGHT  Upper Extremity:   Skin intact   + Gross deformity over distal clavicle  + Ecchymosis over clavicle  NO Skin tenting over clavicle  + TTP over distal clavicle   Full painless ROM of shoulder   + AIN/PIN/Median/Radial/Ulnar/Musculocutaneous/Axillary nerve function  SILT C5-T1   + Radial pulse, brisk capillary refill, symmetric bilaterally   Compartments soft and compressible      Secondary Survey:   No TTP over bony prominences, SILT, palpable pulses, full/painless A/PROM, compartments soft. No TTP over spinous processes or paraspinal muscles at C/T/L spine. No palpable step off. No other injuries or complaints.       IMAGING:   XR RIGHT Clavicle/Shoulder: superior displacement of distal clavicle suggesting AC joint injury, possible clavicle fx    A/P: 86y Male w/ RIGHT AC joint injury, possible distal clavicle fracture    -Pain control as needed  -NWB RIGHT UE in sling  -Active movement of fingers/wrist/elbow encouraged  -Ice as needed  -Possible need for surgical intervention in future discussed  -Follow up outpatient with Dr. Velez within 1-2 weeks, please call office for appointment  -No acute/emergent orthopedic surgical intervention at this time  -Ortho stable for discharge

## 2022-10-18 NOTE — CHART NOTE - NSCHARTNOTEFT_GEN_A_CORE
Pt is a 87 y/o male who was brought to the ER from [facility] due to a trip and fall at home. Pt had a c/o rt shoulder pain resulting in superior displacement of distal clavicle.  Pt has a past medical hx of parkinsons, BPH, prostate ca, TIA syncope and collapse.  Pt lives with his wife and son.  Pt is confused.  Aminta spoke to pts wife Liz via telephone [phone].  SW discussed options of d/c planning home care vs SNF rehab.  Pt as per wife uses a r/w and receives an [facility] 5 hrs x [facility]. Pt has a chair lift.  [facility] and wife [facility].  Pt wife stated pt will not do well at a rehab and she wants him home.  Wife reports pt will become more confused if he is away from home.  Pt son lives at home and is also a caregiver.  Pts wife is agreeable to home care.  Sw notified CM will make referral to [facility]. Pt family will pick pt up to transport home.  Case discussed with RN/MD.

## 2022-10-18 NOTE — ED ADULT NURSE NOTE - OBJECTIVE STATEMENT
Pt. is an 86YOM BIBEMS s/p Witnessed trip and fall. Denies hitting head. Hx Parkinson's, state he lost his balance and fell. Complaining of right shoulder pain, sling applied PTA. Denies any other complaints. No anticoagulants. HTN noted at triage.

## 2023-06-08 NOTE — ED ADULT TRIAGE NOTE - AS HEIGHT TYPE
Lvm for patient to advise test results available on [facility] are neg  Given office call back number if needed  stated

## 2023-08-15 NOTE — ED ADULT NURSE NOTE - SUICIDE SCREENING QUESTION 2
Endometrial biopsy    Date/Time: 8/15/2023 11:00 AM    Performed by: Praveena Dc DO  Authorized by: Praveena Dc DO  Universal Protocol:  Consent: Written consent obtained. Risks and benefits: risks, benefits and alternatives were discussed  Consent given by: patient  Patient understanding: patient states understanding of the procedure being performed  Patient consent: the patient's understanding of the procedure matches consent given  Patient identity confirmed: verbally with patient      Indication:     Indications: Other disorder of menstruation and other abnormal bleeding from female genital tract    Pre-procedure:     Premeds:  Ibuprofen  Procedure:     Procedure: endometrial biopsy with Pipelle      A bivalve speculum was placed in the vagina: yes      Cervix cleaned and prepped: yes      A paracervical block was performed: no      An intracervical block was performed: no      The cervix was dilated: no      Uterus sounded: yes      Uterus sound depth (cm):  7.5    Curettes used:  1    Specimen collected: specimen collected and sent to pathology      Patient tolerated procedure well with no complications: yes    Findings:     Uterus size:  6-8 weeks    Cervix: normal      Adnexa: normal    Comments:     Procedure comments:  EMB completed without difficulty,  Adilene Mayuri ablation scheduled for 2023  Risks and benefits reviewed operative consent signed. Assessment/Plan:   Diagnoses and all orders for this visit:    Menorrhagia with regular cycle  -     Endometrial biopsy    Chronic pelvic pain in female  -     Endometrial biopsy    Endometriosis determined by laparoscopy  -     Endometrial biopsy    Status post bilateral salpingectomy  -     Endometrial biopsy    Preoperative exam for gynecologic surgery    Anxiety and depression       Subjective: Here for EMB and preoperative exam    Patient ID: Adilia Castorena is a 37 y.o. female.     HPI   42-year-old female  2 para 2 history of pelvic endometriosis determined by laparoscopy. She has had a bilateral salpingectomy for permanent sterilization several years ago. Patient complains of progressive pelvic pain symptoms including dysmenorrhea and positional dyspareunia. Patient also complains of menstrual cycle getting progressively longer, heavy and clotty at times. Recommend D&C hysteroscopy with Adilene endometrial ablation. Pelvic ultrasound normal size uterus measures 9.36 x 4.46 x 5.71 cm  Endometrial thickness 7.1 mm  Anteverted uterus and bilateral ovaries appear to be within normal limits. There is no free fluid noted. EMB completed at today's visit and results are pending  Operative consent obtained and completed today. Patient desires repeat D&C hysteroscopy with Adilene endometrial ablation, declined hormonal suppression, she does not want take any additional medications. She reviewed the brochure on Adilene endometrial ablation and she would like to have this done. This was scheduled on 8/30/2023 all questions answered. Review of Systems Unchanged    Objective: No acute distress  /72   Ht 5' 7" (1.702 m)   Wt 70.3 kg (155 lb)   BMI 24.28 kg/m²     Physical Exam  Vitals and nursing note reviewed. Exam conducted with a chaperone present. Constitutional:       Appearance: Normal appearance. She is normal weight. HENT:      Head: Normocephalic. Nose: Nose normal.      Mouth/Throat:      Mouth: Mucous membranes are moist.   Eyes:      Extraocular Movements: Extraocular movements intact. Conjunctiva/sclera: Conjunctivae normal.      Pupils: Pupils are equal, round, and reactive to light. Cardiovascular:      Rate and Rhythm: Normal rate and regular rhythm. Pulses: Normal pulses. Heart sounds: Normal heart sounds. Pulmonary:      Effort: Pulmonary effort is normal.      Breath sounds: Normal breath sounds. Abdominal:      General: Abdomen is flat. Palpations: Abdomen is soft. Genitourinary:     General: Normal vulva. Comments: Normal external genitalia  Normal size uterus  Normal cervix  No CMT  No pelvic masses  EMB completed at this visit dictated separately  Musculoskeletal:         General: Normal range of motion. Cervical back: Normal range of motion and neck supple. Skin:     General: Skin is warm and dry. Neurological:      Mental Status: She is alert and oriented to person, place, and time. Psychiatric:         Mood and Affect: Mood normal.         Behavior: Behavior normal.         Thought Content:  Thought content normal.         Judgment: Judgment normal. Patient unable to complete

## 2023-08-19 ENCOUNTER — INPATIENT (INPATIENT)
Facility: HOSPITAL | Age: 88
LOS: 5 days | Discharge: HOME HEALTH | DRG: 177 | End: 2023-08-25
Attending: HOSPITALIST | Admitting: INTERNAL MEDICINE
Payer: OTHER GOVERNMENT

## 2023-08-19 VITALS
SYSTOLIC BLOOD PRESSURE: 162 MMHG | TEMPERATURE: 100 F | HEART RATE: 79 BPM | RESPIRATION RATE: 16 BRPM | DIASTOLIC BLOOD PRESSURE: 106 MMHG | OXYGEN SATURATION: 94 %

## 2023-08-19 DIAGNOSIS — Z98.890 OTHER SPECIFIED POSTPROCEDURAL STATES: Chronic | ICD-10-CM

## 2023-08-19 LAB
ALBUMIN SERPL ELPH-MCNC: 3.1 G/DL — LOW (ref 3.3–5)
ALP SERPL-CCNC: 315 U/L — HIGH (ref 40–120)
ALT FLD-CCNC: 79 U/L — HIGH (ref 12–78)
ANION GAP SERPL CALC-SCNC: 4 MMOL/L — LOW (ref 5–17)
APPEARANCE UR: CLEAR — SIGNIFICANT CHANGE UP
APTT BLD: 28.4 SEC — SIGNIFICANT CHANGE UP (ref 24.5–35.6)
AST SERPL-CCNC: 145 U/L — HIGH (ref 15–37)
BASOPHILS # BLD AUTO: 0.07 K/UL — SIGNIFICANT CHANGE UP (ref 0–0.2)
BASOPHILS NFR BLD AUTO: 0.6 % — SIGNIFICANT CHANGE UP (ref 0–2)
BILIRUB SERPL-MCNC: 0.4 MG/DL — SIGNIFICANT CHANGE UP (ref 0.2–1.2)
BILIRUB UR-MCNC: NEGATIVE — SIGNIFICANT CHANGE UP
BUN SERPL-MCNC: 25 MG/DL — HIGH (ref 7–23)
CALCIUM SERPL-MCNC: 8.8 MG/DL — SIGNIFICANT CHANGE UP (ref 8.5–10.1)
CHLORIDE SERPL-SCNC: 106 MMOL/L — SIGNIFICANT CHANGE UP (ref 96–108)
CO2 SERPL-SCNC: 31 MMOL/L — SIGNIFICANT CHANGE UP (ref 22–31)
COLOR SPEC: YELLOW — SIGNIFICANT CHANGE UP
CREAT SERPL-MCNC: 0.64 MG/DL — SIGNIFICANT CHANGE UP (ref 0.5–1.3)
DIFF PNL FLD: NEGATIVE — SIGNIFICANT CHANGE UP
EGFR: 92 ML/MIN/1.73M2 — SIGNIFICANT CHANGE UP
EOSINOPHIL # BLD AUTO: 0.13 K/UL — SIGNIFICANT CHANGE UP (ref 0–0.5)
EOSINOPHIL NFR BLD AUTO: 1.2 % — SIGNIFICANT CHANGE UP (ref 0–6)
GLUCOSE SERPL-MCNC: 114 MG/DL — HIGH (ref 70–99)
GLUCOSE UR QL: NEGATIVE — SIGNIFICANT CHANGE UP
HCT VFR BLD CALC: 38.5 % — LOW (ref 39–50)
HGB BLD-MCNC: 12.8 G/DL — LOW (ref 13–17)
IMM GRANULOCYTES NFR BLD AUTO: 0.4 % — SIGNIFICANT CHANGE UP (ref 0–0.9)
INR BLD: 0.95 RATIO — SIGNIFICANT CHANGE UP (ref 0.85–1.18)
KETONES UR-MCNC: NEGATIVE — SIGNIFICANT CHANGE UP
LACTATE SERPL-SCNC: 1.1 MMOL/L — SIGNIFICANT CHANGE UP (ref 0.7–2)
LEUKOCYTE ESTERASE UR-ACNC: NEGATIVE — SIGNIFICANT CHANGE UP
LYMPHOCYTES # BLD AUTO: 0.91 K/UL — LOW (ref 1–3.3)
LYMPHOCYTES # BLD AUTO: 8.2 % — LOW (ref 13–44)
MCHC RBC-ENTMCNC: 29 PG — SIGNIFICANT CHANGE UP (ref 27–34)
MCHC RBC-ENTMCNC: 33.2 GM/DL — SIGNIFICANT CHANGE UP (ref 32–36)
MCV RBC AUTO: 87.3 FL — SIGNIFICANT CHANGE UP (ref 80–100)
MONOCYTES # BLD AUTO: 0.8 K/UL — SIGNIFICANT CHANGE UP (ref 0–0.9)
MONOCYTES NFR BLD AUTO: 7.2 % — SIGNIFICANT CHANGE UP (ref 2–14)
NEUTROPHILS # BLD AUTO: 9.15 K/UL — HIGH (ref 1.8–7.4)
NEUTROPHILS NFR BLD AUTO: 82.4 % — HIGH (ref 43–77)
NITRITE UR-MCNC: NEGATIVE — SIGNIFICANT CHANGE UP
PH UR: 7 — SIGNIFICANT CHANGE UP (ref 5–8)
PLATELET # BLD AUTO: 220 K/UL — SIGNIFICANT CHANGE UP (ref 150–400)
POTASSIUM SERPL-MCNC: 4.1 MMOL/L — SIGNIFICANT CHANGE UP (ref 3.5–5.3)
POTASSIUM SERPL-SCNC: 4.1 MMOL/L — SIGNIFICANT CHANGE UP (ref 3.5–5.3)
PROT SERPL-MCNC: 7.1 GM/DL — SIGNIFICANT CHANGE UP (ref 6–8.3)
PROT UR-MCNC: NEGATIVE — SIGNIFICANT CHANGE UP
PROTHROM AB SERPL-ACNC: 10.8 SEC — SIGNIFICANT CHANGE UP (ref 9.5–13)
RAPID RVP RESULT: SIGNIFICANT CHANGE UP
RBC # BLD: 4.41 M/UL — SIGNIFICANT CHANGE UP (ref 4.2–5.8)
RBC # FLD: 14.3 % — SIGNIFICANT CHANGE UP (ref 10.3–14.5)
SARS-COV-2 RNA SPEC QL NAA+PROBE: SIGNIFICANT CHANGE UP
SODIUM SERPL-SCNC: 141 MMOL/L — SIGNIFICANT CHANGE UP (ref 135–145)
SP GR SPEC: 1 — LOW (ref 1.01–1.02)
UROBILINOGEN FLD QL: NEGATIVE — SIGNIFICANT CHANGE UP
WBC # BLD: 11.1 K/UL — HIGH (ref 3.8–10.5)
WBC # FLD AUTO: 11.1 K/UL — HIGH (ref 3.8–10.5)

## 2023-08-19 PROCEDURE — 71250 CT THORAX DX C-: CPT | Mod: 26,MA

## 2023-08-19 PROCEDURE — 99285 EMERGENCY DEPT VISIT HI MDM: CPT

## 2023-08-19 PROCEDURE — 71045 X-RAY EXAM CHEST 1 VIEW: CPT | Mod: 26

## 2023-08-19 PROCEDURE — 93010 ELECTROCARDIOGRAM REPORT: CPT

## 2023-08-19 PROCEDURE — 70450 CT HEAD/BRAIN W/O DYE: CPT | Mod: 26,MA

## 2023-08-19 RX ORDER — KETOROLAC TROMETHAMINE 30 MG/ML
15 SYRINGE (ML) INJECTION ONCE
Refills: 0 | Status: DISCONTINUED | OUTPATIENT
Start: 2023-08-19 | End: 2023-08-19

## 2023-08-19 RX ORDER — AZITHROMYCIN 500 MG/1
500 TABLET, FILM COATED ORAL ONCE
Refills: 0 | Status: COMPLETED | OUTPATIENT
Start: 2023-08-19 | End: 2023-08-19

## 2023-08-19 RX ORDER — ACETAMINOPHEN 500 MG
650 TABLET ORAL ONCE
Refills: 0 | Status: COMPLETED | OUTPATIENT
Start: 2023-08-19 | End: 2023-08-19

## 2023-08-19 RX ORDER — CEFTRIAXONE 500 MG/1
1000 INJECTION, POWDER, FOR SOLUTION INTRAMUSCULAR; INTRAVENOUS ONCE
Refills: 0 | Status: DISCONTINUED | OUTPATIENT
Start: 2023-08-19 | End: 2023-08-19

## 2023-08-19 RX ORDER — CEFTRIAXONE 500 MG/1
1000 INJECTION, POWDER, FOR SOLUTION INTRAMUSCULAR; INTRAVENOUS ONCE
Refills: 0 | Status: COMPLETED | OUTPATIENT
Start: 2023-08-19 | End: 2023-08-19

## 2023-08-19 RX ADMIN — CEFTRIAXONE 1000 MILLIGRAM(S): 500 INJECTION, POWDER, FOR SOLUTION INTRAMUSCULAR; INTRAVENOUS at 22:57

## 2023-08-19 RX ADMIN — AZITHROMYCIN 255 MILLIGRAM(S): 500 TABLET, FILM COATED ORAL at 22:57

## 2023-08-19 RX ADMIN — Medication 15 MILLIGRAM(S): at 23:21

## 2023-08-19 RX ADMIN — Medication 650 MILLIGRAM(S): at 21:28

## 2023-08-19 NOTE — ED ADULT TRIAGE NOTE - CHIEF COMPLAINT QUOTE
Pt presents to ER with daughter c/o drooling and weakness. Onset of symptoms began 3 days PTA. Hx of parkinson's. On arrival obvious secretions from mouth. Airway patent. Pt unable to communicate verbally. MD aware

## 2023-08-19 NOTE — ED ADULT NURSE NOTE - OBJECTIVE STATEMENT
Pt in ED c/o drooling and weakness.  Pt has a peg tube.  Unable to swallow secretions.  Pt following commands.  Airway patent.  Suctioned orally small amount of secretions.  Sp02 97% on RA.  Pt breathing symmetrical and unlabored, cardiac monitoring in place,.  Pt in no acute distress at this time.

## 2023-08-19 NOTE — ED PROVIDER NOTE - PHYSICAL EXAMINATION
Gen: NAD, awake and alert  Head: NCAT  HEENT: EOMI, oral mucosa moist, normal conjunctiva  Lung: no respiratory distress, CTAB, no wheezes/rhonchi/rales B/L, speaking in full sentences  CV: RRR, no murmurs  Abd: non distended, soft, nontender, no guarding. PEG tube in place  MSK: no visible deformities  Neuro: Globally weak, appears non focal  Skin: Warm, well perfused  Psych: normal affect

## 2023-08-19 NOTE — ED PROVIDER NOTE - WR ORDER ID 1
Problem: Pain  Goal: #Acceptable pain level achieved/maintained at rest using NRS/Faces  Description: This goal is used for patients who can self-report.  Acceptable means the level is at or below the identified comfort/function goal.  Outcome: Outcome Met, Continue evaluating goal progress toward completion  Goal: # Acceptable pain level achieved/maintained at rest using NRS/Faces without oversedation (opioid naive or PCA/Epidural infusion)  Description: This goal is used if Opioid-naïve or on PCA/Epidural Infusion.  Outcome: Outcome Met, Continue evaluating goal progress toward completion     Problem: Tissue Perfusion, Ineffective - Multisystem  Goal: Hemodynamic stability achieved/maintained  Description: Hemodynamic instability may include VS or rhythm changes or s/s FVE.  AHA guidelines: Keep BP>90 mm Hg.  Outcome: Outcome Met, Continue evaluating goal progress toward completion  Goal: Elimination status is maintained/returned to baseline  Description: Remove indwelling urinary catheter as soon as possible or collaborate with provider for order/reason for continued use.   Outcome: Outcome Not Met, Continue to Monitor  Goal: Verbalizes understanding of multisystem perfusion impairment and treatment plan  Description: Document on Patient Education Activity  Outcome: Outcome Not Met, Continue to Monitor      [id number]

## 2023-08-20 DIAGNOSIS — J18.9 PNEUMONIA, UNSPECIFIED ORGANISM: ICD-10-CM

## 2023-08-20 LAB
ALBUMIN SERPL ELPH-MCNC: 2.8 G/DL — LOW (ref 3.3–5)
ALP SERPL-CCNC: 235 U/L — HIGH (ref 40–120)
ALT FLD-CCNC: 129 U/L — HIGH (ref 12–78)
ANION GAP SERPL CALC-SCNC: 4 MMOL/L — LOW (ref 5–17)
AST SERPL-CCNC: 54 U/L — HIGH (ref 15–37)
BASOPHILS # BLD AUTO: 0.04 K/UL — SIGNIFICANT CHANGE UP (ref 0–0.2)
BASOPHILS NFR BLD AUTO: 0.3 % — SIGNIFICANT CHANGE UP (ref 0–2)
BILIRUB SERPL-MCNC: 0.5 MG/DL — SIGNIFICANT CHANGE UP (ref 0.2–1.2)
BUN SERPL-MCNC: 17 MG/DL — SIGNIFICANT CHANGE UP (ref 7–23)
CALCIUM SERPL-MCNC: 8.6 MG/DL — SIGNIFICANT CHANGE UP (ref 8.5–10.1)
CHLORIDE SERPL-SCNC: 109 MMOL/L — HIGH (ref 96–108)
CO2 SERPL-SCNC: 28 MMOL/L — SIGNIFICANT CHANGE UP (ref 22–31)
CREAT SERPL-MCNC: 0.53 MG/DL — SIGNIFICANT CHANGE UP (ref 0.5–1.3)
EGFR: 97 ML/MIN/1.73M2 — SIGNIFICANT CHANGE UP
EOSINOPHIL # BLD AUTO: 0.12 K/UL — SIGNIFICANT CHANGE UP (ref 0–0.5)
EOSINOPHIL NFR BLD AUTO: 0.9 % — SIGNIFICANT CHANGE UP (ref 0–6)
GLUCOSE SERPL-MCNC: 90 MG/DL — SIGNIFICANT CHANGE UP (ref 70–99)
HCT VFR BLD CALC: 34.7 % — LOW (ref 39–50)
HGB BLD-MCNC: 11.7 G/DL — LOW (ref 13–17)
IMM GRANULOCYTES NFR BLD AUTO: 0.4 % — SIGNIFICANT CHANGE UP (ref 0–0.9)
LYMPHOCYTES # BLD AUTO: 1.38 K/UL — SIGNIFICANT CHANGE UP (ref 1–3.3)
LYMPHOCYTES # BLD AUTO: 10.3 % — LOW (ref 13–44)
MCHC RBC-ENTMCNC: 29.1 PG — SIGNIFICANT CHANGE UP (ref 27–34)
MCHC RBC-ENTMCNC: 33.7 GM/DL — SIGNIFICANT CHANGE UP (ref 32–36)
MCV RBC AUTO: 86.3 FL — SIGNIFICANT CHANGE UP (ref 80–100)
MONOCYTES # BLD AUTO: 0.87 K/UL — SIGNIFICANT CHANGE UP (ref 0–0.9)
MONOCYTES NFR BLD AUTO: 6.5 % — SIGNIFICANT CHANGE UP (ref 2–14)
NEUTROPHILS # BLD AUTO: 10.94 K/UL — HIGH (ref 1.8–7.4)
NEUTROPHILS NFR BLD AUTO: 81.6 % — HIGH (ref 43–77)
PLATELET # BLD AUTO: 209 K/UL — SIGNIFICANT CHANGE UP (ref 150–400)
POTASSIUM SERPL-MCNC: 3.9 MMOL/L — SIGNIFICANT CHANGE UP (ref 3.5–5.3)
POTASSIUM SERPL-SCNC: 3.9 MMOL/L — SIGNIFICANT CHANGE UP (ref 3.5–5.3)
PROCALCITONIN SERPL-MCNC: 0.07 NG/ML — SIGNIFICANT CHANGE UP (ref 0.02–0.1)
PROT SERPL-MCNC: 6.5 GM/DL — SIGNIFICANT CHANGE UP (ref 6–8.3)
RBC # BLD: 4.02 M/UL — LOW (ref 4.2–5.8)
RBC # FLD: 14.1 % — SIGNIFICANT CHANGE UP (ref 10.3–14.5)
SODIUM SERPL-SCNC: 141 MMOL/L — SIGNIFICANT CHANGE UP (ref 135–145)
WBC # BLD: 13.4 K/UL — HIGH (ref 3.8–10.5)
WBC # FLD AUTO: 13.4 K/UL — HIGH (ref 3.8–10.5)

## 2023-08-20 PROCEDURE — 97162 PT EVAL MOD COMPLEX 30 MIN: CPT | Mod: GP

## 2023-08-20 PROCEDURE — C9113: CPT

## 2023-08-20 PROCEDURE — 99223 1ST HOSP IP/OBS HIGH 75: CPT

## 2023-08-20 PROCEDURE — 82140 ASSAY OF AMMONIA: CPT

## 2023-08-20 PROCEDURE — 80053 COMPREHEN METABOLIC PANEL: CPT

## 2023-08-20 PROCEDURE — 99497 ADVNCD CARE PLAN 30 MIN: CPT | Mod: 25

## 2023-08-20 PROCEDURE — 36415 COLL VENOUS BLD VENIPUNCTURE: CPT

## 2023-08-20 PROCEDURE — 76700 US EXAM ABDOM COMPLETE: CPT | Mod: 26

## 2023-08-20 PROCEDURE — 80048 BASIC METABOLIC PNL TOTAL CA: CPT

## 2023-08-20 PROCEDURE — 87449 NOS EACH ORGANISM AG IA: CPT

## 2023-08-20 PROCEDURE — 85025 COMPLETE CBC W/AUTO DIFF WBC: CPT

## 2023-08-20 PROCEDURE — 80074 ACUTE HEPATITIS PANEL: CPT

## 2023-08-20 PROCEDURE — 49465 FLUORO EXAM OF G/COLON TUBE: CPT

## 2023-08-20 PROCEDURE — 92610 EVALUATE SWALLOWING FUNCTION: CPT | Mod: GN

## 2023-08-20 PROCEDURE — 76700 US EXAM ABDOM COMPLETE: CPT

## 2023-08-20 RX ORDER — SODIUM CHLORIDE 9 MG/ML
1000 INJECTION, SOLUTION INTRAVENOUS
Refills: 0 | Status: DISCONTINUED | OUTPATIENT
Start: 2023-08-20 | End: 2023-08-25

## 2023-08-20 RX ORDER — ACETAMINOPHEN 500 MG
650 TABLET ORAL EVERY 6 HOURS
Refills: 0 | Status: DISCONTINUED | OUTPATIENT
Start: 2023-08-20 | End: 2023-08-25

## 2023-08-20 RX ORDER — TAMSULOSIN HYDROCHLORIDE 0.4 MG/1
0.4 CAPSULE ORAL DAILY
Refills: 0 | Status: DISCONTINUED | OUTPATIENT
Start: 2023-08-20 | End: 2023-08-20

## 2023-08-20 RX ORDER — LISINOPRIL 2.5 MG/1
2.5 TABLET ORAL DAILY
Refills: 0 | Status: DISCONTINUED | OUTPATIENT
Start: 2023-08-20 | End: 2023-08-22

## 2023-08-20 RX ORDER — PANTOPRAZOLE SODIUM 20 MG/1
40 TABLET, DELAYED RELEASE ORAL
Refills: 0 | Status: DISCONTINUED | OUTPATIENT
Start: 2023-08-20 | End: 2023-08-20

## 2023-08-20 RX ORDER — LANOLIN ALCOHOL/MO/W.PET/CERES
3 CREAM (GRAM) TOPICAL AT BEDTIME
Refills: 0 | Status: DISCONTINUED | OUTPATIENT
Start: 2023-08-20 | End: 2023-08-25

## 2023-08-20 RX ORDER — AZITHROMYCIN 500 MG/1
500 TABLET, FILM COATED ORAL EVERY 24 HOURS
Refills: 0 | Status: COMPLETED | OUTPATIENT
Start: 2023-08-20 | End: 2023-08-23

## 2023-08-20 RX ORDER — ONDANSETRON 8 MG/1
4 TABLET, FILM COATED ORAL EVERY 8 HOURS
Refills: 0 | Status: DISCONTINUED | OUTPATIENT
Start: 2023-08-20 | End: 2023-08-25

## 2023-08-20 RX ORDER — DONEPEZIL HYDROCHLORIDE 10 MG/1
5 TABLET, FILM COATED ORAL AT BEDTIME
Refills: 0 | Status: DISCONTINUED | OUTPATIENT
Start: 2023-08-20 | End: 2023-08-25

## 2023-08-20 RX ORDER — PANTOPRAZOLE SODIUM 20 MG/1
40 TABLET, DELAYED RELEASE ORAL DAILY
Refills: 0 | Status: DISCONTINUED | OUTPATIENT
Start: 2023-08-20 | End: 2023-08-21

## 2023-08-20 RX ORDER — METOPROLOL TARTRATE 50 MG
25 TABLET ORAL DAILY
Refills: 0 | Status: DISCONTINUED | OUTPATIENT
Start: 2023-08-20 | End: 2023-08-25

## 2023-08-20 RX ORDER — ASPIRIN/CALCIUM CARB/MAGNESIUM 324 MG
81 TABLET ORAL DAILY
Refills: 0 | Status: DISCONTINUED | OUTPATIENT
Start: 2023-08-20 | End: 2023-08-25

## 2023-08-20 RX ORDER — DOXAZOSIN MESYLATE 4 MG
2 TABLET ORAL AT BEDTIME
Refills: 0 | Status: DISCONTINUED | OUTPATIENT
Start: 2023-08-20 | End: 2023-08-25

## 2023-08-20 RX ORDER — ATORVASTATIN CALCIUM 80 MG/1
1 TABLET, FILM COATED ORAL
Qty: 0 | Refills: 0 | DISCHARGE

## 2023-08-20 RX ORDER — DONEPEZIL HYDROCHLORIDE 10 MG/1
1 TABLET, FILM COATED ORAL
Qty: 0 | Refills: 0 | DISCHARGE

## 2023-08-20 RX ORDER — CARBIDOPA AND LEVODOPA 25; 100 MG/1; MG/1
1 TABLET ORAL
Refills: 0 | Status: DISCONTINUED | OUTPATIENT
Start: 2023-08-20 | End: 2023-08-23

## 2023-08-20 RX ORDER — ATORVASTATIN CALCIUM 80 MG/1
20 TABLET, FILM COATED ORAL AT BEDTIME
Refills: 0 | Status: DISCONTINUED | OUTPATIENT
Start: 2023-08-20 | End: 2023-08-25

## 2023-08-20 RX ORDER — METOPROLOL TARTRATE 50 MG
0.5 TABLET ORAL
Qty: 0 | Refills: 0 | DISCHARGE

## 2023-08-20 RX ORDER — SELEGILINE HYDROCHLORIDE 1.25 MG/1
1 TABLET, ORALLY DISINTEGRATING ORAL
Qty: 0 | Refills: 0 | DISCHARGE

## 2023-08-20 RX ORDER — CEFTRIAXONE 500 MG/1
1000 INJECTION, POWDER, FOR SOLUTION INTRAMUSCULAR; INTRAVENOUS EVERY 24 HOURS
Refills: 0 | Status: DISCONTINUED | OUTPATIENT
Start: 2023-08-20 | End: 2023-08-25

## 2023-08-20 RX ORDER — QUETIAPINE FUMARATE 200 MG/1
50 TABLET, FILM COATED ORAL AT BEDTIME
Refills: 0 | Status: DISCONTINUED | OUTPATIENT
Start: 2023-08-20 | End: 2023-08-24

## 2023-08-20 RX ORDER — LISINOPRIL 2.5 MG/1
1 TABLET ORAL
Qty: 0 | Refills: 0 | DISCHARGE

## 2023-08-20 RX ORDER — ENOXAPARIN SODIUM 100 MG/ML
40 INJECTION SUBCUTANEOUS EVERY 24 HOURS
Refills: 0 | Status: DISCONTINUED | OUTPATIENT
Start: 2023-08-20 | End: 2023-08-25

## 2023-08-20 RX ADMIN — CEFTRIAXONE 1000 MILLIGRAM(S): 500 INJECTION, POWDER, FOR SOLUTION INTRAMUSCULAR; INTRAVENOUS at 10:27

## 2023-08-20 RX ADMIN — ATORVASTATIN CALCIUM 20 MILLIGRAM(S): 80 TABLET, FILM COATED ORAL at 21:19

## 2023-08-20 RX ADMIN — QUETIAPINE FUMARATE 50 MILLIGRAM(S): 200 TABLET, FILM COATED ORAL at 21:19

## 2023-08-20 RX ADMIN — SODIUM CHLORIDE 75 MILLILITER(S): 9 INJECTION, SOLUTION INTRAVENOUS at 12:30

## 2023-08-20 RX ADMIN — AZITHROMYCIN 255 MILLIGRAM(S): 500 TABLET, FILM COATED ORAL at 10:27

## 2023-08-20 RX ADMIN — Medication 2 MILLIGRAM(S): at 21:21

## 2023-08-20 RX ADMIN — DONEPEZIL HYDROCHLORIDE 5 MILLIGRAM(S): 10 TABLET, FILM COATED ORAL at 21:19

## 2023-08-20 RX ADMIN — ENOXAPARIN SODIUM 40 MILLIGRAM(S): 100 INJECTION SUBCUTANEOUS at 17:36

## 2023-08-20 RX ADMIN — Medication 25 MILLIGRAM(S): at 17:35

## 2023-08-20 RX ADMIN — CARBIDOPA AND LEVODOPA 1 TABLET(S): 25; 100 TABLET ORAL at 21:19

## 2023-08-20 RX ADMIN — LISINOPRIL 2.5 MILLIGRAM(S): 2.5 TABLET ORAL at 17:35

## 2023-08-20 NOTE — H&P ADULT - NSHPREVIEWOFSYSTEMS_GEN_ALL_CORE
denies SOB, no CP, no abd pain , denies nausea or vomiting- unable to determine reliabity of ROS as pt with dementia

## 2023-08-20 NOTE — PROVIDER CONTACT NOTE (OTHER) - ACTION/TREATMENT ORDERED:
Telephone order for Tello Catheter Insertion.
Straight cath patient 3rd time and re-assess in 6 hours.

## 2023-08-20 NOTE — CONSULT NOTE ADULT - SUBJECTIVE AND OBJECTIVE BOX
HPI:   86 y/o M w/ PMH of HTN, parkinsons, GERD, CVA, syncope, BPH presenting w/ increased secretions. Seen w/ wife and daughter. Reports over the past few days having cough w/ copious amount of secretions. Last night was concerned that he would choke, he didn't, but didn't think he could stay home another night so brought him to the ED today for eval. [facility] and they were told he had some "congestion" in his lungs. Primarily bed bound. Denies fevers, chills, headache, dizziness, blurred vision, chest pain, shortness of breath, abdominal pain, n/v/d/c, urinary symptoms, MSK pain, rash (20 Aug 2023 09:16)      PAST MEDICAL & SURGICAL HISTORY:  Parkinsons disease      HTN (hypertension)      Osteoporosis      Prostate cancer      Personal history of transient ischemic attack (TIA), and cerebral infarction without residual deficits      Syncope and collapse      Bacteremia      Benign prostatic hyperplasia with lower urinary tract symptoms      Necrotizing enterocolitis      Sensorineural hearing loss (SNHL) of both ears      GERD (gastroesophageal reflux disease)      Colonic polyp      Diverticulosis      Nonrheumatic aortic valve insufficiency      Chronic rhinitis      H/O hernia repair          Home Medications:  atorvastatin 10 mg oral tablet: 1 tab(s) orally once a day (03 Dec 2021 14:29)  donepezil 5 mg oral tablet: 1 tab(s) orally once a day (03 Dec 2021 14:29)  lisinopril 2.5 mg oral tablet: 1 tab(s) orally once a day (03 Dec 2021 14:29)  Multiple Vitamins oral tablet: 1 tab(s) orally once a day (03 Dec 2021 14:29)  selegiline 5 mg oral tablet: 1 tab(s) orally once a day (03 Dec 2021 14:29)  SEROquel 25 mg oral tablet: 2 tab(s) orally once a day (at bedtime) (03 Dec 2021 14:29)  Toprol-XL 25 mg oral tablet, extended release: 0.5 tab(s) orally once a day (at bedtime) (03 Dec 2021 14:29)      MEDICATIONS  (STANDING):  azithromycin  IVPB 500 milliGRAM(s) IV Intermittent every 24 hours  cefTRIAXone Injectable. 1000 milliGRAM(s) IV Push every 24 hours    MEDICATIONS  (PRN):  acetaminophen   Oral Liquid .. 650 milliGRAM(s) Oral every 6 hours PRN Temp greater or equal to 38C (100.4F), Mild Pain (1 - 3)  aluminum hydroxide/magnesium hydroxide/simethicone Suspension 30 milliLiter(s) Oral every 4 hours PRN Dyspepsia  melatonin 3 milliGRAM(s) Oral at bedtime PRN Insomnia  ondansetron Injectable 4 milliGRAM(s) IV Push every 8 hours PRN Nausea and/or Vomiting      Allergies    apple (Anaphylaxis)  raw, fresh fruits- reynaldo family (apple, pear, apricot, peach, fig); processed/cooked is ok. (Anaphylaxis)  Originally Entered as [Unknown] reaction to [fresh fruits] (Unknown)  No Known Drug Allergies    Intolerances        SOCIAL HISTORY: Denies tobacco, etoh abuse or illicit drug use    FAMILY HISTORY:  Family history unobtainable due to patient's condition        Vital Signs Last 24 Hrs  T(C): 36.4 (20 Aug 2023 08:00), Max: 38.3 (19 Aug 2023 23:06)  T(F): 97.6 (20 Aug 2023 08:00), Max: 100.9 (19 Aug 2023 23:06)  HR: 65 (20 Aug 2023 08:00) (65 - 83)  BP: 128/60 (20 Aug 2023 08:00) (111/64 - 162/106)  BP(mean): 95 (19 Aug 2023 23:08) (95 - 118)  RR: 16 (20 Aug 2023 08:00) (16 - 20)  SpO2: 98% (20 Aug 2023 08:00) (94% - 98%)    Parameters below as of 20 Aug 2023 08:00  Patient On (Oxygen Delivery Method): room air            REVIEW OF SYSTEMS:    CONSTITUTIONAL:  As per HPI.  HEENT:  Eyes:  No diplopia or blurred vision. ENT:  No earache, sore throat or runny nose.  CARDIOVASCULAR:  No pressure, squeezing, tightness, heaviness or aching about the chest, neck, axilla or epigastrium.  RESPIRATORY:  No cough, shortness of breath, PND or orthopnea.  GASTROINTESTINAL:  No nausea, vomiting or diarrhea.  GENITOURINARY:  No dysuria, frequency or urgency.  MUSCULOSKELETAL:  As per HPI.  SKIN:  No change in skin, hair or nails.  NEUROLOGIC:  No paresthesias, fasciculations, seizures or weakness.  PSYCHIATRIC:  No disorder of thought or mood.  ENDOCRINE:  No heat or cold intolerance, polyuria or polydipsia.  HEMATOLOGICAL:  No easy bruising or bleedings:  .     PHYSICAL EXAMINATION:    GENERAL APPEARANCE:  Pt. is not currently dyspneic, in no distress. Pt. is alert, oriented, and pleasant.  HEENT:  Pupils are normal and react normally. No icterus. Mucous membranes well colored.  NECK:  Supple. No lymphadenopathy. Jugular venous pressure not elevated. Carotids equal.   HEART:   The cardiac impulse has a normal quality. Regular. Normal S1 and S2. There are no murmurs, rubs or gallops noted  CHEST:  Chest is clear to auscultation. Normal respiratory effort.  ABDOMEN:  Soft and nontender.   EXTREMITIES:  There is no cyanosis, clubbing or edema.   SKIN:  No rash or significant lesions are noted.    LABS:                        12.8   11.10 )-----------( 220      ( 19 Aug 2023 20:15 )             38.5         141  |  106  |  25<H>  ----------------------------<  114<H>  4.1   |  31  |  0.64    Ca    8.8      19 Aug 2023 20:15    TPro  7.1  /  Alb  3.1<L>  /  TBili  0.4  /  DBili  x   /  AST  145<H>  /  ALT  79<H>  /  AlkPhos  315<H>      LIVER FUNCTIONS - ( 19 Aug 2023 20:15 )  Alb: 3.1 g/dL / Pro: 7.1 gm/dL / ALK PHOS: 315 U/L / ALT: 79 U/L / AST: 145 U/L / GGT: x           PT/INR - ( 19 Aug 2023 20:15 )   PT: 10.8 sec;   INR: 0.95 ratio         PTT - ( 19 Aug 2023 20:15 )  PTT:28.4 sec      Urinalysis Basic - ( 19 Aug 2023 21:28 )    Color: Yellow / Appearance: Clear / S.005 / pH: x  Gluc: x / Ketone: Negative  / Bili: Negative / Urobili: Negative   Blood: x / Protein: Negative / Nitrite: Negative   Leuk Esterase: Negative / RBC: x / WBC x   Sq Epi: x / Non Sq Epi: x / Bacteria: x            RADIOLOGY & ADDITIONAL STUDIES:        HPI:]    86 y/o M w/ PMH of HTN, parkinsons, GERD, CVA, syncope, BPH presenting w/ increased secretions. Seen w/ wife and daughter. Reports over the past few days having cough w/ copious amount of secretions. Last night was concerned that he would choke, he didn't, but didn't think he could stay home another night so brought him to the ED today for eval. [facility] and they were told he had some "congestion" in his lungs. Primarily bed bound. Denies fevers, chills, headache, dizziness, blurred vision, chest pain, shortness of breath, abdominal pain, n/v/d/c, urinary symptoms, MSK pain, rash, pat seen for abnormal CT chest, lying in bed, no distress.      PAST MEDICAL & SURGICAL HISTORY:  Parkinsons disease      HTN (hypertension)      Osteoporosis      Prostate cancer      Personal history of transient ischemic attack (TIA), and cerebral infarction without residual deficits      Syncope and collapse      Bacteremia      Benign prostatic hyperplasia with lower urinary tract symptoms      Necrotizing enterocolitis      Sensorineural hearing loss (SNHL) of both ears      GERD (gastroesophageal reflux disease)      Colonic polyp      Diverticulosis      Nonrheumatic aortic valve insufficiency      Chronic rhinitis      H/O hernia repair          Home Medications:  atorvastatin 10 mg oral tablet: 1 tab(s) orally once a day (03 Dec 2021 14:29)  donepezil 5 mg oral tablet: 1 tab(s) orally once a day (03 Dec 2021 14:29)  lisinopril 2.5 mg oral tablet: 1 tab(s) orally once a day (03 Dec 2021 14:29)  Multiple Vitamins oral tablet: 1 tab(s) orally once a day (03 Dec 2021 14:29)  selegiline 5 mg oral tablet: 1 tab(s) orally once a day (03 Dec 2021 14:29)  SEROquel 25 mg oral tablet: 2 tab(s) orally once a day (at bedtime) (03 Dec 2021 14:29)  Toprol-XL 25 mg oral tablet, extended release: 0.5 tab(s) orally once a day (at bedtime) (03 Dec 2021 14:29)      MEDICATIONS  (STANDING):  azithromycin  IVPB 500 milliGRAM(s) IV Intermittent every 24 hours  cefTRIAXone Injectable. 1000 milliGRAM(s) IV Push every 24 hours    MEDICATIONS  (PRN):  acetaminophen   Oral Liquid .. 650 milliGRAM(s) Oral every 6 hours PRN Temp greater or equal to 38C (100.4F), Mild Pain (1 - 3)  aluminum hydroxide/magnesium hydroxide/simethicone Suspension 30 milliLiter(s) Oral every 4 hours PRN Dyspepsia  melatonin 3 milliGRAM(s) Oral at bedtime PRN Insomnia  ondansetron Injectable 4 milliGRAM(s) IV Push every 8 hours PRN Nausea and/or Vomiting      Allergies    apple (Anaphylaxis)  raw, fresh fruits- reynaldo family (apple, pear, apricot, peach, fig); processed/cooked is ok. (Anaphylaxis)  Originally Entered as [Unknown] reaction to [fresh fruits] (Unknown)  No Known Drug Allergies    Intolerances        SOCIAL HISTORY: Denies tobacco, etoh abuse or illicit drug use    FAMILY HISTORY:  Family history unobtainable due to patient's condition        Vital Signs Last 24 Hrs  T(C): 36.4 (20 Aug 2023 08:00), Max: 38.3 (19 Aug 2023 23:06)  T(F): 97.6 (20 Aug 2023 08:00), Max: 100.9 (19 Aug 2023 23:06)  HR: 65 (20 Aug 2023 08:00) (65 - 83)  BP: 128/60 (20 Aug 2023 08:00) (111/64 - 162/106)  BP(mean): 95 (19 Aug 2023 23:08) (95 - 118)  RR: 16 (20 Aug 2023 08:00) (16 - 20)  SpO2: 98% (20 Aug 2023 08:00) (94% - 98%)    Parameters below as of 20 Aug 2023 08:00  Patient On (Oxygen Delivery Method): room air            REVIEW OF SYSTEMS:    CONSTITUTIONAL:  As per HPI.  HEENT:  Eyes:  No diplopia or blurred vision. ENT:  No earache, sore throat or runny nose.  CARDIOVASCULAR:  No pressure, squeezing, tightness, heaviness or aching about the chest, neck, axilla or epigastrium.  RESPIRATORY:  No cough, shortness of breath, PND or orthopnea.  GASTROINTESTINAL:  No nausea, vomiting or diarrhea.  GENITOURINARY:  No dysuria, frequency or urgency.  MUSCULOSKELETAL:  As per HPI.  SKIN:  No change in skin, hair or nails.  NEUROLOGIC:  No paresthesias, fasciculations, seizures or weakness.  PSYCHIATRIC:  No disorder of thought or mood.  ENDOCRINE:  No heat or cold intolerance, polyuria or polydipsia.  HEMATOLOGICAL:  No easy bruising or bleedings:  .     PHYSICAL EXAMINATION:    GENERAL APPEARANCE:  Pt. is not currently dyspneic, in no distress. Pt. is alert, oriented, and pleasant.  HEENT:  Pupils are normal and react normally. No icterus. Mucous membranes well colored.  NECK:  Supple. No lymphadenopathy. Jugular venous pressure not elevated. Carotids equal.   HEART:   The cardiac impulse has a normal quality. Regular. Normal S1 and S2. There are no murmurs, rubs or gallops noted  CHEST:  Chest crackles to auscultation. Normal respiratory effort.  ABDOMEN:  Soft and nontender.   EXTREMITIES:  There is no cyanosis, clubbing or edema.   SKIN:  No rash or significant lesions are noted.    LABS:                        12.8   11.10 )-----------( 220      ( 19 Aug 2023 20:15 )             38.5         141  |  106  |  25<H>  ----------------------------<  114<H>  4.1   |  31  |  0.64    Ca    8.8      19 Aug 2023 20:15    TPro  7.1  /  Alb  3.1<L>  /  TBili  0.4  /  DBili  x   /  AST  145<H>  /  ALT  79<H>  /  AlkPhos  315<H>      LIVER FUNCTIONS - ( 19 Aug 2023 20:15 )  Alb: 3.1 g/dL / Pro: 7.1 gm/dL / ALK PHOS: 315 U/L / ALT: 79 U/L / AST: 145 U/L / GGT: x           PT/INR - ( 19 Aug 2023 20:15 )   PT: 10.8 sec;   INR: 0.95 ratio         PTT - ( 19 Aug 2023 20:15 )  PTT:28.4 sec      Urinalysis Basic - ( 19 Aug 2023 21:28 )    Color: Yellow / Appearance: Clear / S.005 / pH: x  Gluc: x / Ketone: Negative  / Bili: Negative / Urobili: Negative   Blood: x / Protein: Negative / Nitrite: Negative   Leuk Esterase: Negative / RBC: x / WBC x   Sq Epi: x / Non Sq Epi: x / Bacteria: x            RADIOLOGY & ADDITIONAL STUDIES:     < from: CT Chest No Cont (23 @ 21:25) >    IMPRESSION:    New airspace opacities as described above. This could represent an acute   inflammatory or infectious process or progression of a chronic process.   Correlate clinically for infection. Recommend pulmonary consultation and   short-term follow-up to assess for resolution. This could also represent   sequela from aspiration, the patient's esophagus contains debris.    L2 vertebral body height loss of indeterminant age but new since the CT   of 10/18/2022.    < end of copied text >

## 2023-08-20 NOTE — PATIENT PROFILE ADULT - FUNCTIONAL ASSESSMENT - BASIC MOBILITY 6.
2-calculated by average/Not able to assess (calculate score using [facility] averaging method)

## 2023-08-20 NOTE — H&P ADULT - MOLST COMPLETED
"[facility]     My [employer] Daily Monitoring        Daily Weight Goal: 321 - 326 lbs     Today's Weight:  332 lbs     Alert: 3 lb wt gain over night & Yes to swelling     Diuretic:  Torsemide 40 mg in am and 20 mg in afternoon. KCL 20 mEq daily           Diet: Left over chicken breast with mashed potatoes. Reminded him to watch his salt intake.     Fluids: Drank \"almost 3 L yesterday\" Reminded him to keep it under 2 L as with his heart failure he can not tolerate that much fluid. Pt states he is constantly thirsty. Recommended he try ice cubes/chips, lemon water, cucumbers and frozen grapes. Pt and wife state they will try those.     States he is feeling well, \"just this damn depression\". He was started n Wellbutrin yesterday. Encouraged him to give it a few wks before he stops taking it. Expressed understanding. He states he DOES NOT want to take extra diuretics because . Encouraged him to limit his fluid intake to help minimize how often he is voiding. Encouraged him to elevate legs.     Will update Rozina.  Next appt 12/4 w/Rozina Graves RN 12:30 PM 11/08/19    "
Reviewed phone encounter. Weight up secondary to dietary and fluid indiscretions. Patient refuses additional diuretics. Continue current regimen. Continue to reinforce lifestyle modifications.       
20-Aug-2023

## 2023-08-20 NOTE — H&P ADULT - ASSESSMENT
86 y/o M w/ PMH of HTN, parkinsons, GERD, CVA, syncope, BPH presenting w/ increased secretions. Seen w/ wife and daughter. Reports over the past few days having cough w/ copious amount of secretions. Last night was concerned that he would choke, he didn't, but didn't think he could stay home another night so brought him to the ED today for eval. [facility] and they were told he had some "congestion" in his lungs. Primarily bed bound. Denies fevers, chills, headache, dizziness, blurred vision, chest pain, shortness of breath, abdominal pain, n/v/d/c, urinary symptoms, MSK pain, rash    in ED temp 100.9 , leukocytosis 11,000, CT chest  New airspace opacities as described above. This could represent an acute   inflammatory or infectious process or progression of a chronic process.   Correlate clinically for infection. Recommend pulmonary consultation and   short-term follow-up to assess for resolution. This could also represent   sequela from aspiration, the patient's esophagus contains debris.       , EKG   received ceftriaxone and zithromax     A/P  Right sided pneumonia likely aspiration Pneumonia  Debris within the esophagus  hx GERD.   admit to med surg  IV ceftriaxone, zithro  blood cx, legionella, sputum cx   IVF  pulm consult  GI consult if ok to start tube feeds     Abnormal LFT  check abd US  hepatitiss panel    Anemia monitor     L2 vertebral body height loss of indeterminant age but new since the CT   of 10/18/2022.  f/u outpt      Parkinsons disease/dementia   HTN (hypertension)  Prostate cancer  Nonrheumatic aortic valve insufficiency  continue home meds    vte prophylaxis lovenox SC         88 y/o M w/ PMH of HTN, parkinsons, GERD, CVA, syncope, BPH presenting w/ increased secretions. Seen w/ wife and daughter. Reports over the past few days having cough w/ copious amount of secretions. Last night was concerned that he would choke, he didn't, but didn't think he could stay home another night so brought him to the ED today for eval. Reports [facility] and they were told he had some "congestion" in his lungs. Primarily bed bound. Denies fevers, chills, headache, dizziness, blurred vision, chest pain, shortness of breath, abdominal pain, n/v/d/c, urinary symptoms, MSK pain, rash    in ED temp 100.9 , leukocytosis 11,000, CT chest  New airspace opacities as described above. This could represent an acute   inflammatory or infectious process or progression of a chronic process.   Correlate clinically for infection. Recommend pulmonary consultation and   short-term follow-up to assess for resolution. This could also represent   sequela from aspiration, the patient's esophagus contains debris.       , EKG   received ceftriaxone and zithromax     A/P  Right sided pneumonia likely aspiration Pneumonia  Debris within the esophagus  hx GERD.   admit to med surg  IV ceftriaxone, zithro  blood cx, legionella, sputum cx   IVF  pulm consult  GI consult for debris within esophagus - if ok to start peg feeds    AUR   resume flomax  bladder scan q 6hrs    Abnormal LFT  check abd US  hepatitiss panel    Anemia monitor     L2 vertebral body height loss of indeterminant age but new since the CT   of 10/18/2022.  f/u outpt      Parkinsons disease/dementia   HTN (hypertension)  Prostate cancer  Nonrheumatic aortic valve insufficiency  continue home meds    vte prophylaxis lovenox SC

## 2023-08-20 NOTE — PATIENT PROFILE ADULT - FALL HARM RISK - HARM RISK INTERVENTIONS

## 2023-08-20 NOTE — H&P ADULT - NSHPPHYSICALEXAM_GEN_ALL_CORE
PHYSICAL EXAM:    Daily Height in cm: 167.64 (19 Aug 2023 19:00)    Daily     ICU Vital Signs Last 24 Hrs  T(C): 36.4 (20 Aug 2023 08:00), Max: 38.3 (19 Aug 2023 23:06)  T(F): 97.6 (20 Aug 2023 08:00), Max: 100.9 (19 Aug 2023 23:06)  HR: 65 (20 Aug 2023 08:00) (65 - 83)  BP: 128/60 (20 Aug 2023 08:00) (111/64 - 162/106)  BP(mean): 95 (19 Aug 2023 23:08) (95 - 118)  ABP: --  ABP(mean): --  RR: 16 (20 Aug 2023 08:00) (16 - 20)  SpO2: 98% (20 Aug 2023 08:00) (94% - 98%)    O2 Parameters below as of 20 Aug 2023 08:00  Patient On (Oxygen Delivery Method): room air            Constitutional: NAD  HEENT: Atraumatic, VICENTE, Normal, No congestion  Respiratory: inspiratory crackles R side > L no rhonchi/wheeze  Cardiovascular: N S1S2;   Gastrointestinal: Abdomen soft, non tender, , gastrostomy tube present  Bowel Ssounds present  Extremities: No edema, peripheral pulses present  Neurological: awake, follows 1 step commands, oriented x1 to self , has b/l upper extremity tremors, mild cog wheel  rigidity to extremity   Skin: Non cellulitic, no rash, ulcers

## 2023-08-20 NOTE — H&P ADULT - CONVERSATION DETAILS
patient does not have capacity to make his decisions due to dementia patient does not have capacity to make his decisions due to dementia  pt is DNR and DNI and is ok with BIPAP   Dw with wife HCP and Daughter [phone] patient does not have capacity to make his decisions due to dementia  pt is DNR and DNI and is ok with trial of  NIV   Dw with wife  Liz Holder HCP and Daughter [phone]

## 2023-08-20 NOTE — CONSULT NOTE ADULT - ASSESSMENT
PROBLEMS:    Aspiration pneumonia-esophagus contains debris  Parkinsons disease  HTN (hypertension)  Prostate cancer  Nonrheumatic aortic valve insufficiency  Chronic rhinitis    PLAN:    IV ABX  ASPIRATION PRECAUTION  OOB  PT  DVT PROPHYLASIX

## 2023-08-21 LAB
ALBUMIN SERPL ELPH-MCNC: 2.6 G/DL — LOW (ref 3.3–5)
ALP SERPL-CCNC: 208 U/L — HIGH (ref 40–120)
ALT FLD-CCNC: 26 U/L — SIGNIFICANT CHANGE UP (ref 12–78)
ANION GAP SERPL CALC-SCNC: 3 MMOL/L — LOW (ref 5–17)
AST SERPL-CCNC: 73 U/L — HIGH (ref 15–37)
BASOPHILS # BLD AUTO: 0.05 K/UL — SIGNIFICANT CHANGE UP (ref 0–0.2)
BASOPHILS NFR BLD AUTO: 0.6 % — SIGNIFICANT CHANGE UP (ref 0–2)
BILIRUB SERPL-MCNC: 0.4 MG/DL — SIGNIFICANT CHANGE UP (ref 0.2–1.2)
BUN SERPL-MCNC: 13 MG/DL — SIGNIFICANT CHANGE UP (ref 7–23)
CALCIUM SERPL-MCNC: 8.4 MG/DL — LOW (ref 8.5–10.1)
CHLORIDE SERPL-SCNC: 112 MMOL/L — HIGH (ref 96–108)
CO2 SERPL-SCNC: 28 MMOL/L — SIGNIFICANT CHANGE UP (ref 22–31)
CREAT SERPL-MCNC: 0.48 MG/DL — LOW (ref 0.5–1.3)
CULTURE RESULTS: SIGNIFICANT CHANGE UP
EGFR: 100 ML/MIN/1.73M2 — SIGNIFICANT CHANGE UP
EOSINOPHIL # BLD AUTO: 0.15 K/UL — SIGNIFICANT CHANGE UP (ref 0–0.5)
EOSINOPHIL NFR BLD AUTO: 1.7 % — SIGNIFICANT CHANGE UP (ref 0–6)
GLUCOSE SERPL-MCNC: 114 MG/DL — HIGH (ref 70–99)
HAV IGM SER-ACNC: SIGNIFICANT CHANGE UP
HBV CORE IGM SER-ACNC: SIGNIFICANT CHANGE UP
HBV SURFACE AG SER-ACNC: SIGNIFICANT CHANGE UP
HCT VFR BLD CALC: 35.2 % — LOW (ref 39–50)
HCV AB S/CO SERPL IA: 0.27 S/CO — SIGNIFICANT CHANGE UP (ref 0–0.99)
HCV AB SERPL-IMP: SIGNIFICANT CHANGE UP
HGB BLD-MCNC: 11.8 G/DL — LOW (ref 13–17)
IMM GRANULOCYTES NFR BLD AUTO: 0.3 % — SIGNIFICANT CHANGE UP (ref 0–0.9)
LEGIONELLA AG UR QL: NEGATIVE — SIGNIFICANT CHANGE UP
LYMPHOCYTES # BLD AUTO: 0.87 K/UL — LOW (ref 1–3.3)
LYMPHOCYTES # BLD AUTO: 9.7 % — LOW (ref 13–44)
MCHC RBC-ENTMCNC: 28.9 PG — SIGNIFICANT CHANGE UP (ref 27–34)
MCHC RBC-ENTMCNC: 33.5 GM/DL — SIGNIFICANT CHANGE UP (ref 32–36)
MCV RBC AUTO: 86.1 FL — SIGNIFICANT CHANGE UP (ref 80–100)
MONOCYTES # BLD AUTO: 0.63 K/UL — SIGNIFICANT CHANGE UP (ref 0–0.9)
MONOCYTES NFR BLD AUTO: 7 % — SIGNIFICANT CHANGE UP (ref 2–14)
NEUTROPHILS # BLD AUTO: 7.28 K/UL — SIGNIFICANT CHANGE UP (ref 1.8–7.4)
NEUTROPHILS NFR BLD AUTO: 80.7 % — HIGH (ref 43–77)
PLATELET # BLD AUTO: 210 K/UL — SIGNIFICANT CHANGE UP (ref 150–400)
POTASSIUM SERPL-MCNC: 3.8 MMOL/L — SIGNIFICANT CHANGE UP (ref 3.5–5.3)
POTASSIUM SERPL-SCNC: 3.8 MMOL/L — SIGNIFICANT CHANGE UP (ref 3.5–5.3)
PROT SERPL-MCNC: 5.9 GM/DL — LOW (ref 6–8.3)
RBC # BLD: 4.09 M/UL — LOW (ref 4.2–5.8)
RBC # FLD: 13.8 % — SIGNIFICANT CHANGE UP (ref 10.3–14.5)
SODIUM SERPL-SCNC: 143 MMOL/L — SIGNIFICANT CHANGE UP (ref 135–145)
SPECIMEN SOURCE: SIGNIFICANT CHANGE UP
WBC # BLD: 9.01 K/UL — SIGNIFICANT CHANGE UP (ref 3.8–10.5)
WBC # FLD AUTO: 9.01 K/UL — SIGNIFICANT CHANGE UP (ref 3.8–10.5)

## 2023-08-21 PROCEDURE — 49465 FLUORO EXAM OF G/COLON TUBE: CPT

## 2023-08-21 PROCEDURE — 99232 SBSQ HOSP IP/OBS MODERATE 35: CPT

## 2023-08-21 RX ORDER — PANTOPRAZOLE SODIUM 20 MG/1
40 TABLET, DELAYED RELEASE ORAL
Refills: 0 | Status: DISCONTINUED | OUTPATIENT
Start: 2023-08-21 | End: 2023-08-25

## 2023-08-21 RX ORDER — POLYETHYLENE GLYCOL 3350 17 G/17G
17 POWDER, FOR SOLUTION ORAL DAILY
Refills: 0 | Status: DISCONTINUED | OUTPATIENT
Start: 2023-08-21 | End: 2023-08-25

## 2023-08-21 RX ORDER — FAMOTIDINE 10 MG/ML
40 INJECTION INTRAVENOUS AT BEDTIME
Refills: 0 | Status: DISCONTINUED | OUTPATIENT
Start: 2023-08-21 | End: 2023-08-25

## 2023-08-21 RX ORDER — FAMOTIDINE 10 MG/ML
40 INJECTION INTRAVENOUS AT BEDTIME
Refills: 0 | Status: DISCONTINUED | OUTPATIENT
Start: 2023-08-21 | End: 2023-08-21

## 2023-08-21 RX ORDER — PANTOPRAZOLE SODIUM 20 MG/1
40 TABLET, DELAYED RELEASE ORAL
Refills: 0 | Status: DISCONTINUED | OUTPATIENT
Start: 2023-08-21 | End: 2023-08-21

## 2023-08-21 RX ORDER — DEXTROSE MONOHYDRATE, SODIUM CHLORIDE, AND POTASSIUM CHLORIDE 50; .745; 4.5 G/1000ML; G/1000ML; G/1000ML
1000 INJECTION, SOLUTION INTRAVENOUS
Refills: 0 | Status: COMPLETED | OUTPATIENT
Start: 2023-08-21 | End: 2023-08-22

## 2023-08-21 RX ORDER — IOHEXOL 300 MG/ML
30 INJECTION, SOLUTION INTRAVENOUS ONCE
Refills: 0 | Status: COMPLETED | OUTPATIENT
Start: 2023-08-21 | End: 2023-08-21

## 2023-08-21 RX ORDER — FAMOTIDINE 10 MG/ML
20 INJECTION INTRAVENOUS AT BEDTIME
Refills: 0 | Status: DISCONTINUED | OUTPATIENT
Start: 2023-08-21 | End: 2023-08-21

## 2023-08-21 RX ADMIN — CARBIDOPA AND LEVODOPA 1 TABLET(S): 25; 100 TABLET ORAL at 08:29

## 2023-08-21 RX ADMIN — AZITHROMYCIN 255 MILLIGRAM(S): 500 TABLET, FILM COATED ORAL at 10:39

## 2023-08-21 RX ADMIN — IOHEXOL 30 MILLILITER(S): 300 INJECTION, SOLUTION INTRAVENOUS at 16:03

## 2023-08-21 RX ADMIN — CARBIDOPA AND LEVODOPA 1 TABLET(S): 25; 100 TABLET ORAL at 16:03

## 2023-08-21 RX ADMIN — ENOXAPARIN SODIUM 40 MILLIGRAM(S): 100 INJECTION SUBCUTANEOUS at 18:03

## 2023-08-21 RX ADMIN — Medication 2 MILLIGRAM(S): at 22:24

## 2023-08-21 RX ADMIN — Medication 25 MILLIGRAM(S): at 10:40

## 2023-08-21 RX ADMIN — Medication 81 MILLIGRAM(S): at 10:40

## 2023-08-21 RX ADMIN — LISINOPRIL 2.5 MILLIGRAM(S): 2.5 TABLET ORAL at 10:40

## 2023-08-21 RX ADMIN — CARBIDOPA AND LEVODOPA 1 TABLET(S): 25; 100 TABLET ORAL at 12:06

## 2023-08-21 RX ADMIN — FAMOTIDINE 40 MILLIGRAM(S): 10 INJECTION INTRAVENOUS at 22:24

## 2023-08-21 RX ADMIN — DEXTROSE MONOHYDRATE, SODIUM CHLORIDE, AND POTASSIUM CHLORIDE 75 MILLILITER(S): 50; .745; 4.5 INJECTION, SOLUTION INTRAVENOUS at 18:04

## 2023-08-21 RX ADMIN — DONEPEZIL HYDROCHLORIDE 5 MILLIGRAM(S): 10 TABLET, FILM COATED ORAL at 22:24

## 2023-08-21 RX ADMIN — SODIUM CHLORIDE 75 MILLILITER(S): 9 INJECTION, SOLUTION INTRAVENOUS at 06:41

## 2023-08-21 RX ADMIN — CEFTRIAXONE 1000 MILLIGRAM(S): 500 INJECTION, POWDER, FOR SOLUTION INTRAMUSCULAR; INTRAVENOUS at 10:40

## 2023-08-21 RX ADMIN — PANTOPRAZOLE SODIUM 40 MILLIGRAM(S): 20 TABLET, DELAYED RELEASE ORAL at 10:40

## 2023-08-21 RX ADMIN — ATORVASTATIN CALCIUM 20 MILLIGRAM(S): 80 TABLET, FILM COATED ORAL at 22:24

## 2023-08-21 RX ADMIN — CARBIDOPA AND LEVODOPA 1 TABLET(S): 25; 100 TABLET ORAL at 20:11

## 2023-08-21 RX ADMIN — QUETIAPINE FUMARATE 50 MILLIGRAM(S): 200 TABLET, FILM COATED ORAL at 22:24

## 2023-08-21 NOTE — CHART NOTE - NSCHARTNOTEFT_GEN_A_CORE
Dietitian Brief Note:    *Current Status: 8/21 - RD spoke w/ family at bed side (wife and daughter); family reports that pt receives bolus feeds of [employer] 1.4 4x/daily at 8am, 12pm, 4pm, and 8pm - unsure of what pt receives as a free water flush before and after each feed. Is on pleasure feeds - SLP rec'd pureed diet w/ mod thick liquids today on 8/21/23 - FOR PLEASURE FEEDS ONLY.** TF will provide TOTAL ENN; Will initiate TF today as per Dr. Lombardi, dietitian to complete full assessment tomorrow.     TF recommendation: Initiate [employer] 1.4 via PEG q 4 hours at 8am, 12, 4pm and 8pm. Provides ~1820kcal, 80g protein, and 926 free water flushes. Consider free water flushes of 100mL before and after each feed (=additional 800mL/day); monitor and adjust prn as per MD.       *ESTIMATED NUTR NEEDS: 48.6Kg based on bed scale wt taken by RD on 8/21              0336-2897 Kcal (35-40Kcal/Kg)              73-97g protein (1.5-2.0g/Kg)              1458-1701mL   (30-35mL/Kg)        Recommendations:  1) Initiate TF order as per recommendations above  2monitor hydration status; adjust free water flushes prn, consider free water flushes of 100mL before and after each feed (which provides ~ 800mL of water per day)  3) monitor TF tolerance; keep back of bed > 35 degrees  4) monitor BM: if > 3 days without BM, add bowel regimen prn  5) daily wt checks to track/trend changes  6) C/w pureed diet w/ moderately thick liquids ; Bridge PO intake w/ TF so pt meets >80% of ENN   RD will continue to monitor PO intake, labs, hydration, and wt prn.      Beryl Adamson M.S., Registered Dietitian Nutritionist [phone]

## 2023-08-21 NOTE — SWALLOW BEDSIDE ASSESSMENT ADULT - COMMENTS
History of Present Illness:    86 y/o M w/ PMH of HTN, parkinsons, GERD, CVA, syncope, BPH presenting w/ increased secretions. Seen w/ wife and daughter. Reports over the past few days having cough w/ copious amount of secretions. Last night was concerned that he would choke, he didn't, but didn't think he could stay home another night so brought him to the ED today for eval. [facility] and they were told he had some "congestion" in his lungs. Primarily bed bound. Denies fevers, chills, headache, dizziness, blurred vision, chest pain, shortness of breath, abdominal pain, n/v/d/c, urinary symptoms, MSK pain, rash.  Service is consulted for po intake.   Note hx PD, GERD

## 2023-08-21 NOTE — CONSULT NOTE ADULT - ASSESSMENT
Imp:  the aspiration could be from saliva, pleasure feeding or aspiration from stomach  Family is focused on comfort    Rec:  Increase PPI to bid (protonix here, prevacid on d/c)  Pepcid 40 qhs  Reviewed of G-J which family declines  Reconsult prn

## 2023-08-21 NOTE — SWALLOW BEDSIDE ASSESSMENT ADULT - SWALLOW EVAL: RECOMMENDED DIET
puree and moderately thick liquids as tolerated; meds crushed in apple sauce while tube not being used.   pt to be upright to feed. Full feed at this time.

## 2023-08-21 NOTE — CONSULT NOTE ADULT - SUBJECTIVE AND OBJECTIVE BOX
HPI:   88 y/o M w/ PMH of HTN, parkinsons, GERD, CVA, syncope, BPH presenting w/ increased secretions. Seen w/ wife and daughter. Reports over the past few days having cough w/ copious amount of secretions. Last night was concerned that he would choke, he didn't, but didn't think he could stay home another night so brought him to the ED today for eval. [facility] and [facility] were told he had some "congestion" in his lungs. Primarily bed bound. Denies fevers, chills, headache, dizziness, blurred vision, chest pain, shortness of breath, abdominal pain, n/v/d/c, urinary symptoms, MSK pain, rash    in ED temp 100.9 , leukocytosis 11,000, CT chest reviewed , EKG NSR , nonspecifc ST/t waves , anteroseptal infarct similar to ekg done 2018  received ceftriaxone and zithromax   pt seen on floor , awake, oriented x1 to self, comfortable, denies sob or chest pain, at this time denies cough, follows 1 step commands        (20 Aug 2023 09:16)  --------------  Family noted some black fluid from the stomach. There is no vomiting although he regurgitates mucus at times.    PAST MEDICAL & SURGICAL HISTORY:  Parkinsons disease      HTN (hypertension)      Osteoporosis      Prostate cancer      Personal history of transient ischemic attack (TIA), and cerebral infarction without residual deficits      Syncope and collapse      Bacteremia      Benign prostatic hyperplasia with lower urinary tract symptoms      Necrotizing enterocolitis      Sensorineural hearing loss (SNHL) of both ears      GERD (gastroesophageal reflux disease)      Colonic polyp      Diverticulosis      Nonrheumatic aortic valve insufficiency      Chronic rhinitis      H/O hernia repair          Home Medications:  aspirin 81 mg oral tablet, chewable: 1 tab(s) by PEG tube once a day (20 Aug 2023 14:35)  atorvastatin 20 mg oral tablet: 1 tab(s) by PEG tube once a day (at bedtime) (20 Aug 2023 14:39)  carbidopa-levodopa 25 mg-100 mg oral tablet: 1 tab(s) by PEG tube 4 times a day at 8AM, 12PM, 4PM and 8PM (20 Aug 2023 14:45)  donepezil 5 mg oral tablet: 1 tab(s) by PEG tube once a day (at bedtime) (20 Aug 2023 14:37)  doxazosin 2 mg oral tablet: 1 tab(s) by PEG tube once a day (at bedtime) (20 Aug 2023 14:36)  lansoprazole 30 mg oral tablet, disintegratin cap(s) by PEG tube once a day (20 Aug 2023 14:27)  lisinopril 2.5 mg oral tablet: 1 tab(s) by PEG tube 2 times a day (20 Aug 2023 14:33)  metoprolol tartrate 50 mg oral tablet: 0.5 tab(s) by PEG tube once a day (20 Aug 2023 14:31)  Seroquel 25 mg oral tablet: 2 tab(s) by PEG tube once a day (at bedtime) (20 Aug 2023 14:42)      MEDICATIONS  (STANDING):  aspirin  chewable 81 milliGRAM(s) Oral daily  atorvastatin 20 milliGRAM(s) Oral at bedtime  azithromycin  IVPB 500 milliGRAM(s) IV Intermittent every 24 hours  carbidopa/levodopa  25/100 1 Tablet(s) Oral <User Schedule>  cefTRIAXone Injectable. 1000 milliGRAM(s) IV Push every 24 hours  dextrose 5% + sodium chloride 0.45% with potassium chloride 20 mEq/L 1000 milliLiter(s) (75 mL/Hr) IV Continuous <Continuous>  dextrose 5% + sodium chloride 0.45%. 1000 milliLiter(s) (75 mL/Hr) IV Continuous <Continuous>  donepezil 5 milliGRAM(s) Oral at bedtime  doxazosin 2 milliGRAM(s) Oral at bedtime  enoxaparin Injectable 40 milliGRAM(s) SubCutaneous every 24 hours  famotidine    Tablet 40 milliGRAM(s) Oral at bedtime  lisinopril 2.5 milliGRAM(s) Oral daily  metoprolol tartrate 25 milliGRAM(s) Oral daily  pantoprazole   Suspension 40 milliGRAM(s) Oral two times a day before meals  polyethylene glycol 3350 17 Gram(s) Oral daily  QUEtiapine 50 milliGRAM(s) Oral at bedtime    MEDICATIONS  (PRN):  acetaminophen   Oral Liquid .. 650 milliGRAM(s) Oral every 6 hours PRN Temp greater or equal to 38C (100.4F), Mild Pain (1 - 3)  aluminum hydroxide/magnesium hydroxide/simethicone Suspension 30 milliLiter(s) Oral every 4 hours PRN Dyspepsia  melatonin 3 milliGRAM(s) Oral at bedtime PRN Insomnia  ondansetron Injectable 4 milliGRAM(s) IV Push every 8 hours PRN Nausea and/or Vomiting      Allergies    apple (Anaphylaxis)  raw, fresh fruits- [place] family (apple, pear, apricot, peach, fig); processed/cooked is ok. (Anaphylaxis)  Originally Entered as [Unknown] reaction to [fresh fruits] (Unknown)  No Known Drug Allergies    Intolerances        SOCIAL HISTORY:    FAMILY HISTORY:  Family history unobtainable due to patient's condition        ROS  As above  Otherwise unremarkable    Vital Signs Last 24 Hrs  T(C): 36.7 (21 Aug 2023 15:36), Max: 36.7 (21 Aug 2023 15:36)  T(F): 98.1 (21 Aug 2023 15:36), Max: 98.1 (21 Aug 2023 15:36)  HR: 70 (21 Aug 2023 15:36) (68 - 71)  BP: 169/80 (21 Aug 2023 15:36) (154/90 - 169/80)  BP(mean): --  RR: 17 (21 Aug 2023 15:36) (17 - 18)  SpO2: 98% (21 Aug 2023 15:36) (97% - 99%)    Parameters below as of 21 Aug 2023 15:36  Patient On (Oxygen Delivery Method): room air        Constitutional: NAD, awake  Respiratory: CTAB  Cardiovascular: S1 and S2, RRR  Gastrointestinal: BS+, soft, NT/ND, G tube clean  Extremities: No peripheral edema  Psychiatric: incoherent  Skin: No rashes    LABS:                        11.8   9.01  )-----------( 210      ( 21 Aug 2023 09:20 )             35.2     08-21    143  |  112<H>  |  13  ----------------------------<  114<H>  3.8   |  28  |  0.48<L>    Ca    8.4<L>      21 Aug 2023 09:20    TPro  5.9<L>  /  Alb  2.6<L>  /  TBili  0.4  /  DBili  x   /  AST  73<H>  /  ALT  26  /  AlkPhos  208<H>  08-    PT/INR - ( 19 Aug 2023 20:15 )   PT: 10.8 sec;   INR: 0.95 ratio         PTT - ( 19 Aug 2023 20:15 )  PTT:28.4 sec  LIVER FUNCTIONS - ( 21 Aug 2023 09:20 )  Alb: 2.6 g/dL / Pro: 5.9 gm/dL / ALK PHOS: 208 U/L / ALT: 26 U/L / AST: 73 U/L / GGT: x             RADIOLOGY & ADDITIONAL STUDIES:

## 2023-08-21 NOTE — PROGRESS NOTE ADULT - ASSESSMENT
PROBLEMS:    Aspiration pneumonia-esophagus contains debris  Parkinsons disease  HTN (hypertension)  Prostate cancer  Nonrheumatic aortic valve insufficiency  Chronic rhinitis    PLAN:    pulmonary better  IV rhocephin/azithromycin  ASPIRATION PRECAUTION  OOB  PT  DVT PROPHYLASIX

## 2023-08-21 NOTE — PROGRESS NOTE ADULT - ASSESSMENT
86 y/o M w/ PMH of HTN, parkinsons, GERD, CVA, syncope, BPH presenting w/ increased secretions. Seen w/ wife and daughter. Reports over the past few days having cough w/ copious amount of secretions. Last night was concerned that he would choke, he didn't, but didn't think he could stay home another night so brought him to the ED today for eval. [facility] and they were told he had some "congestion" in his lungs. Primarily bed bound. Denies fevers, chills, headache, dizziness, blurred vision, chest pain, shortness of breath, abdominal pain, n/v/d/c, urinary symptoms, MSK pain, rash    in ED temp 100.9 , leukocytosis 11,000, CT chest  New airspace opacities as described above. This could represent an acute   inflammatory or infectious process or progression of a chronic process.   Correlate clinically for infection. Recommend pulmonary consultation and   short-term follow-up to assess for resolution. This could also represent   sequela from aspiration, the patient's esophagus contains debris.       , EKG   received ceftriaxone and zithromax     A/P  Right sided pneumonia likely aspiration Pneumonia  Debris within the esophagus  hx GERD.   admit to med surg  IV ceftriaxone, zithro  blood cx, legionella, sputum cx   IVF  pulm consult  GI consult -  feeding tube  check ordered- gastrograffin study  dw with Gi and radiology- if ok start tube feeds  pureed and mod thick liquids for pleasure feeds- swallow consult appreciated     AUR   resume flomax  bladder scan q 6hrs    Abnormal LFT  check abd US neg for acute pathology  hepatitiss panel    Anemia monitor     L2 vertebral body height loss of indeterminant age but new since the CT   of 10/18/2022.  f/u outpt      Parkinsons disease/dementia   HTN (hypertension)  Prostate cancer  Nonrheumatic aortic valve insufficiency  continue home meds    vte prophylaxis lovenox SC     dw with wife and daughter  DNR/DNI

## 2023-08-21 NOTE — SWALLOW BEDSIDE ASSESSMENT ADULT - SWALLOW EVAL: RECOMMENDED FEEDING/EATING TECHNIQUES
use E;mpty spoon" presentation to help clear residual from mouth/allow for swallow between intakes/alternate food with liquid/check mouth frequently for oral residue/pocketing/crush medication (when feasible)/maintain upright posture during/after eating for 30 mins

## 2023-08-21 NOTE — SWALLOW BEDSIDE ASSESSMENT ADULT - ORAL PHASE
slow oral processing,  subjectivley Ap transfer seems disorganized but functional  with eventual deposit on posterior tongue.  As per family, he often has residual in the mouth

## 2023-08-21 NOTE — SWALLOW BEDSIDE ASSESSMENT ADULT - SLP GENERAL OBSERVATIONS
pt lying in bed, looks frail and thin; laryngeal strap muscles fairly prominent.  awake and alert, occasionally talking but hard to understand 2/2 to low volume and reduced RoM of articulators

## 2023-08-21 NOTE — SWALLOW BEDSIDE ASSESSMENT ADULT - NS SPL SWALLOW CLINIC TRIAL FT
Rx: Strat pt on puree and moderately thick liquids via spoon. pt to be upright and to be fed. meds cruahed in puree. or given via pg. when it is functioning.  Disc with NSg and family. Service will follow.

## 2023-08-21 NOTE — SWALLOW BEDSIDE ASSESSMENT ADULT - PHARYNGEAL PHASE
slow onset of pharyngeal swallow  but with full laryngeal lift once started. Double swallow noted. likely residual in ophary nx. pt does not readily swallow to command so use of the "emnpty spoon swallow" was demonstrated and described to the family. Noovert s/s aspiration noted. but pt may well have micro aspirations, and/or silent aspiration.

## 2023-08-22 LAB
ALBUMIN SERPL ELPH-MCNC: 2.5 G/DL — LOW (ref 3.3–5)
ALP SERPL-CCNC: 204 U/L — HIGH (ref 40–120)
ALT FLD-CCNC: 68 U/L — SIGNIFICANT CHANGE UP (ref 12–78)
ANION GAP SERPL CALC-SCNC: 3 MMOL/L — LOW (ref 5–17)
AST SERPL-CCNC: 65 U/L — HIGH (ref 15–37)
BASOPHILS # BLD AUTO: 0.06 K/UL — SIGNIFICANT CHANGE UP (ref 0–0.2)
BASOPHILS NFR BLD AUTO: 0.7 % — SIGNIFICANT CHANGE UP (ref 0–2)
BILIRUB SERPL-MCNC: 0.4 MG/DL — SIGNIFICANT CHANGE UP (ref 0.2–1.2)
BUN SERPL-MCNC: 14 MG/DL — SIGNIFICANT CHANGE UP (ref 7–23)
CALCIUM SERPL-MCNC: 8.4 MG/DL — LOW (ref 8.5–10.1)
CHLORIDE SERPL-SCNC: 111 MMOL/L — HIGH (ref 96–108)
CO2 SERPL-SCNC: 27 MMOL/L — SIGNIFICANT CHANGE UP (ref 22–31)
CREAT SERPL-MCNC: 0.61 MG/DL — SIGNIFICANT CHANGE UP (ref 0.5–1.3)
EGFR: 93 ML/MIN/1.73M2 — SIGNIFICANT CHANGE UP
EOSINOPHIL # BLD AUTO: 0.25 K/UL — SIGNIFICANT CHANGE UP (ref 0–0.5)
EOSINOPHIL NFR BLD AUTO: 3.1 % — SIGNIFICANT CHANGE UP (ref 0–6)
GLUCOSE SERPL-MCNC: 114 MG/DL — HIGH (ref 70–99)
HCT VFR BLD CALC: 36 % — LOW (ref 39–50)
HGB BLD-MCNC: 12 G/DL — LOW (ref 13–17)
IMM GRANULOCYTES NFR BLD AUTO: 0.2 % — SIGNIFICANT CHANGE UP (ref 0–0.9)
LYMPHOCYTES # BLD AUTO: 1.02 K/UL — SIGNIFICANT CHANGE UP (ref 1–3.3)
LYMPHOCYTES # BLD AUTO: 12.6 % — LOW (ref 13–44)
MCHC RBC-ENTMCNC: 28.8 PG — SIGNIFICANT CHANGE UP (ref 27–34)
MCHC RBC-ENTMCNC: 33.3 GM/DL — SIGNIFICANT CHANGE UP (ref 32–36)
MCV RBC AUTO: 86.3 FL — SIGNIFICANT CHANGE UP (ref 80–100)
MONOCYTES # BLD AUTO: 0.68 K/UL — SIGNIFICANT CHANGE UP (ref 0–0.9)
MONOCYTES NFR BLD AUTO: 8.4 % — SIGNIFICANT CHANGE UP (ref 2–14)
NEUTROPHILS # BLD AUTO: 6.05 K/UL — SIGNIFICANT CHANGE UP (ref 1.8–7.4)
NEUTROPHILS NFR BLD AUTO: 75 % — SIGNIFICANT CHANGE UP (ref 43–77)
PLATELET # BLD AUTO: 219 K/UL — SIGNIFICANT CHANGE UP (ref 150–400)
POTASSIUM SERPL-MCNC: 4.1 MMOL/L — SIGNIFICANT CHANGE UP (ref 3.5–5.3)
POTASSIUM SERPL-SCNC: 4.1 MMOL/L — SIGNIFICANT CHANGE UP (ref 3.5–5.3)
PROT SERPL-MCNC: 6.1 GM/DL — SIGNIFICANT CHANGE UP (ref 6–8.3)
RBC # BLD: 4.17 M/UL — LOW (ref 4.2–5.8)
RBC # FLD: 13.8 % — SIGNIFICANT CHANGE UP (ref 10.3–14.5)
SODIUM SERPL-SCNC: 141 MMOL/L — SIGNIFICANT CHANGE UP (ref 135–145)
WBC # BLD: 8.08 K/UL — SIGNIFICANT CHANGE UP (ref 3.8–10.5)
WBC # FLD AUTO: 8.08 K/UL — SIGNIFICANT CHANGE UP (ref 3.8–10.5)

## 2023-08-22 PROCEDURE — 99232 SBSQ HOSP IP/OBS MODERATE 35: CPT

## 2023-08-22 RX ORDER — LISINOPRIL 2.5 MG/1
2.5 TABLET ORAL
Refills: 0 | Status: DISCONTINUED | OUTPATIENT
Start: 2023-08-22 | End: 2023-08-25

## 2023-08-22 RX ADMIN — LISINOPRIL 2.5 MILLIGRAM(S): 2.5 TABLET ORAL at 09:56

## 2023-08-22 RX ADMIN — PANTOPRAZOLE SODIUM 40 MILLIGRAM(S): 20 TABLET, DELAYED RELEASE ORAL at 06:25

## 2023-08-22 RX ADMIN — Medication 2 MILLIGRAM(S): at 21:49

## 2023-08-22 RX ADMIN — ENOXAPARIN SODIUM 40 MILLIGRAM(S): 100 INJECTION SUBCUTANEOUS at 18:14

## 2023-08-22 RX ADMIN — FAMOTIDINE 40 MILLIGRAM(S): 10 INJECTION INTRAVENOUS at 21:49

## 2023-08-22 RX ADMIN — AZITHROMYCIN 255 MILLIGRAM(S): 500 TABLET, FILM COATED ORAL at 09:56

## 2023-08-22 RX ADMIN — QUETIAPINE FUMARATE 50 MILLIGRAM(S): 200 TABLET, FILM COATED ORAL at 21:49

## 2023-08-22 RX ADMIN — DONEPEZIL HYDROCHLORIDE 5 MILLIGRAM(S): 10 TABLET, FILM COATED ORAL at 21:49

## 2023-08-22 RX ADMIN — ATORVASTATIN CALCIUM 20 MILLIGRAM(S): 80 TABLET, FILM COATED ORAL at 21:49

## 2023-08-22 RX ADMIN — CARBIDOPA AND LEVODOPA 1 TABLET(S): 25; 100 TABLET ORAL at 20:33

## 2023-08-22 RX ADMIN — CARBIDOPA AND LEVODOPA 1 TABLET(S): 25; 100 TABLET ORAL at 08:08

## 2023-08-22 RX ADMIN — CARBIDOPA AND LEVODOPA 1 TABLET(S): 25; 100 TABLET ORAL at 12:26

## 2023-08-22 RX ADMIN — Medication 25 MILLIGRAM(S): at 09:56

## 2023-08-22 RX ADMIN — CARBIDOPA AND LEVODOPA 1 TABLET(S): 25; 100 TABLET ORAL at 16:34

## 2023-08-22 RX ADMIN — Medication 81 MILLIGRAM(S): at 09:57

## 2023-08-22 RX ADMIN — POLYETHYLENE GLYCOL 3350 17 GRAM(S): 17 POWDER, FOR SOLUTION ORAL at 09:56

## 2023-08-22 RX ADMIN — LISINOPRIL 2.5 MILLIGRAM(S): 2.5 TABLET ORAL at 21:49

## 2023-08-22 RX ADMIN — CEFTRIAXONE 1000 MILLIGRAM(S): 500 INJECTION, POWDER, FOR SOLUTION INTRAMUSCULAR; INTRAVENOUS at 09:56

## 2023-08-22 RX ADMIN — PANTOPRAZOLE SODIUM 40 MILLIGRAM(S): 20 TABLET, DELAYED RELEASE ORAL at 16:34

## 2023-08-22 NOTE — DIETITIAN INITIAL EVALUATION ADULT - ADD RECOMMEND
1) [employer] bolus feed  2) monitor TF tolerance, keep back of bed >35 degrees  3) monitor hydration status; adjust free water flushes prn; consider free water flushes of 100 cc pre-meal and 100 cc post-meal  4) daily wt checks  5) monitor lytes/mins; replete/correct prn   5) monitor lytes/mins; replete/correct prn   6)  2) monitor TF tolerance, keep back of bed >35 degrees  3) monitor hydration status; adjust free water flushes prn; consider free water flushes of 100 cc pre- and 100cc post-feed  4) daily wt checks  5) Pt on pureed diet with moderately thick liquids for  Pleasure Feeds.  Pt must be fed upright during feeds, and 30 minutes after feeds.

## 2023-08-22 NOTE — DIETITIAN INITIAL EVALUATION ADULT - NS FNS DIET ORDER
Diet, Pureed:   Moderately Thick Liquids (MODTHICKLIQS)  Tube Feeding Modality: Gastrostomy  [place] [employer] Assist 1.4  Total Volume for 24 Hours (mL): 1440  Bolus  Total Volume of Bolus (mL):  240  Total # of Feeds: 4  Tube Feed Frequency: Every 4 hours   Tube Feed Start Time: 08:00  Bolus Feed Rate (mL per Hour): 240   Bolus Feed Duration (in Hours): 4  Bolus Feed Instructions:  Initiate bolus feeds of [place] [employer] Assist 1.4 (brought from home) q 4 hours at 8am, 12pm, 4pm and 8pm  Free Water Flush  Free Water Flush Instructions:  Consider free water flushes of 100mL before and after each feed (=additional 800mL/day); monitor and adjust prn as per MD (08-21-23 @ 16:02)

## 2023-08-22 NOTE — DIETITIAN INITIAL EVALUATION ADULT - ORAL INTAKE PTA/DIET HISTORY
Pt lives at home with wife, is on [place] Orders bolus Tube Feeds.  [employer] is brought from home.   Pt's family reports that pt receives bolus feeds of [place] Orders 1.4 4x/daily at 8am, 12pm, 4pm, and 8pm - unsure of what pt receives as a free water flush before and after each feed.

## 2023-08-22 NOTE — PROGRESS NOTE ADULT - ASSESSMENT
PROBLEMS:    Aspiration pneumonia-esophagus contains debris  Parkinsons disease  HTN (hypertension)  Prostate cancer  Nonrheumatic aortic valve insufficiency  Chronic rhinitis    PLAN:    Pulmonary better  IV rhocephin/azithromycin  ASPIRATION PRECAUTION  OOB  PT  Family considering comfort care  DVT PROPHYLASIX

## 2023-08-22 NOTE — DIETITIAN INITIAL EVALUATION ADULT - OTHER INFO
86 y/o M w/ PMH of HTN, parkinsons, GERD, CVA, syncope, BPH presenting w/ increased secretions. Seen w/ wife and daughter. Reports over the past few days having cough w/ copious amount of secretions. Last night was concerned that he would choke, he didn't, but didn't think he could stay home another night so brought him to the ED today for eval. [facility] and they were told he had some "congestion" in his lungs. Primarily bed bound. Denies fevers, chills, headache, dizziness, blurred vision, chest pain, shortness of breath, abdominal pain, n/v/d/c, urinary symptoms, MSK pain, rash    in ED temp 100.9 , leukocytosis 11,000, CT chest reviewed , EKG NSR , nonspecifc ST/t waves , anteroseptal infarct similar to ekg done march 1st 2018  received ceftriaxone and zithromax   pt seen on floor , awake, oriented x1 to self, comfortable, denies sob or chest pain, at this time denies cough, follows 1 step commands   88 y/o M w/ PMH of HTN, parkinsons, GERD, CVA, syncope, BPH presenting w/ increased secretions. Seen w/ wife and daughter. Reports over the past few days having cough w/ copious amount of secretions. Last night was concerned that he would choke, he didn't, but didn't think he could stay home another night so brought him to the ED today for eval. Reports [facility] and they were told he had some "congestion" in his lungs. Primarily bed bound. Denies fevers, chills, headache, dizziness, blurred vision, chest pain, shortness of breath, abdominal pain, n/v/d/c, urinary symptoms, MSK pain, rash  pt seen on floor , awake, oriented x1 to self, comfortable, denies sob or chest pain, at this time denies cough, follows 1 step commands     Visited with pt at bedside  Pt unclear in his answers to questions    Admit dx PNA due to infectious organism  Pt had been having secretions at home, was afraid he would choke  Pt is on pleasure feeds - SLP rec'd pureed diet w/ mod thick liquids  on 8/21/23 - FOR PLEASURE FEEDS ONLY  Pt on Tube Feed  bolus   [employer]   TF recommendation: Initiate [employer] 1.4 via PEG q 4 hours at 8am, 12, 4pm and 8pm. Provides ~1820kcal, 80g protein, and 926 free water flushes. Consider free water flushes of 100mL before and after each feed (=additional 800mL/day); monitor and adjust prn as per MD.     *ESTIMATED NUTR NEEDS: 48.6Kg based on bed scale wt taken by RD on 8/21              6282-7790 Kcal (35-40Kcal/Kg)              73-97g protein (1.5-2.0g/Kg)              1458-1701mL   (30-35mL/Kg)    RD weight taken on 8/21   48 kg  NFPE reveals severe muscle wasting, fat wasting   Energy  intake estimated < 75% ENN > one month   Pt is FULL FEED and must be fed upright.  Feed is PLEASURE ONLY.  Recommendations to follow in Plan/Intervention

## 2023-08-22 NOTE — PROGRESS NOTE ADULT - ASSESSMENT
86 y/o M w/ PMH of HTN, parkinsons, GERD, CVA, syncope, BPH presenting w/ increased secretions. Seen w/ wife and daughter. Reports over the past few days having cough w/ copious amount of secretions. Last night was concerned that he would choke, he didn't, but didn't think he could stay home another night so brought him to the ED today for eval. Reports [facility] and they were told he had some "congestion" in his lungs. Primarily bed bound. Denies fevers, chills, headache, dizziness, blurred vision, chest pain, shortness of breath, abdominal pain, n/v/d/c, urinary symptoms, MSK pain, rash    in ED temp 100.9 , leukocytosis 11,000, CT chest  New airspace opacities as described above. This could represent an acute   inflammatory or infectious process or progression of a chronic process.   Correlate clinically for infection. Recommend pulmonary consultation and   short-term follow-up to assess for resolution. This could also represent   sequela from aspiration, the patient's esophagus contains debris.       , EKG   received ceftriaxone and zithromax     A/P  Right sided pneumonia likely aspiration Pneumonia  Debris within the esophagus  hx GERD.   admit to med surg  IV ceftriaxone, zithro  blood cx, legionella, sputum cx   IVF  pulm consult  GI consult -  feeding tube  check ordered- gastrograffin study  dw with Gi and radiology- if ok start tube feeds  pureed and mod thick liquids for pleasure feeds- swallow consult appreciated PPI, pepcid    AUR   resume flomax  bladder scan q 6hrs    Abnormal LFT  check abd US neg for acute pathology  hepatitiss panel    Anemia monitor     L2 vertebral body height loss of indeterminant age but new since the CT   of 10/18/2022.  f/u outpt      Parkinsons disease/dementia   HTN (hypertension)  Prostate cancer  Nonrheumatic aortic valve insufficiency  continue home meds    vte prophylaxis lovenox SC     dw with wife and daughter  DNR/DNI

## 2023-08-22 NOTE — DIETITIAN INITIAL EVALUATION ADULT - NAME AND PHONE
Peggy Alexander RDN, CDN, [facility]      [phone]   [email or web address]

## 2023-08-22 NOTE — DIETITIAN INITIAL EVALUATION ADULT - ETIOLOGY
r/t inability to consume sufficient energy/protein 2/2 pneumonia, dysphagia r/t Parkinson's diseasemw

## 2023-08-22 NOTE — DIETITIAN INITIAL EVALUATION ADULT - PERTINENT LABORATORY DATA
08-22    141  |  111<H>  |  14  ----------------------------<  114<H>  4.1   |  27  |  0.61    Ca    8.4<L>      22 Aug 2023 07:07    TPro  6.1  /  Alb  2.5<L>  /  TBili  0.4  /  DBili  x   /  AST  65<H>  /  ALT  68  /  AlkPhos  204<H>  08-22

## 2023-08-22 NOTE — DIETITIAN NUTRITION RISK NOTIFICATION - TREATMENT: THE FOLLOWING DIET HAS BEEN RECOMMENDED
Diet, Pureed:   Moderately Thick Liquids (MODTHICKLIQS)  Tube Feeding Modality: Gastrostomy  [employer] 1.4  Total Volume for 24 Hours (mL): 1440  Bolus  Total Volume of Bolus (mL):  240  Total # of Feeds: 4  Tube Feed Frequency: Every 4 hours   Tube Feed Start Time: 08:00  Bolus Feed Rate (mL per Hour): 240   Bolus Feed Duration (in Hours): 4  Bolus Feed Instructions:  Initiate bolus feeds of [employer] 1.4 (brought from home) q 4 hours at 8am, 12pm, 4pm and 8pm  Free Water Flush  Free Water Flush Instructions:  Consider free water flushes of 100mL before and after each feed (=additional 800mL/day); monitor and adjust prn as per MD (08-21-23 @ 16:02) [Active]

## 2023-08-22 NOTE — DIETITIAN INITIAL EVALUATION ADULT - PERTINENT MEDS FT
MEDICATIONS  (STANDING):  aspirin  chewable 81 milliGRAM(s) Oral daily  atorvastatin 20 milliGRAM(s) Oral at bedtime  azithromycin  IVPB 500 milliGRAM(s) IV Intermittent every 24 hours  carbidopa/levodopa  25/100 1 Tablet(s) Oral <User Schedule>  cefTRIAXone Injectable. 1000 milliGRAM(s) IV Push every 24 hours  dextrose 5% + sodium chloride 0.45% with potassium chloride 20 mEq/L 1000 milliLiter(s) (75 mL/Hr) IV Continuous <Continuous>  dextrose 5% + sodium chloride 0.45%. 1000 milliLiter(s) (75 mL/Hr) IV Continuous <Continuous>  donepezil 5 milliGRAM(s) Oral at bedtime  doxazosin 2 milliGRAM(s) Oral at bedtime  enoxaparin Injectable 40 milliGRAM(s) SubCutaneous every 24 hours  famotidine    Tablet 40 milliGRAM(s) Oral at bedtime  lisinopril 2.5 milliGRAM(s) Oral daily  metoprolol tartrate 25 milliGRAM(s) Oral daily  pantoprazole   Suspension 40 milliGRAM(s) Oral two times a day before meals  polyethylene glycol 3350 17 Gram(s) Oral daily  QUEtiapine 50 milliGRAM(s) Oral at bedtime    MEDICATIONS  (PRN):  acetaminophen   Oral Liquid .. 650 milliGRAM(s) Oral every 6 hours PRN Temp greater or equal to 38C (100.4F), Mild Pain (1 - 3)  aluminum hydroxide/magnesium hydroxide/simethicone Suspension 30 milliLiter(s) Oral every 4 hours PRN Dyspepsia  melatonin 3 milliGRAM(s) Oral at bedtime PRN Insomnia  ondansetron Injectable 4 milliGRAM(s) IV Push every 8 hours PRN Nausea and/or Vomiting

## 2023-08-22 NOTE — DIETITIAN NUTRITION RISK NOTIFICATION - ADDITIONAL COMMENTS/DIETITIAN RECOMMENDATIONS
1) [employer] bolus feed  2) monitor TF tolerance, keep back of bed >35 degrees  3) monitor hydration status; adjust free water flushes prn; consider free water flushes of 100 cc pre-meal and 100 cc post-meal  4) daily wt checks  5) monitor lytes/mins; replete/correct prn   6)Pt on pureed diet with moderately thick liquids for  Pleasure Feeds.  Pt must be fed upright during feeds, and 30 minutes after feeds.   Will Monitor PO intake, tolerance, labs and weight.

## 2023-08-23 DIAGNOSIS — J69.0 PNEUMONITIS DUE TO INHALATION OF FOOD AND VOMIT: ICD-10-CM

## 2023-08-23 LAB
AMMONIA BLD-MCNC: 46 UMOL/L — HIGH (ref 11–32)
ANION GAP SERPL CALC-SCNC: 5 MMOL/L — SIGNIFICANT CHANGE UP (ref 5–17)
BASOPHILS # BLD AUTO: 0.07 K/UL — SIGNIFICANT CHANGE UP (ref 0–0.2)
BASOPHILS NFR BLD AUTO: 1.3 % — SIGNIFICANT CHANGE UP (ref 0–2)
BUN SERPL-MCNC: 17 MG/DL — SIGNIFICANT CHANGE UP (ref 7–23)
CALCIUM SERPL-MCNC: 8.4 MG/DL — LOW (ref 8.5–10.1)
CHLORIDE SERPL-SCNC: 113 MMOL/L — HIGH (ref 96–108)
CO2 SERPL-SCNC: 25 MMOL/L — SIGNIFICANT CHANGE UP (ref 22–31)
CREAT SERPL-MCNC: 0.64 MG/DL — SIGNIFICANT CHANGE UP (ref 0.5–1.3)
EGFR: 92 ML/MIN/1.73M2 — SIGNIFICANT CHANGE UP
EOSINOPHIL # BLD AUTO: 0.44 K/UL — SIGNIFICANT CHANGE UP (ref 0–0.5)
EOSINOPHIL NFR BLD AUTO: 7.9 % — HIGH (ref 0–6)
GLUCOSE SERPL-MCNC: 88 MG/DL — SIGNIFICANT CHANGE UP (ref 70–99)
HCT VFR BLD CALC: 35.8 % — LOW (ref 39–50)
HGB BLD-MCNC: 11.9 G/DL — LOW (ref 13–17)
IMM GRANULOCYTES NFR BLD AUTO: 0.4 % — SIGNIFICANT CHANGE UP (ref 0–0.9)
LYMPHOCYTES # BLD AUTO: 1.23 K/UL — SIGNIFICANT CHANGE UP (ref 1–3.3)
LYMPHOCYTES # BLD AUTO: 22 % — SIGNIFICANT CHANGE UP (ref 13–44)
MCHC RBC-ENTMCNC: 29.2 PG — SIGNIFICANT CHANGE UP (ref 27–34)
MCHC RBC-ENTMCNC: 33.2 GM/DL — SIGNIFICANT CHANGE UP (ref 32–36)
MCV RBC AUTO: 87.7 FL — SIGNIFICANT CHANGE UP (ref 80–100)
MONOCYTES # BLD AUTO: 0.58 K/UL — SIGNIFICANT CHANGE UP (ref 0–0.9)
MONOCYTES NFR BLD AUTO: 10.4 % — SIGNIFICANT CHANGE UP (ref 2–14)
NEUTROPHILS # BLD AUTO: 3.24 K/UL — SIGNIFICANT CHANGE UP (ref 1.8–7.4)
NEUTROPHILS NFR BLD AUTO: 58 % — SIGNIFICANT CHANGE UP (ref 43–77)
PLATELET # BLD AUTO: 195 K/UL — SIGNIFICANT CHANGE UP (ref 150–400)
POTASSIUM SERPL-MCNC: 4.2 MMOL/L — SIGNIFICANT CHANGE UP (ref 3.5–5.3)
POTASSIUM SERPL-SCNC: 4.2 MMOL/L — SIGNIFICANT CHANGE UP (ref 3.5–5.3)
RBC # BLD: 4.08 M/UL — LOW (ref 4.2–5.8)
RBC # FLD: 14.1 % — SIGNIFICANT CHANGE UP (ref 10.3–14.5)
SODIUM SERPL-SCNC: 143 MMOL/L — SIGNIFICANT CHANGE UP (ref 135–145)
WBC # BLD: 5.58 K/UL — SIGNIFICANT CHANGE UP (ref 3.8–10.5)
WBC # FLD AUTO: 5.58 K/UL — SIGNIFICANT CHANGE UP (ref 3.8–10.5)

## 2023-08-23 PROCEDURE — 99253 IP/OBS CNSLTJ NEW/EST LOW 45: CPT

## 2023-08-23 PROCEDURE — 99498 ADVNCD CARE PLAN ADDL 30 MIN: CPT

## 2023-08-23 PROCEDURE — 99223 1ST HOSP IP/OBS HIGH 75: CPT

## 2023-08-23 PROCEDURE — 99497 ADVNCD CARE PLAN 30 MIN: CPT | Mod: 25

## 2023-08-23 PROCEDURE — 99232 SBSQ HOSP IP/OBS MODERATE 35: CPT

## 2023-08-23 RX ORDER — CARBIDOPA AND LEVODOPA 25; 100 MG/1; MG/1
2 TABLET ORAL
Refills: 0 | Status: DISCONTINUED | OUTPATIENT
Start: 2023-08-23 | End: 2023-08-23

## 2023-08-23 RX ORDER — CARBIDOPA AND LEVODOPA 25; 100 MG/1; MG/1
1 TABLET ORAL
Refills: 0 | Status: DISCONTINUED | OUTPATIENT
Start: 2023-08-23 | End: 2023-08-25

## 2023-08-23 RX ORDER — CARBIDOPA AND LEVODOPA 25; 100 MG/1; MG/1
1.5 TABLET ORAL
Refills: 0 | Status: DISCONTINUED | OUTPATIENT
Start: 2023-08-23 | End: 2023-08-25

## 2023-08-23 RX ORDER — SELEGILINE HYDROCHLORIDE 1.25 MG/1
5 TABLET, ORALLY DISINTEGRATING ORAL
Refills: 0 | Status: DISCONTINUED | OUTPATIENT
Start: 2023-08-23 | End: 2023-08-23

## 2023-08-23 RX ADMIN — Medication 2 MILLIGRAM(S): at 22:13

## 2023-08-23 RX ADMIN — ATORVASTATIN CALCIUM 20 MILLIGRAM(S): 80 TABLET, FILM COATED ORAL at 22:24

## 2023-08-23 RX ADMIN — Medication 81 MILLIGRAM(S): at 08:39

## 2023-08-23 RX ADMIN — CARBIDOPA AND LEVODOPA 1.5 TABLET(S): 25; 100 TABLET ORAL at 16:09

## 2023-08-23 RX ADMIN — CARBIDOPA AND LEVODOPA 1 TABLET(S): 25; 100 TABLET ORAL at 22:15

## 2023-08-23 RX ADMIN — LISINOPRIL 2.5 MILLIGRAM(S): 2.5 TABLET ORAL at 22:14

## 2023-08-23 RX ADMIN — FAMOTIDINE 40 MILLIGRAM(S): 10 INJECTION INTRAVENOUS at 22:13

## 2023-08-23 RX ADMIN — PANTOPRAZOLE SODIUM 40 MILLIGRAM(S): 20 TABLET, DELAYED RELEASE ORAL at 06:25

## 2023-08-23 RX ADMIN — LISINOPRIL 2.5 MILLIGRAM(S): 2.5 TABLET ORAL at 08:40

## 2023-08-23 RX ADMIN — Medication 25 MILLIGRAM(S): at 08:39

## 2023-08-23 RX ADMIN — ENOXAPARIN SODIUM 40 MILLIGRAM(S): 100 INJECTION SUBCUTANEOUS at 17:26

## 2023-08-23 RX ADMIN — CARBIDOPA AND LEVODOPA 1 TABLET(S): 25; 100 TABLET ORAL at 08:39

## 2023-08-23 RX ADMIN — PANTOPRAZOLE SODIUM 40 MILLIGRAM(S): 20 TABLET, DELAYED RELEASE ORAL at 17:26

## 2023-08-23 RX ADMIN — DONEPEZIL HYDROCHLORIDE 5 MILLIGRAM(S): 10 TABLET, FILM COATED ORAL at 22:25

## 2023-08-23 RX ADMIN — AZITHROMYCIN 255 MILLIGRAM(S): 500 TABLET, FILM COATED ORAL at 08:40

## 2023-08-23 RX ADMIN — QUETIAPINE FUMARATE 50 MILLIGRAM(S): 200 TABLET, FILM COATED ORAL at 22:15

## 2023-08-23 RX ADMIN — CARBIDOPA AND LEVODOPA 1 TABLET(S): 25; 100 TABLET ORAL at 11:23

## 2023-08-23 RX ADMIN — DEXTROSE MONOHYDRATE, SODIUM CHLORIDE, AND POTASSIUM CHLORIDE 75 MILLILITER(S): 50; .745; 4.5 INJECTION, SOLUTION INTRAVENOUS at 10:53

## 2023-08-23 RX ADMIN — CEFTRIAXONE 1000 MILLIGRAM(S): 500 INJECTION, POWDER, FOR SOLUTION INTRAMUSCULAR; INTRAVENOUS at 08:40

## 2023-08-23 RX ADMIN — POLYETHYLENE GLYCOL 3350 17 GRAM(S): 17 POWDER, FOR SOLUTION ORAL at 08:39

## 2023-08-23 NOTE — CONSULT NOTE ADULT - SUBJECTIVE AND OBJECTIVE BOX
87 year old man with Parkinson's disease, dementia, reported prior stroke, HTN, BPH, extensive baseline disability (reportedly bedbound at baseline, requiring PEG tube for nutrition), admitted to  on 8/18 with increasing oral secretions, fever, and treated this admission for pneumonia.  Neurology consultation requested since patient more tremulous and stiff compared to baseline.      Patient himself unable to provide any history.  He was disoriented, and did not give the correct age.  He did not complain of any symptoms, and when asked if he had headache, visual changes, or weakness in the arms, he denied it.  He also denied having any chest pain, or shortness of breath.      By chart review, his home regiment for PD is carbidopa/levodopa 25/100, 2 tablets 4 times daily, and selegiline 5mg once daily.      On exam:   GEN: elderly man, NAD  CV: RRR, S1, S2  PLM: CTAB  GI: soft, non tender.  PEG tube in place.   : taylor in place  HEENT: no nuchal rigidity.     NEURO:   Awake, alert, attending the examiner, and following simple commands.  Names common objects, but not all.  Did not repeat a simple phrase.  Disoriented to the date, month, year, and his own age.  Disoriented to hospital.   Pupils 2-1mm, symmetric, full EOM, full Vf's, no papilledema, no nystagmus, no facial weakness, there is diminished spontaneous facial animation, and the voice is hypophonic.   MOTOR: cogwheeling bilateral upper extremities, R leg is spastic.  Tremor bilateral upper extremities, R>L.  5/5 , biceps, deltoids.  Lower extremities only moving in plane of the bed.   SENSORY: symmetric light touch  COORDINATION: limited by his tremor.   REFLEXES: 1+ symmetric BB, BR, trace patellar, achilles.      CTH images reviewed: no hemorrhage.  No large territorial infarct.  Grey white differentiation intact.  Diffuse cortical atrophy.

## 2023-08-23 NOTE — CONSULT NOTE ADULT - NS ATTEND AMEND GEN_ALL_CORE FT
Per FEP the PA for Emgality 120mg/mL has been approved until 6/3/20. Ref#: not given    Pharm notified via fax. I agree with above note.    I was present during disucssion was held.   Advanced directives DNR/DNI v   MOLST on chart to reflect above.     Case discussed at length w/ family pt without capacity   spent >60mins w/ family discussing goc.     General: calm in NAD  Mental Status: lethargic  HEENT:  perrl - wouldn't open eyes on command  Lungs: decreased bl bs  Cardiac: s1s2   GI: soft nontender +BS  : voids  Ext: tremor present    Time spent 75minuts on initial consult including examination, chart review, discussion w/ family staff and attempts w/ patient

## 2023-08-23 NOTE — CONSULT NOTE ADULT - CONVERSATION DETAILS
Spoke with patient's wife, Liz (HCP), his daughter, Lisa, and his son, Vick, over the phone alongside Dr. Resendiz and Elizabeth Potts LMSW. Introduced ourselves as the palliative care team and explained the role of palliative medicine.    Patient's wife explains recent decline in patient, occurring since January of this year. Patient lives at home with his wife [facility] 16 hours a week (M/W/F) + private hire aides on T/Th/Sat/Sun and every evening. Wife explains difficulty in taking care of patient at home. He has been becoming increasingly agitated and aggressive since January and has had progressive dysphagia which led to a PEG placement in January as well. The wife is heavily involved in the patient's care, along with the help of her children. The patient has been receiving bolus tube feeds at home and has been "doing well" with them, but has also been receiving food by mouth to allow the patient to participate and spend time with the family as much as possible. The daughter explained her understanding of an inevitable aspiration event, but the family feels it is important for the patient to be able to participate and sit at the dining room table with them for his quality of life. We discussed the risk of inevitable future aspiration events in this case and went on to discuss the option of hospice care in the patient's home.    We highlighted the fact that hospice care will focus on the patient's quality of life and comfort and it would be a shift away from disease-targeted treatments including diagnostic testing, blood work, etc. The philosophy of hospice, details of what hospice provides, and medicare coverage were explained by Declan Potts LMSW.  All questions were addressed.    DNR/DNI was confirmed by the patient's wife and daughter. The family decided that they would like to see how he does on this hospitalization, and will most likely take the patient home with home care in hopes that he will return to his baseline. They said they will consider hospice in the future if he were to decline further. We told the family that we will continue to follow the patient while he is in the hospital, and we will reach out to the family again tomorrow. Our phone number was provided so that we may be reached before then with any questions they may have for our team.

## 2023-08-23 NOTE — PHYSICAL THERAPY INITIAL EVALUATION ADULT - GENERAL OBSERVATIONS, REHAB EVAL
Pt. rec'd on 2S in bed supine with RN, lethargic, unable to follow commands, but able to tolerate assessment. Pt. tolerating bed side evaluation well with no adverse effected noted throughout. Patient appears to be at baseline level of mobility, required 24/7 care in the past at home. Left Pt in NAD, hand off care to RN in room.

## 2023-08-23 NOTE — CONSULT NOTE ADULT - ASSESSMENT
HPI: Pt is a 87y old Male with hx of HTN, parkinsons, GERD, CVA, syncope, BPH presenting w/ increased secretions. Seen w/ wife and daughter. Reports over the past few days having cough w/ copious amount of secretions. Last night was concerned that he would choke, he didn't, but didn't think he could stay home another night so brought him to the ED today for eval. [facility] and they were told he had some "congestion" in his lungs. Primarily bed bound. Denies fevers, chills, headache, dizziness, blurred vision, chest pain, shortness of breath, abdominal pain, n/v/d/c, urinary symptoms, MSK pain, rash    in ED temp 100.9 , leukocytosis 11,000, CT chest  New airspace opacities as described above. This could represent an acute   inflammatory or infectious process or progression of a chronic process.   Correlate clinically for infection. Recommend pulmonary consultation and   short-term follow-up to assess for resolution. This could also represent   sequela from aspiration, the patient's esophagus contains debris.    8/23: Evaluated at bedside. Patient lethargic, arouses to repeated verbal stimuli, but only responds with moaning. Did not open eyes for me and unable to follow simple commands. Appears comfortable in NAD. RN at bedside denies pain or episodes of agitation.     1. Aspiration PNA  -continue IV abx; management per primary team  -pulm consult  -appreciate SLP recs; continue PO feeds if patient alert and able to follow simple commands'  -GI consult to check peg placement    2. Severe protein calorie malnutrition  -appreciate nutrition consult  -bolus TF  -PO feeds as tolerated     3. AMS  -      Process of Care     --Reviewed dx/treatment problems and alignment with Goals of Care           Physical Aspects of Care     --Pain     patient denies at this time     c/w current managment           --Bowel Regimen     denies constipation     risk for constipation d/t immobility     daily dulcolax           --Dyspnea     No SOB at this time     comfortable and in NAD           --Nausea Vomiting     denies           --Weakness     PT as tolerated            Psychological and Psychiatric Aspects of Care:      --Greif/Bereavment: emotional support provided     --Hx of psychiatric dx: none     -Pastoral Care Available PRN            Social Aspects of Care     -SW involved            Cultural Aspects     -Primary Language: English           Goals of Care:            We discussed Palliative Care team being a supportive team when a patient has ongoing illnesses.  We also discussed that it is not an end of life care service, but can help navigate symptoms and emotional support througout their hospital stay here.           Ethical and Legal Aspects: NA           Capacity- unable to fully assess if patient has capacity as patient defers to daughter for  who was not at bedside  HPI: Pt is a 87y old Male with hx of HTN, parkinsons, GERD, CVA, syncope, BPH presenting w/ increased secretions. Seen w/ wife and daughter. Reports over the past few days having cough w/ copious amount of secretions. Last night was concerned that he would choke, he didn't, but didn't think he could stay home another night so brought him to the ED today for eval. Reports [facility] and they were told he had some "congestion" in his lungs. Primarily bed bound. Denies fevers, chills, headache, dizziness, blurred vision, chest pain, shortness of breath, abdominal pain, n/v/d/c, urinary symptoms, MSK pain, rash    in ED temp 100.9 , leukocytosis 11,000, CT chest  New airspace opacities as described above. This could represent an acute   inflammatory or infectious process or progression of a chronic process.   Correlate clinically for infection. Recommend pulmonary consultation and   short-term follow-up to assess for resolution. This could also represent   sequela from aspiration, the patient's esophagus contains debris.    8/23: Evaluated at bedside. Patient lethargic, arouses to repeated verbal stimuli, but only responds with moaning. Did not open eyes for me and unable to follow simple commands. Appears comfortable in NAD. RN at bedside denies pain or episodes of agitation.     1. Aspiration PNA  -continue IV abx; management per primary team  -pulm consult  -appreciate SLP recs; continue PO feeds if patient alert and able to follow simple commands  -GI consult to check peg placement  -aspiration precautions    2. Severe protein calorie malnutrition  -appreciate nutrition consult  -bolus TF  -PO feeds as tolerated and if patient alert    3. AMS  -neuro consulted  -delirium precautions      Process of Care   --Reviewed dx/treatment problems and alignment with Goals of Care       Physical Aspects of Care   --Pain   no nonverbal indicators of pain at this time   c/w current management     --Bowel Regimen   risk for constipation d/t immobility   daily dulcolax as needed    --Dyspnea   No SOB at this time   comfortable and in NAD     --Nausea Vomiting   denies     --Weakness   PT as tolerated    OOB as tolerated    Psychological and Psychiatric Aspects of Care:    --Grief/Bereavment: emotional support provided   --Hx of psychiatric dx: none   --Pastoral Care Available PRN        Social Aspects of Care   --SW involved          Cultural Aspects   --Primary Language: English           Goals of Care: DNR/DNI. The philosophy of hospice was discussed but no decision was made to transition to hospice at this time.    We discussed Palliative Care team being a supportive team when a patient has ongoing illnesses.  We also discussed that it is not an end of life care service, but can help navigate symptoms and emotional support througout their hospital stay here.         Ethical and Legal Aspects: NA     Capacity- patient does not have capacity at this time; spoke with wife- Liz (HCP), daughter-Lisa and son-Vick HPI: Pt is a 87y old Male with hx of HTN, parkinsons, GERD, CVA, syncope, BPH presenting w/ increased secretions.     1. Aspiration PNA  -continue IV abx; management per primary team  -pulm consult  -appreciate SLP recs; continue PO feeds if patient alert and able to follow simple commands  -GI consult to check peg placement  -aspiration precautions    2. Severe protein calorie malnutrition  -appreciate nutrition consult  -bolus TF  -PO feeds as tolerated and if patient alert    3. AMS  -neuro consulted  -delirium precautions      Process of Care   --Reviewed dx/treatment problems and alignment with Goals of Care       Physical Aspects of Care   --Pain   no nonverbal indicators of pain at this time   c/w current management     --Bowel Regimen   risk for constipation d/t immobility   daily dulcolax as needed    --Dyspnea   No SOB at this time   comfortable and in NAD     --Nausea Vomiting   denies     --Weakness   PT as tolerated    OOB as tolerated    Psychological and Psychiatric Aspects of Care:    --Grief/Bereavment: emotional support provided   --Hx of psychiatric dx: none   --Pastoral Care Available PRN        Social Aspects of Care   --SW involved          Cultural Aspects   --Primary Language: English           Goals of Care:   DNR/DNI. The philosophy of hospice was discussed but no decision was made to transition to hospice at this time.    We discussed Palliative Care team being a supportive team when a patient has ongoing illnesses.  We also discussed that it is not an end of life care service, but can help navigate symptoms and emotional support througout their hospital stay here.         Ethical and Legal Aspects: PONCHO     HCP- Liz (patients wife) and Lisa Gant (patients daughter)  Capacity- patient does not have capacity at this time; spoke with wife- Liz (HCP), daughter-Lisa and son-Vick IRWIN: dnr dni    Discussed With: Case coordinated with attending and SW and RN      Time Spent: 75 minutes including the care, coordination and counseling of this patient, 50% of which was spent coordinating and counseling.

## 2023-08-23 NOTE — CONSULT NOTE ADULT - SUBJECTIVE AND OBJECTIVE BOX
HPI: Pt is a 87y old Male with hx of HTN, parkinsons, GERD, CVA, syncope, BPH presenting w/ increased secretions. Seen w/ wife and daughter. Reports over the past few days having cough w/ copious amount of secretions. Last night was concerned that he would choke, he didn't, but didn't think he could stay home another night so brought him to the ED today for eval. [facility] and they were told he had some "congestion" in his lungs. Primarily bed bound. Denies fevers, chills, headache, dizziness, blurred vision, chest pain, shortness of breath, abdominal pain, n/v/d/c, urinary symptoms, MSK pain, rash    in ED temp 100.9 , leukocytosis 11,000, CT chest  New airspace opacities as described above. This could represent an acute   inflammatory or infectious process or progression of a chronic process.   Correlate clinically for infection. Recommend pulmonary consultation and   short-term follow-up to assess for resolution. This could also represent   sequela from aspiration, the patient's esophagus contains debris.    8/23: Evaluated at bedside. Patient lethargic, arouses to repeated verbal stimuli, but only responds with moaning. Did not open eyes for me and unable to follow simple commands. Appears comfortable in NAD. RN at bedside denies pain or episodes of agitation.     PAIN: ( )Yes   (X)No  no nonverbal indicators of pain present    DYSPNEA: ( ) Yes  (X) No  resting comfortably in bed on RA in NAD    PAST MEDICAL & SURGICAL HISTORY:  Parkinsons disease  HTN (hypertension)  Osteoporosis  Prostate cancer  Personal history of transient ischemic attack (TIA), and cerebral infarction without residual deficits  Syncope and collapse  Bacteremia  Benign prostatic hyperplasia with lower urinary tract symptoms  Necrotizing enterocolitis  Sensorineural hearing loss (SNHL) of both ears  GERD (gastroesophageal reflux disease)  Colonic polyp  Diverticulosis  Nonrheumatic aortic valve insufficiency  Chronic rhinitis  H/O hernia repair      SOCIAL HX:  Hx opiate tolerance ( )YES  ( )NO  Baseline ADLs  (Prior to Admission)  ( ) Independent   (X)Dependent    FAMILY HISTORY:  Family history unobtainable due to patient's condition      Review of Systems:    All other systems reviewed and negative  Unable to obtain/Limited due to: patient's clinical condition/AMS      PHYSICAL EXAM:    Vital Signs Last 24 Hrs  T(C): 36.7 (23 Aug 2023 08:05), Max: 36.9 (23 Aug 2023 00:08)  T(F): 98.1 (23 Aug 2023 08:05), Max: 98.5 (23 Aug 2023 00:08)  HR: 61 (23 Aug 2023 08:05) (61 - 76)  BP: 121/84 (23 Aug 2023 08:05) (121/84 - 169/70)  BP(mean): 97 (23 Aug 2023 08:05) (97 - 97)  RR: 18 (23 Aug 2023 08:05) (18 - 18)  SpO2: 98% (23 Aug 2023 08:05) (98% - 99%)    Parameters below as of 23 Aug 2023 08:05  Patient On (Oxygen Delivery Method): room air      Daily     Daily     PPSV2: 20%  FAST: 7C    General: Ill-appearing. Lethargic.   Mental Status: Minimally responsive to his name.  HEENT: Dry mucous membranes.   Lungs: CTA bilaterally.   Cardiac: Regular rate and rhythm. S1S2.  GI: Abdomen soft, nontender, nondistended. +PEG. +BS x4.  : Incontinent of urine. No suprapubic tenderness.   Ext: No edema. Peripheral pulses palpable.   Neuro: No verbal communication, only moaning.       LABS:                        11.9   5.58  )-----------( 195      ( 23 Aug 2023 07:07 )             35.8     08-23    143  |  113<H>  |  17  ----------------------------<  88  4.2   |  25  |  0.64    Ca    8.4<L>      23 Aug 2023 07:07    TPro  6.1  /  Alb  2.5<L>  /  TBili  0.4  /  DBili  x   /  AST  65<H>  /  ALT  68  /  AlkPhos  204<H>  08-22    Albumin: Albumin: 2.5 g/dL (08-22 @ 07:07)      Allergies    apple (Anaphylaxis)  raw, fresh fruits- reynaldo family (apple, pear, apricot, peach, fig); processed/cooked is ok. (Anaphylaxis)  Originally Entered as [Unknown] reaction to [fresh fruits] (Unknown)  No Known Drug Allergies    Intolerances      MEDICATIONS  (STANDING):  aspirin  chewable 81 milliGRAM(s) Oral daily  atorvastatin 20 milliGRAM(s) Oral at bedtime  carbidopa/levodopa  25/100 1 Tablet(s) Oral <User Schedule>  cefTRIAXone Injectable. 1000 milliGRAM(s) IV Push every 24 hours  dextrose 5% + sodium chloride 0.45% with potassium chloride 20 mEq/L 1000 milliLiter(s) (75 mL/Hr) IV Continuous <Continuous>  dextrose 5% + sodium chloride 0.45%. 1000 milliLiter(s) (75 mL/Hr) IV Continuous <Continuous>  donepezil 5 milliGRAM(s) Oral at bedtime  doxazosin 2 milliGRAM(s) Oral at bedtime  enoxaparin Injectable 40 milliGRAM(s) SubCutaneous every 24 hours  famotidine    Tablet 40 milliGRAM(s) Oral at bedtime  lisinopril 2.5 milliGRAM(s) Oral two times a day  metoprolol tartrate 25 milliGRAM(s) Oral daily  pantoprazole   Suspension 40 milliGRAM(s) Oral two times a day before meals  polyethylene glycol 3350 17 Gram(s) Oral daily  QUEtiapine 50 milliGRAM(s) Oral at bedtime    MEDICATIONS  (PRN):  acetaminophen   Oral Liquid .. 650 milliGRAM(s) Oral every 6 hours PRN Temp greater or equal to 38C (100.4F), Mild Pain (1 - 3)  aluminum hydroxide/magnesium hydroxide/simethicone Suspension 30 milliLiter(s) Oral every 4 hours PRN Dyspepsia  melatonin 3 milliGRAM(s) Oral at bedtime PRN Insomnia  ondansetron Injectable 4 milliGRAM(s) IV Push every 8 hours PRN Nausea and/or Vomiting      RADIOLOGY/ADDITIONAL STUDIES:  < from: CT Head No Cont (08.19.23 @ 21:25) >  ACC: [id number] EXAM:  CT BRAIN   ORDERED BY: SHRUTHI GRIER     PROCEDURE DATE:  08/19/2023        INTERPRETATION:  EXAMINATION: CT HEAD    DATE: 8/19/2023 9:25 PM    INDICATION: Weakness    COMPARISON: October 18, 2022    TECHNIQUE: Thin sectionnoncontrast axial images were obtained through   the head.  RAPID AI was utilized for preliminary assessment of   intracranial hemorrhage.    FINDINGS:  Intracranial Contents:    Moderate cerebral atrophy and chronic microvascular ischemic change.   Chronic lacunar infarct in the left thalamus. Gray-white differentiation   is preserved. No hydrocephalus. The basilar cisterns are clear. No focal   edema or acute mass effect. There is no intracranial fluid collection or   acute hemorrhage.    Bonesand Extracranial Soft Tissues:    There is no fracture identified. The visualized paranasal sinuses have   minimal mucosal thickening and there is partial right mastoid effusion.   Scalp and imaged facial soft tissues are within normal limits.      IMPRESSION:  1. No acute intracranial CT finding    --- End of Report ---      MICK JOHN MD; Attending Radiologist  This document has been electronically signed. Aug 19 2023  9:35PM    < end of copied text >          < from: CT Chest No Cont (08.19.23 @ 21:25) >  ACC: [id number] EXAM:  CT CHEST   ORDERED BY: SHRUTHI GRIER     PROCEDURE DATE:  08/19/2023          INTERPRETATION:  CLINICAL INFORMATION: Cough    COMPARISON: CT chest dimension and pelvis 10/18/2022.    CONTRAST/COMPLICATIONS:  IV Contrast: NONE  Oral Contrast: NONE  Complications: None reported at time of study completion    PROCEDURE:  CT of the Chest was performed.  Sagittal and coronal reformats were performed.    FINDINGS:    LUNGS AND AIRWAYS: Patent central airways.  There is biapicalscarring   which is stable since the previous exam.  There are small nodular opacities in the posterior right upper lobe with   associated mild groundglass which is new.  There is right middle lobe reticular and groundglass opacity with   peripheral tree-in-bud opacities, new.  There are tubular opacities in the lingula possibly impacted airways, new.    PLEURA: No pleural effusion.  MEDIASTINUM AND DENISE: No lymphadenopathy.  VESSELS: Within normal limits.  HEART: Heart size is normal. No pericardial effusion. R. Artie artery and   valvular calcifications.  CHEST WALL AND LOWER NECK: Within normal limits.  VISUALIZED UPPER ABDOMEN: Debris within the esophagus. Partially imaged   2.3 x 1.6 cm ovoid object with a dense periphery and internal airand   fluid at the greater curvature of the stomach. Possibly a stent or   something ingested. Correlate with patient's history.  BONES: Degenerative changes. L2 vertebral body height loss of   indeterminant age but new since the CT of 10/18/2022.    IMPRESSION:    New airspace opacities as described above. This could represent an acute   inflammatory or infectious process or progression of a chronic process.   Correlate clinically for infection. Recommend pulmonary consultation and   short-term follow-up to assess for resolution. This could also represent   sequela from aspiration, the patient's esophagus contains debris.    L2 vertebral body height loss of indeterminant age but new since the CT   of 10/18/2022.      --- End of Report ---      SANTIAGO LÓPEZ MD; Attending Radiologist  This document has been electronically signed. Aug 19 2023  9:57PM    < end of copied text >   HPI:     Pt is a 87y old Male with hx of HTN, parkinsons, GERD, CVA, syncope, BPH presenting w/ increased secretions. Seen w/ wife and daughter. Reports over the past few days having cough w/ copious amount of secretions. Last night was concerned that he would choke, he didn't, but didn't think he could stay home another night so brought him to the ED today for eval. [facility] and they were told he had some "congestion" in his lungs. Primarily bed bound. Denies fevers, chills, headache, dizziness, blurred vision, chest pain, shortness of breath, abdominal pain, n/v/d/c, urinary symptoms, MSK pain, rash    in ED temp 100.9 , leukocytosis 11,000, CT chest  New airspace opacities as described above."    8/23: Evaluated at bedside. Patient lethargic, arouses to repeated verbal stimuli, but only responds with moaning. Did not open eyes for me and unable to follow simple commands. Appears comfortable in NAD. RN at bedside denies pain or episodes of agitation.     PAIN: ( )Yes   (X)No  no nonverbal indicators of pain present    DYSPNEA: ( ) Yes  (X) No  resting comfortably in bed on RA in NAD    PAST MEDICAL & SURGICAL HISTORY:  Parkinsons disease  HTN (hypertension)  Osteoporosis  Prostate cancer  Personal history of transient ischemic attack (TIA), and cerebral infarction without residual deficits  Syncope and collapse  Bacteremia  Benign prostatic hyperplasia with lower urinary tract symptoms  Necrotizing enterocolitis  Sensorineural hearing loss (SNHL) of both ears  GERD (gastroesophageal reflux disease)  Colonic polyp  Diverticulosis  Nonrheumatic aortic valve insufficiency  Chronic rhinitis  H/O hernia repair      SOCIAL HX:  Hx opiate tolerance ( )YES  ( x)NO  Baseline ADLs  (Prior to Admission)  ( ) Independent   (X)Dependent    FAMILY HISTORY:  Family history unobtainable due to patient's condition      Review of Systems:    All other systems reviewed and negative  Unable to obtain/Limited due to: patient's clinical condition/AMS      PHYSICAL EXAM:    Vital Signs Last 24 Hrs  T(C): 36.7 (23 Aug 2023 08:05), Max: 36.9 (23 Aug 2023 00:08)  T(F): 98.1 (23 Aug 2023 08:05), Max: 98.5 (23 Aug 2023 00:08)  HR: 61 (23 Aug 2023 08:05) (61 - 76)  BP: 121/84 (23 Aug 2023 08:05) (121/84 - 169/70)  BP(mean): 97 (23 Aug 2023 08:05) (97 - 97)  RR: 18 (23 Aug 2023 08:05) (18 - 18)  SpO2: 98% (23 Aug 2023 08:05) (98% - 99%)    Parameters below as of 23 Aug 2023 08:05  Patient On (Oxygen Delivery Method): room air      Daily     Daily     PPSV2: 20%  FAST: 7C    General: Ill-appearing. Lethargic.   Mental Status: Minimally responsive to his name.  HEENT: Dry mucous membranes.   Lungs: CTA bilaterally.   Cardiac: Regular rate and rhythm. S1S2.  GI: Abdomen soft, nontender, nondistended. +PEG. +BS x4.  : Incontinent of urine. No suprapubic tenderness.   Ext: No edema. Peripheral pulses palpable.   Neuro: No verbal communication, only moaning.       LABS:                        11.9   5.58  )-----------( 195      ( 23 Aug 2023 07:07 )             35.8     08-23    143  |  113<H>  |  17  ----------------------------<  88  4.2   |  25  |  0.64    Ca    8.4<L>      23 Aug 2023 07:07    TPro  6.1  /  Alb  2.5<L>  /  TBili  0.4  /  DBili  x   /  AST  65<H>  /  ALT  68  /  AlkPhos  204<H>  08-22    Albumin: Albumin: 2.5 g/dL (08-22 @ 07:07)      Allergies    apple (Anaphylaxis)  raw, fresh fruits- reynaldo family (apple, pear, apricot, peach, fig); processed/cooked is ok. (Anaphylaxis)  Originally Entered as [Unknown] reaction to [fresh fruits] (Unknown)  No Known Drug Allergies    Intolerances      MEDICATIONS  (STANDING):  aspirin  chewable 81 milliGRAM(s) Oral daily  atorvastatin 20 milliGRAM(s) Oral at bedtime  carbidopa/levodopa  25/100 1 Tablet(s) Oral <User Schedule>  cefTRIAXone Injectable. 1000 milliGRAM(s) IV Push every 24 hours  dextrose 5% + sodium chloride 0.45% with potassium chloride 20 mEq/L 1000 milliLiter(s) (75 mL/Hr) IV Continuous <Continuous>  dextrose 5% + sodium chloride 0.45%. 1000 milliLiter(s) (75 mL/Hr) IV Continuous <Continuous>  donepezil 5 milliGRAM(s) Oral at bedtime  doxazosin 2 milliGRAM(s) Oral at bedtime  enoxaparin Injectable 40 milliGRAM(s) SubCutaneous every 24 hours  famotidine    Tablet 40 milliGRAM(s) Oral at bedtime  lisinopril 2.5 milliGRAM(s) Oral two times a day  metoprolol tartrate 25 milliGRAM(s) Oral daily  pantoprazole   Suspension 40 milliGRAM(s) Oral two times a day before meals  polyethylene glycol 3350 17 Gram(s) Oral daily  QUEtiapine 50 milliGRAM(s) Oral at bedtime    MEDICATIONS  (PRN):  acetaminophen   Oral Liquid .. 650 milliGRAM(s) Oral every 6 hours PRN Temp greater or equal to 38C (100.4F), Mild Pain (1 - 3)  aluminum hydroxide/magnesium hydroxide/simethicone Suspension 30 milliLiter(s) Oral every 4 hours PRN Dyspepsia  melatonin 3 milliGRAM(s) Oral at bedtime PRN Insomnia  ondansetron Injectable 4 milliGRAM(s) IV Push every 8 hours PRN Nausea and/or Vomiting      RADIOLOGY/ADDITIONAL STUDIES:  < from: CT Head No Cont (08.19.23 @ 21:25) >  ACC: [id number] EXAM:  CT BRAIN   ORDERED BY: SHRUTHI GRIER     PROCEDURE DATE:  08/19/2023        INTERPRETATION:  EXAMINATION: CT HEAD    DATE: 8/19/2023 9:25 PM    INDICATION: Weakness    COMPARISON: October 18, 2022    TECHNIQUE: Thin sectionnoncontrast axial images were obtained through   the head.  RAPID AI was utilized for preliminary assessment of   intracranial hemorrhage.    FINDINGS:  Intracranial Contents:    Moderate cerebral atrophy and chronic microvascular ischemic change.   Chronic lacunar infarct in the left thalamus. Gray-white differentiation   is preserved. No hydrocephalus. The basilar cisterns are clear. No focal   edema or acute mass effect. There is no intracranial fluid collection or   acute hemorrhage.    Bonesand Extracranial Soft Tissues:    There is no fracture identified. The visualized paranasal sinuses have   minimal mucosal thickening and there is partial right mastoid effusion.   Scalp and imaged facial soft tissues are within normal limits.      IMPRESSION:  1. No acute intracranial CT finding    --- End of Report ---      MICK JOHN MD; Attending Radiologist  This document has been electronically signed. Aug 19 2023  9:35PM    < end of copied text >          < from: CT Chest No Cont (08.19.23 @ 21:25) >  ACC: [id number] EXAM:  CT CHEST   ORDERED BY: SHRUTHI GRIER     PROCEDURE DATE:  08/19/2023          INTERPRETATION:  CLINICAL INFORMATION: Cough    COMPARISON: CT chest dimension and pelvis 10/18/2022.    CONTRAST/COMPLICATIONS:  IV Contrast: NONE  Oral Contrast: NONE  Complications: None reported at time of study completion    PROCEDURE:  CT of the Chest was performed.  Sagittal and coronal reformats were performed.    FINDINGS:    LUNGS AND AIRWAYS: Patent central airways.  There is biapicalscarring   which is stable since the previous exam.  There are small nodular opacities in the posterior right upper lobe with   associated mild groundglass which is new.  There is right middle lobe reticular and groundglass opacity with   peripheral tree-in-bud opacities, new.  There are tubular opacities in the lingula possibly impacted airways, new.    PLEURA: No pleural effusion.  MEDIASTINUM AND DENISE: No lymphadenopathy.  VESSELS: Within normal limits.  HEART: Heart size is normal. No pericardial effusion. R. Artie artery and   valvular calcifications.  CHEST WALL AND LOWER NECK: Within normal limits.  VISUALIZED UPPER ABDOMEN: Debris within the esophagus. Partially imaged   2.3 x 1.6 cm ovoid object with a dense periphery and internal airand   fluid at the greater curvature of the stomach. Possibly a stent or   something ingested. Correlate with patient's history.  BONES: Degenerative changes. L2 vertebral body height loss of   indeterminant age but new since the CT of 10/18/2022.    IMPRESSION:    New airspace opacities as described above. This could represent an acute   inflammatory or infectious process or progression of a chronic process.   Correlate clinically for infection. Recommend pulmonary consultation and   short-term follow-up to assess for resolution. This could also represent   sequela from aspiration, the patient's esophagus contains debris.    L2 vertebral body height loss of indeterminant age but new since the CT   of 10/18/2022.      --- End of Report ---      SANTIAGO LÓPEZ MD; Attending Radiologist  This document has been electronically signed. Aug 19 2023  9:57PM    < end of copied text >

## 2023-08-23 NOTE — PROGRESS NOTE ADULT - NUTRITIONAL ASSESSMENT
This patient has been assessed with a concern for Malnutrition and has been determined to have a diagnosis/diagnoses of Severe protein-calorie malnutrition and Underweight (BMI < 19).    This patient is being managed with:   Diet Pureed-  Moderately Thick Liquids (MODTHICKLIQS)  Tube Feeding Modality: Gastrostomy  [place] 1.4  Total Volume for 24 Hours (mL): 1440  Bolus  Total Volume of Bolus (mL):  240  Total # of Feeds: 4  Tube Feed Frequency: Every 4 hours   Tube Feed Start Time: 08:00  Bolus Feed Rate (mL per Hour): 240   Bolus Feed Duration (in Hours): 4  Bolus Feed Instructions:  Initiate bolus feeds of [place] 1.4 (brought from home) q 4 hours at 8am 12pm 4pm and 8pm  Free Water Flush  Free Water Flush Instructions:  Consider free water flushes of 100mL before and after each feed (=additional 800mL/day); monitor and adjust prn as per MD  Entered: Aug 21 2023  4:02PM  
This patient has been assessed with a concern for Malnutrition and has been determined to have a diagnosis/diagnoses of Severe protein-calorie malnutrition and Underweight (BMI < 19).    This patient is being managed with:   Diet Pureed-  Moderately Thick Liquids (MODTHICKLIQS)  Tube Feeding Modality: Gastrostomy  [place] 1.4  Total Volume for 24 Hours (mL): 1440  Bolus  Total Volume of Bolus (mL):  240  Total # of Feeds: 4  Tube Feed Frequency: Every 4 hours   Tube Feed Start Time: 08:00  Bolus Feed Rate (mL per Hour): 240   Bolus Feed Duration (in Hours): 4  Bolus Feed Instructions:  Initiate bolus feeds of [place] 1.4 (brought from home) q 4 hours at 8am 12pm 4pm and 8pm  Free Water Flush  Free Water Flush Instructions:  Consider free water flushes of 100mL before and after each feed (=additional 800mL/day); monitor and adjust prn as per MD  Entered: Aug 21 2023  4:02PM

## 2023-08-23 NOTE — GOALS OF CARE CONVERSATION - ADVANCED CARE PLANNING - CONVERSATION DETAILS
HPI:   88 y/o M w/ PMH of HTN, parkinsons, GERD, CVA, syncope, BPH presenting w/ increased secretions. Seen w/ wife and daughter. Reports over the past few days having cough w/ copious amount of secretions. Last night was concerned that he would choke, he didn't, but didn't think he could stay home another night so brought him to the ED today for eval. [facility] and they were told he had some "congestion" in his lungs. Primarily bed bound.    (20 Aug 2023 09:16)      PERTINENT PMH REVIEWED:  [ X ] YES [ ] NO           Primary Contact:   karina Carcamo, health care agent, phone # [phone]    HCP [ X ] Surrogate [   ] Guardian [   ]    Mental Status: Pt. lacks capacity  Concerns of Depression [  ] -not identified  Anxiety [   ] -not identified  Baseline ADLs (prior to admission):  Independent [ ] moderately [ ] fully   Dependent   [ ] moderately [X ]fully    Family Meeting attendees: GOC held 8/23    Anticipated Grief: Patient[  ] Family [ X ]    Caregiver [place] Assessed: Yes [ X ] No [  ]    Tenriism: Taoist.    Spiritual Concerns: Not identified,  available as needed.    Goals of Care: Comfort [  ] Rehabilitation [ X  ] Curative [  ] Life Prolonging [  ]    Previous Services: Aide services at home through private hire and [facility]     ADVANCE DIRECTIVES:    -Pt. lacks capacity  -HCP located on [employer] names wife as primary agent & Ilsa as alternate  -MOLST DNR/DNI/trial NIV    Anticipated D/C Plan: Likely return home with [facility] services                     Summary:  Palliative team spoke with wife and two children via telephone to discuss GOC, assist with planning and provide supportive counseling.  Palliative role explained.  Emotional support provided.  Prior to hospitalization, Pt. resided at home with wife.  Pt. has about 37 hours a week of a private hire aide (split shift) and [facility].  Pt. requires 24/7 assist with ADLs requiring max assist of 1.  Daughter states that Pt. was able to ambulate short distances with 1 assist.  Family shared that Pt. can become frustrated at home and noted an overall decline since January.  Families feelings explored.  Support provided.      We discussed Pts current medical condition.  Family shared that Pts feeding tube was placed in January of this year.  We discussed the option of focusing on Pts comfort with the support of hospice in the future.  Family shared that at this time they desire PT services as they are hopeful that Pt. will regain strength and participate in ADLs upon discharge.  We discussed all levels of home care including [facility] vs hospice at home. HPI:   86 y/o M w/ PMH of HTN, parkinsons, GERD, CVA, syncope, BPH presenting w/ increased secretions. Seen w/ wife and daughter. Reports over the past few days having cough w/ copious amount of secretions. Last night was concerned that he would choke, he didn't, but didn't think he could stay home another night so brought him to the ED today for eval. Reports [facility] and they were told he had some "congestion" in his lungs. Primarily bed bound.    (20 Aug 2023 09:16)      PERTINENT PMH REVIEWED:  [ X ] YES [ ] NO           Primary Contact:   karina Carcamo, health care agent, phone # [phone]    HCP [ X ] Surrogate [   ] Guardian [   ]    Mental Status: Pt. lacks capacity  Concerns of Depression [  ] -not identified  Anxiety [   ] -not identified  Baseline ADLs (prior to admission):  Independent [ ] moderately [ ] fully   Dependent   [ ] moderately [X ]fully    Family Meeting attendees: GOC held 8/23    Anticipated Grief: Patient[  ] Family [ X ]    Caregiver [place] Assessed: Yes [ X ] No [  ]    Buddhist: Hinduism.    Spiritual Concerns: Not identified,  available as needed.    Goals of Care: Comfort [  ] Rehabilitation [ X  ] Curative [  ] Life Prolonging [  ]    Previous Services: Aide services at home [facility]     ADVANCE DIRECTIVES:    -Pt. lacks capacity  -HCP located on [employer] names wife as primary agent & Lisa as alternate  -MOLST DNR/DNI/trial NIV    Anticipated D/C Plan: Likely return home with [facility] services                     Summary:  Palliative team spoke with wife and two children via telephone to discuss GOC, assist with planning and provide supportive counseling.  Palliative role explained.  Emotional support provided.  Prior to hospitalization, Pt. resided at home with wife.  Pt. has about 37 hours a week of a private hire aide (split shift) and [facility].  Pt. requires 24/7 assist with ADLs requiring max assist of 1.  Daughter states that Pt. was able to ambulate short distances with 1 assist.  Family shared that Pt. can become frustrated at times and noted an overall decline since January noting periods of agitation at times.  Families feelings explored.  Support provided.      We discussed Pts current medical condition.  Family shared that Pts feeding tube was placed in January of this year.  Wife assists with bolus feeds, Pt does receive pleasure feeds by mouth.  Daughter states family is aware of the risk of aspiration or PNA.  We discussed the option of focusing on Pts comfort with the support of hospice at home.  The philosophy of hospice discussed in details noting that hospice would focus on Pts quality of life and comfort.  We noted that hospice would stop blood work, testing, xrays, hospitalizations, etc to treat overall symptoms in his home environment that is most familiar to him.  Family noted that each hospitalization becomes harder for Pt to return to his baseline.  We discussed all levels of home care including [facility] vs hospice at home including the services that each provide at home.  Family shared that at this time they desire PT services as they are hopeful that Pt. will regain strength and participate in ADLs upon discharge.  Family shared that if Pt continues to decline the option to focus on his comfort at home is something they would consider.    Advance directives reviewed and confirmed with Pts wife, Dona, his health care agent.  HCP located on [employer] names wife as primary agent & Lisa as alternate.  MOLST: DNR/DNI/trial NIV.    Plan likely to return home with [facility] services.  Informed family of palliative team availability.  Emotional support provided.  Our team to continue to follow.

## 2023-08-23 NOTE — PHYSICAL THERAPY INITIAL EVALUATION ADULT - RANGE OF MOTION EXAMINATION, REHAB EVAL
grossly WFL, however pt is very stiff and rigid due to PD./bilateral upper extremity ROM was WFL (within functional limits)/bilateral lower extremity ROM was WFL (within functional limits) grossly WFL, however pt is very stiff and rigid due to PD, L>R/bilateral upper extremity ROM was WFL (within functional limits)/bilateral lower extremity ROM was WFL (within functional limits)

## 2023-08-23 NOTE — PHYSICAL THERAPY INITIAL EVALUATION ADULT - MANUAL MUSCLE TESTING RESULTS, REHAB EVAL
BUEs WFL, BLEs grossly 3/5/no strength deficits were identified Pt. unable to follow commands/not tested due to

## 2023-08-23 NOTE — PROGRESS NOTE ADULT - ASSESSMENT
88 y/o M w/ PMH of HTN, parkinsons, GERD, CVA, syncope, BPH presenting w/ increased secretions. Seen w/ wife and daughter. Reports over the past few days having cough w/ copious amount of secretions. Last night was concerned that he would choke, he didn't, but didn't think he could stay home another night so brought him to the ED today for eval. Reports [facility] and they were told he had some "congestion" in his lungs. Primarily bed bound. Denies fevers, chills, headache, dizziness, blurred vision, chest pain, shortness of breath, abdominal pain, n/v/d/c, urinary symptoms, MSK pain, rash    in ED temp 100.9 , leukocytosis 11,000, CT chest  New airspace opacities as described above. This could represent an acute   inflammatory or infectious process or progression of a chronic process.   Correlate clinically for infection. Recommend pulmonary consultation and   short-term follow-up to assess for resolution. This could also represent   sequela from aspiration, the patient's esophagus contains debris.    received ceftriaxone and zithromax in ED    A/P  Right sided pneumonia likely aspiration Pneumonia  Debris within the esophagus  hx GERD.    med surg  IV ceftriaxone, zithro  blood cx NGTD , legionella,   now back on tube feeds   s/p IVF  pulm consult appreciated   GI consult appreciated  -  s/p  gastrograffin study - no obstruction  cleared by GI to start tube feeds , PPI dose increased to BID  and pepcid added  pureed and mod thick liquids for pleasure feeds- swallow consult appreciated PPI, pepcid    AUR   resume flomax- taylor placed   bladder scan q 6hrs  void trial prior to discharge     Abnormal LFT stable   check abd US neg for acute pathology  benign abd on exam, tolerating tube feeds   viral hepatitis panel negative     Anemia monitor     parkinsons disease/dementia   due to worsening tremors , hypophonia and waxing and waning mentation  - neuro was consulted   Dr sanchez recommended   increased dose of sinemet,started  selegiline.  Continue on donepezil,   However daughter would like me to speak with patient's neurologist- spoke with dr Hernandez [place] office - [phone]- who had seen pt last week- recommended not to give selegiline and recommended increasing dose sinemet 1.5 tab at 7 am and 11 am , then 1 tab at 3 pm and 7 PM - Dr Hernandez cautiously and slowly  increased sinemet as worried about dyskinesia which he has had in the past      L2 vertebral body height loss of indeterminant age but new since the CT   of 10/18/2022.  f/u outpt      Parkinsons disease/dementia   HTN (hypertension)  Prostate cancer  Nonrheumatic aortic valve insufficiency  continue home meds    vte prophylaxis lovenox SC     dw with wife and daughter  DNR/DNI  palliative consult appreciated     dispo- once mentation better switch to  abx via peg and discharge planning , sinemet dose increased today, voiding trial prior to dc    88 y/o M w/ PMH of HTN, parkinsons, GERD, CVA, syncope, BPH presenting w/ increased secretions. Seen w/ wife and daughter. Reports over the past few days having cough w/ copious amount of secretions. Last night was concerned that he would choke, he didn't, but didn't think he could stay home another night so brought him to the ED today for eval. Reports [facility] and they were told he had some "congestion" in his lungs. Primarily bed bound. Denies fevers, chills, headache, dizziness, blurred vision, chest pain, shortness of breath, abdominal pain, n/v/d/c, urinary symptoms, MSK pain, rash    in ED temp 100.9 , leukocytosis 11,000, CT chest  New airspace opacities as described above. This could represent an acute   inflammatory or infectious process or progression of a chronic process.   Correlate clinically for infection. Recommend pulmonary consultation and   short-term follow-up to assess for resolution. This could also represent   sequela from aspiration, the patient's esophagus contains debris.    received ceftriaxone and zithromax in ED    A/P  Right sided pneumonia likely aspiration Pneumonia  Debris within the esophagus  hx GERD.    med surg  IV ceftriaxone, zithro  blood cx NGTD , legionella,   now back on tube feeds   s/p IVF  pulm consult appreciated   GI consult appreciated  -  s/p  gastrograffin study - no obstruction  cleared by GI to start tube feeds , PPI dose increased to BID  and pepcid added  pureed and mod thick liquids for pleasure feeds- swallow consult appreciated PPI, pepcid    AUR   resume flomax- taylor placed   bladder scan q 6hrs  void trial prior to discharge     Abnormal LFT stable   check abd US neg for acute pathology  benign abd on exam, tolerating tube feeds   viral hepatitis panel negative     Anemia monitor     parkinsons disease/dementia   due to worsening tremors , hypophonia and waxing and waning mentation  - neuro was consulted   Dr sanchez recommended   increased dose of sinemet,started  selegiline.  Continue on donepezil,   However daughter would like me to speak with patient's neurologist- spoke with dr Hernandez [place] office - [phone]- who had seen pt last week- recommended not to give selegiline and recommended increasing dose sinemet 1.5 tab at 7 am and 11 am , then 1 tab at 3 pm and 7 PM - Dr Hernandez cautiously and slowly  increased sinemet as worried about dyskinesia which he has had in the past      L2 vertebral body height loss of indeterminant age but new since the CT   of 10/18/2022.  f/u outpt      Parkinsons disease/dementia   HTN (hypertension)  Prostate cancer  Nonrheumatic aortic valve insufficiency  continue home meds    vte prophylaxis lovenox SC     dw with wife and daughter  DNR/DNI  palliative consult appreciated     dispo- once mentation better switch to  abx via peg and discharge planning , sinemet dose increased today, voiding trial prior to dc Daughter paul [phone] would like to be called every day  spoke with wife Liz Holder HCP  who permitted to talk to daughter   86 y/o M w/ PMH of HTN, parkinsons, GERD, CVA, syncope, BPH presenting w/ increased secretions. Seen w/ wife and daughter. Reports over the past few days having cough w/ copious amount of secretions. Last night was concerned that he would choke, he didn't, but didn't think he could stay home another night so brought him to the ED today for eval. Reports [facility] and they were told he had some "congestion" in his lungs. Primarily bed bound. Denies fevers, chills, headache, dizziness, blurred vision, chest pain, shortness of breath, abdominal pain, n/v/d/c, urinary symptoms, MSK pain, rash    in ED temp 100.9 , leukocytosis 11,000, CT chest  New airspace opacities as described above. This could represent an acute   inflammatory or infectious process or progression of a chronic process.   Correlate clinically for infection. Recommend pulmonary consultation and   short-term follow-up to assess for resolution. This could also represent   sequela from aspiration, the patient's esophagus contains debris.    received ceftriaxone and zithromax in ED    A/P  Right sided pneumonia likely aspiration Pneumonia  Debris within the esophagus  hx GERD.    med surg  IV ceftriaxone, zithro  blood cx NGTD , legionella,   now back on tube feeds   s/p IVF  pulm consult appreciated   GI consult appreciated  -  s/p  gastrograffin study - no obstruction  cleared by GI to start tube feeds , PPI dose increased to BID  and pepcid added  pureed and mod thick liquids for pleasure feeds- swallow consult appreciated PPI, pepcid    AUR   resume flomax- taylor placed   bladder scan q 6hrs  void trial prior to discharge     Abnormal LFT stable   check abd US neg for acute pathology  benign abd on exam, tolerating tube feeds   viral hepatitis panel negative     Anemia monitor     parkinsons disease/dementia   due to worsening tremors , hypophonia and waxing and waning mentation  - neuro was consulted   Dr sanchez recommended   increased dose of sinemet,started  selegiline.  Continue on donepezil,   However daughter would like me to speak with patient's neurologist- spoke with dr Hernandez [place] office - [phone]- who had seen pt last week- recommended not to give selegiline and recommended increasing dose sinemet 1.5 tab at 7 am and 11 am , then 1 tab at 3 pm and 7 PM - Dr Hernandez cautiously and slowly  increased sinemet as worried about dyskinesia which he has had in the past      L2 vertebral body height loss of indeterminant age but new since the CT   of 10/18/2022.  f/u outpt      Parkinsons disease/dementia   HTN (hypertension)  Prostate cancer  Nonrheumatic aortic valve insufficiency  continue home meds    vte prophylaxis lovenox SC     dw with wife and daughter  DNR/DNI  palliative consult appreciated     dispo- once mentation better switch to  abx via peg and discharge planning , sinemet dose increased today, voiding trial prior to dc Daughter paul [phone] would like to be called every day  spoke with wife Liz Holder HCP  who permitted to talk to daughter  plan for dc home on friday dw with daughter if mentation improves, as home health aides can be reinstated by friday

## 2023-08-23 NOTE — PHYSICAL THERAPY INITIAL EVALUATION ADULT - ADDITIONAL COMMENTS
Prior to hospitalization, Pt. resided at home with wife.  Pt. has about 37 hours a week of a private hire aide (split shift) and [facility].

## 2023-08-23 NOTE — CONSULT NOTE ADULT - ASSESSMENT
87 year old man with PD, dementia, HTN, BPH, admitted with PNA, now completed course of abx, with cogwheeling and rigidity.  On eaxm, hypophonia, hypomimia, tremors and cogwheeling.  Imaging unremarkable.    By chart review, has been receiving less than his home dose of sinemet, and has not been receiving his selegiline.  Medications were reordered.  Please monitor.  Consider PT/OT.    Please page or call neurology with any acute neurological changes or other issues we can help address.   Follow up with his neurologist, Dr. Gupta.  87 year old man with PD, dementia, HTN, BPH, admitted with PNA, now completed course of abx, with cogwheeling and rigidity.  On eaxm, hypophonia, hypomimia, tremors and cogwheeling.  Imaging unremarkable.    By chart review, has been receiving less than his home dose of sinemet, and has not been receiving his selegiline.  Medications were reordered.  Please monitor.  Consider PT/OT.    Continue on donepezil, and frequent reorientation, day/night orientation.   Avoid delirogenic medications.    Please page or call neurology with any acute neurological changes or other issues we can help address.   Follow up with his neurologist.

## 2023-08-23 NOTE — PROGRESS NOTE ADULT - ASSESSMENT
PROBLEMS:    Aspiration pneumonia-esophagus contains debris  Parkinsons disease  HTN (hypertension)  Prostate cancer  Nonrheumatic aortic valve insufficiency  Chronic rhinitis    PLAN:    Pulmonary poor prognosis-comfort care  IV rhocephin  ASPIRATION PRECAUTION  Family considering comfort care  DVT PROPHYLASIX

## 2023-08-23 NOTE — PHYSICAL THERAPY INITIAL EVALUATION ADULT - NSPTDISCHREC_GEN_A_CORE
Acute care PT not indicated at this time. Patient appears to be at baseline level of mobility/function. Recommendation for continued care via home health PT services upon d/c home./Home PT Acute care PT not indicated at this time. Patient appears to be at baseline level of mobility/function. Recommendation for continued care via home health PT services upon d/c home with 24/7./Home PT

## 2023-08-23 NOTE — PHYSICAL THERAPY INITIAL EVALUATION ADULT - PERTINENT HX OF CURRENT PROBLEM, REHAB EVAL
88 y/o M w/ PMH of HTN, parkinsons, GERD, CVA, syncope, BPH presenting w/ increased secretions. Seen w/ wife and daughter. Reports over the past few days having cough w/ copious amount of secretions. Last night was concerned that he would choke, he didn't, but didn't think he could stay home another night so brought him to the ED today for eval. Reports [facility] and they were told he had some "congestion" in his lungs. Primarily bed bound.  Pt admitted 8/20/2023

## 2023-08-24 LAB
ANION GAP SERPL CALC-SCNC: 3 MMOL/L — LOW (ref 5–17)
BASOPHILS # BLD AUTO: 0.06 K/UL — SIGNIFICANT CHANGE UP (ref 0–0.2)
BASOPHILS NFR BLD AUTO: 1 % — SIGNIFICANT CHANGE UP (ref 0–2)
BUN SERPL-MCNC: 17 MG/DL — SIGNIFICANT CHANGE UP (ref 7–23)
CALCIUM SERPL-MCNC: 8.2 MG/DL — LOW (ref 8.5–10.1)
CHLORIDE SERPL-SCNC: 111 MMOL/L — HIGH (ref 96–108)
CO2 SERPL-SCNC: 28 MMOL/L — SIGNIFICANT CHANGE UP (ref 22–31)
CREAT SERPL-MCNC: 0.59 MG/DL — SIGNIFICANT CHANGE UP (ref 0.5–1.3)
EGFR: 94 ML/MIN/1.73M2 — SIGNIFICANT CHANGE UP
EOSINOPHIL # BLD AUTO: 0.45 K/UL — SIGNIFICANT CHANGE UP (ref 0–0.5)
EOSINOPHIL NFR BLD AUTO: 7.8 % — HIGH (ref 0–6)
GLUCOSE SERPL-MCNC: 94 MG/DL — SIGNIFICANT CHANGE UP (ref 70–99)
HCT VFR BLD CALC: 33.3 % — LOW (ref 39–50)
HGB BLD-MCNC: 11.3 G/DL — LOW (ref 13–17)
IMM GRANULOCYTES NFR BLD AUTO: 0.3 % — SIGNIFICANT CHANGE UP (ref 0–0.9)
LYMPHOCYTES # BLD AUTO: 1.43 K/UL — SIGNIFICANT CHANGE UP (ref 1–3.3)
LYMPHOCYTES # BLD AUTO: 24.7 % — SIGNIFICANT CHANGE UP (ref 13–44)
MCHC RBC-ENTMCNC: 29.4 PG — SIGNIFICANT CHANGE UP (ref 27–34)
MCHC RBC-ENTMCNC: 33.9 GM/DL — SIGNIFICANT CHANGE UP (ref 32–36)
MCV RBC AUTO: 86.5 FL — SIGNIFICANT CHANGE UP (ref 80–100)
MONOCYTES # BLD AUTO: 0.57 K/UL — SIGNIFICANT CHANGE UP (ref 0–0.9)
MONOCYTES NFR BLD AUTO: 9.9 % — SIGNIFICANT CHANGE UP (ref 2–14)
NEUTROPHILS # BLD AUTO: 3.25 K/UL — SIGNIFICANT CHANGE UP (ref 1.8–7.4)
NEUTROPHILS NFR BLD AUTO: 56.3 % — SIGNIFICANT CHANGE UP (ref 43–77)
PLATELET # BLD AUTO: 223 K/UL — SIGNIFICANT CHANGE UP (ref 150–400)
POTASSIUM SERPL-MCNC: 4 MMOL/L — SIGNIFICANT CHANGE UP (ref 3.5–5.3)
POTASSIUM SERPL-SCNC: 4 MMOL/L — SIGNIFICANT CHANGE UP (ref 3.5–5.3)
RBC # BLD: 3.85 M/UL — LOW (ref 4.2–5.8)
RBC # FLD: 13.9 % — SIGNIFICANT CHANGE UP (ref 10.3–14.5)
SODIUM SERPL-SCNC: 142 MMOL/L — SIGNIFICANT CHANGE UP (ref 135–145)
WBC # BLD: 5.78 K/UL — SIGNIFICANT CHANGE UP (ref 3.8–10.5)
WBC # FLD AUTO: 5.78 K/UL — SIGNIFICANT CHANGE UP (ref 3.8–10.5)

## 2023-08-24 PROCEDURE — 99232 SBSQ HOSP IP/OBS MODERATE 35: CPT

## 2023-08-24 RX ORDER — QUETIAPINE FUMARATE 200 MG/1
25 TABLET, FILM COATED ORAL AT BEDTIME
Refills: 0 | Status: DISCONTINUED | OUTPATIENT
Start: 2023-08-24 | End: 2023-08-25

## 2023-08-24 RX ADMIN — Medication 2 MILLIGRAM(S): at 23:09

## 2023-08-24 RX ADMIN — CARBIDOPA AND LEVODOPA 1 TABLET(S): 25; 100 TABLET ORAL at 15:20

## 2023-08-24 RX ADMIN — QUETIAPINE FUMARATE 25 MILLIGRAM(S): 200 TABLET, FILM COATED ORAL at 23:08

## 2023-08-24 RX ADMIN — CEFTRIAXONE 1000 MILLIGRAM(S): 500 INJECTION, POWDER, FOR SOLUTION INTRAMUSCULAR; INTRAVENOUS at 09:40

## 2023-08-24 RX ADMIN — CARBIDOPA AND LEVODOPA 1.5 TABLET(S): 25; 100 TABLET ORAL at 05:12

## 2023-08-24 RX ADMIN — CARBIDOPA AND LEVODOPA 1.5 TABLET(S): 25; 100 TABLET ORAL at 10:19

## 2023-08-24 RX ADMIN — ATORVASTATIN CALCIUM 20 MILLIGRAM(S): 80 TABLET, FILM COATED ORAL at 23:09

## 2023-08-24 RX ADMIN — Medication 25 MILLIGRAM(S): at 09:41

## 2023-08-24 RX ADMIN — LISINOPRIL 2.5 MILLIGRAM(S): 2.5 TABLET ORAL at 23:08

## 2023-08-24 RX ADMIN — POLYETHYLENE GLYCOL 3350 17 GRAM(S): 17 POWDER, FOR SOLUTION ORAL at 09:40

## 2023-08-24 RX ADMIN — PANTOPRAZOLE SODIUM 40 MILLIGRAM(S): 20 TABLET, DELAYED RELEASE ORAL at 05:14

## 2023-08-24 RX ADMIN — Medication 81 MILLIGRAM(S): at 09:40

## 2023-08-24 RX ADMIN — DONEPEZIL HYDROCHLORIDE 5 MILLIGRAM(S): 10 TABLET, FILM COATED ORAL at 23:08

## 2023-08-24 RX ADMIN — PANTOPRAZOLE SODIUM 40 MILLIGRAM(S): 20 TABLET, DELAYED RELEASE ORAL at 15:20

## 2023-08-24 RX ADMIN — FAMOTIDINE 40 MILLIGRAM(S): 10 INJECTION INTRAVENOUS at 23:09

## 2023-08-24 RX ADMIN — ENOXAPARIN SODIUM 40 MILLIGRAM(S): 100 INJECTION SUBCUTANEOUS at 18:44

## 2023-08-24 RX ADMIN — LISINOPRIL 2.5 MILLIGRAM(S): 2.5 TABLET ORAL at 09:40

## 2023-08-24 RX ADMIN — CARBIDOPA AND LEVODOPA 1 TABLET(S): 25; 100 TABLET ORAL at 18:44

## 2023-08-24 NOTE — PROGRESS NOTE ADULT - ASSESSMENT
PROBLEMS:    Aspiration pneumonia-esophagus contains debris-family refuses EGD  Parkinsons disease  HTN (hypertension)  Prostate cancer  Nonrheumatic aortic valve insufficiency  Chronic rhinitis    PLAN:    Pulmonary poor prognosis-comfort care  IV rhocephin  ASPIRATION PRECAUTION  Family considering comfort care  DVT PROPHYLASIX

## 2023-08-25 ENCOUNTER — TRANSCRIPTION ENCOUNTER (OUTPATIENT)
Age: 88
End: 2023-08-25

## 2023-08-25 VITALS
DIASTOLIC BLOOD PRESSURE: 79 MMHG | SYSTOLIC BLOOD PRESSURE: 121 MMHG | RESPIRATION RATE: 18 BRPM | HEART RATE: 71 BPM | TEMPERATURE: 98 F | OXYGEN SATURATION: 99 %

## 2023-08-25 PROCEDURE — 99239 HOSP IP/OBS DSCHRG MGMT >30: CPT

## 2023-08-25 RX ORDER — DOXAZOSIN MESYLATE 4 MG
1 TABLET ORAL
Qty: 30 | Refills: 0
Start: 2023-08-25 | End: 2023-09-23

## 2023-08-25 RX ORDER — DOXAZOSIN MESYLATE 4 MG
1 TABLET ORAL
Refills: 0 | DISCHARGE

## 2023-08-25 RX ORDER — DOXAZOSIN MESYLATE 4 MG
1 TABLET ORAL
Qty: 30 | Refills: 0 | DISCHARGE
Start: 2023-08-25

## 2023-08-25 RX ORDER — CARBIDOPA AND LEVODOPA 25; 100 MG/1; MG/1
1 TABLET ORAL
Qty: 0 | Refills: 0 | DISCHARGE
Start: 2023-08-25

## 2023-08-25 RX ORDER — CARBIDOPA AND LEVODOPA 25; 100 MG/1; MG/1
1.5 TABLET ORAL
Qty: 0 | Refills: 0 | DISCHARGE
Start: 2023-08-25

## 2023-08-25 RX ORDER — CARBIDOPA AND LEVODOPA 25; 100 MG/1; MG/1
1.5 TABLET ORAL
Refills: 0 | DISCHARGE
Start: 2023-08-25

## 2023-08-25 RX ORDER — CARBIDOPA AND LEVODOPA 25; 100 MG/1; MG/1
1 TABLET ORAL
Refills: 0 | DISCHARGE
Start: 2023-08-25

## 2023-08-25 RX ADMIN — CARBIDOPA AND LEVODOPA 1.5 TABLET(S): 25; 100 TABLET ORAL at 11:23

## 2023-08-25 RX ADMIN — LISINOPRIL 2.5 MILLIGRAM(S): 2.5 TABLET ORAL at 11:21

## 2023-08-25 RX ADMIN — CARBIDOPA AND LEVODOPA 1.5 TABLET(S): 25; 100 TABLET ORAL at 06:41

## 2023-08-25 RX ADMIN — Medication 25 MILLIGRAM(S): at 11:20

## 2023-08-25 RX ADMIN — CARBIDOPA AND LEVODOPA 1 TABLET(S): 25; 100 TABLET ORAL at 15:20

## 2023-08-25 RX ADMIN — Medication 81 MILLIGRAM(S): at 11:19

## 2023-08-25 RX ADMIN — CEFTRIAXONE 1000 MILLIGRAM(S): 500 INJECTION, POWDER, FOR SOLUTION INTRAMUSCULAR; INTRAVENOUS at 11:21

## 2023-08-25 RX ADMIN — PANTOPRAZOLE SODIUM 40 MILLIGRAM(S): 20 TABLET, DELAYED RELEASE ORAL at 06:37

## 2023-08-25 NOTE — PROGRESS NOTE ADULT - SUBJECTIVE AND OBJECTIVE BOX
86 y/o M w/ PMH of HTN, parkinsons, GERD, CVA, syncope, BPH presenting w/ increased secretions. Seen w/ wife and daughter. Reports over the past few days having cough w/ copious amount of secretions. Last night was concerned that he would choke, he didn't, but didn't think he could stay home another night so brought him to the ED today for eval. [facility] and they were told he had some "congestion" in his lungs. Primarily bed bound. Denies fevers, chills, headache, dizziness, blurred vision, chest pain, shortness of breath, abdominal pain, n/v/d/c, urinary symptoms, MSK pain, rash    in ED temp 100.9 , leukocytosis 11,000, CT chest reviewed , EKG NSR , nonspecifc ST/t waves , anteroseptal infarct similar to ekg done march 1st 2018  received ceftriaxone and zithromax   pt seen on floor , awake, oriented x1 to self, comfortable, denies sob or chest pain, at this time denies cough, follows 1 step commands  denies SOB, no CP, no abd pain , denies nausea or vomiting- unable to determine reliabity of ROS as pt with dementia    All other review of systems negative, except as noted in HPI   8/21 having losse cough, awake, tries to follow 1 step commands incomprehensible speech which is baseline due to parkinsons , passed swallow exam,   Physical exam   Constitutional: NAD  HEENT: Atraumatic, VICENTE, Normal, No congestion  Respiratory: inspiratory crackles R side > L no rhonchi/wheeze  Cardiovascular: N S1S2;   Gastrointestinal: Abdomen soft, non tender, , gastrostomy tube present  Bowel Ssounds present  Extremities: No edema, peripheral pulses present  Neurological: awake, follows 1 step commands, oriented x1 to self , has b/l upper extremity tremors, mild cog wheel  rigidity to extremity   Skin: Non cellulitic, no rash, ulcers      PHYSICAL EXAM:    Daily     Daily     ICU Vital Signs Last 24 Hrs  T(C): 36.4 (21 Aug 2023 07:58), Max: 36.8 (20 Aug 2023 16:29)  T(F): 97.5 (21 Aug 2023 07:58), Max: 98.2 (20 Aug 2023 16:29)  HR: 68 (21 Aug 2023 07:58) (68 - 88)  BP: 160/87 (21 Aug 2023 07:58) (145/92 - 160/87)  BP(mean): --  ABP: --  ABP(mean): --  RR: 17 (21 Aug 2023 07:58) (17 - 18)  SpO2: 99% (21 Aug 2023 07:58) (96% - 99%)    O2 Parameters below as of 21 Aug 2023 07:58  Patient On (Oxygen Delivery Method): room air                                    11.8   9.01  )-----------( 210      ( 21 Aug 2023 09:20 )             35.2       CBC Full  -  ( 21 Aug 2023 09:20 )  WBC Count : 9.01 K/uL  RBC Count : 4.09 M/uL  Hemoglobin : 11.8 g/dL  Hematocrit : 35.2 %  Platelet Count - Automated : 210 K/uL  Mean Cell Volume : 86.1 fl  Mean Cell Hemoglobin : 28.9 pg  Mean Cell Hemoglobin Concentration : 33.5 gm/dL  Auto Neutrophil # : 7.28 K/uL  Auto Lymphocyte # : 0.87 K/uL  Auto Monocyte # : 0.63 K/uL  Auto Eosinophil # : 0.15 K/uL  Auto Basophil # : 0.05 K/uL  Auto Neutrophil % : 80.7 %  Auto Lymphocyte % : 9.7 %  Auto Monocyte % : 7.0 %  Auto Eosinophil % : 1.7 %  Auto Basophil % : 0.6 %      08-21    143  |  112<H>  |  13  ----------------------------<  114<H>  3.8   |  28  |  0.48<L>    Ca    8.4<L>      21 Aug 2023 09:20    TPro  5.9<L>  /  Alb  2.6<L>  /  TBili  0.4  /  DBili  x   /  AST  73<H>  /  ALT  26  /  AlkPhos  208<H>  08-21      LIVER FUNCTIONS - ( 21 Aug 2023 09:20 )  Alb: 2.6 g/dL / Pro: 5.9 gm/dL / ALK PHOS: 208 U/L / ALT: 26 U/L / AST: 73 U/L / GGT: x             PT/INR - ( 19 Aug 2023 20:15 )   PT: 10.8 sec;   INR: 0.95 ratio         PTT - ( 19 Aug 2023 20:15 )  PTT:28.4 sec          Urinalysis Basic - ( 21 Aug 2023 09:20 )    Color: x / Appearance: x / SG: x / pH: x  Gluc: 114 mg/dL / Ketone: x  / Bili: x / Urobili: x   Blood: x / Protein: x / Nitrite: x   Leuk Esterase: x / RBC: x / WBC x   Sq Epi: x / Non Sq Epi: x / Bacteria: x            MEDICATIONS  (STANDING):  aspirin  chewable 81 milliGRAM(s) Oral daily  atorvastatin 20 milliGRAM(s) Oral at bedtime  azithromycin  IVPB 500 milliGRAM(s) IV Intermittent every 24 hours  carbidopa/levodopa  25/100 1 Tablet(s) Oral <User Schedule>  cefTRIAXone Injectable. 1000 milliGRAM(s) IV Push every 24 hours  dextrose 5% + sodium chloride 0.45% with potassium chloride 20 mEq/L 1000 milliLiter(s) (75 mL/Hr) IV Continuous <Continuous>  dextrose 5% + sodium chloride 0.45%. 1000 milliLiter(s) (75 mL/Hr) IV Continuous <Continuous>  donepezil 5 milliGRAM(s) Oral at bedtime  doxazosin 2 milliGRAM(s) Oral at bedtime  enoxaparin Injectable 40 milliGRAM(s) SubCutaneous every 24 hours  iohexol 300 mG (iodine)/mL Oral Solution 30 milliLiter(s) Oral once  lisinopril 2.5 milliGRAM(s) Oral daily  metoprolol tartrate 25 milliGRAM(s) Oral daily  pantoprazole  Injectable 40 milliGRAM(s) IV Push daily  QUEtiapine 50 milliGRAM(s) Oral at bedtime      
Subjective:    stephanie emeterio, seen by palliative care, sitting in bed.    Home Medications:  aspirin 81 mg oral tablet, chewable: 1 tab(s) by PEG tube once a day (20 Aug 2023 14:35)  atorvastatin 20 mg oral tablet: 1 tab(s) by PEG tube once a day (at bedtime) (20 Aug 2023 14:39)  carbidopa-levodopa 25 mg-100 mg oral tablet: 1 tab(s) by PEG tube 4 times a day at 8AM, 12PM, 4PM and 8PM (20 Aug 2023 14:45)  donepezil 5 mg oral tablet: 1 tab(s) by PEG tube once a day (at bedtime) (20 Aug 2023 14:37)  doxazosin 2 mg oral tablet: 1 tab(s) by PEG tube once a day (at bedtime) (20 Aug 2023 14:36)  lansoprazole 30 mg oral tablet, disintegratin cap(s) by PEG tube once a day (20 Aug 2023 14:27)  lisinopril 2.5 mg oral tablet: 1 tab(s) by PEG tube 2 times a day (20 Aug 2023 14:33)  metoprolol tartrate 50 mg oral tablet: 0.5 tab(s) by PEG tube once a day (20 Aug 2023 14:31)  Seroquel 25 mg oral tablet: 2 tab(s) by PEG tube once a day (at bedtime) (20 Aug 2023 14:42)    MEDICATIONS  (STANDING):  aspirin  chewable 81 milliGRAM(s) Oral daily  atorvastatin 20 milliGRAM(s) Oral at bedtime  carbidopa/levodopa  25/100 1 Tablet(s) Oral <User Schedule>  cefTRIAXone Injectable. 1000 milliGRAM(s) IV Push every 24 hours  dextrose 5% + sodium chloride 0.45%. 1000 milliLiter(s) (75 mL/Hr) IV Continuous <Continuous>  donepezil 5 milliGRAM(s) Oral at bedtime  doxazosin 2 milliGRAM(s) Oral at bedtime  enoxaparin Injectable 40 milliGRAM(s) SubCutaneous every 24 hours  famotidine    Tablet 40 milliGRAM(s) Oral at bedtime  lisinopril 2.5 milliGRAM(s) Oral two times a day  metoprolol tartrate 25 milliGRAM(s) Oral daily  pantoprazole   Suspension 40 milliGRAM(s) Oral two times a day before meals  polyethylene glycol 3350 17 Gram(s) Oral daily  QUEtiapine 50 milliGRAM(s) Oral at bedtime    MEDICATIONS  (PRN):  acetaminophen   Oral Liquid .. 650 milliGRAM(s) Oral every 6 hours PRN Temp greater or equal to 38C (100.4F), Mild Pain (1 - 3)  aluminum hydroxide/magnesium hydroxide/simethicone Suspension 30 milliLiter(s) Oral every 4 hours PRN Dyspepsia  melatonin 3 milliGRAM(s) Oral at bedtime PRN Insomnia  ondansetron Injectable 4 milliGRAM(s) IV Push every 8 hours PRN Nausea and/or Vomiting      Allergies    apple (Anaphylaxis)  raw, fresh fruits- reynaldo family (apple, pear, apricot, peach, fig); processed/cooked is ok. (Anaphylaxis)  Originally Entered as [Unknown] reaction to [fresh fruits] (Unknown)  No Known Drug Allergies    Intolerances        Vital Signs Last 24 Hrs  T(C): 36.7 (23 Aug 2023 08:05), Max: 36.9 (23 Aug 2023 00:08)  T(F): 98.1 (23 Aug 2023 08:05), Max: 98.5 (23 Aug 2023 00:08)  HR: 61 (23 Aug 2023 08:05) (61 - 76)  BP: 121/84 (23 Aug 2023 08:05) (121/84 - 169/70)  BP(mean): 97 (23 Aug 2023 08:05) (97 - 97)  RR: 18 (23 Aug 2023 08:05) (18 - 18)  SpO2: 98% (23 Aug 2023 08:05) (98% - 99%)    Parameters below as of 23 Aug 2023 08:05  Patient On (Oxygen Delivery Method): room air          PHYSICAL EXAMINATION:    NECK:  Supple. No lymphadenopathy. Jugular venous pressure not elevated. Carotids equal.   HEART:   The cardiac impulse has a normal quality. Reg., Nl S1 and S2.  There are no murmurs, rubs or gallops noted  CHEST:  Chest crackles to auscultation. Normal respiratory effort.  ABDOMEN:  Soft and nontender.   EXTREMITIES:  There is no edema.       LABS:                        11.9   5.58  )-----------( 195      ( 23 Aug 2023 07:07 )             35.8         143  |  113<H>  |  17  ----------------------------<  88  4.2   |  25  |  0.64    Ca    8.4<L>      23 Aug 2023 07:07    TPro  6.1  /  Alb  2.5<L>  /  TBili  0.4  /  DBili  x   /  AST  65<H>  /  ALT  68  /  AlkPhos  204<H>  08      Urinalysis Basic - ( 23 Aug 2023 07:07 )    Color: x / Appearance: x / SG: x / pH: x  Gluc: 88 mg/dL / Ketone: x  / Bili: x / Urobili: x   Blood: x / Protein: x / Nitrite: x   Leuk Esterase: x / RBC: x / WBC x   Sq Epi: x / Non Sq Epi: x / Bacteria: x            
  HPI:   86 y/o M w/ PMH of HTN, parkinsons, GERD, CVA, syncope, BPH presenting w/ increased secretions. Over the past few days having cough w/ copious amount of secretions. One night prior to admission was concerned that he would choke, he didn't, but didn't think he could stay home another night so brought him to the ED for eval. [facility] and they were told he had some "congestion" in his lungs. Primarily bed bound.       (20 Aug 2023 09:16)      Review of Systems:  Unable to obtain- severe PD    PHYSICAL EXAM:    Vital Signs Last 24 Hrs  T(C): 36.4 (24 Aug 2023 07:55), Max: 36.7 (23 Aug 2023 23:53)  T(F): 97.6 (24 Aug 2023 07:55), Max: 98.1 (23 Aug 2023 23:53)  HR: 94 (24 Aug 2023 07:55) (76 - 94)  BP: 123/60 (24 Aug 2023 07:55) (123/60 - 154/87)  BP(mean): --  RR: 18 (24 Aug 2023 07:55) (18 - 18)  SpO2: 97% (24 Aug 2023 07:55) (97% - 100%)    Parameters below as of 24 Aug 2023 07:55  Patient On (Oxygen Delivery Method): room air        GENERAL: comfortable   HEAD:  Atraumatic, Normocephalic  EYES: onjunctiva and sclera clear  HEENT: Moist mucous membranes  NECK: Supple, No JVD  NERVOUS SYSTEM:  Alertk  CHEST/LUNG: AEEB, crackles present b/l   HEART:S1S2 normal, no murmer  ABDOMEN: Soft, Nontender, Nondistended; Bowel sounds present  GENITOURINARY- no palpable bladder  EXTREMITIES:  2+ Peripheral Pulses, No clubbing, cyanosis, or edema  MUSCULOSKELTAL- No muscle tenderness,   SKIN-no rash, no lesion  PSYCH- Mood stable  LYMPH Node- No palpable lymph node    LABS:                        11.3   5.78  )-----------( 223      ( 24 Aug 2023 06:33 )             33.3     08-24    142  |  111<H>  |  17  ----------------------------<  94  4.0   |  28  |  0.59    Ca    8.2<L>      24 Aug 2023 06:33        Urinalysis Basic - ( 24 Aug 2023 06:33 )    Color: x / Appearance: x / SG: x / pH: x  Gluc: 94 mg/dL / Ketone: x  / Bili: x / Urobili: x   Blood: x / Protein: x / Nitrite: x   Leuk Esterase: x / RBC: x / WBC x   Sq Epi: x / Non Sq Epi: x / Bacteria: x        CAPILLARY BLOOD GLUCOSE                Standing medicine  acetaminophen   Oral Liquid .. 650 milliGRAM(s) Oral every 6 hours PRN  aluminum hydroxide/magnesium hydroxide/simethicone Suspension 30 milliLiter(s) Oral every 4 hours PRN  aspirin  chewable 81 milliGRAM(s) Oral daily  atorvastatin 20 milliGRAM(s) Oral at bedtime  carbidopa/levodopa  25/100 1.5 Tablet(s) Oral <User Schedule>  carbidopa/levodopa  25/100 1 Tablet(s) Oral <User Schedule>  cefTRIAXone Injectable. 1000 milliGRAM(s) IV Push every 24 hours  dextrose 5% + sodium chloride 0.45%. 1000 milliLiter(s) IV Continuous <Continuous>  donepezil 5 milliGRAM(s) Oral at bedtime  doxazosin 2 milliGRAM(s) Oral at bedtime  enoxaparin Injectable 40 milliGRAM(s) SubCutaneous every 24 hours  famotidine    Tablet 40 milliGRAM(s) Oral at bedtime  lisinopril 2.5 milliGRAM(s) Oral two times a day  melatonin 3 milliGRAM(s) Oral at bedtime PRN  metoprolol tartrate 25 milliGRAM(s) Oral daily  ondansetron Injectable 4 milliGRAM(s) IV Push every 8 hours PRN  pantoprazole   Suspension 40 milliGRAM(s) Oral two times a day before meals  polyethylene glycol 3350 17 Gram(s) Oral daily  QUEtiapine 50 milliGRAM(s) Oral at bedtime      A/P    #Aspiration Pneumonia  -ct abx   -very high risk for aspiration   -overall poor prognosis     #Urinary retention   -remove taylor's as per family request- watch for retention     #parkinsons disease/dementia   -s/p adjustment of his medicine during this admission   -further management as an outpt     #L2 vertebral body height loss of indeterminant age but new since the CT   of 10/18/2022.  f/u outpt      #HTN monitor it     #Prostate cancer    #Nonrheumatic aortic valve insufficiency  continue home meds    #vte prophylaxis lovenox SC     #dw with  daughter Lisa in detail, all questions have been answered. She would like update everyday/tomorrow     #home health aides can be reinstated by friday -D/C plan for tomorrow 
Subjective:    pat better, sitting in bed, , taking po, no respiratory distress.    Home Medications:  aspirin 81 mg oral tablet, chewable: 1 tab(s) by PEG tube once a day (20 Aug 2023 14:35)  atorvastatin 20 mg oral tablet: 1 tab(s) by PEG tube once a day (at bedtime) (20 Aug 2023 14:39)  carbidopa-levodopa 25 mg-100 mg oral tablet: 1.5 tab(s) by gastrostomy tube 2 times a day at 7 AM and 11 AM (25 Aug 2023 12:40)  carbidopa-levodopa 25 mg-100 mg oral tablet: 1 tab(s) by gastrostomy tube once a day at 3 PM and 7 PM (25 Aug 2023 12:40)  donepezil 5 mg oral tablet: 1 tab(s) by PEG tube once a day (at bedtime) (20 Aug 2023 14:37)  lansoprazole 30 mg oral tablet, disintegratin cap(s) by PEG tube once a day (20 Aug 2023 14:27)  lisinopril 2.5 mg oral tablet: 1 tab(s) by PEG tube 2 times a day (20 Aug 2023 14:33)  metoprolol tartrate 50 mg oral tablet: 0.5 tab(s) by PEG tube once a day (20 Aug 2023 14:31)  Seroquel 25 mg oral tablet: 2 tab(s) by PEG tube once a day (at bedtime) (20 Aug 2023 14:42)    MEDICATIONS  (STANDING):  aspirin  chewable 81 milliGRAM(s) Oral daily  atorvastatin 20 milliGRAM(s) Oral at bedtime  carbidopa/levodopa  25/100 1.5 Tablet(s) Oral <User Schedule>  carbidopa/levodopa  25/100 1 Tablet(s) Oral <User Schedule>  cefTRIAXone Injectable. 1000 milliGRAM(s) IV Push every 24 hours  dextrose 5% + sodium chloride 0.45%. 1000 milliLiter(s) (75 mL/Hr) IV Continuous <Continuous>  donepezil 5 milliGRAM(s) Oral at bedtime  doxazosin 2 milliGRAM(s) Oral at bedtime  enoxaparin Injectable 40 milliGRAM(s) SubCutaneous every 24 hours  famotidine    Tablet 40 milliGRAM(s) Oral at bedtime  lisinopril 2.5 milliGRAM(s) Oral two times a day  metoprolol tartrate 25 milliGRAM(s) Oral daily  pantoprazole   Suspension 40 milliGRAM(s) Oral two times a day before meals  polyethylene glycol 3350 17 Gram(s) Oral daily  QUEtiapine 25 milliGRAM(s) Oral at bedtime    MEDICATIONS  (PRN):  acetaminophen   Oral Liquid .. 650 milliGRAM(s) Oral every 6 hours PRN Temp greater or equal to 38C (100.4F), Mild Pain (1 - 3)  aluminum hydroxide/magnesium hydroxide/simethicone Suspension 30 milliLiter(s) Oral every 4 hours PRN Dyspepsia  melatonin 3 milliGRAM(s) Oral at bedtime PRN Insomnia  ondansetron Injectable 4 milliGRAM(s) IV Push every 8 hours PRN Nausea and/or Vomiting      Allergies    apple (Anaphylaxis)  raw, fresh fruits- reynaldo family (apple, pear, apricot, peach, fig); processed/cooked is ok. (Anaphylaxis)  Originally Entered as [Unknown] reaction to [fresh fruits] (Unknown)  No Known Drug Allergies    Intolerances        Vital Signs Last 24 Hrs  T(C): 36.4 (25 Aug 2023 08:15), Max: 37 (24 Aug 2023 23:04)  T(F): 97.5 (25 Aug 2023 08:15), Max: 98.6 (24 Aug 2023 23:04)  HR: 71 (25 Aug 2023 08:15) (71 - 77)  BP: 121/79 (25 Aug 2023 08:15) (115/65 - 149/84)  BP(mean): 89 (25 Aug 2023 08:15) (89 - 89)  RR: 18 (25 Aug 2023 08:15) (18 - 18)  SpO2: 99% (25 Aug 2023 08:15) (96% - 99%)    Parameters below as of 25 Aug 2023 08:15  Patient On (Oxygen Delivery Method): room air          PHYSICAL EXAMINATION:    NECK:  Supple. No lymphadenopathy. Jugular venous pressure not elevated. Carotids equal.   HEART:   The cardiac impulse has a normal quality. Reg., Nl S1 and S2.  There are no murmurs, rubs or gallops noted  CHEST:  Chest crackles to auscultation. Normal respiratory effort.  ABDOMEN:  Soft and nontender.   EXTREMITIES:  There is no edema.       LABS:                        11.3   5.78  )-----------( 223      ( 24 Aug 2023 06:33 )             33.3     08-24    142  |  111<H>  |  17  ----------------------------<  94  4.0   |  28  |  0.59    Ca    8.2<L>      24 Aug 2023 06:33        Urinalysis Basic - ( 24 Aug 2023 06:33 )    Color: x / Appearance: x / SG: x / pH: x  Gluc: 94 mg/dL / Ketone: x  / Bili: x / Urobili: x   Blood: x / Protein: x / Nitrite: x   Leuk Esterase: x / RBC: x / WBC x   Sq Epi: x / Non Sq Epi: x / Bacteria: x            
Subjective:    pat better, much more awake, sitting in bed, no respiratory complaint.    Home Medications:  aspirin 81 mg oral tablet, chewable: 1 tab(s) by PEG tube once a day (20 Aug 2023 14:35)  atorvastatin 20 mg oral tablet: 1 tab(s) by PEG tube once a day (at bedtime) (20 Aug 2023 14:39)  carbidopa-levodopa 25 mg-100 mg oral tablet: 1 tab(s) by PEG tube 4 times a day at 8AM, 12PM, 4PM and 8PM (20 Aug 2023 14:45)  donepezil 5 mg oral tablet: 1 tab(s) by PEG tube once a day (at bedtime) (20 Aug 2023 14:37)  doxazosin 2 mg oral tablet: 1 tab(s) by PEG tube once a day (at bedtime) (20 Aug 2023 14:36)  lansoprazole 30 mg oral tablet, disintegratin cap(s) by PEG tube once a day (20 Aug 2023 14:27)  lisinopril 2.5 mg oral tablet: 1 tab(s) by PEG tube 2 times a day (20 Aug 2023 14:33)  metoprolol tartrate 50 mg oral tablet: 0.5 tab(s) by PEG tube once a day (20 Aug 2023 14:31)  Seroquel 25 mg oral tablet: 2 tab(s) by PEG tube once a day (at bedtime) (20 Aug 2023 14:42)    MEDICATIONS  (STANDING):  aspirin  chewable 81 milliGRAM(s) Oral daily  atorvastatin 20 milliGRAM(s) Oral at bedtime  carbidopa/levodopa  25/100 1.5 Tablet(s) Oral <User Schedule>  carbidopa/levodopa  25/100 1 Tablet(s) Oral <User Schedule>  cefTRIAXone Injectable. 1000 milliGRAM(s) IV Push every 24 hours  dextrose 5% + sodium chloride 0.45%. 1000 milliLiter(s) (75 mL/Hr) IV Continuous <Continuous>  donepezil 5 milliGRAM(s) Oral at bedtime  doxazosin 2 milliGRAM(s) Oral at bedtime  enoxaparin Injectable 40 milliGRAM(s) SubCutaneous every 24 hours  famotidine    Tablet 40 milliGRAM(s) Oral at bedtime  lisinopril 2.5 milliGRAM(s) Oral two times a day  metoprolol tartrate 25 milliGRAM(s) Oral daily  pantoprazole   Suspension 40 milliGRAM(s) Oral two times a day before meals  polyethylene glycol 3350 17 Gram(s) Oral daily  QUEtiapine 50 milliGRAM(s) Oral at bedtime    MEDICATIONS  (PRN):  acetaminophen   Oral Liquid .. 650 milliGRAM(s) Oral every 6 hours PRN Temp greater or equal to 38C (100.4F), Mild Pain (1 - 3)  aluminum hydroxide/magnesium hydroxide/simethicone Suspension 30 milliLiter(s) Oral every 4 hours PRN Dyspepsia  melatonin 3 milliGRAM(s) Oral at bedtime PRN Insomnia  ondansetron Injectable 4 milliGRAM(s) IV Push every 8 hours PRN Nausea and/or Vomiting      Allergies    apple (Anaphylaxis)  raw, fresh fruits- reynaldo family (apple, pear, apricot, peach, fig); processed/cooked is ok. (Anaphylaxis)  Originally Entered as [Unknown] reaction to [fresh fruits] (Unknown)  No Known Drug Allergies    Intolerances        Vital Signs Last 24 Hrs  T(C): 36.4 (24 Aug 2023 07:55), Max: 36.7 (23 Aug 2023 23:53)  T(F): 97.6 (24 Aug 2023 07:55), Max: 98.1 (23 Aug 2023 23:53)  HR: 94 (24 Aug 2023 07:55) (76 - 94)  BP: 123/60 (24 Aug 2023 07:55) (123/60 - 154/87)  BP(mean): --  RR: 18 (24 Aug 2023 07:55) (18 - 18)  SpO2: 97% (24 Aug 2023 07:55) (97% - 100%)    Parameters below as of 24 Aug 2023 07:55  Patient On (Oxygen Delivery Method): room air          PHYSICAL EXAMINATION:    NECK:  Supple. No lymphadenopathy. Jugular venous pressure not elevated. Carotids equal.   HEART:   The cardiac impulse has a normal quality. Reg., Nl S1 and S2.  There are no murmurs, rubs or gallops noted  CHEST:  Chest crackles to auscultation. Normal respiratory effort.  ABDOMEN:  Soft and nontender.   EXTREMITIES:  There is no edema.       LABS:                        11.3   5.78  )-----------( 223      ( 24 Aug 2023 06:33 )             33.3     08-24    142  |  111<H>  |  17  ----------------------------<  94  4.0   |  28  |  0.59    Ca    8.2<L>      24 Aug 2023 06:33        Urinalysis Basic - ( 24 Aug 2023 06:33 )    Color: x / Appearance: x / SG: x / pH: x  Gluc: 94 mg/dL / Ketone: x  / Bili: x / Urobili: x   Blood: x / Protein: x / Nitrite: x   Leuk Esterase: x / RBC: x / WBC x   Sq Epi: x / Non Sq Epi: x / Bacteria: x            
Subjective:    pat lying in bed, agitated, GI note read, no respiratory distress.    Home Medications:  aspirin 81 mg oral tablet, chewable: 1 tab(s) by PEG tube once a day (20 Aug 2023 14:35)  atorvastatin 20 mg oral tablet: 1 tab(s) by PEG tube once a day (at bedtime) (20 Aug 2023 14:39)  carbidopa-levodopa 25 mg-100 mg oral tablet: 1 tab(s) by PEG tube 4 times a day at 8AM, 12PM, 4PM and 8PM (20 Aug 2023 14:45)  donepezil 5 mg oral tablet: 1 tab(s) by PEG tube once a day (at bedtime) (20 Aug 2023 14:37)  doxazosin 2 mg oral tablet: 1 tab(s) by PEG tube once a day (at bedtime) (20 Aug 2023 14:36)  lansoprazole 30 mg oral tablet, disintegratin cap(s) by PEG tube once a day (20 Aug 2023 14:27)  lisinopril 2.5 mg oral tablet: 1 tab(s) by PEG tube 2 times a day (20 Aug 2023 14:33)  metoprolol tartrate 50 mg oral tablet: 0.5 tab(s) by PEG tube once a day (20 Aug 2023 14:31)  Seroquel 25 mg oral tablet: 2 tab(s) by PEG tube once a day (at bedtime) (20 Aug 2023 14:42)    MEDICATIONS  (STANDING):  aspirin  chewable 81 milliGRAM(s) Oral daily  atorvastatin 20 milliGRAM(s) Oral at bedtime  azithromycin  IVPB 500 milliGRAM(s) IV Intermittent every 24 hours  carbidopa/levodopa  25/100 1 Tablet(s) Oral <User Schedule>  cefTRIAXone Injectable. 1000 milliGRAM(s) IV Push every 24 hours  dextrose 5% + sodium chloride 0.45% with potassium chloride 20 mEq/L 1000 milliLiter(s) (75 mL/Hr) IV Continuous <Continuous>  dextrose 5% + sodium chloride 0.45%. 1000 milliLiter(s) (75 mL/Hr) IV Continuous <Continuous>  donepezil 5 milliGRAM(s) Oral at bedtime  doxazosin 2 milliGRAM(s) Oral at bedtime  enoxaparin Injectable 40 milliGRAM(s) SubCutaneous every 24 hours  famotidine    Tablet 40 milliGRAM(s) Oral at bedtime  lisinopril 2.5 milliGRAM(s) Oral daily  metoprolol tartrate 25 milliGRAM(s) Oral daily  pantoprazole   Suspension 40 milliGRAM(s) Oral two times a day before meals  polyethylene glycol 3350 17 Gram(s) Oral daily  QUEtiapine 50 milliGRAM(s) Oral at bedtime    MEDICATIONS  (PRN):  acetaminophen   Oral Liquid .. 650 milliGRAM(s) Oral every 6 hours PRN Temp greater or equal to 38C (100.4F), Mild Pain (1 - 3)  aluminum hydroxide/magnesium hydroxide/simethicone Suspension 30 milliLiter(s) Oral every 4 hours PRN Dyspepsia  melatonin 3 milliGRAM(s) Oral at bedtime PRN Insomnia  ondansetron Injectable 4 milliGRAM(s) IV Push every 8 hours PRN Nausea and/or Vomiting      Allergies    apple (Anaphylaxis)  raw, fresh fruits- reynaldo family (apple, pear, apricot, peach, fig); processed/cooked is ok. (Anaphylaxis)  Originally Entered as [Unknown] reaction to [fresh fruits] (Unknown)  No Known Drug Allergies    Intolerances        Vital Signs Last 24 Hrs  T(C): 36.4 (22 Aug 2023 07:43), Max: 36.9 (21 Aug 2023 23:25)  T(F): 97.5 (22 Aug 2023 07:43), Max: 98.4 (21 Aug 2023 23:25)  HR: 70 (22 Aug 2023 07:43) (70 - 83)  BP: 145/88 (22 Aug 2023 07:43) (117/65 - 169/80)  BP(mean): --  RR: 18 (22 Aug 2023 07:43) (17 - 18)  SpO2: 100% (22 Aug 2023 07:43) (96% - 100%)    Parameters below as of 22 Aug 2023 07:43  Patient On (Oxygen Delivery Method): room air          PHYSICAL EXAMINATION:    NECK:  Supple. No lymphadenopathy. Jugular venous pressure not elevated. Carotids equal.   HEART:   The cardiac impulse has a normal quality. Reg., Nl S1 and S2.  There are no murmurs, rubs or gallops noted  CHEST:  Chest crackles to auscultation. Normal respiratory effort.  ABDOMEN:  Soft and nontender.   EXTREMITIES:  There is no edema.       LABS:                        12.0   8.08  )-----------( 219      ( 22 Aug 2023 07:07 )             36.0     08    141  |  111<H>  |  14  ----------------------------<  114<H>  4.1   |  27  |  0.61    Ca    8.4<L>      22 Aug 2023 07:07    TPro  6.1  /  Alb  2.5<L>  /  TBili  0.4  /  DBili  x   /  AST  65<H>  /  ALT  68  /  AlkPhos  204<H>        Urinalysis Basic - ( 22 Aug 2023 07:07 )    Color: x / Appearance: x / SG: x / pH: x  Gluc: 114 mg/dL / Ketone: x  / Bili: x / Urobili: x   Blood: x / Protein: x / Nitrite: x   Leuk Esterase: x / RBC: x / WBC x   Sq Epi: x / Non Sq Epi: x / Bacteria: x            
Subjective:    pat lying in bed, no respiratory distress, loose cough.    Home Medications:  aspirin 81 mg oral tablet, chewable: 1 tab(s) by PEG tube once a day (20 Aug 2023 14:35)  atorvastatin 20 mg oral tablet: 1 tab(s) by PEG tube once a day (at bedtime) (20 Aug 2023 14:39)  carbidopa-levodopa 25 mg-100 mg oral tablet: 1 tab(s) by PEG tube 4 times a day at 8AM, 12PM, 4PM and 8PM (20 Aug 2023 14:45)  donepezil 5 mg oral tablet: 1 tab(s) by PEG tube once a day (at bedtime) (20 Aug 2023 14:37)  doxazosin 2 mg oral tablet: 1 tab(s) by PEG tube once a day (at bedtime) (20 Aug 2023 14:36)  lansoprazole 30 mg oral tablet, disintegratin cap(s) by PEG tube once a day (20 Aug 2023 14:27)  lisinopril 2.5 mg oral tablet: 1 tab(s) by PEG tube 2 times a day (20 Aug 2023 14:33)  metoprolol tartrate 50 mg oral tablet: 0.5 tab(s) by PEG tube once a day (20 Aug 2023 14:31)  Seroquel 25 mg oral tablet: 2 tab(s) by PEG tube once a day (at bedtime) (20 Aug 2023 14:42)    MEDICATIONS  (STANDING):  aspirin  chewable 81 milliGRAM(s) Oral daily  atorvastatin 20 milliGRAM(s) Oral at bedtime  azithromycin  IVPB 500 milliGRAM(s) IV Intermittent every 24 hours  carbidopa/levodopa  25/100 1 Tablet(s) Oral <User Schedule>  cefTRIAXone Injectable. 1000 milliGRAM(s) IV Push every 24 hours  dextrose 5% + sodium chloride 0.45%. 1000 milliLiter(s) (75 mL/Hr) IV Continuous <Continuous>  donepezil 5 milliGRAM(s) Oral at bedtime  doxazosin 2 milliGRAM(s) Oral at bedtime  enoxaparin Injectable 40 milliGRAM(s) SubCutaneous every 24 hours  iohexol 300 mG (iodine)/mL Oral Solution 30 milliLiter(s) Oral once  lisinopril 2.5 milliGRAM(s) Oral daily  metoprolol tartrate 25 milliGRAM(s) Oral daily  pantoprazole  Injectable 40 milliGRAM(s) IV Push daily  QUEtiapine 50 milliGRAM(s) Oral at bedtime    MEDICATIONS  (PRN):  acetaminophen   Oral Liquid .. 650 milliGRAM(s) Oral every 6 hours PRN Temp greater or equal to 38C (100.4F), Mild Pain (1 - 3)  aluminum hydroxide/magnesium hydroxide/simethicone Suspension 30 milliLiter(s) Oral every 4 hours PRN Dyspepsia  melatonin 3 milliGRAM(s) Oral at bedtime PRN Insomnia  ondansetron Injectable 4 milliGRAM(s) IV Push every 8 hours PRN Nausea and/or Vomiting      Allergies    apple (Anaphylaxis)  raw, fresh fruits- reynaldo family (apple, pear, apricot, peach, fig); processed/cooked is ok. (Anaphylaxis)  Originally Entered as [Unknown] reaction to [fresh fruits] (Unknown)  No Known Drug Allergies    Intolerances        Vital Signs Last 24 Hrs  T(C): 36.4 (21 Aug 2023 07:58), Max: 36.8 (20 Aug 2023 16:29)  T(F): 97.5 (21 Aug 2023 07:58), Max: 98.2 (20 Aug 2023 16:29)  HR: 68 (21 Aug 2023 07:58) (68 - 88)  BP: 160/87 (21 Aug 2023 07:58) (145/92 - 160/87)  BP(mean): --  RR: 17 (21 Aug 2023 07:58) (17 - 18)  SpO2: 99% (21 Aug 2023 07:58) (96% - 99%)    Parameters below as of 21 Aug 2023 07:58  Patient On (Oxygen Delivery Method): room air          PHYSICAL EXAMINATION:    NECK:  Supple. No lymphadenopathy. Jugular venous pressure not elevated. Carotids equal.   HEART:   The cardiac impulse has a normal quality. Reg., Nl S1 and S2.  There are no murmurs, rubs or gallops noted  CHEST:  Chest crackles to auscultation. Normal respiratory effort.  ABDOMEN:  Soft and nontender.   EXTREMITIES:  There is no edema.       LABS:                        11.8   9.01  )-----------( 210      ( 21 Aug 2023 09:20 )             35.2     08-21    143  |  112<H>  |  13  ----------------------------<  114<H>  3.8   |  28  |  0.48<L>    Ca    8.4<L>      21 Aug 2023 09:20    TPro  5.9<L>  /  Alb  2.6<L>  /  TBili  0.4  /  DBili  x   /  AST  73<H>  /  ALT  26  /  AlkPhos  208<H>      PT/INR - ( 19 Aug 2023 20:15 )   PT: 10.8 sec;   INR: 0.95 ratio         PTT - ( 19 Aug 2023 20:15 )  PTT:28.4 sec  Urinalysis Basic - ( 21 Aug 2023 09:20 )    Color: x / Appearance: x / SG: x / pH: x  Gluc: 114 mg/dL / Ketone: x  / Bili: x / Urobili: x   Blood: x / Protein: x / Nitrite: x   Leuk Esterase: x / RBC: x / WBC x   Sq Epi: x / Non Sq Epi: x / Bacteria: x            
86 y/o M w/ PMH of HTN, parkinsons, GERD, CVA, syncope, BPH presenting w/ increased secretions. Seen w/ wife and daughter. Reports over the past few days having cough w/ copious amount of secretions. Last night was concerned that he would choke, he didn't, but didn't think he could stay home another night so brought him to the ED today for eval. [facility] and [facility] were told he had some "congestion" in his lungs. Primarily bed bound. Denies fevers, chills, headache, dizziness, blurred vision, chest pain, shortness of breath, abdominal pain, n/v/d/c, urinary symptoms, MSK pain, rash    in ED temp 100.9 , leukocytosis 11,000, CT chest reviewed , EKG NSR , nonspecifc ST/t waves , anteroseptal infarct similar to ekg done march 1st 2018  received ceftriaxone and zithromax   pt seen on floor , awake, oriented x1 to self, comfortable, denies sob or chest pain, at this time denies cough, follows 1 step commands  denies SOB, no CP, no abd pain , denies nausea or vomiting- unable to determine reliabity of ROS as pt with dementia    All other review of systems negative, except as noted in HPI   8/21 having losse cough, awake, tries to follow 1 step commands incomprehensible speech which is baseline due to parkinsons , passed swallow exam,  8/22 tolerating tube feeds , agitated overnight   Physical exam   Constitutional: NAD  HEENT: Atraumatic, VICENTE, Normal, No congestion  Respiratory: inspiratory crackles R side > L no rhonchi/wheeze  Cardiovascular: N S1S2;   Gastrointestinal: Abdomen soft, non tender, , gastrostomy tube present  Bowel Ssounds present  Extremities: No edema, peripheral pulses present  Neurological: awake, follows 1 step commands, oriented x1 to self , has b/l upper extremity tremors, mild cog wheel  rigidity to extremity   Skin: Non cellulitic, no rash, ulcers      PHYSICAL EXAM:    Daily     Daily     ICU Vital Signs Last 24 Hrs  T(C): 36.7 (22 Aug 2023 15:41), Max: 36.9 (21 Aug 2023 23:25)  T(F): 98 (22 Aug 2023 15:41), Max: 98.4 (21 Aug 2023 23:25)  HR: 76 (22 Aug 2023 15:41) (70 - 83)  BP: 169/70 (22 Aug 2023 15:41) (117/65 - 169/70)  BP(mean): --  ABP: --  ABP(mean): --  RR: 18 (22 Aug 2023 15:41) (18 - 18)  SpO2: 99% (22 Aug 2023 15:41) (96% - 100%)    O2 Parameters below as of 22 Aug 2023 15:41  Patient On (Oxygen Delivery Method): room air                            12.0   8.08  )-----------( 219      ( 22 Aug 2023 07:07 )             36.0       CBC Full  -  ( 22 Aug 2023 07:07 )  WBC Count : 8.08 K/uL  RBC Count : 4.17 M/uL  Hemoglobin : 12.0 g/dL  Hematocrit : 36.0 %  Platelet Count - Automated : 219 K/uL  Mean Cell Volume : 86.3 fl  Mean Cell Hemoglobin : 28.8 pg  Mean Cell Hemoglobin Concentration : 33.3 gm/dL  Auto Neutrophil # : 6.05 K/uL  Auto Lymphocyte # : 1.02 K/uL  Auto Monocyte # : 0.68 K/uL  Auto Eosinophil # : 0.25 K/uL  Auto Basophil # : 0.06 K/uL  Auto Neutrophil % : 75.0 %  Auto Lymphocyte % : 12.6 %  Auto Monocyte % : 8.4 %  Auto Eosinophil % : 3.1 %  Auto Basophil % : 0.7 %      08-22    141  |  111<H>  |  14  ----------------------------<  114<H>  4.1   |  27  |  0.61    Ca    8.4<L>      22 Aug 2023 07:07    TPro  6.1  /  Alb  2.5<L>  /  TBili  0.4  /  DBili  x   /  AST  65<H>  /  ALT  68  /  AlkPhos  204<H>  08-22      LIVER FUNCTIONS - ( 22 Aug 2023 07:07 )  Alb: 2.5 g/dL / Pro: 6.1 gm/dL / ALK PHOS: 204 U/L / ALT: 68 U/L / AST: 65 U/L / GGT: x                       Urinalysis Basic - ( 22 Aug 2023 07:07 )    Color: x / Appearance: x / SG: x / pH: x  Gluc: 114 mg/dL / Ketone: x  / Bili: x / Urobili: x   Blood: x / Protein: x / Nitrite: x   Leuk Esterase: x / RBC: x / WBC x   Sq Epi: x / Non Sq Epi: x / Bacteria: x            MEDICATIONS  (STANDING):  aspirin  chewable 81 milliGRAM(s) Oral daily  atorvastatin 20 milliGRAM(s) Oral at bedtime  azithromycin  IVPB 500 milliGRAM(s) IV Intermittent every 24 hours  carbidopa/levodopa  25/100 1 Tablet(s) Oral <User Schedule>  cefTRIAXone Injectable. 1000 milliGRAM(s) IV Push every 24 hours  dextrose 5% + sodium chloride 0.45% with potassium chloride 20 mEq/L 1000 milliLiter(s) (75 mL/Hr) IV Continuous <Continuous>  dextrose 5% + sodium chloride 0.45%. 1000 milliLiter(s) (75 mL/Hr) IV Continuous <Continuous>  donepezil 5 milliGRAM(s) Oral at bedtime  doxazosin 2 milliGRAM(s) Oral at bedtime  enoxaparin Injectable 40 milliGRAM(s) SubCutaneous every 24 hours  famotidine    Tablet 40 milliGRAM(s) Oral at bedtime  lisinopril 2.5 milliGRAM(s) Oral daily  metoprolol tartrate 25 milliGRAM(s) Oral daily  pantoprazole   Suspension 40 milliGRAM(s) Oral two times a day before meals  polyethylene glycol 3350 17 Gram(s) Oral daily  QUEtiapine 50 milliGRAM(s) Oral at bedtime        
86 y/o M w/ PMH of HTN, parkinsons, GERD, CVA, syncope, BPH presenting w/ increased secretions. Seen w/ wife and daughter. Reports over the past few days having cough w/ copious amount of secretions. Last night was concerned that he would choke, he didn't, but didn't think he could stay home another night so brought him to the ED today for eval. [facility] and they were told he had some "congestion" in his lungs. Primarily bed bound. Denies fevers, chills, headache, dizziness, blurred vision, chest pain, shortness of breath, abdominal pain, n/v/d/c, urinary symptoms, MSK pain, rash    in ED temp 100.9 , leukocytosis 11,000, CT chest reviewed , EKG NSR , nonspecifc ST/t waves , anteroseptal infarct similar to ekg done march 1st 2018  received ceftriaxone and zithromax   pt seen on floor , awake, oriented x1 to self, comfortable, denies sob or chest pain, at this time denies cough, follows 1 step commands  denies SOB, no CP, no abd pain , denies nausea or vomiting- unable to determine reliabity of ROS as pt with dementia    All other review of systems negative, except as noted in HPI   8/21 having losse cough, awake, tries to follow 1 step commands incomprehensible speech which is baseline due to parkinsons , passed swallow exam,  8/22 tolerating tube feeds , agitated overnight  8/23 this morning pt calm, tries to follow commands , less tremors     PHYSICAL EXAM:    Daily     Daily     ICU Vital Signs Last 24 Hrs  T(C): 36.6 (23 Aug 2023 15:14), Max: 36.9 (23 Aug 2023 00:08)  T(F): 97.9 (23 Aug 2023 15:14), Max: 98.5 (23 Aug 2023 00:08)  HR: 78 (23 Aug 2023 15:14) (61 - 78)  BP: 138/77 (23 Aug 2023 15:14) (121/84 - 139/68)  BP(mean): 97 (23 Aug 2023 08:05) (97 - 97)  ABP: --  ABP(mean): --  RR: 18 (23 Aug 2023 15:14) (18 - 18)  SpO2: 100% (23 Aug 2023 15:14) (98% - 100%)    O2 Parameters below as of 23 Aug 2023 15:14  Patient On (Oxygen Delivery Method): room air   Physical exam   Constitutional: NAD  HEENT: Atraumatic, VICENTE, Normal, No congestion  Respiratory: inspiratory crackles R side > L no rhonchi/wheeze  Cardiovascular: N S1S2;   Gastrointestinal: Abdomen soft, non tender, , gastrostomy tube present  Bowel Ssounds present  Extremities: No edema, peripheral pulses present  Neurological: awake, follows 1 step commands, oriented x1 to self , has b/l upper extremity tremors, mild cog wheel  rigidity to extremity   Skin: Non cellulitic, no rash, ulcers                                          11.9   5.58  )-----------( 195      ( 23 Aug 2023 07:07 )             35.8       CBC Full  -  ( 23 Aug 2023 07:07 )  WBC Count : 5.58 K/uL  RBC Count : 4.08 M/uL  Hemoglobin : 11.9 g/dL  Hematocrit : 35.8 %  Platelet Count - Automated : 195 K/uL  Mean Cell Volume : 87.7 fl  Mean Cell Hemoglobin : 29.2 pg  Mean Cell Hemoglobin Concentration : 33.2 gm/dL  Auto Neutrophil # : 3.24 K/uL  Auto Lymphocyte # : 1.23 K/uL  Auto Monocyte # : 0.58 K/uL  Auto Eosinophil # : 0.44 K/uL  Auto Basophil # : 0.07 K/uL  Auto Neutrophil % : 58.0 %  Auto Lymphocyte % : 22.0 %  Auto Monocyte % : 10.4 %  Auto Eosinophil % : 7.9 %  Auto Basophil % : 1.3 %      08-23    143  |  113<H>  |  17  ----------------------------<  88  4.2   |  25  |  0.64    Ca    8.4<L>      23 Aug 2023 07:07    TPro  6.1  /  Alb  2.5<L>  /  TBili  0.4  /  DBili  x   /  AST  65<H>  /  ALT  68  /  AlkPhos  204<H>  08-22      LIVER FUNCTIONS - ( 22 Aug 2023 07:07 )  Alb: 2.5 g/dL / Pro: 6.1 gm/dL / ALK PHOS: 204 U/L / ALT: 68 U/L / AST: 65 U/L / GGT: x                       Urinalysis Basic - ( 23 Aug 2023 07:07 )    Color: x / Appearance: x / SG: x / pH: x  Gluc: 88 mg/dL / Ketone: x  / Bili: x / Urobili: x   Blood: x / Protein: x / Nitrite: x   Leuk Esterase: x / RBC: x / WBC x   Sq Epi: x / Non Sq Epi: x / Bacteria: x            MEDICATIONS  (STANDING):  aspirin  chewable 81 milliGRAM(s) Oral daily  atorvastatin 20 milliGRAM(s) Oral at bedtime  carbidopa/levodopa  25/100 1.5 Tablet(s) Oral <User Schedule>  carbidopa/levodopa  25/100 1 Tablet(s) Oral <User Schedule>  cefTRIAXone Injectable. 1000 milliGRAM(s) IV Push every 24 hours  dextrose 5% + sodium chloride 0.45%. 1000 milliLiter(s) (75 mL/Hr) IV Continuous <Continuous>  donepezil 5 milliGRAM(s) Oral at bedtime  doxazosin 2 milliGRAM(s) Oral at bedtime  enoxaparin Injectable 40 milliGRAM(s) SubCutaneous every 24 hours  famotidine    Tablet 40 milliGRAM(s) Oral at bedtime  lisinopril 2.5 milliGRAM(s) Oral two times a day  metoprolol tartrate 25 milliGRAM(s) Oral daily  pantoprazole   Suspension 40 milliGRAM(s) Oral two times a day before meals  polyethylene glycol 3350 17 Gram(s) Oral daily  QUEtiapine 50 milliGRAM(s) Oral at bedtime

## 2023-08-25 NOTE — DISCHARGE NOTE NURSING/CASE MANAGEMENT/SOCIAL WORK - PATIENT PORTAL LINK FT
You can access the FollowMyHealth Patient Portal offered by [facility] by registering at the following website: [email or web address]. By joining [employer]’s FollowMyHealth portal, you will also be able to view your health information using other applications (apps) compatible with our system.

## 2023-08-25 NOTE — DISCHARGE NOTE PROVIDER - NSDCMRMEDTOKEN_GEN_ALL_CORE_FT
aspirin 81 mg oral tablet, chewable: 1 tab(s) by PEG tube once a day  atorvastatin 20 mg oral tablet: 1 tab(s) by PEG tube once a day (at bedtime)  carbidopa-levodopa 25 mg-100 mg oral tablet: 1.5 tab(s) by gastrostomy tube 2 times a day at 7 AM and 11 AM  carbidopa-levodopa 25 mg-100 mg oral tablet: 1 tab(s) by gastrostomy tube once a day at 3 PM and 7 PM  donepezil 5 mg oral tablet: 1 tab(s) by PEG tube once a day (at bedtime)  doxazosin 2 mg oral tablet: 1 tab(s) by PEG tube once a day (at bedtime)  lansoprazole 30 mg oral tablet, disintegratin cap(s) by PEG tube once a day  lisinopril 2.5 mg oral tablet: 1 tab(s) by PEG tube 2 times a day  metoprolol tartrate 50 mg oral tablet: 0.5 tab(s) by PEG tube once a day  Seroquel 25 mg oral tablet: 2 tab(s) by PEG tube once a day (at bedtime)

## 2023-08-25 NOTE — DISCHARGE NOTE PROVIDER - NSFTFHOMEHTHYNRD_GEN_ALL_CORE
----- Message from Lashon Mishra sent at 3/27/2023  4:29 PM CDT -----  Contact: pt's mom/Mei Hurley is calling in regard to wanting a referral to [facility] for the pt.  [facility], [address] [place] [address] , Karen Coleman, [place]  ph# [phone]  thanks/mpd  please call Anabelle at [phone]     Yes

## 2023-08-25 NOTE — PROGRESS NOTE ADULT - REASON FOR ADMISSION
cough with secretions

## 2023-08-25 NOTE — DISCHARGE NOTE PROVIDER - CARE PROVIDER_API CALL
Miko Arredondo  Cardiovascular Disease  [address] [place] [address]  Phone: [phone]  Fax: [phone]  Follow Up Time: 1 week

## 2023-08-25 NOTE — DISCHARGE NOTE NURSING/CASE MANAGEMENT/SOCIAL WORK - NSDCVIVACCINE_GEN_ALL_CORE_FT
Tdap; 28-Sep-2020 10:30; Abdulaziz Martins (RN); [employer]; [id number] (Exp. Date: 09-Jul-2022); IntraMuscular; Deltoid Left.; 0.5 milliLiter(s); VIS (VIS Published: 09-May-2013, VIS Presented: 28-Sep-2020);   Tdap; 03-Nov-2021 21:44; Terese Feliciano (JORDAN); [employer]; [id number] (Exp. Date: 15-Jul-2023); IntraMuscular; Deltoid Left.; 0.5 milliLiter(s); VIS (VIS Published: 09-May-2013, VIS Presented: 03-Nov-2021);

## 2023-08-25 NOTE — DISCHARGE NOTE PROVIDER - HOSPITAL COURSE
88 y/o M w/ PMH of HTN, parkinsons, GERD, CVA, syncope, BPH presenting w/ increased secretions. over the past few days having cough w/ copious amount of secretions.    #Pt was treated here for aspiration pneumonia, now he is hemodynamically stable and is being discharged with further management as an outpt. Time spent 45 minutes     #His Tello's has been removed yesterday and he is able to void without any problem.         (20 Aug 2023 09:16)    Vital Signs Last 24 Hrs  T(C): 36.4 (25 Aug 2023 08:15), Max: 37 (24 Aug 2023 23:04)  T(F): 97.5 (25 Aug 2023 08:15), Max: 98.6 (24 Aug 2023 23:04)  HR: 71 (25 Aug 2023 08:15) (71 - 77)  BP: 121/79 (25 Aug 2023 08:15) (115/65 - 149/84)  BP(mean): 89 (25 Aug 2023 08:15) (89 - 89)  RR: 18 (25 Aug 2023 08:15) (18 - 18)  SpO2: 99% (25 Aug 2023 08:15) (96% - 99%)    Parameters below as of 25 Aug 2023 08:15  Patient On (Oxygen Delivery Method): room air      T(C): 36.4 (08-25-23 @ 08:15), Max: 37 (08-24-23 @ 23:04)  HR: 71 (08-25-23 @ 08:15) (71 - 77)  BP: 121/79 (08-25-23 @ 08:15) (115/65 - 149/84)  RR: 18 (08-25-23 @ 08:15) (18 - 18)  SpO2: 99% (08-25-23 @ 08:15) (96% - 99%)    CONSTITUTIONAL: comfortable  EYES:  No Conjunctival or scleral injection, non-icteric  HEENT- Oral mucosa with moist membranes.    RESP: No respiratory distress, b/l crackles   CV: RRR, +S1S2, no MRG; no JVD; no peripheral edema  GI: Soft, NT, ND, no rebound, no guarding; no palpable masses; no hepatosplenomegaly; no hernia palpated  LYMPH: No cervical LAD or tenderness; no axillary LAD or tenderness; no inguinal LAD or tenderness  MSK: no muscle tenderness   SKIN: No rashes or ulcers noted; no subcutaneous nodules or induration palpable  NEURO: Alert  PSYCH: mood stable     08-25-23 @ 12:41

## 2023-08-25 NOTE — PROGRESS NOTE ADULT - PROVIDER SPECIALTY LIST ADULT
Hospitalist
Pulmonology
Hospitalist
Hospitalist
Pulmonology
Hospitalist
Pulmonology

## 2023-08-25 NOTE — PROGRESS NOTE ADULT - ASSESSMENT
PROBLEMS:    Aspiration pneumonia-esophagus contains debris-family refuses EGD  Parkinsons disease  HTN (hypertension)  Prostate cancer  Nonrheumatic aortic valve insufficiency  Chronic rhinitis    PLAN:    Pulmonary stable- decd planning  IV rhocephin  ASPIRATION PRECAUTION  Family considering comfort care  DVT PROPHYLASIX

## 2023-08-26 ENCOUNTER — EMERGENCY (EMERGENCY)
Facility: HOSPITAL | Age: 88
LOS: 0 days | Discharge: HOME | End: 2023-08-26
Attending: EMERGENCY MEDICINE
Payer: MEDICARE

## 2023-08-26 ENCOUNTER — TRANSCRIPTION ENCOUNTER (OUTPATIENT)
Age: 88
End: 2023-08-26

## 2023-08-26 VITALS
TEMPERATURE: 98 F | RESPIRATION RATE: 20 BRPM | HEART RATE: 90 BPM | SYSTOLIC BLOOD PRESSURE: 110 MMHG | OXYGEN SATURATION: 96 % | DIASTOLIC BLOOD PRESSURE: 62 MMHG | HEIGHT: 66 IN

## 2023-08-26 DIAGNOSIS — R05.9 COUGH, UNSPECIFIED: ICD-10-CM

## 2023-08-26 DIAGNOSIS — Z87.01 PERSONAL HISTORY OF PNEUMONIA (RECURRENT): ICD-10-CM

## 2023-08-26 DIAGNOSIS — Z98.890 OTHER SPECIFIED POSTPROCEDURAL STATES: Chronic | ICD-10-CM

## 2023-08-26 DIAGNOSIS — Z86.19 PERSONAL HISTORY OF OTHER INFECTIOUS AND PARASITIC DISEASES: ICD-10-CM

## 2023-08-26 DIAGNOSIS — Z87.19 PERSONAL HISTORY OF OTHER DISEASES OF THE DIGESTIVE SYSTEM: ICD-10-CM

## 2023-08-26 DIAGNOSIS — Z87.09 PERSONAL HISTORY OF OTHER DISEASES OF THE RESPIRATORY SYSTEM: ICD-10-CM

## 2023-08-26 DIAGNOSIS — Z87.438 PERSONAL HISTORY OF OTHER DISEASES OF MALE GENITAL ORGANS: ICD-10-CM

## 2023-08-26 DIAGNOSIS — K21.9 GASTRO-ESOPHAGEAL REFLUX DISEASE WITHOUT ESOPHAGITIS: ICD-10-CM

## 2023-08-26 DIAGNOSIS — Z86.010 PERSONAL HISTORY OF COLONIC POLYPS: ICD-10-CM

## 2023-08-26 DIAGNOSIS — H90.3 SENSORINEURAL HEARING LOSS, BILATERAL: ICD-10-CM

## 2023-08-26 DIAGNOSIS — G20 PARKINSON'S DISEASE: ICD-10-CM

## 2023-08-26 DIAGNOSIS — M81.0 AGE-RELATED OSTEOPOROSIS WITHOUT CURRENT PATHOLOGICAL FRACTURE: ICD-10-CM

## 2023-08-26 DIAGNOSIS — I10 ESSENTIAL (PRIMARY) HYPERTENSION: ICD-10-CM

## 2023-08-26 DIAGNOSIS — Z86.73 PERSONAL HISTORY OF TRANSIENT ISCHEMIC ATTACK (TIA), AND CEREBRAL INFARCTION WITHOUT RESIDUAL DEFICITS: ICD-10-CM

## 2023-08-26 DIAGNOSIS — F02.80 DEMENTIA IN OTHER DISEASES CLASSIFIED ELSEWHERE, UNSPECIFIED SEVERITY, WITHOUT BEHAVIORAL DISTURBANCE, PSYCHOTIC DISTURBANCE, MOOD DISTURBANCE, AND ANXIETY: ICD-10-CM

## 2023-08-26 DIAGNOSIS — Z91.018 ALLERGY TO OTHER FOODS: ICD-10-CM

## 2023-08-26 DIAGNOSIS — J06.9 ACUTE UPPER RESPIRATORY INFECTION, UNSPECIFIED: ICD-10-CM

## 2023-08-26 DIAGNOSIS — Z79.82 LONG TERM (CURRENT) USE OF ASPIRIN: ICD-10-CM

## 2023-08-26 DIAGNOSIS — B97.89 OTHER VIRAL AGENTS AS THE CAUSE OF DISEASES CLASSIFIED ELSEWHERE: ICD-10-CM

## 2023-08-26 PROCEDURE — 99282 EMERGENCY DEPT VISIT SF MDM: CPT

## 2023-08-26 PROCEDURE — 99283 EMERGENCY DEPT VISIT LOW MDM: CPT

## 2023-08-26 NOTE — ED ADULT TRIAGE NOTE - CHIEF COMPLAINT QUOTE
pt presents to ed for evaluation of residual cough with sputum- pt was admitted for PNA on ABX and just discharged yesterday - pt has hx of parkinsons and dementia

## 2023-08-26 NOTE — ED PROVIDER NOTE - CLINICAL SUMMARY MEDICAL DECISION MAKING FREE TEXT BOX
patient at baseline mental status patient's son at bedside agrees to dispo home he was concerned with 1 coughing episode he had today he is aware that he was just recently discharged from the hospital that work-up was reviewed unlikely to change anything continue prescribed medications that he prescribed yesterday at discharge from hospital return to ED for fever change in mental status patient developing shortness of breath patient and patient's son at bedside agree to plan of care

## 2023-08-26 NOTE — ED PROVIDER NOTE - OBJECTIVE STATEMENT
pt presents to ed for evaluation of residual cough with sputum- pt was admitted for PNA on ABX and just discharged yesterday - pt has hx of parkinsons and dementiaPatient's son describe she had 1 coughing episode at home that he became concerned about patient basically nonverbal at bedside can provide no meaningful history

## 2023-08-26 NOTE — ED PROVIDER NOTE - PATIENT PORTAL LINK FT
You can access the FollowMyHealth Patient Portal offered by [facility] by registering at the following website: [email or web address]. By joining [employer]’s FollowMyHealth portal, you will also be able to view your health information using other applications (apps) compatible with our system.

## 2023-08-28 ENCOUNTER — TRANSCRIPTION ENCOUNTER (OUTPATIENT)
Age: 88
End: 2023-08-28

## 2023-08-28 PROBLEM — Z00.00 ENCOUNTER FOR PREVENTIVE HEALTH EXAMINATION: Status: ACTIVE | Noted: 2023-08-28

## 2023-08-29 ENCOUNTER — APPOINTMENT (OUTPATIENT)
Dept: CARE COORDINATION | Facility: HOME HEALTH | Age: 88
End: 2023-08-29
Payer: MEDICARE

## 2023-08-29 VITALS
SYSTOLIC BLOOD PRESSURE: 110 MMHG | DIASTOLIC BLOOD PRESSURE: 60 MMHG | OXYGEN SATURATION: 95 % | HEART RATE: 75 BPM | RESPIRATION RATE: 16 BRPM

## 2023-08-29 DIAGNOSIS — I69.391 DYSPHAGIA FOLLOWING CEREBRAL INFARCTION: ICD-10-CM

## 2023-08-29 DIAGNOSIS — R13.10 DYSPHAGIA, UNSPECIFIED: ICD-10-CM

## 2023-08-29 DIAGNOSIS — I63.449 CEREBRAL INFARCTION DUE TO EMBOLISM OF UNSPECIFIED CEREBELLAR ARTERY: ICD-10-CM

## 2023-08-29 DIAGNOSIS — G20 PARKINSON'S DISEASE: ICD-10-CM

## 2023-08-29 DIAGNOSIS — K21.9 GASTRO-ESOPHAGEAL REFLUX DISEASE W/OUT ESOPHAGITIS: ICD-10-CM

## 2023-08-29 DIAGNOSIS — Z87.898 PERSONAL HISTORY OF OTHER SPECIFIED CONDITIONS: ICD-10-CM

## 2023-08-29 DIAGNOSIS — I10 ESSENTIAL (PRIMARY) HYPERTENSION: ICD-10-CM

## 2023-08-29 PROCEDURE — 99349 HOME/RES VST EST MOD MDM 40: CPT

## 2023-08-29 RX ORDER — LISINOPRIL 2.5 MG/1
2.5 TABLET ORAL TWICE DAILY
Refills: 0 | Status: ACTIVE | COMMUNITY
Start: 2023-08-29

## 2023-08-29 RX ORDER — CARBIDOPA AND LEVODOPA 25; 100 MG/1; MG/1
25-100 TABLET ORAL TWICE DAILY
Refills: 0 | Status: ACTIVE | COMMUNITY
Start: 2023-08-29

## 2023-08-29 RX ORDER — DOXAZOSIN 2 MG/1
2 TABLET ORAL DAILY
Refills: 0 | Status: ACTIVE | COMMUNITY
Start: 2023-08-29

## 2023-08-29 RX ORDER — ATORVASTATIN CALCIUM 20 MG/1
20 TABLET, FILM COATED ORAL
Refills: 0 | Status: ACTIVE | COMMUNITY
Start: 2023-08-29

## 2023-08-29 RX ORDER — DONEPEZIL HYDROCHLORIDE 5 MG/1
5 TABLET ORAL DAILY
Refills: 0 | Status: ACTIVE | COMMUNITY
Start: 2023-08-29

## 2023-08-29 RX ORDER — METOPROLOL TARTRATE 50 MG/1
50 TABLET, FILM COATED ORAL DAILY
Refills: 0 | Status: ACTIVE | COMMUNITY
Start: 2023-08-29

## 2023-08-29 RX ORDER — QUETIAPINE 25 MG/1
25 TABLET, FILM COATED ORAL DAILY
Refills: 0 | Status: ACTIVE | COMMUNITY
Start: 2023-08-29

## 2023-08-29 RX ORDER — LANSOPRAZOLE 30 MG/1
30 CAPSULE, DELAYED RELEASE ORAL DAILY
Refills: 0 | Status: ACTIVE | COMMUNITY
Start: 2023-08-29

## 2023-08-29 NOTE — PLAN
[FreeTextEntry1] : A/P: s/p hospitalization for aspiration PNA, high risk recurrence  # Parkinson;s/dementia: - con't sinemet, aricept - seroquel HS - + bouts of agitation, family to speak with PCP about possible prn seroquel - decline expective, supportive care  # HTN: - BP controlled on current meds - con't ACE, BB - mangement per PCP  # Dysphagia: - s/p CVA January 2023 with insertion of GT - bolus feeds TID - pleasure feeds with high risk aspiration - suction machine arrived today ordered by PCP - aspiration precautions - con't PPI  # BPH: - con;t doxazosin - monitor for retention, s/p taylor while hospitalized  Discussed Goals of Care: - DNR/I - discussed palliative care, home hospice - wants PT at this time, if continues to decline, will transition to hospice

## 2023-08-29 NOTE — HISTORY OF PRESENT ILLNESS
[Post-hospitalization from ___ Hospital] : Post-hospitalization from [unfilled] Hospital [Admitted on: ___] : The patient was admitted on [unfilled] [Discharged on ___] : discharged on [unfilled] [FreeTextEntry3] : ED T&R for increased reports of increased secretions and lethargy [FreeTextEntry2] : 88 y/o M w/ PMH of HTN, parkinsons, GERD, CVA, syncope, BPH presenting w/ increased secretions. Over the past few days having cough w/ copious amount of secretions. One night prior to admission was concerned that he would choke, he didn't, but didn't think he could stay home another night so brought him to the ED for eval. [facility] and they were told he had some "congestion" in his lungs. Primarily bed bound.  #Aspiration Pneumonia -ct abx  -very high risk for aspiration  -overall poor prognosis   #Urinary retention  -remove taylor's as per family request- watch for retention   #parkinsons disease/dementia  -s/p adjustment of his medicine during this admission  -further management as an outpt   #L2 vertebral body height loss of indeterminant age but new since the CT  of 10/18/2022. f/u outpt  #HTN monitor it   #Prostate cancer  #Nonrheumatic aortic valve insufficiency continue home meds  #vte prophylaxis lovenox SC   #dw with  daughter Lisa in detail, all questions have been answered. She would like update everyday/tomorrow   #home health aides can be reinstated by friday -D/C plan for tomorrow   Upon examination awake and alert able to answer simple questions. Wife and dtg present for exam and provided history. + HHA in home as well. Had a CVA in January and he has been declining since then, a GT was placed at that time and he became less communicative. He is essentially w/c bound not walking as much as he used to. Treated for aspiration PNA, educated aspiration precautions and most likely would reoccur. [facility]. He is on the home program, Dr. Hughes is PCP. Parkinson's/Dementia: On simemet QID with seroquel HS, has bouts of agitation. Was cooperative during exam today and able to follow commands. HTN: BP controlled on current meds. Dysphagia: pleasure feeds, puree, Bolus feeds TID. Aspiration precautions. GERD: PPI. [facility], [facility]

## 2023-08-29 NOTE — REVIEW OF SYSTEMS
[Lower Ext Edema] : lower extremity edema [Negative] : Psychiatric [FreeTextEntry7] : GT [FreeTextEntry9] : debiity [de-identified] : healing skin tear L hand [de-identified] : Parkinson's

## 2023-08-29 NOTE — PHYSICAL EXAM
[No Acute Distress] : no acute distress [Well Developed] : well developed [Normal Sclera/Conjunctiva] : normal sclera/conjunctiva [Normal Outer Ear/Nose] : the outer ears and nose were normal in appearance [No JVD] : no jugular venous distention [Supple] : supple [No Respiratory Distress] : no respiratory distress  [Clear to Auscultation] : lungs were clear to auscultation bilaterally [No Accessory Muscle Use] : no accessory muscle use [Normal Rate] : normal rate  [Regular Rhythm] : with a regular rhythm [Normal S1, S2] : normal S1 and S2 [Pedal Pulses Present] : the pedal pulses are present [No Extremity Clubbing/Cyanosis] : no extremity clubbing/cyanosis [Soft] : abdomen soft [Non Tender] : non-tender [Non-distended] : non-distended [Normal Bowel Sounds] : normal bowel sounds [No Spinal Tenderness] : no spinal tenderness [No Rash] : no rash [No Focal Deficits] : no focal deficits [de-identified] : poor inspiratory effort [de-identified] : chronic trace edema RLE [de-identified] : debility, generalized muscle weakness, stiff [de-identified] : healing skin tear to L hand, scabbed, approx .25cm round pink tissue, no signs infection [de-identified] : AA&O x 2

## 2023-09-05 DIAGNOSIS — Z86.73 PERSONAL HISTORY OF TRANSIENT ISCHEMIC ATTACK (TIA), AND CEREBRAL INFARCTION WITHOUT RESIDUAL DEFICITS: ICD-10-CM

## 2023-09-05 DIAGNOSIS — Z85.46 PERSONAL HISTORY OF MALIGNANT NEOPLASM OF PROSTATE: ICD-10-CM

## 2023-09-05 DIAGNOSIS — F02.80 DEMENTIA IN OTHER DISEASES CLASSIFIED ELSEWHERE, UNSPECIFIED SEVERITY, WITHOUT BEHAVIORAL DISTURBANCE, PSYCHOTIC DISTURBANCE, MOOD DISTURBANCE, AND ANXIETY: ICD-10-CM

## 2023-09-05 DIAGNOSIS — T18.128A FOOD IN ESOPHAGUS CAUSING OTHER INJURY, INITIAL ENCOUNTER: ICD-10-CM

## 2023-09-05 DIAGNOSIS — J69.0 PNEUMONITIS DUE TO INHALATION OF FOOD AND VOMIT: ICD-10-CM

## 2023-09-05 DIAGNOSIS — Z93.1 GASTROSTOMY STATUS: ICD-10-CM

## 2023-09-05 DIAGNOSIS — Z66 DO NOT RESUSCITATE: ICD-10-CM

## 2023-09-05 DIAGNOSIS — R33.8 OTHER RETENTION OF URINE: ICD-10-CM

## 2023-09-05 DIAGNOSIS — D64.9 ANEMIA, UNSPECIFIED: ICD-10-CM

## 2023-09-05 DIAGNOSIS — Z91.018 ALLERGY TO OTHER FOODS: ICD-10-CM

## 2023-09-05 DIAGNOSIS — E43 UNSPECIFIED SEVERE PROTEIN-CALORIE MALNUTRITION: ICD-10-CM

## 2023-09-05 DIAGNOSIS — I10 ESSENTIAL (PRIMARY) HYPERTENSION: ICD-10-CM

## 2023-09-05 DIAGNOSIS — N40.1 BENIGN PROSTATIC HYPERPLASIA WITH LOWER URINARY TRACT SYMPTOMS: ICD-10-CM

## 2023-09-05 DIAGNOSIS — I35.1 NONRHEUMATIC AORTIC (VALVE) INSUFFICIENCY: ICD-10-CM

## 2023-09-05 DIAGNOSIS — G20 PARKINSON'S DISEASE: ICD-10-CM

## 2023-09-05 DIAGNOSIS — K21.9 GASTRO-ESOPHAGEAL REFLUX DISEASE WITHOUT ESOPHAGITIS: ICD-10-CM
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
## 2023-09-06 ENCOUNTER — TRANSCRIPTION ENCOUNTER (OUTPATIENT)
Age: 88
End: 2023-09-06

## 2023-09-07 NOTE — ED ADULT NURSE NOTE - HOW MANY DRINKS CONTAINING ALCOHOL DO YOU HAVE ON A TYPICAL DAY WHEN YOU ARE DRINKING?
Detail Level: Detailed
Quality 110: Preventive Care And Screening: Influenza Immunization: Influenza Immunization Administered during Influenza season
1 or 2

## 2023-09-08 ENCOUNTER — TRANSCRIPTION ENCOUNTER (OUTPATIENT)
Age: 88
End: 2023-09-08

## 2023-09-13 ENCOUNTER — TRANSCRIPTION ENCOUNTER (OUTPATIENT)
Age: 88
End: 2023-09-13

## 2023-09-13 ENCOUNTER — EMERGENCY (EMERGENCY)
Facility: HOSPITAL | Age: 88
LOS: 0 days | Discharge: HOME | End: 2023-09-14
Attending: EMERGENCY MEDICINE
Payer: MEDICARE

## 2023-09-13 VITALS
HEIGHT: 66 IN | TEMPERATURE: 98 F | DIASTOLIC BLOOD PRESSURE: 99 MMHG | OXYGEN SATURATION: 94 % | HEART RATE: 84 BPM | RESPIRATION RATE: 18 BRPM | SYSTOLIC BLOOD PRESSURE: 146 MMHG

## 2023-09-13 DIAGNOSIS — S80.811A ABRASION, RIGHT LOWER LEG, INITIAL ENCOUNTER: ICD-10-CM

## 2023-09-13 DIAGNOSIS — F02.80 DEMENTIA IN OTHER DISEASES CLASSIFIED ELSEWHERE, UNSPECIFIED SEVERITY, WITHOUT BEHAVIORAL DISTURBANCE, PSYCHOTIC DISTURBANCE, MOOD DISTURBANCE, AND ANXIETY: ICD-10-CM

## 2023-09-13 DIAGNOSIS — Z79.82 LONG TERM (CURRENT) USE OF ASPIRIN: ICD-10-CM
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
## 2023-09-13 DIAGNOSIS — Z98.890 OTHER SPECIFIED POSTPROCEDURAL STATES: Chronic | ICD-10-CM

## 2023-09-13 DIAGNOSIS — K21.9 GASTRO-ESOPHAGEAL REFLUX DISEASE WITHOUT ESOPHAGITIS: ICD-10-CM

## 2023-09-13 DIAGNOSIS — Z85.46 PERSONAL HISTORY OF MALIGNANT NEOPLASM OF PROSTATE: ICD-10-CM

## 2023-09-13 DIAGNOSIS — R06.82 TACHYPNEA, NOT ELSEWHERE CLASSIFIED: ICD-10-CM

## 2023-09-13 DIAGNOSIS — Z86.73 PERSONAL HISTORY OF TRANSIENT ISCHEMIC ATTACK (TIA), AND CEREBRAL INFARCTION WITHOUT RESIDUAL DEFICITS: ICD-10-CM

## 2023-09-13 DIAGNOSIS — N40.0 BENIGN PROSTATIC HYPERPLASIA WITHOUT LOWER URINARY TRACT SYMPTOMS: ICD-10-CM

## 2023-09-13 DIAGNOSIS — R60.0 LOCALIZED EDEMA: ICD-10-CM

## 2023-09-13 DIAGNOSIS — R53.81 OTHER MALAISE: ICD-10-CM

## 2023-09-13 DIAGNOSIS — R09.89 OTHER SPECIFIED SYMPTOMS AND SIGNS INVOLVING THE CIRCULATORY AND RESPIRATORY SYSTEMS: ICD-10-CM

## 2023-09-13 DIAGNOSIS — Z66 DO NOT RESUSCITATE: ICD-10-CM

## 2023-09-13 DIAGNOSIS — R91.8 OTHER NONSPECIFIC ABNORMAL FINDING OF LUNG FIELD: ICD-10-CM

## 2023-09-13 DIAGNOSIS — G20 PARKINSON'S DISEASE: ICD-10-CM

## 2023-09-13 DIAGNOSIS — Z93.1 GASTROSTOMY STATUS: ICD-10-CM
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
## 2023-09-13 DIAGNOSIS — Z87.61: ICD-10-CM

## 2023-09-13 DIAGNOSIS — I10 ESSENTIAL (PRIMARY) HYPERTENSION: ICD-10-CM

## 2023-09-13 DIAGNOSIS — Z20.822 CONTACT WITH AND (SUSPECTED) EXPOSURE TO COVID-19: ICD-10-CM

## 2023-09-13 DIAGNOSIS — R53.1 WEAKNESS: ICD-10-CM

## 2023-09-13 DIAGNOSIS — Z91.018 ALLERGY TO OTHER FOODS: ICD-10-CM

## 2023-09-13 LAB
ALBUMIN SERPL ELPH-MCNC: 2.7 G/DL — LOW (ref 3.3–5)
ALP SERPL-CCNC: 229 U/L — HIGH (ref 40–120)
ALT FLD-CCNC: 24 U/L — SIGNIFICANT CHANGE UP (ref 12–78)
ANION GAP SERPL CALC-SCNC: 4 MMOL/L — LOW (ref 5–17)
APPEARANCE UR: CLEAR — SIGNIFICANT CHANGE UP
APTT BLD: 25.6 SEC — SIGNIFICANT CHANGE UP (ref 24.5–35.6)
AST SERPL-CCNC: 109 U/L — HIGH (ref 15–37)
BASE EXCESS BLDV CALC-SCNC: 3.8 MMOL/L — HIGH (ref -2–3)
BASOPHILS # BLD AUTO: 0.08 K/UL — SIGNIFICANT CHANGE UP (ref 0–0.2)
BASOPHILS NFR BLD AUTO: 0.9 % — SIGNIFICANT CHANGE UP (ref 0–2)
BILIRUB SERPL-MCNC: 0.4 MG/DL — SIGNIFICANT CHANGE UP (ref 0.2–1.2)
BILIRUB UR-MCNC: NEGATIVE — SIGNIFICANT CHANGE UP
BUN SERPL-MCNC: 26 MG/DL — HIGH (ref 7–23)
CALCIUM SERPL-MCNC: 8.4 MG/DL — LOW (ref 8.5–10.1)
CHLORIDE SERPL-SCNC: 109 MMOL/L — HIGH (ref 96–108)
CO2 SERPL-SCNC: 28 MMOL/L — SIGNIFICANT CHANGE UP (ref 22–31)
COLOR SPEC: YELLOW — SIGNIFICANT CHANGE UP
CREAT SERPL-MCNC: 0.62 MG/DL — SIGNIFICANT CHANGE UP (ref 0.5–1.3)
DIFF PNL FLD: NEGATIVE — SIGNIFICANT CHANGE UP
EGFR: 93 ML/MIN/1.73M2 — SIGNIFICANT CHANGE UP
EOSINOPHIL # BLD AUTO: 0.54 K/UL — HIGH (ref 0–0.5)
EOSINOPHIL NFR BLD AUTO: 5.8 % — SIGNIFICANT CHANGE UP (ref 0–6)
GAS PNL BLDV: SIGNIFICANT CHANGE UP
GLUCOSE SERPL-MCNC: 109 MG/DL — HIGH (ref 70–99)
GLUCOSE UR QL: NEGATIVE — SIGNIFICANT CHANGE UP
HCO3 BLDV-SCNC: 29 MMOL/L — SIGNIFICANT CHANGE UP (ref 22–29)
HCT VFR BLD CALC: 33.1 % — LOW (ref 39–50)
HGB BLD-MCNC: 10.9 G/DL — LOW (ref 13–17)
IMM GRANULOCYTES NFR BLD AUTO: 0.4 % — SIGNIFICANT CHANGE UP (ref 0–0.9)
INR BLD: 0.94 RATIO — SIGNIFICANT CHANGE UP (ref 0.85–1.18)
KETONES UR-MCNC: NEGATIVE — SIGNIFICANT CHANGE UP
LACTATE SERPL-SCNC: 1.1 MMOL/L — SIGNIFICANT CHANGE UP (ref 0.7–2)
LEUKOCYTE ESTERASE UR-ACNC: ABNORMAL
LYMPHOCYTES # BLD AUTO: 1.29 K/UL — SIGNIFICANT CHANGE UP (ref 1–3.3)
LYMPHOCYTES # BLD AUTO: 13.7 % — SIGNIFICANT CHANGE UP (ref 13–44)
MCHC RBC-ENTMCNC: 29.5 PG — SIGNIFICANT CHANGE UP (ref 27–34)
MCHC RBC-ENTMCNC: 32.9 GM/DL — SIGNIFICANT CHANGE UP (ref 32–36)
MCV RBC AUTO: 89.5 FL — SIGNIFICANT CHANGE UP (ref 80–100)
MONOCYTES # BLD AUTO: 0.93 K/UL — HIGH (ref 0–0.9)
MONOCYTES NFR BLD AUTO: 9.9 % — SIGNIFICANT CHANGE UP (ref 2–14)
NEUTROPHILS # BLD AUTO: 6.51 K/UL — SIGNIFICANT CHANGE UP (ref 1.8–7.4)
NEUTROPHILS NFR BLD AUTO: 69.3 % — SIGNIFICANT CHANGE UP (ref 43–77)
NITRITE UR-MCNC: NEGATIVE — SIGNIFICANT CHANGE UP
PCO2 BLDV: 46 MMHG — SIGNIFICANT CHANGE UP (ref 42–55)
PH BLDV: 7.41 — SIGNIFICANT CHANGE UP (ref 7.32–7.43)
PH UR: 6.5 — SIGNIFICANT CHANGE UP (ref 5–8)
PLATELET # BLD AUTO: 202 K/UL — SIGNIFICANT CHANGE UP (ref 150–400)
PO2 BLDV: 184 MMHG — HIGH (ref 25–45)
POTASSIUM SERPL-MCNC: 4.3 MMOL/L — SIGNIFICANT CHANGE UP (ref 3.5–5.3)
POTASSIUM SERPL-SCNC: 4.3 MMOL/L — SIGNIFICANT CHANGE UP (ref 3.5–5.3)
PROT SERPL-MCNC: 6.1 GM/DL — SIGNIFICANT CHANGE UP (ref 6–8.3)
PROT UR-MCNC: NEGATIVE — SIGNIFICANT CHANGE UP
PROTHROM AB SERPL-ACNC: 10.6 SEC — SIGNIFICANT CHANGE UP (ref 9.5–13)
RBC # BLD: 3.7 M/UL — LOW (ref 4.2–5.8)
RBC # FLD: 14.3 % — SIGNIFICANT CHANGE UP (ref 10.3–14.5)
SAO2 % BLDV: 100 % — HIGH (ref 67–88)
SODIUM SERPL-SCNC: 141 MMOL/L — SIGNIFICANT CHANGE UP (ref 135–145)
SP GR SPEC: 1 — LOW (ref 1.01–1.02)
UROBILINOGEN FLD QL: NEGATIVE — SIGNIFICANT CHANGE UP
WBC # BLD: 9.39 K/UL — SIGNIFICANT CHANGE UP (ref 3.8–10.5)
WBC # FLD AUTO: 9.39 K/UL — SIGNIFICANT CHANGE UP (ref 3.8–10.5)

## 2023-09-13 PROCEDURE — 87040 BLOOD CULTURE FOR BACTERIA: CPT

## 2023-09-13 PROCEDURE — 36415 COLL VENOUS BLD VENIPUNCTURE: CPT

## 2023-09-13 PROCEDURE — 87086 URINE CULTURE/COLONY COUNT: CPT

## 2023-09-13 PROCEDURE — 85610 PROTHROMBIN TIME: CPT

## 2023-09-13 PROCEDURE — 71045 X-RAY EXAM CHEST 1 VIEW: CPT

## 2023-09-13 PROCEDURE — 82803 BLOOD GASES ANY COMBINATION: CPT

## 2023-09-13 PROCEDURE — 81001 URINALYSIS AUTO W/SCOPE: CPT

## 2023-09-13 PROCEDURE — 80053 COMPREHEN METABOLIC PANEL: CPT

## 2023-09-13 PROCEDURE — 96374 THER/PROPH/DIAG INJ IV PUSH: CPT

## 2023-09-13 PROCEDURE — 93005 ELECTROCARDIOGRAM TRACING: CPT

## 2023-09-13 PROCEDURE — 85730 THROMBOPLASTIN TIME PARTIAL: CPT

## 2023-09-13 PROCEDURE — 83605 ASSAY OF LACTIC ACID: CPT

## 2023-09-13 PROCEDURE — 0225U NFCT DS DNA&RNA 21 SARSCOV2: CPT

## 2023-09-13 PROCEDURE — 85025 COMPLETE CBC W/AUTO DIFF WBC: CPT

## 2023-09-13 PROCEDURE — 99285 EMERGENCY DEPT VISIT HI MDM: CPT | Mod: 25

## 2023-09-13 PROCEDURE — 71045 X-RAY EXAM CHEST 1 VIEW: CPT | Mod: 26

## 2023-09-13 PROCEDURE — 93010 ELECTROCARDIOGRAM REPORT: CPT

## 2023-09-13 PROCEDURE — 99285 EMERGENCY DEPT VISIT HI MDM: CPT

## 2023-09-13 RX ORDER — SODIUM CHLORIDE 9 MG/ML
500 INJECTION INTRAMUSCULAR; INTRAVENOUS; SUBCUTANEOUS ONCE
Refills: 0 | Status: COMPLETED | OUTPATIENT
Start: 2023-09-13 | End: 2023-09-13

## 2023-09-13 RX ORDER — AMPICILLIN SODIUM AND SULBACTAM SODIUM 250; 125 MG/ML; MG/ML
3 INJECTION, POWDER, FOR SUSPENSION INTRAMUSCULAR; INTRAVENOUS ONCE
Refills: 0 | Status: COMPLETED | OUTPATIENT
Start: 2023-09-13 | End: 2023-09-13

## 2023-09-13 RX ADMIN — SODIUM CHLORIDE 500 MILLILITER(S): 9 INJECTION INTRAMUSCULAR; INTRAVENOUS; SUBCUTANEOUS at 23:39

## 2023-09-13 NOTE — ED PROVIDER NOTE - GASTROINTESTINAL, MLM
Abdomen soft, non-tender, no guarding. Feeding tube w/ stoma. Abdomen soft, non-tender, no guarding. Feeding tube in place w/ stoma not showing obvious infection.  BS+

## 2023-09-13 NOTE — ED PROVIDER NOTE - NSFOLLOWUPINSTRUCTIONS_ED_ALL_ED_FT
Take antibiotic via feeding tube as prescribed.  [facility].  [facility].  Return to ED if difficulty breathing recurs/worsens or other concern.      Choking, Adult  Choking occurs when a food or object gets stuck in the throat or windpipe (trachea) and blocks the airway. If the airway is partly blocked, coughing will usually cause the food or object to come out. If the airway is completely blocked, immediate action is needed to make it come out.    A completely blocked airway can lead to respiratory failure and even death if untreated. The kind of treatment needed depends on the severity of the choking.    Signs of choking  A person with a partial airway blockage may be able to talk and cough.  A person with a complete airway blockage may:  Hold their neck with both hands. This is considered a universal sign of choking.  Be unable to breathe.  Make soft or high-pitched sounds while breathing.  Be unable to cough or cough weakly, ineffectively, or silently.  Be unable to cry, speak, or make sounds.  Turn blue or gray.  For partial airway blockage  If a person has a partial airway blockage and is coughing and able to speak:  Do not interfere. Allow coughing to clear the airway.  Do not let them try to drink until the food or object comes out.  Stay with the person until the food or object comes out. Watch for signs of complete airway blockage. If the person shows signs of complete airway blockage, take action to help them.  For complete airway blockage  A person doing abdominal thrusts on someone who is choking. Close-up shows how to press in and up with the hands.  If a person has a complete airway blockage, their life is in danger. A complete airway blockage causes breathing to stop. It is a medical emergency that requires fast and appropriate action by anyone who is available.    Do abdominal thrusts to save a person who is choking. Abdominal thrusts use pressure to force air from the abdomen into the throat to move the blockage.    CPR for an unconscious person  If the choking person is not breathing and either collapses or is found on the ground:  Shout for help.  If someone responds, tell that person to call 911 and look for an automated external defibrillator (AED).  If no one responds, begin 2 minutes of CPR.  Make sure the person is lying on a firm, flat surface and is facing up. Begin CPR, starting with 30 chest compressions and 2 breaths. Every time you open the airway to give rescue breaths, open the person's mouth. If you can see the food or object and it can be easily pulled out, remove it with your fingers. Do not try to remove the food or object if you cannot see it. This could push it farther into the airway.  After 5 cycles or 2 minutes of CPR, call local emergency services if a call has not already been made.  Continue CPR until the person starts breathing or until help arrives.  If you are choking:  A person using a chair to do abdominal thrusts.  Call 911. Do not worry about communicating what is happening. Do not hang up the phone. Someone may be sent to help you anyway.  Do abdominal thrusts on yourself. To do this, hold a fist against your abdomen and bend over a hard surface, such as a chair. Forcefully push your fist in and up until the food or object comes out.  Prevention  Chew food thoroughly.  Know that older adults are at an increased risk of choking. They should chew smaller bites and cut their food into smaller portions.  Avoid talking or laughing while you are chewing and swallowing.  Be prepared for choking situations. Take a certified first-aid course to learn how to correctly do abdominal thrusts.    Contact a health care provider if:  You have trouble swallowing food.  You continue to have drooling after choking stops.  You continue to have chest pain after choking stops.  Get help right away if:  You have problems breathing after choking stops.  You are confused or keep feeling drowsy.  You have lost consciousness at any point.  You were given CPR.  These symptoms may be an emergency. Get help right away. Call 911.  Do not wait to see if the symptoms will go away.  Do not drive yourself to the hospital.  This information is not intended to replace advice given to you by your health care provider. Make sure you discuss any questions you have with your health care provider.          Aspiration Precautions    WHAT YOU NEED TO KNOW:    What do I need to know about aspiration precautions? Aspiration means that foods or fluids get into your airway. This can lead to trouble breathing or lung infections such as pneumonia. Aspiration precautions are practices that help prevent these problems.    What increases my risk for aspiration? Any condition that causes trouble swallowing may increase your risk for aspiration. Examples include the following:    Stroke    A neuromuscular disease such as Parkinson's disease    Radiation or surgery to the neck or face    Head or neck cancer    Cigarette smoking or heavy alcohol use  What can I do to prevent aspiration?    Eat in a chair or sit upright while you eat. This will help prevent choking. Stay upright for 45 minutes to 1 hour after you eat or drink.    Eat small amounts slowly. Do not eat or drink with a straw. Take small bites and chew well before you swallow.    Avoid distractions while you eat. Keep the radio and TV turned off during meals. Do not try to talk to others while you eat.    Make sure your dentures fit correctly. This will help you chew food into pieces that are easier to swallow.    Limit spicy foods and caffeine. These may cause reflux. Reflux is the movement of foods and fluids from your stomach into your esophagus. This could increase the risk that foods or fluids will also move into your airway.    Drink water with your meals. Water will help rinse food out of your mouth. This will decrease the risk that food will move into your airway.    Do not smoke. Nicotine and other chemicals in cigarettes and cigars can damage your esophagus and cause trouble swallowing. Ask your healthcare provider for information if you currently smoke and need help to quit. E-cigarettes or smokeless tobacco still contain nicotine. Talk to your healthcare provider before you use these products.  What do I need to know about nutrition and aspiration? Your healthcare provider may show you how to thicken liquids or soften foods. Thickened liquids and soft foods are easier to swallow. A registered dietitian can help you plan your meals:    Puree your foods as directed. This will help remove chunks or lumps. You can add gravy, sauce, vegetable juice, milk, or half and half to foods before you blend them. Your food should be the same consistency as pudding after you puree it. If your food is too thin after you puree it, thicken it as directed. The following are examples of foods that puree well into a pudding consistency:  Cream of wheat with small amounts of milk    Moistened breads, pancakes, Swedish pastries, or muffins    Well-cooked pasta, noodles, or rice    Cooked vegetables, tomato sauce, or cooked potatoes without skin    Casseroles, eggs, or cooked pureed meats    Margarine, sour cream, smooth cheese sauces, or strained gravy    Thicken your foods and drinks as directed. Your food and drinks should be thickened to the consistency of pudding. You can add flour, cornstarch, or potato flakes, or thickening products to thicken your foods or drinks. Follow directions on the package when you add thickening products to your food or drinks. Your healthcare provider will give you a complete list of foods and drinks that need to be thickened. The following are examples of foods and drinks that should be thickened:  Milk, milkshakes, nutritional shakes, or sherbet    Juices without pulp or gelatin    Coffee, tea, or soda    Alcoholic beverages    Keep a food diary. Write down everything you eat. Take the diary to your follow-up visits. This information will help your healthcare provider decide if you are getting enough nutrition.  When should I seek care immediately?    You have chest pain.    You have shortness of breath.    You have signs or symptoms of dehydration, such as increased thirst, dark urine, or little or no urine.  When should I contact my healthcare provider?    You have a cough, chills, or a fever.    You cough or choke before, during, or after you eat or drink.    You feel like you have to clear your throat after you eat or drink.    You lose weight without trying.    You have questions or concerns about your condition or care.  CARE AGREEMENT:    You have the right to help plan your care. Learn about your health condition and how it may be treated. Discuss treatment options with your healthcare providers to decide what care you want to receive. You always have the right to refuse treatment.

## 2023-09-13 NOTE — ED ADULT NURSE NOTE - NSFALLHARMRISKINTERV_ED_ALL_ED
Assistance OOB with selected safe patient handling equipment if applicable/Assistance with ambulation/Communicate risk of Fall with Harm to all staff, patient, and family/Monitor gait and stability/Provide visual cue: red socks, yellow wristband, yellow gown, etc/Reinforce activity limits and safety measures with patient and family/Bed in lowest position, wheels locked, appropriate side rails in place/Call bell, personal items and telephone in reach/Instruct patient to call for assistance before getting out of bed/chair/stretcher/Non-slip footwear applied when patient is off stretcher/[place] to call system/Physically safe environment - no spills, clutter or unnecessary equipment/Purposeful Proactive Rounding/Room/bathroom lighting operational, light cord in reach

## 2023-09-13 NOTE — ED PROVIDER NOTE - NEUROLOGICAL, MLM
Aroused to verbal stimuli, chronic b/l LE weakness. Aroused to verbal stimuli & remains awake & cooperative during eval.  chronic b/l LE weakness.

## 2023-09-13 NOTE — ED ADULT TRIAGE NOTE - CHIEF COMPLAINT QUOTE
Pt BIBEMS from home, c/o shortness of breath ongoing since discharge from  for pneumonia. HX of dementia. Arrives with PEG tube. As per EMS, no fevers. STAT EKG to be completed.

## 2023-09-13 NOTE — ED PROVIDER NOTE - RESPIRATORY, MLM
Decreased and paranormal breath sounds L lung. Mild tachypnea w/o retractions. Decreased breath sounds R lung. Mild tachypnea w/o retractions.  Not hypoxic.  R/A sats 98%

## 2023-09-13 NOTE — ED PROVIDER NOTE - CONSTITUTIONAL, MLM
Mildly lethargic, acute upon chronically ill. Awake, alert. normal... Mildly lethargic, acute upon chronically ill. Remains awake & cooperative during eval.  No respiratory discomfort.

## 2023-09-13 NOTE — ED PROVIDER NOTE - OBJECTIVE STATEMENT
Pt is a 87y male baseline nonambulatory with a PMH of HTN, Parkinson's, dementia, GERD, CVA, h/o syncope, BPH, feeding tube dependent presents to the ED BIBEMS from home c/o SOB s/p d/c from  for PNA 8/19 to 8/26. MOLST DNR. Wife reports a lot of mucous coming out of his mouth since d/c, is unable to swallow it, and was choking and gagging today spurring an ED visit. Wife reports some improvement upon arrival. Endorses generalized weakness. Denies fevers, trauma/recent falls, or other complaints. NKA. Pulm: [facility] GI: Farzad Pt is a 87y WM, baseline nonambulatory with a PMH of HTN, Parkinson's dementia, GERD, CVA, h/o syncope, BPH, + feeding tube-dependent, BIBEMS from home to ED c/o recent increased oral secretions  with SOB episode with assoc. choking/gagging.  Pt s/p d/c from  for PNA 8/19 to 8/26. MOLST +DNR. Wife reports a lot of mucous coming out of his mouth since d/c 8/26, is unable to swallow it, and was choking and gagging earlier today spurring an ED visit. Wife reports + symptomatic improvement upon ED arrival. Endorses generalized weakness. Pt presently denies SOB.  Denies fevers, trauma/recent falls, or other complaints.   NKA. Pulm: Anwar (wife reports diff. getting to the office), GI: Farzad  PCP: at [facility]

## 2023-09-13 NOTE — ED PROVIDER NOTE - SKIN, MLM
Superficial abrasion R anterior shin w/o signs of infection. Skin normal color for race, warm, dry and intact. No evidence of rash.

## 2023-09-13 NOTE — ED PROVIDER NOTE - PATIENT PORTAL LINK FT
You can access the FollowMyHealth Patient Portal offered by [facility] by registering at the following website: [email or web address]. By joining [employer]’s FollowMyHealth portal, you will also be able to view your health information using other applications (apps) compatible with our system.

## 2023-09-13 NOTE — ED PROVIDER NOTE - CLINICAL SUMMARY MEDICAL DECISION MAKING FREE TEXT BOX
87y male baseline nonambulatory with multiple PMH including HTN, Parkinson's, dementia, GERD, CVA, h/o syncope, BPH, feeding tube dependent,  admission 8/19 to 8/26 regarding aspiration PNA, tx w/ IV antibiotics. BIBA today for excessive oral secretions w/ episodes of coughing and choking on mucous and SOB. Clinical concern for aspirations, though time of ED evaluation 98% SAT on RA. Decreased R lungs sounds, chronically ill appearing.    Plan: CXR, labs including CBC, CMP, blood/urine w/ culture, lactate, UA, RVP/COVID, BGP. Cautious IV. Monitor, observe, reassess. 87y male baseline nonambulatory with multiple PMH including HTN, Parkinson's, dementia, GERD, CVA, h/o syncope, BPH, feeding tube dependent,  admission 8/19 to 8/26 regarding aspiration PNA, tx w/ IV antibiotics. BIBA today for excessive oral secretions w/ episodes of coughing and choking on mucous and SOB. Clinical concern for aspirations, though time of ED evaluation 98% SAT on RA. Decreased R lungs sounds, chronically ill appearing.    Plan: CXR, labs including CBC, CMP, blood/urine w/ culture, lactate, UA, RVP/COVID, BGP. Cautious IV. Monitor, observe, reassess.    23:20, ANNMARIE Slater MD:  labs unremarkable.  CXR + RLL infilt though + improved c/w 8/2023 inpt.  While in ED pt breathing in good comfort, normal sats, no F.  Case & XR/labs d/w admit hospitalist Dr. Plasencia: does not meet admission criteria.  Treating with 1 dose Unasyn in ED & will DC on po Augmentin via PEG tube at home.  [facility]. 87y male baseline nonambulatory with multiple PMH including HTN, Parkinson's dementia, GERD, CVA, h/o syncope, BPH, + feeding tube-dependent, HH admission 8/19 to 8/26 regarding aspiration PNA, tx w/ IV antibiotics; BIBA today for excessive oral secretions w/ episodes of coughing and choking on mucus and SOB. Clinical concern for aspirations, though time of ED evaluation pt in good respiratory comfort & 98% sats on R/A. Decreased R lungs sounds, chronically ill appearing.    Plan: CXR, labs including CBC, CMP, blood/urine w/ culture, lactate, UA, RVP/COVID, BGP. Cautious IV. Monitor, observe, reassess.    23:20, C MD Bryon:  Labs unremarkable.  CXR + RLL infilt though + improved c/w 8/2023 inpt.  While in ED pt breathing in good comfort, normal sats, no F.  Case & XR/labs d/w admit hospitalist Dr. Plasencia: does not meet admission criteria.  Treating with 1 dose Unasyn in ED & will DC on po Augmentin via PEG tube at home.  Wife agrees to arrange for outpt  [facility].

## 2023-09-13 NOTE — ED ADULT NURSE NOTE - OBJECTIVE STATEMENT
Pt to Ed with daughter with c/o sob and productive cough. Pt was admitted for aspiration pna on aug 28 and D/Cd 6 days later. per daughter pt began to decline again after a few days and having copious amount of thick mucus.Pt hx dementia, parkinsons, CVA with residual Pt to Ed with daughter with c/o sob and productive cough. Pt was admitted for aspiration pna on aug 28 and D/Cd 6 days later. per daughter pt began to decline again after a few days and having copious amount of thick mucus.Pt hx dementia, parkinsons, CVA with residual weakness. PEG tube in place. pt A&ox2. resp unlabored at this time.

## 2023-09-14 VITALS
RESPIRATION RATE: 20 BRPM | TEMPERATURE: 98 F | HEART RATE: 70 BPM | DIASTOLIC BLOOD PRESSURE: 81 MMHG | SYSTOLIC BLOOD PRESSURE: 134 MMHG | OXYGEN SATURATION: 98 %

## 2023-09-14 LAB
BACTERIA # UR AUTO: NEGATIVE — SIGNIFICANT CHANGE UP
EPI CELLS # UR: NEGATIVE — SIGNIFICANT CHANGE UP
RAPID RVP RESULT: SIGNIFICANT CHANGE UP
RBC CASTS # UR COMP ASSIST: SIGNIFICANT CHANGE UP /HPF (ref 0–4)
SARS-COV-2 RNA SPEC QL NAA+PROBE: SIGNIFICANT CHANGE UP
WBC UR QL: SIGNIFICANT CHANGE UP /HPF (ref 0–5)

## 2023-09-14 RX ADMIN — AMPICILLIN SODIUM AND SULBACTAM SODIUM 200 GRAM(S): 250; 125 INJECTION, POWDER, FOR SUSPENSION INTRAMUSCULAR; INTRAVENOUS at 00:04

## 2023-09-15 ENCOUNTER — TRANSCRIPTION ENCOUNTER (OUTPATIENT)
Age: 88
End: 2023-09-15

## 2023-09-15 LAB
CULTURE RESULTS: NO GROWTH — SIGNIFICANT CHANGE UP
SPECIMEN SOURCE: SIGNIFICANT CHANGE UP

## 2023-09-21 ENCOUNTER — TRANSCRIPTION ENCOUNTER (OUTPATIENT)
Age: 88
End: 2023-09-21

## 2023-09-30 ENCOUNTER — INPATIENT (INPATIENT)
Facility: HOSPITAL | Age: 88
LOS: 13 days | Discharge: HOSPICE | DRG: 871 | End: 2023-10-14
Attending: INTERNAL MEDICINE | Admitting: INTERNAL MEDICINE
Payer: MEDICARE

## 2023-09-30 VITALS
WEIGHT: 110.01 LBS | OXYGEN SATURATION: 93 % | RESPIRATION RATE: 19 BRPM | TEMPERATURE: 98 F | DIASTOLIC BLOOD PRESSURE: 72 MMHG | HEIGHT: 66 IN | SYSTOLIC BLOOD PRESSURE: 107 MMHG | HEART RATE: 97 BPM

## 2023-09-30 DIAGNOSIS — L03.90 CELLULITIS, UNSPECIFIED: ICD-10-CM

## 2023-09-30 DIAGNOSIS — Z98.890 OTHER SPECIFIED POSTPROCEDURAL STATES: Chronic | ICD-10-CM

## 2023-09-30 LAB
ALBUMIN SERPL ELPH-MCNC: 1.9 G/DL — LOW (ref 3.3–5)
ALP SERPL-CCNC: 295 U/L — HIGH (ref 40–120)
ALT FLD-CCNC: 36 U/L — SIGNIFICANT CHANGE UP (ref 12–78)
ANION GAP SERPL CALC-SCNC: 5 MMOL/L — SIGNIFICANT CHANGE UP (ref 5–17)
APPEARANCE UR: CLEAR — SIGNIFICANT CHANGE UP
APTT BLD: 32.5 SEC — SIGNIFICANT CHANGE UP (ref 24.5–35.6)
AST SERPL-CCNC: 51 U/L — HIGH (ref 15–37)
BACTERIA # UR AUTO: ABNORMAL
BASOPHILS # BLD AUTO: 0.04 K/UL — SIGNIFICANT CHANGE UP (ref 0–0.2)
BASOPHILS NFR BLD AUTO: 2 % — SIGNIFICANT CHANGE UP (ref 0–2)
BILIRUB SERPL-MCNC: 0.6 MG/DL — SIGNIFICANT CHANGE UP (ref 0.2–1.2)
BILIRUB UR-MCNC: NEGATIVE — SIGNIFICANT CHANGE UP
BUN SERPL-MCNC: 26 MG/DL — HIGH (ref 7–23)
CALCIUM SERPL-MCNC: 7.6 MG/DL — LOW (ref 8.5–10.1)
CHLORIDE SERPL-SCNC: 109 MMOL/L — HIGH (ref 96–108)
CO2 SERPL-SCNC: 23 MMOL/L — SIGNIFICANT CHANGE UP (ref 22–31)
COLOR SPEC: YELLOW — SIGNIFICANT CHANGE UP
COMMENT - URINE: SIGNIFICANT CHANGE UP
CREAT SERPL-MCNC: 0.74 MG/DL — SIGNIFICANT CHANGE UP (ref 0.5–1.3)
DIFF PNL FLD: ABNORMAL
EGFR: 88 ML/MIN/1.73M2 — SIGNIFICANT CHANGE UP
EOSINOPHIL # BLD AUTO: 0.11 K/UL — SIGNIFICANT CHANGE UP (ref 0–0.5)
EOSINOPHIL NFR BLD AUTO: 6 % — SIGNIFICANT CHANGE UP (ref 0–6)
EPI CELLS # UR: NEGATIVE — SIGNIFICANT CHANGE UP
GLUCOSE SERPL-MCNC: 132 MG/DL — HIGH (ref 70–99)
GLUCOSE UR QL: NEGATIVE — SIGNIFICANT CHANGE UP
HCT VFR BLD CALC: 27.3 % — LOW (ref 39–50)
HGB BLD-MCNC: 9.1 G/DL — LOW (ref 13–17)
INR BLD: 1.79 RATIO — HIGH (ref 0.85–1.18)
KETONES UR-MCNC: NEGATIVE — SIGNIFICANT CHANGE UP
LACTATE SERPL-SCNC: 2.3 MMOL/L — HIGH (ref 0.7–2)
LACTATE SERPL-SCNC: 2.6 MMOL/L — HIGH (ref 0.7–2)
LACTATE SERPL-SCNC: 3 MMOL/L — HIGH (ref 0.7–2)
LEUKOCYTE ESTERASE UR-ACNC: NEGATIVE — SIGNIFICANT CHANGE UP
LYMPHOCYTES # BLD AUTO: 0.34 K/UL — LOW (ref 1–3.3)
LYMPHOCYTES # BLD AUTO: 18 % — SIGNIFICANT CHANGE UP (ref 13–44)
MANUAL SMEAR VERIFICATION: SIGNIFICANT CHANGE UP
MCHC RBC-ENTMCNC: 28.8 PG — SIGNIFICANT CHANGE UP (ref 27–34)
MCHC RBC-ENTMCNC: 33.3 GM/DL — SIGNIFICANT CHANGE UP (ref 32–36)
MCV RBC AUTO: 86.4 FL — SIGNIFICANT CHANGE UP (ref 80–100)
MONOCYTES # BLD AUTO: 0.23 K/UL — SIGNIFICANT CHANGE UP (ref 0–0.9)
MONOCYTES NFR BLD AUTO: 12 % — SIGNIFICANT CHANGE UP (ref 2–14)
NEUTROPHILS # BLD AUTO: 1.17 K/UL — LOW (ref 1.8–7.4)
NEUTROPHILS NFR BLD AUTO: 62 % — SIGNIFICANT CHANGE UP (ref 43–77)
NITRITE UR-MCNC: NEGATIVE — SIGNIFICANT CHANGE UP
NRBC # BLD: 0 /100 — SIGNIFICANT CHANGE UP (ref 0–0)
NRBC # BLD: SIGNIFICANT CHANGE UP /100 WBCS (ref 0–0)
PH UR: 5 — SIGNIFICANT CHANGE UP (ref 5–8)
PLAT MORPH BLD: NORMAL — SIGNIFICANT CHANGE UP
PLATELET # BLD AUTO: 91 K/UL — LOW (ref 150–400)
POTASSIUM SERPL-MCNC: 3.9 MMOL/L — SIGNIFICANT CHANGE UP (ref 3.5–5.3)
POTASSIUM SERPL-SCNC: 3.9 MMOL/L — SIGNIFICANT CHANGE UP (ref 3.5–5.3)
PROT SERPL-MCNC: 5.5 GM/DL — LOW (ref 6–8.3)
PROT UR-MCNC: NEGATIVE — SIGNIFICANT CHANGE UP
PROTHROM AB SERPL-ACNC: 19.9 SEC — HIGH (ref 9.5–13)
RAPID RVP RESULT: DETECTED
RBC # BLD: 3.16 M/UL — LOW (ref 4.2–5.8)
RBC # FLD: 14.9 % — HIGH (ref 10.3–14.5)
RBC BLD AUTO: NORMAL — SIGNIFICANT CHANGE UP
RBC CASTS # UR COMP ASSIST: SIGNIFICANT CHANGE UP /HPF (ref 0–4)
RV+EV RNA SPEC QL NAA+PROBE: DETECTED
SARS-COV-2 RNA SPEC QL NAA+PROBE: SIGNIFICANT CHANGE UP
SODIUM SERPL-SCNC: 137 MMOL/L — SIGNIFICANT CHANGE UP (ref 135–145)
SP GR SPEC: 1.01 — SIGNIFICANT CHANGE UP (ref 1.01–1.02)
UROBILINOGEN FLD QL: 1
WBC # BLD: 1.89 K/UL — LOW (ref 3.8–10.5)
WBC # FLD AUTO: 1.89 K/UL — LOW (ref 3.8–10.5)
WBC UR QL: SIGNIFICANT CHANGE UP /HPF (ref 0–5)

## 2023-09-30 PROCEDURE — 85610 PROTHROMBIN TIME: CPT

## 2023-09-30 PROCEDURE — 85730 THROMBOPLASTIN TIME PARTIAL: CPT

## 2023-09-30 PROCEDURE — 85379 FIBRIN DEGRADATION QUANT: CPT

## 2023-09-30 PROCEDURE — 93306 TTE W/DOPPLER COMPLETE: CPT

## 2023-09-30 PROCEDURE — 99223 1ST HOSP IP/OBS HIGH 75: CPT

## 2023-09-30 PROCEDURE — 80053 COMPREHEN METABOLIC PANEL: CPT

## 2023-09-30 PROCEDURE — 71260 CT THORAX DX C+: CPT | Mod: 26

## 2023-09-30 PROCEDURE — 82272 OCCULT BLD FECES 1-3 TESTS: CPT

## 2023-09-30 PROCEDURE — 82310 ASSAY OF CALCIUM: CPT

## 2023-09-30 PROCEDURE — 87086 URINE CULTURE/COLONY COUNT: CPT

## 2023-09-30 PROCEDURE — 71045 X-RAY EXAM CHEST 1 VIEW: CPT | Mod: 26

## 2023-09-30 PROCEDURE — 71260 CT THORAX DX C+: CPT | Mod: MA

## 2023-09-30 PROCEDURE — 71045 X-RAY EXAM CHEST 1 VIEW: CPT

## 2023-09-30 PROCEDURE — 93971 EXTREMITY STUDY: CPT | Mod: 26,LT

## 2023-09-30 PROCEDURE — 86880 COOMBS TEST DIRECT: CPT

## 2023-09-30 PROCEDURE — 82668 ASSAY OF ERYTHROPOIETIN: CPT

## 2023-09-30 PROCEDURE — 94640 AIRWAY INHALATION TREATMENT: CPT

## 2023-09-30 PROCEDURE — 85384 FIBRINOGEN ACTIVITY: CPT

## 2023-09-30 PROCEDURE — 82728 ASSAY OF FERRITIN: CPT

## 2023-09-30 PROCEDURE — 82306 VITAMIN D 25 HYDROXY: CPT

## 2023-09-30 PROCEDURE — 82652 VIT D 1 25-DIHYDROXY: CPT

## 2023-09-30 PROCEDURE — 84443 ASSAY THYROID STIM HORMONE: CPT

## 2023-09-30 PROCEDURE — 82746 ASSAY OF FOLIC ACID SERUM: CPT

## 2023-09-30 PROCEDURE — 0275U HEM HEPRN NDUC TRMBCTPNA SRM: CPT

## 2023-09-30 PROCEDURE — 93971 EXTREMITY STUDY: CPT | Mod: 26,RT

## 2023-09-30 PROCEDURE — 83970 ASSAY OF PARATHORMONE: CPT

## 2023-09-30 PROCEDURE — 97162 PT EVAL MOD COMPLEX 30 MIN: CPT | Mod: GP

## 2023-09-30 PROCEDURE — 93971 EXTREMITY STUDY: CPT | Mod: LT

## 2023-09-30 PROCEDURE — 83540 ASSAY OF IRON: CPT

## 2023-09-30 PROCEDURE — 81001 URINALYSIS AUTO W/SCOPE: CPT

## 2023-09-30 PROCEDURE — 36415 COLL VENOUS BLD VENIPUNCTURE: CPT

## 2023-09-30 PROCEDURE — 85045 AUTOMATED RETICULOCYTE COUNT: CPT

## 2023-09-30 PROCEDURE — 99285 EMERGENCY DEPT VISIT HI MDM: CPT

## 2023-09-30 PROCEDURE — 83605 ASSAY OF LACTIC ACID: CPT

## 2023-09-30 PROCEDURE — 70450 CT HEAD/BRAIN W/O DYE: CPT | Mod: MA

## 2023-09-30 PROCEDURE — 83010 ASSAY OF HAPTOGLOBIN QUANT: CPT

## 2023-09-30 PROCEDURE — 83615 LACTATE (LD) (LDH) ENZYME: CPT

## 2023-09-30 PROCEDURE — 82607 VITAMIN B-12: CPT

## 2023-09-30 PROCEDURE — 85025 COMPLETE CBC W/AUTO DIFF WBC: CPT

## 2023-09-30 PROCEDURE — 85027 COMPLETE CBC AUTOMATED: CPT

## 2023-09-30 PROCEDURE — 80048 BASIC METABOLIC PNL TOTAL CA: CPT

## 2023-09-30 PROCEDURE — 86022 PLATELET ANTIBODIES: CPT

## 2023-09-30 PROCEDURE — 70450 CT HEAD/BRAIN W/O DYE: CPT | Mod: 26

## 2023-09-30 PROCEDURE — 83880 ASSAY OF NATRIURETIC PEPTIDE: CPT

## 2023-09-30 RX ORDER — CARBIDOPA AND LEVODOPA 25; 100 MG/1; MG/1
1.5 TABLET ORAL
Refills: 0 | Status: DISCONTINUED | OUTPATIENT
Start: 2023-09-30 | End: 2023-10-14

## 2023-09-30 RX ORDER — VANCOMYCIN HCL 1 G
1000 VIAL (EA) INTRAVENOUS ONCE
Refills: 0 | Status: COMPLETED | OUTPATIENT
Start: 2023-09-30 | End: 2023-09-30

## 2023-09-30 RX ORDER — PANTOPRAZOLE SODIUM 20 MG/1
40 TABLET, DELAYED RELEASE ORAL
Refills: 0 | Status: DISCONTINUED | OUTPATIENT
Start: 2023-09-30 | End: 2023-10-14

## 2023-09-30 RX ORDER — ASCORBIC ACID 60 MG
500 TABLET,CHEWABLE ORAL DAILY
Refills: 0 | Status: DISCONTINUED | OUTPATIENT
Start: 2023-09-30 | End: 2023-10-14

## 2023-09-30 RX ORDER — SODIUM CHLORIDE 9 MG/ML
1550 INJECTION INTRAMUSCULAR; INTRAVENOUS; SUBCUTANEOUS ONCE
Refills: 0 | Status: COMPLETED | OUTPATIENT
Start: 2023-09-30 | End: 2023-09-30

## 2023-09-30 RX ORDER — ASPIRIN/CALCIUM CARB/MAGNESIUM 324 MG
81 TABLET ORAL DAILY
Refills: 0 | Status: DISCONTINUED | OUTPATIENT
Start: 2023-09-30 | End: 2023-10-14

## 2023-09-30 RX ORDER — QUETIAPINE FUMARATE 200 MG/1
25 TABLET, FILM COATED ORAL
Refills: 0 | Status: DISCONTINUED | OUTPATIENT
Start: 2023-09-30 | End: 2023-10-14

## 2023-09-30 RX ORDER — CEFTRIAXONE 500 MG/1
1000 INJECTION, POWDER, FOR SOLUTION INTRAMUSCULAR; INTRAVENOUS ONCE
Refills: 0 | Status: DISCONTINUED | OUTPATIENT
Start: 2023-09-30 | End: 2023-09-30

## 2023-09-30 RX ORDER — APIXABAN 2.5 MG/1
5 TABLET, FILM COATED ORAL EVERY 12 HOURS
Refills: 0 | Status: DISCONTINUED | OUTPATIENT
Start: 2023-10-01 | End: 2023-10-14

## 2023-09-30 RX ORDER — VANCOMYCIN HCL 1 G
VIAL (EA) INTRAVENOUS
Refills: 0 | Status: DISCONTINUED | OUTPATIENT
Start: 2023-09-30 | End: 2023-10-01

## 2023-09-30 RX ORDER — APIXABAN 2.5 MG/1
10 TABLET, FILM COATED ORAL ONCE
Refills: 0 | Status: COMPLETED | OUTPATIENT
Start: 2023-09-30 | End: 2023-09-30

## 2023-09-30 RX ORDER — CARBIDOPA AND LEVODOPA 25; 100 MG/1; MG/1
1 TABLET ORAL ONCE
Refills: 0 | Status: COMPLETED | OUTPATIENT
Start: 2023-09-30 | End: 2023-09-30

## 2023-09-30 RX ORDER — QUETIAPINE FUMARATE 200 MG/1
2 TABLET, FILM COATED ORAL
Refills: 0 | DISCHARGE

## 2023-09-30 RX ORDER — METOPROLOL TARTRATE 50 MG
25 TABLET ORAL DAILY
Refills: 0 | Status: DISCONTINUED | OUTPATIENT
Start: 2023-09-30 | End: 2023-10-14

## 2023-09-30 RX ORDER — CEFTRIAXONE 500 MG/1
1000 INJECTION, POWDER, FOR SOLUTION INTRAMUSCULAR; INTRAVENOUS ONCE
Refills: 0 | Status: COMPLETED | OUTPATIENT
Start: 2023-09-30 | End: 2023-09-30

## 2023-09-30 RX ORDER — ATORVASTATIN CALCIUM 80 MG/1
20 TABLET, FILM COATED ORAL AT BEDTIME
Refills: 0 | Status: DISCONTINUED | OUTPATIENT
Start: 2023-09-30 | End: 2023-10-14

## 2023-09-30 RX ORDER — DONEPEZIL HYDROCHLORIDE 10 MG/1
5 TABLET, FILM COATED ORAL AT BEDTIME
Refills: 0 | Status: DISCONTINUED | OUTPATIENT
Start: 2023-09-30 | End: 2023-10-14

## 2023-09-30 RX ORDER — LISINOPRIL 2.5 MG/1
2.5 TABLET ORAL EVERY 12 HOURS
Refills: 0 | Status: DISCONTINUED | OUTPATIENT
Start: 2023-09-30 | End: 2023-10-14

## 2023-09-30 RX ORDER — VANCOMYCIN HCL 1 G
1000 VIAL (EA) INTRAVENOUS EVERY 12 HOURS
Refills: 0 | Status: DISCONTINUED | OUTPATIENT
Start: 2023-10-01 | End: 2023-10-01

## 2023-09-30 RX ORDER — CARBIDOPA AND LEVODOPA 25; 100 MG/1; MG/1
1 TABLET ORAL
Refills: 0 | Status: DISCONTINUED | OUTPATIENT
Start: 2023-09-30 | End: 2023-10-14

## 2023-09-30 RX ORDER — DOXAZOSIN MESYLATE 4 MG
2 TABLET ORAL AT BEDTIME
Refills: 0 | Status: DISCONTINUED | OUTPATIENT
Start: 2023-09-30 | End: 2023-10-14

## 2023-09-30 RX ORDER — PIPERACILLIN AND TAZOBACTAM 4; .5 G/20ML; G/20ML
3.38 INJECTION, POWDER, LYOPHILIZED, FOR SOLUTION INTRAVENOUS EVERY 8 HOURS
Refills: 0 | Status: DISCONTINUED | OUTPATIENT
Start: 2023-09-30 | End: 2023-10-01

## 2023-09-30 RX ADMIN — ATORVASTATIN CALCIUM 20 MILLIGRAM(S): 80 TABLET, FILM COATED ORAL at 23:26

## 2023-09-30 RX ADMIN — CARBIDOPA AND LEVODOPA 1 TABLET(S): 25; 100 TABLET ORAL at 17:39

## 2023-09-30 RX ADMIN — QUETIAPINE FUMARATE 25 MILLIGRAM(S): 200 TABLET, FILM COATED ORAL at 23:25

## 2023-09-30 RX ADMIN — LISINOPRIL 2.5 MILLIGRAM(S): 2.5 TABLET ORAL at 23:25

## 2023-09-30 RX ADMIN — Medication 2 MILLIGRAM(S): at 23:26

## 2023-09-30 RX ADMIN — DONEPEZIL HYDROCHLORIDE 5 MILLIGRAM(S): 10 TABLET, FILM COATED ORAL at 23:26

## 2023-09-30 RX ADMIN — CEFTRIAXONE 1000 MILLIGRAM(S): 500 INJECTION, POWDER, FOR SOLUTION INTRAMUSCULAR; INTRAVENOUS at 15:51

## 2023-09-30 RX ADMIN — Medication 250 MILLIGRAM(S): at 23:42

## 2023-09-30 RX ADMIN — SODIUM CHLORIDE 1550 MILLILITER(S): 9 INJECTION INTRAMUSCULAR; INTRAVENOUS; SUBCUTANEOUS at 15:52

## 2023-09-30 RX ADMIN — APIXABAN 10 MILLIGRAM(S): 2.5 TABLET, FILM COATED ORAL at 17:39

## 2023-09-30 NOTE — ED PROVIDER NOTE - CLINICAL SUMMARY MEDICAL DECISION MAKING FREE TEXT BOX
Differential: Known DVT/ cellulitis/ r/o sepsis/ failure to thrive, deconditioning from hospital stay      CBC, CMP, lactate, rectal temp, US, reassess.

## 2023-09-30 NOTE — PATIENT PROFILE ADULT - FALL HARM RISK - HARM RISK INTERVENTIONS

## 2023-09-30 NOTE — ED ADULT NURSE NOTE - PRIMARY CARE PROVIDER
Quality 431: Preventive Care And Screening: Unhealthy Alcohol Use - Screening: Patient screened for unhealthy alcohol use using a single question and scores less than 2 times per year Quality 130: Documentation Of Current Medications In The Medical Record: Current Medications Documented Detail Level: Simple Quality 226: Preventive Care And Screening: Tobacco Use: Screening And Cessation Intervention: Patient screened for tobacco use and is an ex/non-smoker See MD Note
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
## 2023-09-30 NOTE — ED PROVIDER NOTE - OBJECTIVE STATEMENT
88 yo male w/PMHx of HTN, Parkinson's dementia, GERD, CVA, h/o syncope, BPH, + feeding tube-dependent presents to the ED . According to the wife, [facility] for [facility] effusion. Pt has increased right leg swelling as well. Pt notes that during his stay it was found that pt had a blood clot in his leg and had cellulitis as well. Pt now has swelling to the left hand and the right leg swelling is worse since yesterday and is worried that there may still be a blood clot but is not sure. [facility] but EMS refused. Pt is at baseline mental stauts. Pt was put on blood thinners and is currently on antibx. No fever.

## 2023-09-30 NOTE — H&P ADULT - HISTORY OF PRESENT ILLNESS
86 y/o M w/ PMH of BPH, diverticulosis, GERD, HTN, PED placement, necrotizing enterocolitis, osteoporosis, parkinson's dementia, TIA prostate cancer, hearing loss, CVA, p/w worsening RLE / LUE swelling. Patient is unable to provide history, son and wife at bedside provides history. States that [facility] where he was diagnosed with RLE DVT and treated for cellulitis. He was discharged yesterday, and this AM family noticed patient's RLE and LUE swelling was worsening. Patient's son states they were told patient had ulcers in intestines, and considered risk of this prior to starting Eliquis. Son states patient has brown stool, no melena / hematochezia. States patient is an aspiration risk, and previously treated for aspiration PNA as well. [facility] was also told [facility] the [facility] that patient had fluid in his lungs. In the morning patient also c/o HA to son.     PSH: Hernia repair, PEG placement     Social Hx: Denies tobacco / etoh / drugs     Family Hx: No pertinent family hx

## 2023-09-30 NOTE — ED PROVIDER NOTE - DIFFERENTIAL DIAGNOSIS
Known DVT/ cellulitis/ r/o sepsis/ failure to thrive, deconditioning from hospital stay Differential Diagnosis

## 2023-09-30 NOTE — H&P ADULT - REASON FOR ADMISSION
PCP: Dr. Gulshan Browning   Heme: Denies having one   Pulm: Doesn't recall name   Cardio: Dr. Alejandro Arredondo

## 2023-09-30 NOTE — ED PROVIDER NOTE - MUSCULOSKELETAL, MLM
Spine appears normal, range of motion is not limited, no muscle or joint tenderness, 2+ pitting edema, worse on the right than the left
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
## 2023-09-30 NOTE — PATIENT PROFILE ADULT - DOES PATIENT HAVE ADVANCE DIRECTIVE
60 year old female with PMH of autism, cataracts, constipation, nonverbal at baseline with mental retardation, osteoporosis, hypothyroidism coming to hospital [place] [facility] [place]. patient unable to provided history given non verbal w/ mental retardation. seen by surgery and GI for flex sigmoidoscopy today via GI for reduction of volvulus. at baseline patient tolerating puree with nectar thick diet. per chart patient was transfer to [facility] [place] given abdominal distention with agitation and respiratory depression after dinner.     # Hypokalemia  - since refusing PO meds, give 6 doses of 10mEq now IVPB and rest to be replenished in OR  - If OR delayed can continue further IVPB repletion on floor    # Sigmoid volvulus  - s/p status post colonoscopic decompression and rectal tube placement 11/27  - Planned sigmoidectomy by CRS today  - Cont Rectal tube    Gentle IVF only as BNP already elevated and she will be off diuretics preoperatively  ECHO grd 1 diastolic dysfxn    # Intellectual disability, Non verbal at baseline, Autism,   - at baseline  - supportive care    # IBS  - mesalamine    # depression   - escitalopram    # Hypothyroidism  - levothyroxine changed to IV, since NPO    #dvt prophylaxis  - heparin SC     Plan- Resume Po meds when able ; To OR today    patient rodriguez Howe on phone [phone] is the [facility]    patient group [facility], located within [facility] [phone]  Yes

## 2023-09-30 NOTE — ED PROVIDER NOTE - MUSCULOSKELETAL [+], MLM
Outcome for 01/27/23 9:33 AM: [employer] message sent  GEOFF Means   Outcome for 02/01/23 1:38 PM: Left Voicemail   Taylor Myhre, RMA  Outcome for 02/02/23 9:39 AM: Left Voicemail   Taylor Myhre, RMA  Outcome for 02/02/23 9:41 AM: Data uploaded on [employer] to upload tandem Taylor Myhre, RMA                 +right leg swelling, +left hand swelling

## 2023-09-30 NOTE — H&P ADULT - ASSESSMENT
86 y/o M w/ PMH of BPH, diverticulosis, GERD, HTN, PED placement, necrotizing enterocolitis, osteoporosis, parkinson's dementia, TIA prostate cancer, hearing loss, CVA, p/w worsening RLE / LUE swelling    *Severe sepsis 2/2 URI / possible early aspiration PNA / cellulitis   -RVP: +Rhinovirus   -Lactic acidosis 2.6 -> 3 - > Recheck lactate   -Unable to hydrate aggressively 2/2 fluid overload --Aspiration precautions   -Patient also has wet cough at bedside, IV abx will cover for possible early aspiration PNA as well     *RLE DVT and possible cellulitis   -Vanco / Cefepime  -ID consult   -LE U/S: Occlusive thrombosis right common femoral vein to the calf veins. Upper extent of thrombus not demonstrated. (Above and below the knee)  -LUE swelling -> No evidence of left upper extremity deep venous thrombosis.  -Vascular consult   -Patient CT chest w/ IV contrast, however, it wasn't a PE protocol. Once contrast load clears, will need a dedicate CT chest w/ PE protocol in AM to r/o PE     *Pulmonary edema / Pleural effusions - Possible Acute CHF? (EF unknown)   -CT chest:  Interstitial pulmonary edema and moderate pleural effusions  -IV lasix  -Echo  -I/Os + daily weights  -Cardio consult  -If CHF confirmed, will need BB + ACEi     *Pancytopenia  -F/u heme for further recommendations   -Patient is on Eliquis. Given occlusive thrombus w/ h/o malignancy, will c/w Eliquis / ASA tonight, and recheck platelets in AM. Will need to consult Heme whether Eliquis can be continued in the setting of thrombocytopenia     *HA  -CTH: No acute findings. +chronic findings (patient has a h/o TIA / CVA)     *PEG tube in place  -Nutrition consult   -Family states they will bring PEG tube supplies and formula in case hospital does not have correct size / formula     *Elevated Alk phos / ALT  -Elevated previously as well, if remains stable -> f/u outpatient for further evaluation     *Hypocalcemia  -Start Ca supplements, and f/u outpatient   -Check PTH / Vitamin D     *Unchanged L2 compression farcture / old rub and R clavicle fx / R renal cyst incidentally noted on CT  -F/u outpatient     *H/o BPH / diverticulosis / GERD / HTN / necrotizing enterocolitis / osteoporosis / parkinson's dementia / TIA / CVA / prostate cancer     *DVT ppx   -On Eliquis  (awaiting heme recommendations, as patient has thrombocytopenia)  86 y/o M w/ PMH of BPH, diverticulosis, GERD, HTN, PED placement, necrotizing enterocolitis, osteoporosis, parkinson's dementia, TIA prostate cancer, hearing loss, CVA, p/w worsening RLE / LUE swelling    *Severe sepsis 2/2 URI / possible early aspiration PNA / cellulitis   -RVP: +Rhinovirus   -Lactic acidosis 2.6 -> 3 - > Recheck lactate   -Unable to hydrate aggressively 2/2 fluid overload   -Aspiration precautions   -Patient also has deep cough at bedside, IV abx will cover for possible early aspiration PNA as well   -Vanco / Zosyn   -ID consult     *RLE DVT   -LE U/S: Occlusive thrombosis right common femoral vein to the calf veins. Upper extent of thrombus not demonstrated. (Above and below the knee)  -LUE swelling -> No evidence of left upper extremity deep venous thrombosis.  -Vascular consult   -Patient CT chest w/ IV contrast, however, it wasn't a PE protocol. Once contrast load clears, may need a dedicate CT chest w/ PE protocol in AM to r/o PE   -D-dimer     *Pulmonary edema / Pleural effusions - Possible CHF? (EF unknown)   -CT chest:  Interstitial pulmonary edema and moderate pleural effusions  -Given severe sepsis and lack of respiratory distress, will avoid IV lasix overnight and reassess in AM   -Echo  -EKG: Sinus w/ PVCs   -I/Os + daily weights  -Cardio consult  -On BB / ACEi   -Family concerned about patient coughing up solid substance which they have at bedside -> Pulm consult     *Pancytopenia  -F/u heme for further recommendations   -Patient is on Eliquis. Given occlusive thrombus w/ h/o malignancy, will c/w Eliquis / ASA tonight, and recheck platelets in AM. Will need to consult Heme whether Eliquis can be continued in the setting of thrombocytopenia     *HA  -CTH: No acute findings. +chronic findings (patient has a h/o TIA / CVA)     *PEG tube in place  -Nutrition consult   -Family states they will bring PEG tube supplies and formula in case hospital does not have correct size / formula     *Elevated Alk phos / ALT  -Elevated previously as well, if remains stable -> f/u outpatient for further evaluation     *Hypocalcemia  -Start Ca supplements, and f/u outpatient   -Check PTH / Vitamin D     *Unchanged L2 compression farcture / old rub and R clavicle fx / R renal cyst incidentally noted on CT  -F/u outpatient     *H/o BPH / diverticulosis / GERD / HTN / necrotizing enterocolitis / osteoporosis / parkinson's dementia / TIA / CVA / prostate cancer   -C/w home meds (except as noted above) and f/u outpatient     *DVT ppx   -On Eliquis  (awaiting heme recommendations, as patient has thrombocytopenia)

## 2023-09-30 NOTE — ED ADULT NURSE NOTE - NSFALLHARMRISKINTERV_ED_ALL_ED
Assistance OOB with selected safe patient handling equipment if applicable/Assistance with ambulation/Communicate risk of Fall with Harm to all staff, patient, and family/Monitor for mental status changes and reorient to person, place, and time, as needed/Move patient closer to nursing station/within visual sight of ED staff/Provide visual cue: red socks, yellow wristband, yellow gown, etc/Reinforce activity limits and safety measures with patient and family/Toileting schedule using arm’s reach rule for commode and bathroom/Use of alarms - bed, stretcher, chair and/or video monitoring/Bed in lowest position, wheels locked, appropriate side rails in place/Call bell, personal items and telephone in reach/Instruct patient to call for assistance before getting out of bed/chair/stretcher/Non-slip footwear applied when patient is off stretcher/[place] to call system/Physically safe environment - no spills, clutter or unnecessary equipment/Purposeful Proactive Rounding/Room/bathroom lighting operational, light cord in reach

## 2023-09-30 NOTE — ED ADULT TRIAGE NOTE - PATIENT ON (OXYGEN DELIVERY METHOD)
room air
Implemented All Fall Risk Interventions:  [place] to call system. Call bell, personal items and telephone within reach. Instruct patient to call for assistance. Room bathroom lighting operational. Non-slip footwear when patient is off stretcher. Physically safe environment: no spills, clutter or unnecessary equipment. Stretcher in lowest position, wheels locked, appropriate side rails in place. Provide visual cue, wrist band, yellow gown, etc. Monitor gait and stability. Monitor for mental status changes and reorient to person, place, and time. Review medications for side effects contributing to fall risk. Reinforce activity limits and safety measures with patient and family.

## 2023-10-01 LAB
24R-OH-CALCIDIOL SERPL-MCNC: 20.1 NG/ML — LOW (ref 30–80)
ALBUMIN SERPL ELPH-MCNC: 1.8 G/DL — LOW (ref 3.3–5)
ALP SERPL-CCNC: 221 U/L — HIGH (ref 40–120)
ALT FLD-CCNC: 42 U/L — SIGNIFICANT CHANGE UP (ref 12–78)
ANION GAP SERPL CALC-SCNC: 5 MMOL/L — SIGNIFICANT CHANGE UP (ref 5–17)
APTT BLD: 30.7 SEC — SIGNIFICANT CHANGE UP (ref 24.5–35.6)
AST SERPL-CCNC: 34 U/L — SIGNIFICANT CHANGE UP (ref 15–37)
BASOPHILS # BLD AUTO: 0 K/UL — SIGNIFICANT CHANGE UP (ref 0–0.2)
BASOPHILS NFR BLD AUTO: 0 % — SIGNIFICANT CHANGE UP (ref 0–2)
BILIRUB SERPL-MCNC: 0.8 MG/DL — SIGNIFICANT CHANGE UP (ref 0.2–1.2)
BUN SERPL-MCNC: 20 MG/DL — SIGNIFICANT CHANGE UP (ref 7–23)
CALCIUM SERPL-MCNC: 7.3 MG/DL — LOW (ref 8.4–10.5)
CALCIUM SERPL-MCNC: 7.4 MG/DL — LOW (ref 8.5–10.1)
CHLORIDE SERPL-SCNC: 111 MMOL/L — HIGH (ref 96–108)
CO2 SERPL-SCNC: 22 MMOL/L — SIGNIFICANT CHANGE UP (ref 22–31)
CREAT SERPL-MCNC: 0.51 MG/DL — SIGNIFICANT CHANGE UP (ref 0.5–1.3)
D DIMER BLD IA.RAPID-MCNC: 867 NG/ML DDU — HIGH
DIR ANTIGLOB POLYSPECIFIC INTERPRETATION: SIGNIFICANT CHANGE UP
EGFR: 98 ML/MIN/1.73M2 — SIGNIFICANT CHANGE UP
EOSINOPHIL # BLD AUTO: 0.28 K/UL — SIGNIFICANT CHANGE UP (ref 0–0.5)
EOSINOPHIL NFR BLD AUTO: 13 % — HIGH (ref 0–6)
FERRITIN SERPL-MCNC: 452 NG/ML — HIGH (ref 30–400)
FIBRINOGEN PPP-MCNC: 450 MG/DL — HIGH (ref 200–435)
FOLATE SERPL-MCNC: 14.2 NG/ML — SIGNIFICANT CHANGE UP
GLUCOSE SERPL-MCNC: 95 MG/DL — SIGNIFICANT CHANGE UP (ref 70–99)
HAPTOGLOB SERPL-MCNC: 175 MG/DL — SIGNIFICANT CHANGE UP (ref 34–200)
HCT VFR BLD CALC: 26 % — LOW (ref 39–50)
HCT VFR BLD CALC: 26.9 % — LOW (ref 39–50)
HEPARIN-PF4 AB RESULT: <0.6 U/ML — SIGNIFICANT CHANGE UP (ref 0–0.9)
HGB BLD-MCNC: 8.7 G/DL — LOW (ref 13–17)
HGB BLD-MCNC: 9.2 G/DL — LOW (ref 13–17)
INR BLD: 1.66 RATIO — HIGH (ref 0.85–1.18)
IRON SATN MFR SERPL: 103 UG/DL — SIGNIFICANT CHANGE UP (ref 45–165)
LACTATE SERPL-SCNC: 1.9 MMOL/L — SIGNIFICANT CHANGE UP (ref 0.7–2)
LDH SERPL L TO P-CCNC: 214 U/L — SIGNIFICANT CHANGE UP (ref 84–241)
LYMPHOCYTES # BLD AUTO: 0.25 K/UL — LOW (ref 1–3.3)
LYMPHOCYTES # BLD AUTO: 12 % — LOW (ref 13–44)
MANUAL SMEAR VERIFICATION: SIGNIFICANT CHANGE UP
MCHC RBC-ENTMCNC: 28.6 PG — SIGNIFICANT CHANGE UP (ref 27–34)
MCHC RBC-ENTMCNC: 29 PG — SIGNIFICANT CHANGE UP (ref 27–34)
MCHC RBC-ENTMCNC: 33.5 GM/DL — SIGNIFICANT CHANGE UP (ref 32–36)
MCHC RBC-ENTMCNC: 34.2 GM/DL — SIGNIFICANT CHANGE UP (ref 32–36)
MCV RBC AUTO: 84.9 FL — SIGNIFICANT CHANGE UP (ref 80–100)
MCV RBC AUTO: 85.5 FL — SIGNIFICANT CHANGE UP (ref 80–100)
MONOCYTES # BLD AUTO: 0.34 K/UL — SIGNIFICANT CHANGE UP (ref 0–0.9)
MONOCYTES NFR BLD AUTO: 16 % — HIGH (ref 2–14)
NEUTROPHILS # BLD AUTO: 1.23 K/UL — LOW (ref 1.8–7.4)
NEUTROPHILS NFR BLD AUTO: 58 % — SIGNIFICANT CHANGE UP (ref 43–77)
NRBC # BLD: 0 /100 — SIGNIFICANT CHANGE UP (ref 0–0)
NRBC # BLD: SIGNIFICANT CHANGE UP /100 WBCS (ref 0–0)
OB PNL STL: NEGATIVE — SIGNIFICANT CHANGE UP
PF4 HEPARIN CMPLX AB SER-ACNC: NEGATIVE — SIGNIFICANT CHANGE UP
PLAT MORPH BLD: NORMAL — SIGNIFICANT CHANGE UP
PLATELET # BLD AUTO: 79 K/UL — LOW (ref 150–400)
PLATELET # BLD AUTO: 83 K/UL — LOW (ref 150–400)
POTASSIUM SERPL-MCNC: 3.8 MMOL/L — SIGNIFICANT CHANGE UP (ref 3.5–5.3)
POTASSIUM SERPL-SCNC: 3.8 MMOL/L — SIGNIFICANT CHANGE UP (ref 3.5–5.3)
PROT SERPL-MCNC: 4.9 GM/DL — LOW (ref 6–8.3)
PROTHROM AB SERPL-ACNC: 18.5 SEC — HIGH (ref 9.5–13)
PTH-INTACT FLD-MCNC: 61 PG/ML — SIGNIFICANT CHANGE UP (ref 15–65)
RBC # BLD: 3.04 M/UL — LOW (ref 4.2–5.8)
RBC # BLD: 3.17 M/UL — LOW (ref 4.2–5.8)
RBC # BLD: 3.17 M/UL — LOW (ref 4.2–5.8)
RBC # FLD: 14.9 % — HIGH (ref 10.3–14.5)
RBC # FLD: 15 % — HIGH (ref 10.3–14.5)
RBC BLD AUTO: SIGNIFICANT CHANGE UP
RETICS #: 89.1 K/UL — SIGNIFICANT CHANGE UP (ref 25–125)
RETICS/RBC NFR: 2.8 % — HIGH (ref 0.5–2.5)
SODIUM SERPL-SCNC: 138 MMOL/L — SIGNIFICANT CHANGE UP (ref 135–145)
TSH SERPL-MCNC: 1.47 UU/ML — SIGNIFICANT CHANGE UP (ref 0.34–4.82)
VARIANT LYMPHS # BLD: 1 % — SIGNIFICANT CHANGE UP (ref 0–6)
VIT B12 SERPL-MCNC: >2000 PG/ML — HIGH (ref 232–1245)
WBC # BLD: 2.12 K/UL — LOW (ref 3.8–10.5)
WBC # BLD: 2.23 K/UL — LOW (ref 3.8–10.5)
WBC # FLD AUTO: 2.12 K/UL — LOW (ref 3.8–10.5)
WBC # FLD AUTO: 2.23 K/UL — LOW (ref 3.8–10.5)

## 2023-10-01 PROCEDURE — 99233 SBSQ HOSP IP/OBS HIGH 50: CPT

## 2023-10-01 PROCEDURE — 99221 1ST HOSP IP/OBS SF/LOW 40: CPT

## 2023-10-01 RX ORDER — POTASSIUM CHLORIDE 20 MEQ
20 PACKET (EA) ORAL ONCE
Refills: 0 | Status: DISCONTINUED | OUTPATIENT
Start: 2023-10-01 | End: 2023-10-01

## 2023-10-01 RX ORDER — CEFTRIAXONE 500 MG/1
1000 INJECTION, POWDER, FOR SOLUTION INTRAMUSCULAR; INTRAVENOUS EVERY 24 HOURS
Refills: 0 | Status: DISCONTINUED | OUTPATIENT
Start: 2023-10-01 | End: 2023-10-01

## 2023-10-01 RX ORDER — FUROSEMIDE 40 MG
40 TABLET ORAL ONCE
Refills: 0 | Status: COMPLETED | OUTPATIENT
Start: 2023-10-01 | End: 2023-10-01

## 2023-10-01 RX ORDER — POTASSIUM CHLORIDE 20 MEQ
20 PACKET (EA) ORAL ONCE
Refills: 0 | Status: COMPLETED | OUTPATIENT
Start: 2023-10-01 | End: 2023-10-01

## 2023-10-01 RX ORDER — CEFTRIAXONE 500 MG/1
1000 INJECTION, POWDER, FOR SOLUTION INTRAMUSCULAR; INTRAVENOUS EVERY 24 HOURS
Refills: 0 | Status: COMPLETED | OUTPATIENT
Start: 2023-10-01 | End: 2023-10-07

## 2023-10-01 RX ADMIN — Medication 500 MILLIGRAM(S): at 11:19

## 2023-10-01 RX ADMIN — ATORVASTATIN CALCIUM 20 MILLIGRAM(S): 80 TABLET, FILM COATED ORAL at 21:06

## 2023-10-01 RX ADMIN — DONEPEZIL HYDROCHLORIDE 5 MILLIGRAM(S): 10 TABLET, FILM COATED ORAL at 21:06

## 2023-10-01 RX ADMIN — Medication 40 MILLIGRAM(S): at 11:11

## 2023-10-01 RX ADMIN — CEFTRIAXONE 1000 MILLIGRAM(S): 500 INJECTION, POWDER, FOR SOLUTION INTRAMUSCULAR; INTRAVENOUS at 14:03

## 2023-10-01 RX ADMIN — APIXABAN 5 MILLIGRAM(S): 2.5 TABLET, FILM COATED ORAL at 11:18

## 2023-10-01 RX ADMIN — APIXABAN 5 MILLIGRAM(S): 2.5 TABLET, FILM COATED ORAL at 21:06

## 2023-10-01 RX ADMIN — LISINOPRIL 2.5 MILLIGRAM(S): 2.5 TABLET ORAL at 21:06

## 2023-10-01 RX ADMIN — CARBIDOPA AND LEVODOPA 1.5 TABLET(S): 25; 100 TABLET ORAL at 14:04

## 2023-10-01 RX ADMIN — Medication 20 MILLIEQUIVALENT(S): at 14:04

## 2023-10-01 RX ADMIN — CARBIDOPA AND LEVODOPA 1.5 TABLET(S): 25; 100 TABLET ORAL at 11:18

## 2023-10-01 RX ADMIN — CARBIDOPA AND LEVODOPA 1 TABLET(S): 25; 100 TABLET ORAL at 21:06

## 2023-10-01 RX ADMIN — Medication 100 MILLIGRAM(S): at 21:08

## 2023-10-01 RX ADMIN — QUETIAPINE FUMARATE 25 MILLIGRAM(S): 200 TABLET, FILM COATED ORAL at 11:19

## 2023-10-01 RX ADMIN — Medication 2 MILLIGRAM(S): at 21:06

## 2023-10-01 RX ADMIN — CARBIDOPA AND LEVODOPA 1 TABLET(S): 25; 100 TABLET ORAL at 14:05

## 2023-10-01 RX ADMIN — LISINOPRIL 2.5 MILLIGRAM(S): 2.5 TABLET ORAL at 11:19

## 2023-10-01 RX ADMIN — QUETIAPINE FUMARATE 25 MILLIGRAM(S): 200 TABLET, FILM COATED ORAL at 21:06

## 2023-10-01 RX ADMIN — Medication 250 MILLIGRAM(S): at 05:01

## 2023-10-01 RX ADMIN — Medication 25 MILLIGRAM(S): at 11:19

## 2023-10-01 RX ADMIN — PIPERACILLIN AND TAZOBACTAM 25 GRAM(S): 4; .5 INJECTION, POWDER, LYOPHILIZED, FOR SOLUTION INTRAVENOUS at 06:05

## 2023-10-01 RX ADMIN — Medication 81 MILLIGRAM(S): at 11:19

## 2023-10-01 NOTE — CONSULT NOTE ADULT - SUBJECTIVE AND OBJECTIVE BOX
HPI:  88yo M w/ multiple comorbidities, nonfunctional patient presents w/ RLE swelling. Most of history obtained from chart. [facility], seems was discharged with eliquis. Patient currently denies any pain. Upon exam, lower extremity swelling is mostly from pitting edema, no real difference between right and left. Denies any pain upon palpation. Currently breathing well without difficulty on room air. Vascular surgery consulted for US finding of RLE DVT. Denies fever/chills, shortness of breath, chest pain. VS reviewed    PAST MEDICAL & SURGICAL HISTORY:  Parkinsons disease      HTN (hypertension)      Osteoporosis      Prostate cancer      Personal history of transient ischemic attack (TIA), and cerebral infarction without residual deficits      Syncope and collapse      Bacteremia      Benign prostatic hyperplasia with lower urinary tract symptoms      Necrotizing enterocolitis      Sensorineural hearing loss (SNHL) of both ears      GERD (gastroesophageal reflux disease)      Colonic polyp      Diverticulosis      Nonrheumatic aortic valve insufficiency      Chronic rhinitis      H/O hernia repair          MEDICATIONS  (STANDING):  apixaban 5 milliGRAM(s) Enteral Tube every 12 hours  ascorbic acid 500 milliGRAM(s) Oral daily  aspirin  chewable 81 milliGRAM(s) Oral daily  atorvastatin 20 milliGRAM(s) Oral at bedtime  carbidopa/levodopa  25/100 1 Tablet(s) Oral <User Schedule>  carbidopa/levodopa  25/100 1.5 Tablet(s) Oral <User Schedule>  donepezil 5 milliGRAM(s) Oral at bedtime  doxazosin 2 milliGRAM(s) Oral at bedtime  furosemide   Injectable 40 milliGRAM(s) IV Push once  lisinopril 2.5 milliGRAM(s) Oral every 12 hours  metoprolol tartrate 25 milliGRAM(s) Oral daily  pantoprazole    Tablet 40 milliGRAM(s) Oral before breakfast  piperacillin/tazobactam IVPB.. 3.375 Gram(s) IV Intermittent every 8 hours  potassium chloride   Powder 20 milliEquivalent(s) Oral once  QUEtiapine 25 milliGRAM(s) Oral two times a day  vancomycin  IVPB 1000 milliGRAM(s) IV Intermittent every 12 hours  vancomycin  IVPB        MEDICATIONS  (PRN):      Allergies    Originally Entered as [Unknown] reaction to [fresh fruits] (Unknown)  apple (Anaphylaxis)  raw, fresh fruits- reyn[place] family (apple, pear, apricot, peach, fig); processed/cooked is ok. (Anaphylaxis)  No Known Drug Allergies    Intolerances        SOCIAL HISTORY:    FAMILY HISTORY:  Family history unobtainable due to patient's condition            Physical Exam:  General: NAD, resting comfortably  HEENT: NC/AT, EOMI, normal hearing, no oral lesions, no LAD, neck supple  Pulmonary: normal resp effort, CTA-B  Cardiovascular: NSR, no murmurs  Abdominal: soft, ND/NT, no organomegaly  Extremities: palpable distal pulses on lower and upper extremities. RLE and LLE with pitting edema, mild swelling. no tenderness.  Pulses: palpable distal pulses    Vital Signs Last 24 Hrs  T(C): 37.2 (01 Oct 2023 08:30), Max: 37.4 (30 Sep 2023 22:30)  T(F): 99 (01 Oct 2023 08:30), Max: 99.3 (30 Sep 2023 22:30)  HR: 88 (01 Oct 2023 08:30) (82 - 88)  BP: 113/70 (01 Oct 2023 08:30) (113/70 - 168/84)  BP(mean): 107 (30 Sep 2023 19:59) (107 - 107)  RR: 19 (01 Oct 2023 08:30) (19 - 20)  SpO2: 92% (01 Oct 2023 08:30) (91% - 96%)    Parameters below as of 01 Oct 2023 08:30  Patient On (Oxygen Delivery Method): room air        I&O's Summary          LABS:                        8.7    2.12  )-----------( 79       ( 01 Oct 2023 07:44 )             26.0     10-01    138  |  111<H>  |  20  ----------------------------<  95  3.8   |  22  |  0.51    Ca    7.4<L>      01 Oct 2023 07:44    TPro  4.9<L>  /  Alb  1.8<L>  /  TBili  0.8  /  DBili  x   /  AST  34  /  ALT  42  /  AlkPhos  221<H>  10-01    PT/INR - ( 01 Oct 2023 07:44 )   PT: 18.5 sec;   INR: 1.66 ratio         PTT - ( 01 Oct 2023 07:44 )  PTT:30.7 sec  Urinalysis Basic - ( 01 Oct 2023 07:44 )    Color: x / Appearance: x / SG: x / pH: x  Gluc: 95 mg/dL / Ketone: x  / Bili: x / Urobili: x   Blood: x / Protein: x / Nitrite: x   Leuk Esterase: x / RBC: x / WBC x   Sq Epi: x / Non Sq Epi: x / Bacteria: x      CAPILLARY BLOOD GLUCOSE        LIVER FUNCTIONS - ( 01 Oct 2023 07:44 )  Alb: 1.8 g/dL / Pro: 4.9 gm/dL / ALK PHOS: 221 U/L / ALT: 42 U/L / AST: 34 U/L / GGT: x             Cultures:      RADIOLOGY & ADDITIONAL STUDIES:      Plan:

## 2023-10-01 NOTE — DIETITIAN INITIAL EVALUATION ADULT - NS FNS DIET ORDER
Diet, NPO with Tube Feed:   Tube Feeding Modality: Gastrostomy  Jevity 1.5 Ranjit (JEVITY1.5)  Total Volume for 24 Hours (mL): 1440  Bolus  Total Volume of Bolus (mL):  240  Total # of Feeds: 4  Tube Feed Frequency: Every 4 hours   Tube Feed Start Time: 12:00  Bolus Feed Rate (mL per Hour): 360   Bolus Feed Duration (in Hours): 1  Bolus   Total Volume per Flush (mL): 200   Frequency: Every 4 Hours (10-01-23 @ 11:41)

## 2023-10-01 NOTE — DIETITIAN INITIAL EVALUATION ADULT - NUTRITIONGOAL OUTCOME1
Pt will receive >/= 80% of ENN via tolerated route   Will provide nutrition/ hydration within goals of care

## 2023-10-01 NOTE — PROGRESS NOTE ADULT - SUBJECTIVE AND OBJECTIVE BOX
History of Present Illness:   88 y/o M w/ PMH of BPH, diverticulosis, GERD, HTN, Necrotizing enterocolitis, osteoporosis, parkinson's dementia, TIA prostate cancer, hearing loss, CVA, s/p PEG p/w worsening RLE / LUE swelling. Patient is unable to provide history, son and wife at bedside provides history. States that [facility] with [facility] and treated for cellulitis. He was discharged yesterday, and this AM family noticed patient's RLE and LUE swelling was worsening. States patient is an aspiration risk, and previously treated for aspiration PNA as well. [facility] also [facility] that patient had fluid in his lungs. In the morning patient also c/o HA to son.     10.1: no dyspnea, confused      REVIEW OF SYSTEMS: unable to obtain fully due to confusion         All other review of systems is negative unless indicated above    Vital Signs Last 24 Hrs  T(C): 37.2 (01 Oct 2023 08:30), Max: 37.4 (30 Sep 2023 22:30)  T(F): 99 (01 Oct 2023 08:30), Max: 99.3 (30 Sep 2023 22:30)  HR: 88 (01 Oct 2023 08:30) (82 - 88)  BP: 113/70 (01 Oct 2023 08:30) (113/70 - 168/84)  BP(mean): 107 (30 Sep 2023 19:59) (107 - 107)  RR: 19 (01 Oct 2023 08:30) (19 - 20)  SpO2: 92% (01 Oct 2023 08:30) (91% - 96%)    Parameters below as of 01 Oct 2023 08:30  Patient On (Oxygen Delivery Method): room air        PHYSICAL EXAM:    GENERAL: NAD  HEENT:  NC/AT, EOMI, PERRLA, No scleral icterus, Moist mucous membranes  NECK: Supple, No JVD  CNS:  Alert & Oriented X1, Motor Strength 5/5 B/L upper and lower extremities; DTRs 2+ intact   LUNG: Normal Breath sounds, +basilar rales, No rhonchi, No wheezing  HEART: RRR; No murmurs, No rubs  ABDOMEN: +BS, ST/ND/NT, +PEG  GENITOURINARY: Voiding, Bladder not distended  EXTREMITIES:  +RLE edema   MUSCULOSKELTAL: Joints normal ROM, No TTP, No effusion  SKIN: no rashes  RECTAL: deferred, not indicated  BREAST: deferred                          8.7    2.12  )-----------( 79       ( 01 Oct 2023 07:44 )             26.0     10-01    138  |  111<H>  |  20  ----------------------------<  95  3.8   |  22  |  0.51    Ca    7.4<L>      01 Oct 2023 07:44    TPro  4.9<L>  /  Alb  1.8<L>  /  TBili  0.8  /  DBili  x   /  AST  34  /  ALT  42  /  AlkPhos  221<H>  10-01    Vancomycin levels:   Cultures:     MEDICATIONS  (STANDING):  apixaban 5 milliGRAM(s) Enteral Tube every 12 hours  ascorbic acid 500 milliGRAM(s) Oral daily  aspirin  chewable 81 milliGRAM(s) Oral daily  atorvastatin 20 milliGRAM(s) Oral at bedtime  carbidopa/levodopa  25/100 1 Tablet(s) Oral <User Schedule>  carbidopa/levodopa  25/100 1.5 Tablet(s) Oral <User Schedule>  donepezil 5 milliGRAM(s) Oral at bedtime  doxazosin 2 milliGRAM(s) Oral at bedtime  furosemide   Injectable 40 milliGRAM(s) IV Push once  lisinopril 2.5 milliGRAM(s) Oral every 12 hours  metoprolol tartrate 25 milliGRAM(s) Oral daily  pantoprazole    Tablet 40 milliGRAM(s) Oral before breakfast  piperacillin/tazobactam IVPB.. 3.375 Gram(s) IV Intermittent every 8 hours  potassium chloride   Powder 20 milliEquivalent(s) Oral once  QUEtiapine 25 milliGRAM(s) Oral two times a day  vancomycin  IVPB 1000 milliGRAM(s) IV Intermittent every 12 hours    MEDICATIONS  (PRN):      CT Chest: fluid overload, mild to mod bilat effusions, no Pna      a/p:    *URI due to Rhinovirus:  supportive care    *Pulmonary edema and effusions:  suspect Acute on chronic diastolic Chf  Lasix 40mg IV x 1  f/u renal function in am; consider continuing diuretic according to renal  function     *RLE DVT:  -c/w Apixaban  [facility]  c/w Ceftriaxone for mild cellulitis of Rt leg    *Pancytopenia:  ? due to viral infection  Hematology consult    Thrombocytopenia: r/o HIT  f/u HIT Ab  No heparinoids  will c/w Apixaban    *PEG tube in place  -Nutrition consult   -cw Jevity 360cc bolus TID    *Unchanged L2 compression farcture / old rub and R clavicle fx / R renal cyst incidentally noted on CT  -F/u outpatient

## 2023-10-01 NOTE — DIETITIAN INITIAL EVALUATION ADULT - ETIOLOGY
r/t decreased ability to meet increased nutrient needs 2/2 sepsis/ ? asp pna on Parkinson's and other multiple comorbidities

## 2023-10-01 NOTE — DIETITIAN INITIAL EVALUATION ADULT - ENTERAL
Initiate [place] Peptide 1.5 @ 20 cc/hr, increase by 20 cc/hr q4 hours until goal rate of 60 cc/hr (total volume 1200 mL) met. Will provide ~ 1800 kcal, 88 g protein, and 840 mL free water. Consider additional free water flushes of 30 cc/hr; adjust prn to maintain hydration as per MD.

## 2023-10-01 NOTE — CONSULT NOTE ADULT - ASSESSMENT
86 y/o Male with h/o BPH, RLE DVT, diverticulosis, GERD, HTN, PEG placement, necrotizing enterocolitis, osteoporosis, Parkinson's dementia, PEG placement, TIA prostate cancer, hearing loss, old CVA was admitted on 9/30 for worsening RLE / LUE swelling. Patient is unable to provide history, son and wife at bedside provides history. States that [facility] with [facility] and treated for cellulitis. He was discharged on the day PTA, and this AM family noticed patient's RLE and LUE swelling was worsening. Patient's son states they were told patient had ulcers in intestines, and considered risk of this prior to starting Eliquis. Son states patient has brown stool, no melena / hematochezia. States patient is an aspiration risk, and previously treated for aspiration PNA as well. [facility] also [facility] that [facility] had [facility] in his lungs. In ER he received ceftriaxone.     1. Low grade fever. B/l pleural effusions with pulmonary congestion. RLE DVT. URI with rhinovirus. Parkinson disease. Metabolic encephalopathy.   -obtain BC x 2, urine c/s       86 y/o Male with h/o BPH, RLE DVT, diverticulosis, GERD, HTN, PEG placement, necrotizing enterocolitis, osteoporosis, Parkinson's dementia, PEG placement, TIA prostate cancer, hearing loss, old CVA was admitted on 9/30 for worsening RLE / LUE swelling. Patient is unable to provide history, son and wife at bedside provides history. States that [facility] with RLE DVT and treated for cellulitis. He was discharged on the day PTA, and this AM family noticed patient's RLE and LUE swelling was worsening. Patient's son states they were told patient had ulcers in intestines, and considered risk of this prior to starting Eliquis. Son states patient has brown stool, no melena / hematochezia. States patient is an aspiration risk, and previously treated for aspiration PNA as well. [facility] also [facility] that [facility] had fluid in his lungs. In ER he received ceftriaxone.     1. Low grade fever. B/l pleural effusions with pulmonary congestion. RLE DVT. URI with rhinovirus. Parkinson disease. Metabolic encephalopathy.   -doubt pneumonia  -obtain BC x 2, urine c/s  -start ceftriaxone 1 gm IV qd and doxycycline 100 mg PO q12h  -reason for abx use and side effects reviewed; monitor BMP   -elevate leg  -aspiration precautions  -old chart reviewed to assess prior cultures  -monitor temps  -f/u CBC  -supportive care  2. Other issues:   -care per medicine

## 2023-10-01 NOTE — CONSULT NOTE ADULT - SUBJECTIVE AND OBJECTIVE BOX
HPI:  86 y/o M w/ PMH of BPH, diverticulosis, GERD, HTN, PED placement, necrotizing enterocolitis, osteoporosis, parkinson's dementia, TIA prostate cancer, hearing loss, CVA, p/w worsening RLE / LUE swelling. Patient is unable to provide history, son and wife at bedside provides history. States that [facility] with RLE D[facility] and treated for cellulitis. He was discharged yesterday, and this AM family noticed patient's RLE and LUE swelling was worsening. Patient's son states they were told patient had ulcers in intestines, and considered risk of this prior to starting Eliquis. Son states patient has brown stool, no melena / hematochezia. States patient is an aspiration risk, and previously treated for aspiration PNA as well. [facility] was also [facility] that patient had [facility] in his lungs. In the morning patient also c/o HA to son.     PSH: Hernia repair, PEG placement     Social Hx: Denies tobacco / etoh / drugs     Family Hx: No pertinent family hx    (30 Sep 2023 21:34)      PAST MEDICAL & SURGICAL HISTORY:  Parkinsons disease      HTN (hypertension)      Osteoporosis      Prostate cancer      Personal history of transient ischemic attack (TIA), and cerebral infarction without residual deficits      Syncope and collapse      Bacteremia      Benign prostatic hyperplasia with lower urinary tract symptoms      Necrotizing enterocolitis      Sensorineural hearing loss (SNHL) of both ears      GERD (gastroesophageal reflux disease)      Colonic polyp      Diverticulosis      Nonrheumatic aortic valve insufficiency      Chronic rhinitis      H/O hernia repair          Home Medications:  aspirin 81 mg oral tablet, chewable: 1 tab(s) by PEG tube once a day (30 Sep 2023 18:38)  atorvastatin 20 mg oral tablet: 1 tab(s) by PEG tube once a day (at bedtime) (30 Sep 2023 18:38)  carbidopa-levodopa 25 mg-100 mg oral tablet: 1.5 tab(s) by gastrostomy tube 2 times a day at 8 AM and 12 PM (30 Sep 2023 18:34)  carbidopa-levodopa 25 mg-100 mg oral tablet: 1 tab(s) by gastrostomy tube 2 times a day at 4 PM and 8 PM (30 Sep 2023 18:34)  donepezil 5 mg oral tablet: 1 tab(s) by PEG tube once a day (at bedtime) (30 Sep 2023 18:38)  doxazosin 2 mg oral tablet: 1 tab(s) by PEG tube once a day (at bedtime) (30 Sep 2023 18:34)  Eliquis 5 mg oral tablet: 1 tab(s) orally 2 times a day (30 Sep 2023 18:38)  lansoprazole 30 mg oral tablet, disintegratin cap(s) by PEG tube once a day (30 Sep 2023 18:38)  lisinopril 2.5 mg oral tablet: 1 tab(s) by PEG tube 2 times a day (30 Sep 2023 18:38)  metoprolol tartrate 50 mg oral tablet: 0.5 tab(s) by PEG tube once a day (30 Sep 2023 18:38)  QUEtiapine 25 mg oral tablet: 1 tab(s) orally 2 times a day (30 Sep 2023 21:50)  Vitamin C 500 mg/5 mL oral liquid: 5 milliliter(s) orally once a day (30 Sep 2023 21:50)      MEDICATIONS  (STANDING):  apixaban 5 milliGRAM(s) Enteral Tube every 12 hours  ascorbic acid 500 milliGRAM(s) Oral daily  aspirin  chewable 81 milliGRAM(s) Oral daily  atorvastatin 20 milliGRAM(s) Oral at bedtime  carbidopa/levodopa  25/100 1 Tablet(s) Oral <User Schedule>  carbidopa/levodopa  25/100 1.5 Tablet(s) Oral <User Schedule>  cefTRIAXone Injectable. 1000 milliGRAM(s) IV Push every 24 hours  donepezil 5 milliGRAM(s) Oral at bedtime  doxazosin 2 milliGRAM(s) Oral at bedtime  lisinopril 2.5 milliGRAM(s) Oral every 12 hours  metoprolol tartrate 25 milliGRAM(s) Oral daily  pantoprazole    Tablet 40 milliGRAM(s) Oral before breakfast  potassium chloride   Powder 20 milliEquivalent(s) Oral once  QUEtiapine 25 milliGRAM(s) Oral two times a day    MEDICATIONS  (PRN):      Allergies    Originally Entered as [Unknown] reaction to [fresh fruits] (Unknown)  apple (Anaphylaxis)  raw, fresh fruits- reynaldo family (apple, pear, apricot, peach, fig); processed/cooked is ok. (Anaphylaxis)  No Known Drug Allergies    Intolerances        SOCIAL HISTORY: Denies tobacco, etoh abuse or illicit drug use    FAMILY HISTORY:  Family history unobtainable due to patient's condition        Vital Signs Last 24 Hrs  T(C): 37.2 (01 Oct 2023 08:30), Max: 37.4 (30 Sep 2023 22:30)  T(F): 99 (01 Oct 2023 08:30), Max: 99.3 (30 Sep 2023 22:30)  HR: 88 (01 Oct 2023 08:30) (82 - 88)  BP: 113/70 (01 Oct 2023 08:30) (113/70 - 168/84)  BP(mean): 107 (30 Sep 2023 19:59) (107 - 107)  RR: 19 (01 Oct 2023 08:30) (19 - 20)  SpO2: 92% (01 Oct 2023 08:30) (91% - 96%)    Parameters below as of 01 Oct 2023 08:30  Patient On (Oxygen Delivery Method): room air            REVIEW OF SYSTEMS:    CONSTITUTIONAL:  As per HPI.  HEENT:  Eyes:  No diplopia or blurred vision. ENT:  No earache, sore throat or runny nose.  CARDIOVASCULAR:  No pressure, squeezing, tightness, heaviness or aching about the chest, neck, axilla or epigastrium.  RESPIRATORY:  No cough, shortness of breath, PND or orthopnea.  GASTROINTESTINAL:  No nausea, vomiting or diarrhea.  GENITOURINARY:  No dysuria, frequency or urgency.  MUSCULOSKELETAL:  As per HPI.  SKIN:  No change in skin, hair or nails.  NEUROLOGIC:  No paresthesias, fasciculations, seizures or weakness.  PSYCHIATRIC:  No disorder of thought or mood.  ENDOCRINE:  No heat or cold intolerance, polyuria or polydipsia.  HEMATOLOGICAL:  No easy bruising or bleedings:  .     PHYSICAL EXAMINATION:    GENERAL APPEARANCE:  Pt. is not currently dyspneic, in no distress. Pt. is alert, oriented, and pleasant.  HEENT:  Pupils are normal and react normally. No icterus. Mucous membranes well colored.  NECK:  Supple. No lymphadenopathy. Jugular venous pressure not elevated. Carotids equal.   HEART:   The cardiac impulse has a normal quality. Regular. Normal S1 and S2. There are no murmurs, rubs or gallops noted  CHEST:  Chest is clear to auscultation. Normal respiratory effort.  ABDOMEN:  Soft and nontender.   EXTREMITIES:  There is no cyanosis, clubbing or edema.   SKIN:  No rash or significant lesions are noted.    LABS:                        9.2    2.23  )-----------( 83       ( 01 Oct 2023 11:54 )             26.9     10    138  |  111<H>  |  20  ----------------------------<  95  3.8   |  22  |  0.51    Ca    7.4<L>      01 Oct 2023 07:44    TPro  4.9<L>  /  Alb  1.8<L>  /  TBili  0.8  /  DBili  x   /  AST  34  /  ALT  42  /  AlkPhos  221<H>  10    LIVER FUNCTIONS - ( 01 Oct 2023 07:44 )  Alb: 1.8 g/dL / Pro: 4.9 gm/dL / ALK PHOS: 221 U/L / ALT: 42 U/L / AST: 34 U/L / GGT: x           PT/INR - ( 01 Oct 2023 07:44 )   PT: 18.5 sec;   INR: 1.66 ratio         PTT - ( 01 Oct 2023 07:44 )  PTT:30.7 sec      Urinalysis Basic - ( 01 Oct 2023 07:44 )    Color: x / Appearance: x / SG: x / pH: x  Gluc: 95 mg/dL / Ketone: x  / Bili: x / Urobili: x   Blood: x / Protein: x / Nitrite: x   Leuk Esterase: x / RBC: x / WBC x   Sq Epi: x / Non Sq Epi: x / Bacteria: x            RADIOLOGY & ADDITIONAL STUDIES:        HPI:    86 y/o M w/ PMH of BPH, diverticulosis, GERD, HTN, PED placement, necrotizing enterocolitis, osteoporosis, parkinson's dementia, TIA prostate cancer, hearing loss, CVA, p/w worsening RLE / LUE swelling. Patient is unable to provide history, son and wife at bedside provides history. States that [facility] with RLE D[facility] and treated for cellulitis. He was discharged yesterday, and this AM family noticed patient's RLE and LUE swelling was worsening. Patient's son states they were told patient had ulcers in intestines, and considered risk of this prior to starting Eliquis. Son states patient has brown stool, no melena / hematochezia. States patient is an aspiration risk, and previously treated for aspiration PNA as well. [facility] that [facility] had [facility] in his lungs. pat seen for pulmonary, h/o from wife & daughter, lying in bed, npo on tube feeding.     PSH: Hernia repair, PEG placement     Social Hx: Denies tobacco / etoh / drugs     Family Hx: No pertinent family hx    (30 Sep 2023 21:34)      PAST MEDICAL & SURGICAL HISTORY:  Parkinsons disease      HTN (hypertension)      Osteoporosis      Prostate cancer      Personal history of transient ischemic attack (TIA), and cerebral infarction without residual deficits      Syncope and collapse      Bacteremia      Benign prostatic hyperplasia with lower urinary tract symptoms      Necrotizing enterocolitis      Sensorineural hearing loss (SNHL) of both ears      GERD (gastroesophageal reflux disease)      Colonic polyp      Diverticulosis      Nonrheumatic aortic valve insufficiency      Chronic rhinitis      H/O hernia repair          Home Medications:  aspirin 81 mg oral tablet, chewable: 1 tab(s) by PEG tube once a day (30 Sep 2023 18:38)  atorvastatin 20 mg oral tablet: 1 tab(s) by PEG tube once a day (at bedtime) (30 Sep 2023 18:38)  carbidopa-levodopa 25 mg-100 mg oral tablet: 1.5 tab(s) by gastrostomy tube 2 times a day at 8 AM and 12 PM (30 Sep 2023 18:34)  carbidopa-levodopa 25 mg-100 mg oral tablet: 1 tab(s) by gastrostomy tube 2 times a day at 4 PM and 8 PM (30 Sep 2023 18:34)  donepezil 5 mg oral tablet: 1 tab(s) by PEG tube once a day (at bedtime) (30 Sep 2023 18:38)  doxazosin 2 mg oral tablet: 1 tab(s) by PEG tube once a day (at bedtime) (30 Sep 2023 18:34)  Eliquis 5 mg oral tablet: 1 tab(s) orally 2 times a day (30 Sep 2023 18:38)  lansoprazole 30 mg oral tablet, disintegratin cap(s) by PEG tube once a day (30 Sep 2023 18:38)  lisinopril 2.5 mg oral tablet: 1 tab(s) by PEG tube 2 times a day (30 Sep 2023 18:38)  metoprolol tartrate 50 mg oral tablet: 0.5 tab(s) by PEG tube once a day (30 Sep 2023 18:38)  QUEtiapine 25 mg oral tablet: 1 tab(s) orally 2 times a day (30 Sep 2023 21:50)  Vitamin C 500 mg/5 mL oral liquid: 5 milliliter(s) orally once a day (30 Sep 2023 21:50)      MEDICATIONS  (STANDING):  apixaban 5 milliGRAM(s) Enteral Tube every 12 hours  ascorbic acid 500 milliGRAM(s) Oral daily  aspirin  chewable 81 milliGRAM(s) Oral daily  atorvastatin 20 milliGRAM(s) Oral at bedtime  carbidopa/levodopa  25/100 1 Tablet(s) Oral <User Schedule>  carbidopa/levodopa  25/100 1.5 Tablet(s) Oral <User Schedule>  cefTRIAXone Injectable. 1000 milliGRAM(s) IV Push every 24 hours  donepezil 5 milliGRAM(s) Oral at bedtime  doxazosin 2 milliGRAM(s) Oral at bedtime  lisinopril 2.5 milliGRAM(s) Oral every 12 hours  metoprolol tartrate 25 milliGRAM(s) Oral daily  pantoprazole    Tablet 40 milliGRAM(s) Oral before breakfast  potassium chloride   Powder 20 milliEquivalent(s) Oral once  QUEtiapine 25 milliGRAM(s) Oral two times a day    MEDICATIONS  (PRN):      Allergies    Originally Entered as [Unknown] reaction to [fresh fruits] (Unknown)  apple (Anaphylaxis)  raw, fresh fruits- reynaldo family (apple, pear, apricot, peach, fig); processed/cooked is ok. (Anaphylaxis)  No Known Drug Allergies    Intolerances        SOCIAL HISTORY: Denies tobacco, etoh abuse or illicit drug use    FAMILY HISTORY:  Family history unobtainable due to patient's condition        Vital Signs Last 24 Hrs  T(C): 37.2 (01 Oct 2023 08:30), Max: 37.4 (30 Sep 2023 22:30)  T(F): 99 (01 Oct 2023 08:30), Max: 99.3 (30 Sep 2023 22:30)  HR: 88 (01 Oct 2023 08:30) (82 - 88)  BP: 113/70 (01 Oct 2023 08:30) (113/70 - 168/84)  BP(mean): 107 (30 Sep 2023 19:59) (107 - 107)  RR: 19 (01 Oct 2023 08:30) (19 - 20)  SpO2: 92% (01 Oct 2023 08:30) (91% - 96%)    Parameters below as of 01 Oct 2023 08:30  Patient On (Oxygen Delivery Method): room air            REVIEW OF SYSTEMS:    CONSTITUTIONAL:  As per HPI.  HEENT:  Eyes:  No diplopia or blurred vision. ENT:  No earache, sore throat or runny nose.  CARDIOVASCULAR:  No pressure, squeezing, tightness, heaviness or aching about the chest, neck, axilla or epigastrium.  RESPIRATORY:  No cough, +shortness of breath, PND or orthopnea.  GASTROINTESTINAL:  No nausea, vomiting or diarrhea.  GENITOURINARY:  No dysuria, frequency or urgency.  MUSCULOSKELETAL:  As per HPI.  SKIN:  No change in skin, hair or nails.  NEUROLOGIC:  No paresthesias, fasciculations, seizures or weakness.  PSYCHIATRIC:  No disorder of thought or mood.  ENDOCRINE:  No heat or cold intolerance, polyuria or polydipsia.  HEMATOLOGICAL:  No easy bruising or bleedings:  .     PHYSICAL EXAMINATION:    GENERAL APPEARANCE:  Pt. is not currently dyspneic, in no distress. Pt. is alert, oriented, and pleasant.  HEENT:  Pupils are normal and react normally. No icterus. Mucous membranes well colored.  NECK:  Supple. No lymphadenopathy. Jugular venous pressure not elevated. Carotids equal.   HEART:   The cardiac impulse has a normal quality. Regular. Normal S1 and S2. There are no murmurs, rubs or gallops noted  CHEST:  Chest crackles to auscultation. Normal respiratory effort.  ABDOMEN:  Soft and nontender.   EXTREMITIES:  There is no cyanosis, clubbing or edema.   SKIN:  No rash or significant lesions are noted.    LABS:                        9.2    2.23  )-----------( 83       ( 01 Oct 2023 11:54 )             26.9     10    138  |  111<H>  |  20  ----------------------------<  95  3.8   |  22  |  0.51    Ca    7.4<L>      01 Oct 2023 07:44    TPro  4.9<L>  /  Alb  1.8<L>  /  TBili  0.8  /  DBili  x   /  AST  34  /  ALT  42  /  AlkPhos  221<H>  10    LIVER FUNCTIONS - ( 01 Oct 2023 07:44 )  Alb: 1.8 g/dL / Pro: 4.9 gm/dL / ALK PHOS: 221 U/L / ALT: 42 U/L / AST: 34 U/L / GGT: x           PT/INR - ( 01 Oct 2023 07:44 )   PT: 18.5 sec;   INR: 1.66 ratio         PTT - ( 01 Oct 2023 07:44 )  PTT:30.7 sec      Urinalysis Basic - ( 01 Oct 2023 07:44 )    Color: x / Appearance: x / SG: x / pH: x  Gluc: 95 mg/dL / Ketone: x  / Bili: x / Urobili: x   Blood: x / Protein: x / Nitrite: x   Leuk Esterase: x / RBC: x / WBC x   Sq Epi: x / Non Sq Epi: x / Bacteria: x            RADIOLOGY & ADDITIONAL STUDIES:     CT Chest w/ IV Cont (23 @ 17:15) >  IMPRESSION:    Interstitial pulmonary edema and moderate pleural effusions.      US Duplex Venous Upper Ext Ltd, Left (23 @ 16:42) >  IMPRESSION:  No evidence of left upper extremity deep venous thrombosis.    US Duplex Venous Lower Ext Ltd, Right (23 @ 15:04) >  Occlusive thrombosis right common femoral vein to the calf veins. Upper   extent of thrombus not demonstrated. (Above and below the knee)

## 2023-10-01 NOTE — DIETITIAN NUTRITION RISK NOTIFICATION - TREATMENT: THE FOLLOWING DIET HAS BEEN RECOMMENDED
Diet, NPO with Tube Feed:   Tube Feeding Modality: Gastrostomy  Nurys Santiago Peptide 1.5  Total Volume for 24 Hours (mL): 1440  Continuous  Starting Tube Feed Rate {mL per Hour}: 20  Increase Tube Feed Rate by (mL): 20     Every 4 hours  Until Goal Tube Feed Rate (mL per Hour): 60  Tube Feed Duration (in Hours): 24  Tube Feed Start Time: 00:00  Free Water Flush  Free Water Flush Instructions:  free water flushes of 30 cc/hr; adjust prn to maintain hydration as per MD (10-01-23 @ 12:27) [Pending Verification By Attending]  Diet, NPO with Tube Feed:   Tube Feeding Modality: Gastrostomy  Jevity 1.5 Ranjit (JEVITY1.5)  Total Volume for 24 Hours (mL): 1440  Bolus  Total Volume of Bolus (mL):  240  Total # of Feeds: 4  Tube Feed Frequency: Every 4 hours   Tube Feed Start Time: 12:00  Bolus Feed Rate (mL per Hour): 360   Bolus Feed Duration (in Hours): 1  Bolus   Total Volume per Flush (mL): 200   Frequency: Every 4 Hours (10-01-23 @ 11:41) [Active]

## 2023-10-01 NOTE — DIETITIAN INITIAL EVALUATION ADULT - PERTINENT LABORATORY DATA
10-01    138  |  111<H>  |  20  ----------------------------<  95  3.8   |  22  |  0.51    Ca    7.4<L>      01 Oct 2023 07:44    TPro  4.9<L>  /  Alb  1.8<L>  /  TBili  0.8  /  DBili  x   /  AST  34  /  ALT  42  /  AlkPhos  221<H>  10-01

## 2023-10-01 NOTE — CONSULT NOTE ADULT - SUBJECTIVE AND OBJECTIVE BOX
HPI:    88 y/o M w/ PMH of BPH, diverticulosis, GERD, HTN, PED placement, necrotizing enterocolitis, osteoporosis, parkinson's dementia, TIA prostate cancer, hearing loss, CVA, p/w worsening RLE / LUE swelling.     101/2023- Spoke with son and wife over the phone. Stated [facility] was diagnosed with RLE DVT and was discharged on Eliquis 5 mg PO daily, since September 21st. He was also treated with antibiotics for cellulitis and before he was on Amoxicillin for PNA.    He was discharged last Thursday, and yesterday (Saturday) family noticed patient's RLE and LUE swelling was worsening. Patient's son states they were told patient had ulcers in intestines, and considered risk of this prior to starting Eliquis. Son states patient has brown stool, no melena / hematochezia. States patient is an aspiration risk, and previously treated for aspiration PNA as well. [facility] was also told [facility] that patient had [facility] in his lungs. On day pf admission patient also c/o HA.    PAST MEDICAL & SURGICAL HISTORY:    Parkinson disease    HTN (hypertension)    Osteoporosis    Prostate cancer    Personal history of transient ischemic attack (TIA), and cerebral infarction without residual deficits    Syncope and collapse    Bacteremia    Benign prostatic hyperplasia with lower urinary tract symptoms    Necrotizing enterocolitis- On PEG tube feeding    Sensorineural hearing loss (SNHL) of both ears    GERD (gastroesophageal reflux disease)    Colonic polyp    Diverticulosis    Nonrheumatic aortic valve insufficiency    Chronic rhinitis    H/O hernia repair    FAMILY HISTORY:    Family history unobtainable due to patient's condition    MEDICATIONS  (STANDING):    apixaban 5 milliGRAM(s) Enteral Tube every 12 hours  ascorbic acid 500 milliGRAM(s) Oral daily  aspirin  chewable 81 milliGRAM(s) Oral daily  atorvastatin 20 milliGRAM(s) Oral at bedtime  carbidopa/levodopa  25/100 1 Tablet(s) Oral <User Schedule>  carbidopa/levodopa  25/100 1.5 Tablet(s) Oral <User Schedule>  donepezil 5 milliGRAM(s) Oral at bedtime  doxazosin 2 milliGRAM(s) Oral at bedtime  furosemide   Injectable 40 milliGRAM(s) IV Push once  lisinopril 2.5 milliGRAM(s) Oral every 12 hours  metoprolol tartrate 25 milliGRAM(s) Oral daily  pantoprazole    Tablet 40 milliGRAM(s) Oral before breakfast  piperacillin/tazobactam IVPB.. 3.375 Gram(s) IV Intermittent every 8 hours  potassium chloride   Powder 20 milliEquivalent(s) Oral once  QUEtiapine 25 milliGRAM(s) Oral two times a day  vancomycin  IVPB      vancomycin  IVPB 1000 milliGRAM(s) IV Intermittent every 12 hours    MEDICATIONS  (PRN):      ALLERGIES:    apple (Anaphylaxis)  raw, fresh fruits- reynaldo family (apple, pear, apricot, peach, fig); processed/cooked is ok. (Anaphylaxis)  No Known Drug Allergies    REVIEW OF SYSTEM     Constitutional, Eyes, ENT, Cardiovascular, Respiratory, Gastrointestinal, Genitourinary, Musculoskeletal, Integumentary, Neurological, Psychiatric, Endocrine, Heme/Lymph and Allergic/Immunologic review of systems are otherwise negative except as noted in HPI.     VITAL SIGNS LAST 24 HRS:    T(C): 37.2 (01 Oct 2023 08:30), Max: 37.4 (30 Sep 2023 22:30)  T(F): 99 (01 Oct 2023 08:30), Max: 99.3 (30 Sep 2023 22:30)  HR: 88 (01 Oct 2023 08:30) (82 - 88)  BP: 113/70 (01 Oct 2023 08:30) (113/70 - 168/84)  BP(mean): 107 (30 Sep 2023 19:59) (107 - 107)  RR: 19 (01 Oct 2023 08:30) (19 - 20)  SpO2: 92% (01 Oct 2023 08:30) (91% - 96%)    Parameters below as of 01 Oct 2023 08:30  Patient On (Oxygen Delivery Method): room air    PHYSICAL EXAM:    CONSTITUTIONAL : NAD, appear to be of stated age , well groomed   NERVOUS SYSTEM:  Alert & Oriented X , no focal deficits.   HEAD:  Atraumatic, Normocephalic  EYES: EOMI, PERRLA, conjunctiva and sclera clear  HEENT: Moist mucous membranes, Supple neck , No JVD  CHEST: auscultation bilaterally; No rales, no rhonchi, no wheezing  HEART: Regular rate and rhythm; No murmurs, no rubs or gallops  ABDOMEN: Soft, Non-tender, Non-distended; Bowel sounds present, no guarding , no peritoneal irritation   GENITOURINARY- Voiding, no suprapubic tenderness  EXTREMITIES:    MUSCULOSKELETAL:- No muscle tenderness, Muscle tone normal, No joint tenderness, no Joint swelling,  Joint ROM –normal   SKIN-no rash, no lesion      LABS:    CBC Full  -  ( 01 Oct 2023 07:44 )  WBC Count : 2.12 K/uL  RBC Count : 3.04 M/uL  Hemoglobin : 8.7 g/dL  Hematocrit : 26.0 %  Platelet Count - Automated : 79 K/uL  Mean Cell Volume : 85.5 fl  Mean Cell Hemoglobin : 28.6 pg  Mean Cell Hemoglobin Concentration : 33.5 gm/dL  Auto Neutrophil # : 1.23 K/uL  Auto Lymphocyte # : 0.25 K/uL  Auto Monocyte # : 0.34 K/uL  Auto Eosinophil # : 0.28 K/uL  Auto Basophil # : 0.00 K/uL  Auto Neutrophil % : 58.0 %  Auto Lymphocyte % : 12.0 %  Auto Monocyte % : 16.0 %  Auto Eosinophil % : 13.0 %  Auto Basophil % : 0.0 %    10-01    138  |  111<H>  |  20  ----------------------------<  95  3.8   |  22  |  0.51    Ca    7.4<L>      01 Oct 2023 07:44    TPro  4.9<L>  /  Alb  1.8<L>  /  TBili  0.8  /  DBili  x   /  AST  34  /  ALT  42  /  AlkPhos  221<H>  10-01    PT/INR - ( 01 Oct 2023 07:44 )   PT: 18.5 sec;   INR: 1.66 ratio     PTT - ( 01 Oct 2023 07:44 )  PTT:30.7 sec    Urinalysis Basic - ( 01 Oct 2023 07:44 )    Color: x / Appearance: x / SG: x / pH: x  Gluc: 95 mg/dL / Ketone: x  / Bili: x / Urobili: x   Blood: x / Protein: x / Nitrite: x   Leuk Esterase: x / RBC: x / WBC x   Sq Epi: x / Non Sq Epi: x / Bacteria: x    RADIOLOGY & ADDITIONAL STUDIES:    EXAM:  US DPLX LWR EXT VEINS LTD RT   ORDERED BY: RONALDO CHÁVEZ     PROCEDURE DATE:  09/30/2023      INTERPRETATION:  CLINICAL INFORMATION: Right lower extremity edema    COMPARISON: None available.    TECHNIQUE: Duplex sonography of the RIGHT LOWER extremity veins with   color and spectral Doppler, with and without compression.    FINDINGS:    Occlusive thrombus is identified from the level of the common femoral   vein to the calf veins.    The left common femoral vein is patent.    IMPRESSION:      Occlusive thrombosis right common femoral vein to the calf veins. Upper   extent of thrombus not demonstrated. (Above and below the knee)      EXAM:  US DPLX UPR EXT VEINS LTD LT   ORDERED BY: RONALDO CHÁVEZ     PROCEDURE DATE:  09/30/2023      INTERPRETATION:  CLINICAL INFORMATION: Left upper extremity swelling.   Assess for DVT.    COMPARISON: None available.    TECHNIQUE: Duplex sonography of the LEFT UPPER extremity veins with color   and spectral Doppler, with and without compression.    FINDINGS:    The left internal jugular, subclavian, axillary and brachial veins are   patent and compressible where applicable.  The basilic vein (superficial   vein) is patent and without thrombus.  The cephalic vein (superficial   vein) is patent and without thrombus.    Doppler examination shows normal spontaneous and phasic flow.    IMPRESSION:  No evidence of left upper extremity deep venous thrombosis.        EXAM:  CT CHEST IC     PROCEDURE DATE:  09/30/2023      INTERPRETATION:  INDICATION: Productive cough    TECHNIQUE: Helical acquisition of the chest after the administration of   90 mL of Omnipaque 350. Maximum intensity projection images were   generated.    COMPARISON: 8/19/2023.    FINDINGS:    LUNGS/AIRWAYS/PLEURA: Patent trachea and bronchi. Bilateral interlobular   septal thickening. Unchanged mild distal airway impaction in the medial   segment of the middle lobe. New moderate pleural effusions and associated   passive atelectasis of the lower lobes.    LYMPH NODES/MEDIASTINUM: Unremarkable.    HEART/VASCULATURE: Normal heart size. No pericardial effusion. Coronary   artery calcifications. Normal caliber aorta. Calcified aortic valve.    UPPER ABDOMEN: Gastrostomy balloon in the stomach. Right renal cyst.    BONES: Unchanged L2 compression fracture. Old fractures of multiple ribs   and the right clavicle.    IMPRESSION:    Interstitial pulmonary edema and moderate pleural effusions.       EXAM:  CT BRAIN       PROCEDURE DATE:  09/30/2023      INTERPRETATION:  EXAM: CT HEAD WITHOUT INTRAVENOUS CONTRAST    HISTORY: Headache    TECHNIQUE: Multiple axial images were obtained at 5 mm intervals from the   skull base to the vertex. Sagittal and coronal reformatted images were   obtained from the axial data set. The images were reviewed in brain and   bone windows.    COMPARISON: CT of the head August 19, 2023    FINDINGS:    There is no acute intracranial hemorrhage. There are areas of decreased   attenuation in the deep and periventricular white matter, compatible with   chronic small vessel disease. There is a chronic infarct in the left   thalamus. There is central parenchymal volume loss. There is no   hydrocephalus. The extra-axial spaces and basal cisterns are within   normal limits. There is no midline shift or mass effect present.    The cranial cervical junction is within normal limits. The sella is not   expanded. There is no depressed calvarial fracture. There is mucosal   thickening in the left sphenoid sinus and throughout the ethmoid air   cells. There is a right-sided mastoid effusion. The visualized orbits are   within normal limits.    IMPRESSION:    1.  No evidence of acute intracranial hemorrhage or midline shift.  2.  Chronic white matter changes as discussed above with central   parenchymal volume loss. If there is concern for acute neurologic   compromise, recommend MRI of the brain for further evaluation.           HPI:    86 y/o M w/ PMH of BPH, diverticulosis, GERD, HTN, PED placement, necrotizing enterocolitis, osteoporosis, parkinson's dementia, TIA prostate cancer, hearing loss, CVA, p/w worsening RLE / LUE swelling.     10/1/2023- Spoke with son and wife over the phone. Stated [facility] with RLE DVT and was discharged on Eliquis 5 mg PO daily, since September 21st. He was also treated with antibiotics for cellulitis and before he was on Amoxicillin for PNA.    He was discharged last Thursday, and yesterday (Saturday) family noticed patient's RLE and LUE swelling was worsening. Patient's son states they were told patient had ulcers in intestines, and considered risk of this prior to starting Eliquis. Son states patient has brown stool, no melena / hematochezia. States patient is an aspiration risk, and previously treated for aspiration PNA as well. [facility] that [facility] had [facility] in his [facility]. On day pf admission patient also c/o HA.    10/1/2023- Seen at bedside, alert, oriented x 1 (name), however with incomprehensible speech. Per RN intermittently with clear speech.     PAST MEDICAL & SURGICAL HISTORY:    Parkinson disease    HTN (hypertension)    Osteoporosis    Prostate cancer    Personal history of transient ischemic attack (TIA), and cerebral infarction without residual deficits    Syncope and collapse    Bacteremia    Benign prostatic hyperplasia with lower urinary tract symptoms    Necrotizing enterocolitis- On PEG tube feeding    Sensorineural hearing loss (SNHL) of both ears    GERD (gastroesophageal reflux disease)    Colonic polyp    Diverticulosis    Nonrheumatic aortic valve insufficiency    Chronic rhinitis    H/O hernia repair    FAMILY HISTORY:    Family history unobtainable due to patient's condition    MEDICATIONS  (STANDING):    apixaban 5 milliGRAM(s) Enteral Tube every 12 hours  ascorbic acid 500 milliGRAM(s) Oral daily  aspirin  chewable 81 milliGRAM(s) Oral daily  atorvastatin 20 milliGRAM(s) Oral at bedtime  carbidopa/levodopa  25/100 1 Tablet(s) Oral <User Schedule>  carbidopa/levodopa  25/100 1.5 Tablet(s) Oral <User Schedule>  donepezil 5 milliGRAM(s) Oral at bedtime  doxazosin 2 milliGRAM(s) Oral at bedtime  furosemide   Injectable 40 milliGRAM(s) IV Push once  lisinopril 2.5 milliGRAM(s) Oral every 12 hours  metoprolol tartrate 25 milliGRAM(s) Oral daily  pantoprazole    Tablet 40 milliGRAM(s) Oral before breakfast  piperacillin/tazobactam IVPB.. 3.375 Gram(s) IV Intermittent every 8 hours  potassium chloride   Powder 20 milliEquivalent(s) Oral once  QUEtiapine 25 milliGRAM(s) Oral two times a day  vancomycin  IVPB      vancomycin  IVPB 1000 milliGRAM(s) IV Intermittent every 12 hours    MEDICATIONS  (PRN):      ALLERGIES:    apple (Anaphylaxis)  raw, fresh fruits- reynaldo family (apple, pear, apricot, peach, fig); processed/cooked is ok. (Anaphylaxis)  No Known Drug Allergies    REVIEW OF SYSTEM     Unable to obtain due to baseline confusion.     VITAL SIGNS LAST 24 HRS:    T(C): 37.2 (01 Oct 2023 08:30), Max: 37.4 (30 Sep 2023 22:30)  T(F): 99 (01 Oct 2023 08:30), Max: 99.3 (30 Sep 2023 22:30)  HR: 88 (01 Oct 2023 08:30) (82 - 88)  BP: 113/70 (01 Oct 2023 08:30) (113/70 - 168/84)  BP(mean): 107 (30 Sep 2023 19:59) (107 - 107)  RR: 19 (01 Oct 2023 08:30) (19 - 20)  SpO2: 92% (01 Oct 2023 08:30) (91% - 96%)    Parameters below as of 01 Oct 2023 08:30  Patient On (Oxygen Delivery Method): room air    PHYSICAL EXAM:    CONSTITUTIONAL : NAD, appear to be of stated age, frail appearing elderly.  NERVOUS SYSTEM:  Alert & Oriented X 1, poorly assessed.   HEAD:  Atraumatic, Normocephalic  EYES: EOMI, PERRLA, conjunctiva and sclera clear  HEENT: Moist mucous membranes, Supple neck , No JVD  CHEST: b/l lower lungs with decreased BS.  HEART: Regular rate and rhythm; No murmurs, no rubs or gallops  ABDOMEN: Soft, Non-tender, Non-distended; Bowel sounds present.  GENITOURINARY- Incontinent, with diapers  EXTREMITIES:    MUSCULOSKELETAL:- RLE with swelling and scabs/ ulcers No obvious deformities, Right upper extremity with swelling as well, no erythema.  SKIN-no rash, no lesion      LABS:    CBC Full  -  ( 01 Oct 2023 07:44 )  WBC Count : 2.12 K/uL  RBC Count : 3.04 M/uL  Hemoglobin : 8.7 g/dL  Hematocrit : 26.0 %  Platelet Count - Automated : 79 K/uL  Mean Cell Volume : 85.5 fl  Mean Cell Hemoglobin : 28.6 pg  Mean Cell Hemoglobin Concentration : 33.5 gm/dL  Auto Neutrophil # : 1.23 K/uL  Auto Lymphocyte # : 0.25 K/uL  Auto Monocyte # : 0.34 K/uL  Auto Eosinophil # : 0.28 K/uL  Auto Basophil # : 0.00 K/uL  Auto Neutrophil % : 58.0 %  Auto Lymphocyte % : 12.0 %  Auto Monocyte % : 16.0 %  Auto Eosinophil % : 13.0 %  Auto Basophil % : 0.0 %    10-01    138  |  111<H>  |  20  ----------------------------<  95  3.8   |  22  |  0.51    Ca    7.4<L>      01 Oct 2023 07:44    TPro  4.9<L>  /  Alb  1.8<L>  /  TBili  0.8  /  DBili  x   /  AST  34  /  ALT  42  /  AlkPhos  221<H>  10-01    PT/INR - ( 01 Oct 2023 07:44 )   PT: 18.5 sec;   INR: 1.66 ratio     PTT - ( 01 Oct 2023 07:44 )  PTT:30.7 sec    Urinalysis Basic - ( 01 Oct 2023 07:44 )    Color: x / Appearance: x / SG: x / pH: x  Gluc: 95 mg/dL / Ketone: x  / Bili: x / Urobili: x   Blood: x / Protein: x / Nitrite: x   Leuk Esterase: x / RBC: x / WBC x   Sq Epi: x / Non Sq Epi: x / Bacteria: x    RADIOLOGY & ADDITIONAL STUDIES:    EXAM:  US DPLX LWR EXT VEINS LTD RT   ORDERED BY: RONALDO CHÁVEZ     PROCEDURE DATE:  09/30/2023      INTERPRETATION:  CLINICAL INFORMATION: Right lower extremity edema    COMPARISON: None available.    TECHNIQUE: Duplex sonography of the RIGHT LOWER extremity veins with   color and spectral Doppler, with and without compression.    FINDINGS:    Occlusive thrombus is identified from the level of the common femoral   vein to the calf veins.    The left common femoral vein is patent.    IMPRESSION:      Occlusive thrombosis right common femoral vein to the calf veins. Upper   extent of thrombus not demonstrated. (Above and below the knee)      EXAM:  US DPLX UPR EXT VEINS LTD LT   ORDERED BY: RONALDO CHÁVEZ     PROCEDURE DATE:  09/30/2023      INTERPRETATION:  CLINICAL INFORMATION: Left upper extremity swelling.   Assess for DVT.    COMPARISON: None available.    TECHNIQUE: Duplex sonography of the LEFT UPPER extremity veins with color   and spectral Doppler, with and without compression.    FINDINGS:    The left internal jugular, subclavian, axillary and brachial veins are   patent and compressible where applicable.  The basilic vein (superficial   vein) is patent and without thrombus.  The cephalic vein (superficial   vein) is patent and without thrombus.    Doppler examination shows normal spontaneous and phasic flow.    IMPRESSION:  No evidence of left upper extremity deep venous thrombosis.        EXAM:  CT CHEST IC     PROCEDURE DATE:  09/30/2023      INTERPRETATION:  INDICATION: Productive cough    TECHNIQUE: Helical acquisition of the chest after the administration of   90 mL of Omnipaque 350. Maximum intensity projection images were   generated.    COMPARISON: 8/19/2023.    FINDINGS:    LUNGS/AIRWAYS/PLEURA: Patent trachea and bronchi. Bilateral interlobular   septal thickening. Unchanged mild distal airway impaction in the medial   segment of the middle lobe. New moderate pleural effusions and associated   passive atelectasis of the lower lobes.    LYMPH NODES/MEDIASTINUM: Unremarkable.    HEART/VASCULATURE: Normal heart size. No pericardial effusion. Coronary   artery calcifications. Normal caliber aorta. Calcified aortic valve.    UPPER ABDOMEN: Gastrostomy balloon in the stomach. Right renal cyst.    BONES: Unchanged L2 compression fracture. Old fractures of multiple ribs   and the right clavicle.    IMPRESSION:    Interstitial pulmonary edema and moderate pleural effusions.       EXAM:  CT BRAIN       PROCEDURE DATE:  09/30/2023      INTERPRETATION:  EXAM: CT HEAD WITHOUT INTRAVENOUS CONTRAST    HISTORY: Headache    TECHNIQUE: Multiple axial images were obtained at 5 mm intervals from the   skull base to the vertex. Sagittal and coronal reformatted images were   obtained from the axial data set. The images were reviewed in brain and   bone windows.    COMPARISON: CT of the head August 19, 2023    FINDINGS:    There is no acute intracranial hemorrhage. There are areas of decreased   attenuation in the deep and periventricular white matter, compatible with   chronic small vessel disease. There is a chronic infarct in the left   thalamus. There is central parenchymal volume loss. There is no   hydrocephalus. The extra-axial spaces and basal cisterns are within   normal limits. There is no midline shift or mass effect present.    The cranial cervical junction is within normal limits. The sella is not   expanded. There is no depressed calvarial fracture. There is mucosal   thickening in the left sphenoid sinus and throughout the ethmoid air   cells. There is a right-sided mastoid effusion. The visualized orbits are   within normal limits.    IMPRESSION:    1.  No evidence of acute intracranial hemorrhage or midline shift.  2.  Chronic white matter changes as discussed above with central   parenchymal volume loss. If there is concern for acute neurologic   compromise, recommend MRI of the brain for further evaluation.

## 2023-10-01 NOTE — PROVIDER CONTACT NOTE (OTHER) - SITUATION
Kiah Rivero at Dr. Escobar's answering service of consul
Kiah Rivero at answering service of consult

## 2023-10-01 NOTE — DIETITIAN INITIAL EVALUATION ADULT - ADD RECOMMEND
1) change TF rx to above - please switch pt to continuous TF for home w/ pump 2/2 high risk for aspiration, 2) Monitor tolerance; maintain STRICT aspiration precautions, back of bed >45 degrees, 3) daily wts to track/trend changes, 4) monitor lytes/ min and replete prn, 5) Monitor bowel movements, if no BM for >3 days, consider implementing bowel regimen, 6) Obtain vitamin D 25OH level to assess nutriture, 7) Consider adding thiamine 100 mg daily 2/2 poor PO intake/ malnutrition. RD will continue to monitor TF tolerance, labs, hydration, and wt prn.

## 2023-10-01 NOTE — CONSULT NOTE ADULT - ASSESSMENT
88 y/o Male w/ MH of BPH, diverticulosis, GERD, HTN, PEG placement for necrotizing enterocolitis, osteoporosis, parkinson's dementia, TIA prostate cancer, hearing loss, CVA, p/w worsening RLE / LUE swelling, found to have pancytopenia.    # Pancytopenia    Thrombocytopenia in setting of recent hospitalization and multi drug exposure  -Patient last known normal platelets ~200 K/ul in 9/13/2023  - Now with acute drop- plts-  -Multifactorial- recent admission- most likely exposed to Heparin, multiple antibiotics use, active infectious processes.  -Inflammation / infection vs drug-induced thrombocytopenia (non-immune) most common causes of acute decline in platelets.  Other causes include immune-mediated platelet destruction/ ITP, bone marrow suppression, or platelet consumption/destruction, hemodilution due to massive transfusion/ recent hypovolemic shock 2/2  blood loss   -Platelets -  -r/o DIC --> sent Fibrogen and LDH, since high D-Dimer, high PT, but normal aPTT, consumption due to  compensated DIC  -If due to increased destruction of platelets, patients are generally acutely ill, and treatment is directed at the underlying infection. Thrombocytopenia may be an incidental finding that resolves spontaneously as the patient recovers; however, in some persistent infections,  thrombocytopenia may also persist.    -Sent PF4 ab to r/o HIT if any recent heparin exposure  -Sent iron studies to r/o nutritional deficiencies  -Continue to monitor serial CBC  -Repeat CBC using a non-EDTA anticoagulant- platelet count, blue top  -Per son and wife patient recently f/u with pulmonologist for PNA (Tx with Amoxicillin and was noticed with RLE swelling and was sent to ED--> [facility] and was discharged-9/28/23 on Eliquis 5 mg PO daily, per son he has started since 9/21/23. [facility] he was also treated with antibiotics for cellulitis.  -Currently Septic with + Rhinovirus /Enterovirus /PNA Cellulitis--> lactate- 2.6-->3.0  -On Antibiotics      Normocytic Anemia    -Multifactorial- recent hospitalization, could be dilutional, could be nutritional deficiency, bone marrow suppression due to ongoing infections/Tx with antibiotic use.  -Labs ordered - CBC with reticulocyte count, iron profile, TSH  LFTs- slightly elevated  - ? Hemolysis? -labs sent - LDH, indirect bilirubin, haptoglobin  - B12/folate def? B12, folate,  - given Hx of Peptic Ulcer per son- will send FOBT  - Continue close monitoring CBC  - Supportive measures-Antibiotics/ gentle IVF/Nutrition  - Transfuse as needed for being symptomatic and if hemodynamically unstable    Leukopenia/ Neutropenia  in acute setting     - Leukopenia--Multi factorial--malnutrition, bone marrow suppression in the setting of possible ongoing sepsis.  - Continue close monitoring CBC  - Supportive measures-Antibiotics/ gentle IVF/Nutrition    # RLE DVT     -LE U/S: Occlusive thrombosis right common femoral vein to the calf veins. Upper extent of thrombus not demonstrated. (Above and below the knee)  -LUE swelling -> No evidence of left upper extremity deep venous thrombosis.  -Vascular consult   -Patient CT chest w/ IV contrast, however, it wasn't a PE protocol.   -High D-dimer   -Currently on Eliquis- no signs of active bleeding- per son/wife stool noted to be normal color, no hematochezia   -d/w Dr. Bain- labs sent to r/o HIT, plan to continue on Eliquis unless hgb drops lower, signs of infection of HIT +    # Pulmonary edema / Pleural effusions - Possible CHF? (EF unknown)     -CT chest:  Interstitial pulmonary edema and moderate pleural effusions  -Cardio consult    -Pulm consult      86 y/o Male w/ MH of BPH, diverticulosis, GERD, HTN, PEG placement for necrotizing enterocolitis, osteoporosis, parkinson's dementia, TIA prostate cancer, hearing loss, CVA, p/w worsening RLE / LUE swelling, found to have pancytopenia.    # Pancytopenia    Thrombocytopenia in setting of recent hospitalization and multi drug exposure  -Patient last known normal platelets ~200 K/ul in 9/13/2023  - Now with acute drop- plts-  -Multifactorial- recent admission- most likely exposed to Heparin, multiple antibiotics use, active infectious processes.  -Inflammation / infection vs drug-induced thrombocytopenia (non-immune) most common causes of acute decline in platelets.  Other causes include immune-mediated platelet destruction/ ITP, bone marrow suppression, or platelet consumption/destruction, hemodilution due to massive transfusion/ recent hypovolemic shock 2/2  blood loss   -Platelets -  -r/o DIC --> sent Fibrogen and LDH, since high D-Dimer, high PT, but normal aPTT, consumption due to  compensated DIC  -If due to increased destruction of platelets, patients are generally acutely ill, and treatment is directed at the underlying infection. Thrombocytopenia may be an incidental finding that resolves spontaneously as the patient recovers; however, in some persistent infections,  thrombocytopenia may also persist.    -Sent PF4 ab to r/o HIT if any recent heparin exposure  -Sent iron studies to r/o nutritional deficiencies  -Continue to monitor serial CBC  -Repeat CBC using a non-EDTA anticoagulant- platelet count, blue top  -Per son and wife patient recently f/u with pulmonologist for PNA (Tx with Amoxicillin and was noticed with RLE swelling and was sent to ED--> [facility] with [facility] and was discharged-9/28/23 on Eliquis 5 mg PO daily, per son he has started since 9/21/23. [facility] he was also treated with antibiotics for cellulitis.  -Currently Septic with + Rhinovirus /Enterovirus /PNA Cellulitis--> lactate- 2.6-->3.0  -On Antibiotics      Normocytic Anemia    -Multifactorial- recent hospitalization, could be dilutional, could be nutritional deficiency, bone marrow suppression due to ongoing infections/Tx with antibiotic use.  -Labs ordered - CBC with reticulocyte count, iron profile, TSH  LFTs- slightly elevated  - ? Hemolysis? -labs sent - LDH, indirect bilirubin, haptoglobin  - B12/folate def? B12, folate,  - given Hx of Peptic Ulcer per son- will send FOBT  - Continue close monitoring CBC  - Supportive measures-Antibiotics/ gentle IVF/Nutrition  - Transfuse as needed for being symptomatic and if hemodynamically unstable    Leukopenia/ Neutropenia  in acute setting     - Leukopenia--Multi factorial--malnutrition, bone marrow suppression in the setting of ongoing sepsis/ viral infection  - Continue close monitoring CBC  - Supportive measures-Antibiotics/ gentle IVF/Nutrition    # RLE DVT     -LE U/S: Occlusive thrombosis right common femoral vein to the calf veins. Upper extent of thrombus not demonstrated. (Above and below the knee)  -LUE swelling -> No evidence of left upper extremity deep venous thrombosis.  -Vascular consult   -Patient CT chest w/ IV contrast, however, it wasn't a PE protocol.   -High D-dimer   -Currently on Eliquis- no signs of active bleeding- per son/wife stool noted to be normal color, no hematochezia   -d/w Dr. Bain- labs sent to r/o HIT, plan to continue on Eliquis unless hgb drops lower, signs of bleeding or HIT +    # Pulmonary edema / Pleural effusions - Possible CHF? (EF unknown)     -CT chest:  Interstitial pulmonary edema and moderate pleural effusions  -Cardio consult    -Pulm consult      88 y/o Male w/ MH of BPH, diverticulosis, GERD, HTN, PEG placement for necrotizing enterocolitis, osteoporosis, parkinson's dementia, TIA prostate cancer, hearing loss, CVA, p/w worsening RLE / LUE swelling, found to have pancytopenia.    # Pancytopenia    Thrombocytopenia in setting of recent hospitalization and multi drug exposure    -Patient last known normal platelets ~200 K/ul in 9/13/2023  - Now with acute drop- plts-79 k/ul<--91K/ul  -Multifactorial- recent admission- most likely exposed to Heparin, multiple antibiotics use, active infectious processes.  -Inflammation / infection vs drug-induced thrombocytopenia (non-immune) most common causes of acute decline in platelets.  Other causes include immune-mediated platelet destruction/ ITP, bone marrow suppression, or platelet consumption/destruction, hemodilution due to massive transfusion/ recent hypovolemic shock 2/2  blood loss   -Platelets -  -r/o DIC --> sent Fibrogen and LDH, since high D-Dimer, high PT, but normal aPTT, consumption due to  compensated DIC  -If due to increased destruction of platelets, patients are generally acutely ill, and treatment is directed at the underlying infection. Thrombocytopenia may be an incidental finding that resolves spontaneously as the patient recovers; however, in some persistent infections,  thrombocytopenia may also persist.    -Sent PF4 ab to r/o HIT if any recent heparin exposure  -Sent iron studies to r/o nutritional deficiencies  -Continue to monitor serial CBC  -Repeat CBC using a non-EDTA anticoagulant- platelet count, blue top  -Per son and wife patient recently f/u with pulmonologist for PNA (Tx with Amoxicillin and was noticed with RLE swelling and was sent to ED--> [facility] with [facility] and was discharged-9/28/23 on Eliquis 5 mg PO daily, per son he has started since 9/21/23. [facility] he was also treated with antibiotics for cellulitis.  -Currently Septic with + Rhinovirus /Enterovirus /PNA Cellulitis--> lactate- 2.6-->3.0  -d/w Dr. Bain- labs sent to r/o HIT, plan to continue on Eliquis unless plts dropped < 50 K/ul, has signs of bleeding or HIT +     Normocytic Anemia    -Multifactorial- recent hospitalization, could be dilutional, could be nutritional deficiency, bone marrow suppression due to ongoing infections/Tx with antibiotic use.  -Labs ordered - CBC with reticulocyte count, iron profile, TSH  LFTs- slightly elevated  - ? Hemolysis? -labs sent - LDH, indirect bilirubin, haptoglobin  - B12/folate def? B12, folate,  - given Hx of Peptic Ulcer per son- will send FOBT  - Continue close monitoring CBC  -d/w Dr. Bain- labs sent hemolysis labs, plan to continue on Eliquis unless hgb drops lower, has signs of bleeding or HIT +  - Supportive measures-Antibiotics/ gentle IVF/Nutrition  - Transfuse as needed for being symptomatic and if hemodynamically unstable    Leukopenia/ Neutropenia  in acute setting     - Leukopenia--Multi factorial--malnutrition, bone marrow suppression in the setting of ongoing sepsis/ viral infection  - Continue close monitoring CBC  - Supportive measures-Antibiotics/ gentle IVF/Nutrition    # RLE DVT     -LE U/S: Occlusive thrombosis right common femoral vein to the calf veins. Upper extent of thrombus not demonstrated. (Above and below the knee)  -LUE swelling -> No evidence of left upper extremity deep venous thrombosis.  -Vascular consult   -Patient CT chest w/ IV contrast, however, it wasn't a PE protocol.   -High D-dimer   -Currently on Eliquis- no signs of active bleeding- per son/wife stool noted to be normal color, no hematochezia   -d/w Dr. Bain- labs sent to r/o HIT, plan to continue on Eliquis unless hgb/platelets drop lower, has signs of bleeding or HIT +    # Pulmonary edema / Pleural effusions - Possible CHF? (EF unknown)     -CT chest:  Interstitial pulmonary edema and moderate pleural effusions  -Cardio consult    -Pulm consult      86 y/o Male w/ MH of BPH, diverticulosis, GERD, HTN, PEG placement for necrotizing enterocolitis, osteoporosis, parkinson's dementia, TIA prostate cancer, hearing loss, CVA, multiple recent hospitalization for PNA, DVT, [facility],  p/w worsening RLE / LUE swelling, found to have pancytopenia.    # Pancytopenia    Thrombocytopenia in setting of recent hospitalization and multi drug exposure    -Patient last known normal platelets ~200 K/ul in 9/13/2023  - Now with acute drop- plts-79 k/ul<--91K/ul  -Multifactorial- recent admission- most likely exposed to Heparin, multiple antibiotics use, active infectious processes.  -Inflammation / infection vs drug-induced thrombocytopenia (non-immune) most common causes of acute decline in platelets.  Other causes include immune-mediated platelet destruction/ ITP, bone marrow suppression, or platelet consumption/destruction, hemodilution due to massive transfusion/ recent hypovolemic shock 2/2  blood loss   -Platelets -  -r/o DIC --> sent Fibrogen and LDH, since high D-Dimer, high PT, but normal aPTT, consumption due to  compensated DIC  -If due to increased destruction of platelets, patients are generally acutely ill, and treatment is directed at the underlying infection. Thrombocytopenia may be an incidental finding that resolves spontaneously as the patient recovers; however, in some persistent infections,  thrombocytopenia may also persist.    -Sent PF4 ab to r/o HIT if any recent heparin exposure  -Sent iron studies to r/o nutritional deficiencies  -Continue to monitor serial CBC  -Repeat CBC using a non-EDTA anticoagulant- platelet count, blue top  -Per son and wife patient recently f/u with pulmonologist for PNA (Tx with Amoxicillin and was noticed with RLE swelling and was sent to ED--> [facility] with RLE DVT and was discharged-9/28/23 on Eliquis 5 mg PO daily, per son he has started since 9/21/23. [facility] he was also treated with antibiotics for cellulitis.  -Currently Septic with + Rhinovirus /Enterovirus /PNA Cellulitis--> lactate- 2.6-->3.0  -d/w Dr. Bain- labs sent to r/o HIT, plan to continue on Eliquis unless plts dropped < 50 K/ul, has signs of bleeding or HIT +     Normocytic Anemia    -Multifactorial- recent hospitalization, could be dilutional, could be nutritional deficiency, bone marrow suppression due to ongoing infections/Tx with antibiotic use.  -Labs ordered - CBC with reticulocyte count, iron profile, TSH  LFTs- slightly elevated  - ? Hemolysis? -labs sent - LDH, indirect bilirubin, haptoglobin  - B12/folate def? B12, folate,  - given Hx of Peptic Ulcer per son- will send FOBT  - Continue close monitoring CBC  -d/w Dr. Bain- labs sent hemolysis labs, plan to continue on Eliquis unless hgb drops lower, has signs of bleeding or HIT +  - Supportive measures-Antibiotics/ gentle IVF/Nutrition  - Transfuse as needed for being symptomatic and if hemodynamically unstable    Leukopenia/ Neutropenia  in acute setting     - Leukopenia--Multi factorial--malnutrition, bone marrow suppression in the setting of ongoing sepsis/ viral infection  - Continue close monitoring CBC  - Supportive measures-Antibiotics/ gentle IVF/Nutrition    # RLE DVT     -LE U/S: Occlusive thrombosis right common femoral vein to the calf veins. Upper extent of thrombus not demonstrated. (Above and below the knee)  -LUE swelling -> No evidence of left upper extremity deep venous thrombosis.  -Vascular consult   -Patient CT chest w/ IV contrast, however, it wasn't a PE protocol.   -High D-dimer   -Currently on Eliquis- no signs of active bleeding- per son/wife stool noted to be normal color, no hematochezia   -d/w Dr. Bain- labs sent to r/o HIT, plan to continue on Eliquis unless hgb/platelets drop lower, has signs of bleeding or HIT +    # Pulmonary edema / Pleural effusions - Possible CHF? (EF unknown)     -CT chest:  Interstitial pulmonary edema and moderate pleural effusions  -Cardio consult    -Pulm consult

## 2023-10-01 NOTE — DIETITIAN INITIAL EVALUATION ADULT - OTHER INFO
88 y/o M w/ PMH of BPH, diverticulosis, GERD, HTN, PED placement, necrotizing enterocolitis, osteoporosis, parkinson's dementia, TIA prostate cancer, hearing loss, CVA, p/w worsening RLE / LUE swelling. Patient is unable to provide history, son and wife at bedside provides history. States that [facility] where [facility] with RLE D[facility] and treated for cellulitis. He was discharged yesterday, and this AM family noticed patient's RLE and LUE swelling was worsening. Patient's son states they were told patient had ulcers in intestines, and considered risk of this prior to starting Eliquis. Son states patient has brown stool, no melena / hematochezia. States patient is an aspiration risk, and previously treated for aspiration PNA as well. Family was also told at the [facility] that patient had fluid in his lungs. In the morning patient also c/o HA to son. Admitted w/ severe sepsis 2/2 URI/ asp PNA. DNR/DNI.    Pt w/ AMS/ poor historian. Unable to provide diet/ wt hx. RD spoke w/ pt's son (second HCP after wife - [phone]) who provided hx. He tells RD that he wishes for pt to be switched to continuous feeds and that he prefers to not bring in [employer] 1.4 formula this time (HH does not carry but does carry [employer] 1.5 which son is OK with providing instead). Pt is at VERY high aspiration risk and continuous feeds would be much more appropriate for pt given hx of asp PNA and high level of suctioning needed per son. Suggest to switch to continuous feeds w/ pump at home for d/c as well - son states he is agreeable and will discuss w/ pt's wife. RD d/w Dr. Bain as well. Son reports pt has been "declining." UBW ~110#, states he has lost 1-2# but not a large wt loss. Last wt taken by RD on 8/21/23 at 107#. RD took new bed scale wt on 10/1 at 129#, appears inaccurate. Admit wt 110#. Pt is emaciated, very thin, frail, and bony. NFPE reveals severe muscle/ fat wasting; meets clear criteria for PCM. Also met criteria for PCM on past admit. Will provide TF and other recommendations below.

## 2023-10-01 NOTE — DIETITIAN INITIAL EVALUATION ADULT - ORAL INTAKE PTA/DIET HISTORY
on [employer] ([employer] 1.4 1 can (325 mL) x 4 daily bolus feeds via PEG at home. Now NPO - cannot swallow per son. Needs to be suctioned frequently. During Aug 2023 admit was having pleasure feeds. Son tells RD that originally when pt was going home w/ peg feeds had option of pump vs bolus feeds but pt was still active so opted for bolus for independence. Now pt is mostly bed bound. [facility] - previously [facility] for 9 days on continuous feeds and did well per son.

## 2023-10-01 NOTE — CONSULT NOTE ADULT - SUBJECTIVE AND OBJECTIVE BOX
Patient is a 87y old  Male who presents with a chief complaint of SOB    HPI:  86 y/o Male with h/o BPH, RLE DVT, diverticulosis, GERD, HTN, PEG placement, necrotizing enterocolitis, osteoporosis, Parkinson's dementia, PEG placement, TIA prostate cancer, hearing loss, old CVA was admitted on 9/30 for worsening RLE / LUE swelling. Patient is unable to provide history, son and wife at bedside provides history. [facility] that [facility] and treated for cellulitis. He was discharged on the day PTA, and this AM family noticed patient's RLE and LUE swelling was worsening. Patient's son states they were told patient had ulcers in intestines, and considered risk of this prior to starting Eliquis. Son states patient has brown stool, no melena / hematochezia. States patient is an aspiration risk, and previously treated for aspiration PNA as well. [facility] also [facility] that [facility] had [facility] in his [facility]. In ER he received ceftriaxone.     PMH: as above  PSH: as above  Meds: per reconciliation sheet, noted below  MEDICATIONS  (STANDING):  apixaban 5 milliGRAM(s) Enteral Tube every 12 hours  ascorbic acid 500 milliGRAM(s) Oral daily  aspirin  chewable 81 milliGRAM(s) Oral daily  atorvastatin 20 milliGRAM(s) Oral at bedtime  carbidopa/levodopa  25/100 1 Tablet(s) Oral <User Schedule>  carbidopa/levodopa  25/100 1.5 Tablet(s) Oral <User Schedule>  cefTRIAXone Injectable. 1000 milliGRAM(s) IV Push every 24 hours  donepezil 5 milliGRAM(s) Oral at bedtime  doxazosin 2 milliGRAM(s) Oral at bedtime  lisinopril 2.5 milliGRAM(s) Oral every 12 hours  metoprolol tartrate 25 milliGRAM(s) Oral daily  pantoprazole    Tablet 40 milliGRAM(s) Oral before breakfast  potassium chloride   Powder 20 milliEquivalent(s) Oral once  QUEtiapine 25 milliGRAM(s) Oral two times a day    MEDICATIONS  (PRN):    Allergies    Originally Entered as [Unknown] reaction to [fresh fruits] (Unknown)  apple (Anaphylaxis)  raw, fresh fruits- reynaldo family (apple, pear, apricot, peach, fig); processed/cooked is ok. (Anaphylaxis)  No Known Drug Allergies    Intolerances      Social: no smoking, no alcohol, no illegal drugs; no recent travel, no exposure to TB  FAMILY HISTORY:  Family history unobtainable due to patient's condition    ROS: the patient is confused, limited  All other systems reviewed and are negative    Vital Signs Last 24 Hrs  T(C): 37.2 (01 Oct 2023 08:30), Max: 37.4 (30 Sep 2023 22:30)  T(F): 99 (01 Oct 2023 08:30), Max: 99.3 (30 Sep 2023 22:30)  HR: 88 (01 Oct 2023 08:30) (82 - 88)  BP: 113/70 (01 Oct 2023 08:30) (113/70 - 168/84)  BP(mean): 107 (30 Sep 2023 19:59) (107 - 107)  RR: 19 (01 Oct 2023 08:30) (19 - 20)  SpO2: 92% (01 Oct 2023 08:30) (91% - 96%)    Parameters below as of 01 Oct 2023 08:30  Patient On (Oxygen Delivery Method): room air    PE:    Constitutional:  No acute distress  HEENT: NC/AT, EOMI, PERRLA, conjunctivae clear; ears and nose atraumatic; pharynx benign  Neck: supple; thyroid not palpable  Back: no tenderness  Respiratory: respiratory effort normal; clear to auscultation  Cardiovascular: S1S2 regular, no murmurs  Abdomen: soft, not tender, not distended, positive BS; no liver or spleen organomegaly  Genitourinary: no suprapubic tenderness  Lymphatic: no LN palpable  Musculoskeletal: no muscle tenderness, no joint swelling or tenderness  Extremities: 1+ pedal edema  Neurological/ Psychiatric: Alert, judgement and insight impaired; moving all extremities  Skin: no rashes; no palpable lesions    Labs: all available labs reviewed                        9.2    2.23  )-----------( 83       ( 01 Oct 2023 11:54 )             26.9     10-01    138  |  111<H>  |  20  ----------------------------<  95  3.8   |  22  |  0.51    Ca    7.4<L>      01 Oct 2023 07:44    TPro  4.9<L>  /  Alb  1.8<L>  /  TBili  0.8  /  DBili  x   /  AST  34  /  ALT  42  /  AlkPhos  221<H>  10-01     LIVER FUNCTIONS - ( 01 Oct 2023 07:44 )  Alb: 1.8 g/dL / Pro: 4.9 gm/dL / ALK PHOS: 221 U/L / ALT: 42 U/L / AST: 34 U/L / GGT: x           Urinalysis (09-30 @ 17:55)  Urine Appearance: Clear  Protein, Urine: Negative    Urine Microscopic-Add On (NC) (09-30 @ 17:55)  White Blood Cell - Urine: 0-2 /HPF  Red Blood Cell - Urine: 0-2 /HPF  Comment - Urine: Occasional mucous strands    Rhinovirus (09-30 @ 12:58)  Detected    Radiology: all available radiological tests reviewed    < from: CT Chest w/ IV Cont (09.30.23 @ 17:15) >  Interstitial pulmonary edema and moderate pleural effusions.  < end of copied text >    Advanced directives addressed: full resuscitation Patient is a 87y old  Male who presents with a chief complaint of SOB    HPI:  88 y/o Male with h/o BPH, RLE DVT, diverticulosis, GERD, HTN, PEG placement, necrotizing enterocolitis, osteoporosis, Parkinson's dementia, PEG placement, TIA prostate cancer, hearing loss, old CVA was admitted on 9/30 for worsening RLE / LUE swelling. Patient is unable to provide history, son and wife at bedside provides history. States that [facility] with [facility] D[facility] and treated for cellulitis. He was discharged on the day PTA, and this AM family noticed patient's RLE and LUE swelling was worsening. Patient's son states they were told patient had ulcers in intestines, and considered risk of this prior to starting Eliquis. Son states patient has brown stool, no melena / hematochezia. States patient is an aspiration risk, and previously treated for aspiration PNA as well. [facility] that [facility] had [facility] in his lungs. In ER he received ceftriaxone.     PMH: as above  PSH: as above  Meds: per reconciliation sheet, noted below  MEDICATIONS  (STANDING):  apixaban 5 milliGRAM(s) Enteral Tube every 12 hours  ascorbic acid 500 milliGRAM(s) Oral daily  aspirin  chewable 81 milliGRAM(s) Oral daily  atorvastatin 20 milliGRAM(s) Oral at bedtime  carbidopa/levodopa  25/100 1 Tablet(s) Oral <User Schedule>  carbidopa/levodopa  25/100 1.5 Tablet(s) Oral <User Schedule>  cefTRIAXone Injectable. 1000 milliGRAM(s) IV Push every 24 hours  donepezil 5 milliGRAM(s) Oral at bedtime  doxazosin 2 milliGRAM(s) Oral at bedtime  lisinopril 2.5 milliGRAM(s) Oral every 12 hours  metoprolol tartrate 25 milliGRAM(s) Oral daily  pantoprazole    Tablet 40 milliGRAM(s) Oral before breakfast  potassium chloride   Powder 20 milliEquivalent(s) Oral once  QUEtiapine 25 milliGRAM(s) Oral two times a day    MEDICATIONS  (PRN):    Allergies    Originally Entered as [Unknown] reaction to [fresh fruits] (Unknown)  apple (Anaphylaxis)  raw, fresh fruits- reynaldo family (apple, pear, apricot, peach, fig); processed/cooked is ok. (Anaphylaxis)  No Known Drug Allergies    Intolerances      Social: no smoking, no alcohol, no illegal drugs; no recent travel, no exposure to TB  FAMILY HISTORY:  Family history unobtainable due to patient's condition    ROS: the patient is confused, limited  All other systems reviewed and are negative    Vital Signs Last 24 Hrs  T(C): 37.2 (01 Oct 2023 08:30), Max: 37.4 (30 Sep 2023 22:30)  T(F): 99 (01 Oct 2023 08:30), Max: 99.3 (30 Sep 2023 22:30)  HR: 88 (01 Oct 2023 08:30) (82 - 88)  BP: 113/70 (01 Oct 2023 08:30) (113/70 - 168/84)  BP(mean): 107 (30 Sep 2023 19:59) (107 - 107)  RR: 19 (01 Oct 2023 08:30) (19 - 20)  SpO2: 92% (01 Oct 2023 08:30) (91% - 96%)    Parameters below as of 01 Oct 2023 08:30  Patient On (Oxygen Delivery Method): room air    PE:    Constitutional:  No acute distress  HEENT: NC/AT, EOMI, PERRLA, conjunctivae clear; ears and nose atraumatic; pharynx benign  Neck: supple; thyroid not palpable  Back: no tenderness  Respiratory: respiratory effort normal; crackles at bases  Cardiovascular: S1S2 regular, no murmurs  Abdomen: soft, not tender, not distended, positive BS; no liver or spleen organomegaly, PEG  Genitourinary: no suprapubic tenderness  Lymphatic: no LN palpable  Musculoskeletal: no muscle tenderness, no joint swelling or tenderness  Extremities: 2+ pedal edema  Right foot, ankle and calf erythema, edema ans warmth; edema extending to lower right thigh, several dry scabs  Neurological/ Psychiatric: lethargic, judgement and insight impaired; moving all extremities  Skin: no rashes; no palpable lesions    Labs: all available labs reviewed                        9.2    2.23  )-----------( 83       ( 01 Oct 2023 11:54 )             26.9     10-01    138  |  111<H>  |  20  ----------------------------<  95  3.8   |  22  |  0.51    Ca    7.4<L>      01 Oct 2023 07:44    TPro  4.9<L>  /  Alb  1.8<L>  /  TBili  0.8  /  DBili  x   /  AST  34  /  ALT  42  /  AlkPhos  221<H>  10-01     LIVER FUNCTIONS - ( 01 Oct 2023 07:44 )  Alb: 1.8 g/dL / Pro: 4.9 gm/dL / ALK PHOS: 221 U/L / ALT: 42 U/L / AST: 34 U/L / GGT: x           Urinalysis (09-30 @ 17:55)  Urine Appearance: Clear  Protein, Urine: Negative    Urine Microscopic-Add On (NC) (09-30 @ 17:55)  White Blood Cell - Urine: 0-2 /HPF  Red Blood Cell - Urine: 0-2 /HPF  Comment - Urine: Occasional mucous strands    Rhinovirus (09-30 @ 12:58)  Detected    Radiology: all available radiological tests reviewed    < from: CT Chest w/ IV Cont (09.30.23 @ 17:15) >  Interstitial pulmonary edema and moderate pleural effusions.  < end of copied text >    Advanced directives addressed: full resuscitation

## 2023-10-01 NOTE — CONSULT NOTE ADULT - ASSESSMENT
86 y/o Male with h/o BPH, RLE DVT, diverticulosis, GERD, HTN, PEG placement, necrotizing enterocolitis, osteoporosis, Parkinson's dementia, PEG placement, TIA prostate cancer, hearing loss, old CVA was admitted on 9/30 for worsening RLE / LUE swelling. Patient is unable to provide history, son and wife at bedside provides history. States that [facility] with R[facility] D[facility] and treated for cellulitis. He was discharged on the day PTA, and this AM family noticed patient's RLE and LUE swelling was worsening. Patient's son states they were told patient had ulcers in intestines, and considered risk of this prior to starting Eliquis. Son states patient has brown stool, no melena / hematochezia. States patient is an aspiration risk, and previously treated for aspiration PNA as well. [facility] that [facility] had [facility] in his lungs. In ER he received ceftriaxone.     PROBLEMS:    Low grade fever-URI with rhinovirus  B/l pleural effusions with pulmonary congestion  RLE DVT  Metabolic encephalopathy   Parkinsons disease  HTN (hypertension)  Prostate cancer  Nonrheumatic aortic valve insufficiency  Chronic rhinitis    PLAN:    Unlikely pneumonia  BC x 2, urine c/s  Ceftriaxone 1 gm IV qd and doxycycline 100 mg PO q12h  Aspiration precautions  Supportive care  Eliquis  D/W WIFE & DAUGHTER IN DETAIL

## 2023-10-01 NOTE — DIETITIAN INITIAL EVALUATION ADULT - LAB (SPECIFY)
consider checking vit D 25OH, B6, B12, thiamine, folate, carnitine, and copper levels as malnutrition in cause these to be deficient

## 2023-10-01 NOTE — CONSULT NOTE ADULT - ASSESSMENT
86yo M presents w/ RLE swelling, US demonstrated DVT at common femoral down to calf veins, no extension above  Patient has recent hx of D[facility], on eliquis  DNR/DNI    Recommendation:  - continue anticoagulation as no extension proximally seen on US.  - nonfunctional and DNR/DNI. recommend medical treatment for DVT  - hem/onc consult for anticoagulation  - no vascular surgery intervention needed at this time    Plan discussed with vascular surgery attending, Dr. Iverson

## 2023-10-01 NOTE — DIETITIAN INITIAL EVALUATION ADULT - PERTINENT MEDS FT
MEDICATIONS  (STANDING):  apixaban 5 milliGRAM(s) Enteral Tube every 12 hours  ascorbic acid 500 milliGRAM(s) Oral daily  aspirin  chewable 81 milliGRAM(s) Oral daily  atorvastatin 20 milliGRAM(s) Oral at bedtime  carbidopa/levodopa  25/100 1 Tablet(s) Oral <User Schedule>  carbidopa/levodopa  25/100 1.5 Tablet(s) Oral <User Schedule>  cefTRIAXone Injectable. 1000 milliGRAM(s) IV Push every 24 hours  donepezil 5 milliGRAM(s) Oral at bedtime  doxazosin 2 milliGRAM(s) Oral at bedtime  lisinopril 2.5 milliGRAM(s) Oral every 12 hours  metoprolol tartrate 25 milliGRAM(s) Oral daily  pantoprazole    Tablet 40 milliGRAM(s) Oral before breakfast  potassium chloride   Powder 20 milliEquivalent(s) Oral once  QUEtiapine 25 milliGRAM(s) Oral two times a day    Home Medications:  aspirin 81 mg oral tablet, chewable: 1 tab(s) by PEG tube once a day (30 Sep 2023 18:38)  atorvastatin 20 mg oral tablet: 1 tab(s) by PEG tube once a day (at bedtime) (30 Sep 2023 18:38)  carbidopa-levodopa 25 mg-100 mg oral tablet: 1.5 tab(s) by gastrostomy tube 2 times a day at 8 AM and 12 PM (30 Sep 2023 18:34)  carbidopa-levodopa 25 mg-100 mg oral tablet: 1 tab(s) by gastrostomy tube 2 times a day at 4 PM and 8 PM (30 Sep 2023 18:34)  donepezil 5 mg oral tablet: 1 tab(s) by PEG tube once a day (at bedtime) (30 Sep 2023 18:38)  doxazosin 2 mg oral tablet: 1 tab(s) by PEG tube once a day (at bedtime) (30 Sep 2023 18:34)  Eliquis 5 mg oral tablet: 1 tab(s) orally 2 times a day (30 Sep 2023 18:38)  lansoprazole 30 mg oral tablet, disintegratin cap(s) by PEG tube once a day (30 Sep 2023 18:38)  lisinopril 2.5 mg oral tablet: 1 tab(s) by PEG tube 2 times a day (30 Sep 2023 18:38)  metoprolol tartrate 50 mg oral tablet: 0.5 tab(s) by PEG tube once a day (30 Sep 2023 18:38)  QUEtiapine 25 mg oral tablet: 1 tab(s) orally 2 times a day (30 Sep 2023 21:50)  Vitamin C 500 mg/5 mL oral liquid: 5 milliliter(s) orally once a day (30 Sep 2023 21:50)

## 2023-10-02 LAB
ANION GAP SERPL CALC-SCNC: 6 MMOL/L — SIGNIFICANT CHANGE UP (ref 5–17)
ANISOCYTOSIS BLD QL: SLIGHT — SIGNIFICANT CHANGE UP
BASOPHILS # BLD AUTO: 0 K/UL — SIGNIFICANT CHANGE UP (ref 0–0.2)
BASOPHILS NFR BLD AUTO: 0 % — SIGNIFICANT CHANGE UP (ref 0–2)
BUN SERPL-MCNC: 23 MG/DL — SIGNIFICANT CHANGE UP (ref 7–23)
CALCIUM SERPL-MCNC: 7.6 MG/DL — LOW (ref 8.5–10.1)
CHLORIDE SERPL-SCNC: 111 MMOL/L — HIGH (ref 96–108)
CO2 SERPL-SCNC: 22 MMOL/L — SIGNIFICANT CHANGE UP (ref 22–31)
CREAT SERPL-MCNC: 0.5 MG/DL — SIGNIFICANT CHANGE UP (ref 0.5–1.3)
CULTURE RESULTS: NO GROWTH — SIGNIFICANT CHANGE UP
DACRYOCYTES BLD QL SMEAR: SLIGHT — SIGNIFICANT CHANGE UP
EGFR: 99 ML/MIN/1.73M2 — SIGNIFICANT CHANGE UP
EOSINOPHIL # BLD AUTO: 0.1 K/UL — SIGNIFICANT CHANGE UP (ref 0–0.5)
EOSINOPHIL NFR BLD AUTO: 5 % — SIGNIFICANT CHANGE UP (ref 0–6)
GLUCOSE SERPL-MCNC: 99 MG/DL — SIGNIFICANT CHANGE UP (ref 70–99)
HCT VFR BLD CALC: 25.6 % — LOW (ref 39–50)
HGB BLD-MCNC: 8.7 G/DL — LOW (ref 13–17)
HYPOCHROMIA BLD QL: SLIGHT — SIGNIFICANT CHANGE UP
LYMPHOCYTES # BLD AUTO: 0.28 K/UL — LOW (ref 1–3.3)
LYMPHOCYTES # BLD AUTO: 14 % — SIGNIFICANT CHANGE UP (ref 13–44)
MANUAL SMEAR VERIFICATION: SIGNIFICANT CHANGE UP
MCHC RBC-ENTMCNC: 28.8 PG — SIGNIFICANT CHANGE UP (ref 27–34)
MCHC RBC-ENTMCNC: 34 GM/DL — SIGNIFICANT CHANGE UP (ref 32–36)
MCV RBC AUTO: 84.8 FL — SIGNIFICANT CHANGE UP (ref 80–100)
MICROCYTES BLD QL: SLIGHT — SIGNIFICANT CHANGE UP
MONOCYTES # BLD AUTO: 0.45 K/UL — SIGNIFICANT CHANGE UP (ref 0–0.9)
MONOCYTES NFR BLD AUTO: 23 % — HIGH (ref 2–14)
NEUTROPHILS # BLD AUTO: 1.12 K/UL — LOW (ref 1.8–7.4)
NEUTROPHILS NFR BLD AUTO: 56 % — SIGNIFICANT CHANGE UP (ref 43–77)
NEUTS BAND # BLD: 1 % — SIGNIFICANT CHANGE UP (ref 0–8)
NRBC # BLD: 0 /100 — SIGNIFICANT CHANGE UP (ref 0–0)
NRBC # BLD: SIGNIFICANT CHANGE UP /100 WBCS (ref 0–0)
NT-PROBNP SERPL-SCNC: 1304 PG/ML — HIGH (ref 0–450)
OVALOCYTES BLD QL SMEAR: SLIGHT — SIGNIFICANT CHANGE UP
PLAT MORPH BLD: NORMAL — SIGNIFICANT CHANGE UP
PLATELET # BLD AUTO: 76 K/UL — LOW (ref 150–400)
POIKILOCYTOSIS BLD QL AUTO: SLIGHT — SIGNIFICANT CHANGE UP
POLYCHROMASIA BLD QL SMEAR: SLIGHT — SIGNIFICANT CHANGE UP
POTASSIUM SERPL-MCNC: 3.4 MMOL/L — LOW (ref 3.5–5.3)
POTASSIUM SERPL-SCNC: 3.4 MMOL/L — LOW (ref 3.5–5.3)
RBC # BLD: 3.02 M/UL — LOW (ref 4.2–5.8)
RBC # FLD: 14.8 % — HIGH (ref 10.3–14.5)
RBC BLD AUTO: ABNORMAL
SCHISTOCYTES BLD QL AUTO: SLIGHT — SIGNIFICANT CHANGE UP
SODIUM SERPL-SCNC: 139 MMOL/L — SIGNIFICANT CHANGE UP (ref 135–145)
SPECIMEN SOURCE: SIGNIFICANT CHANGE UP
VARIANT LYMPHS # BLD: 1 % — SIGNIFICANT CHANGE UP (ref 0–6)
VIT D25+D1,25 OH+D1,25 PNL SERPL-MCNC: 44.1 PG/ML — SIGNIFICANT CHANGE UP (ref 19.9–79.3)
WBC # BLD: 1.97 K/UL — LOW (ref 3.8–10.5)
WBC # FLD AUTO: 1.97 K/UL — LOW (ref 3.8–10.5)

## 2023-10-02 PROCEDURE — 99232 SBSQ HOSP IP/OBS MODERATE 35: CPT

## 2023-10-02 RX ORDER — FUROSEMIDE 40 MG
40 TABLET ORAL DAILY
Refills: 0 | Status: DISCONTINUED | OUTPATIENT
Start: 2023-10-03 | End: 2023-10-08

## 2023-10-02 RX ADMIN — APIXABAN 5 MILLIGRAM(S): 2.5 TABLET, FILM COATED ORAL at 10:40

## 2023-10-02 RX ADMIN — ATORVASTATIN CALCIUM 20 MILLIGRAM(S): 80 TABLET, FILM COATED ORAL at 22:09

## 2023-10-02 RX ADMIN — LISINOPRIL 2.5 MILLIGRAM(S): 2.5 TABLET ORAL at 10:41

## 2023-10-02 RX ADMIN — Medication 81 MILLIGRAM(S): at 10:40

## 2023-10-02 RX ADMIN — LISINOPRIL 2.5 MILLIGRAM(S): 2.5 TABLET ORAL at 22:11

## 2023-10-02 RX ADMIN — QUETIAPINE FUMARATE 25 MILLIGRAM(S): 200 TABLET, FILM COATED ORAL at 10:41

## 2023-10-02 RX ADMIN — Medication 2 MILLIGRAM(S): at 22:09

## 2023-10-02 RX ADMIN — CARBIDOPA AND LEVODOPA 1.5 TABLET(S): 25; 100 TABLET ORAL at 13:16

## 2023-10-02 RX ADMIN — CARBIDOPA AND LEVODOPA 1 TABLET(S): 25; 100 TABLET ORAL at 22:10

## 2023-10-02 RX ADMIN — Medication 100 MILLIGRAM(S): at 10:41

## 2023-10-02 RX ADMIN — Medication 25 MILLIGRAM(S): at 10:41

## 2023-10-02 RX ADMIN — CARBIDOPA AND LEVODOPA 1 TABLET(S): 25; 100 TABLET ORAL at 16:05

## 2023-10-02 RX ADMIN — Medication 500 MILLIGRAM(S): at 10:40

## 2023-10-02 RX ADMIN — CEFTRIAXONE 1000 MILLIGRAM(S): 500 INJECTION, POWDER, FOR SOLUTION INTRAMUSCULAR; INTRAVENOUS at 14:15

## 2023-10-02 RX ADMIN — Medication 100 MILLIGRAM(S): at 22:09

## 2023-10-02 RX ADMIN — APIXABAN 5 MILLIGRAM(S): 2.5 TABLET, FILM COATED ORAL at 22:09

## 2023-10-02 RX ADMIN — QUETIAPINE FUMARATE 25 MILLIGRAM(S): 200 TABLET, FILM COATED ORAL at 22:09

## 2023-10-02 RX ADMIN — DONEPEZIL HYDROCHLORIDE 5 MILLIGRAM(S): 10 TABLET, FILM COATED ORAL at 22:10

## 2023-10-02 RX ADMIN — CARBIDOPA AND LEVODOPA 1.5 TABLET(S): 25; 100 TABLET ORAL at 10:40

## 2023-10-02 NOTE — CONSULT NOTE ADULT - ASSESSMENT
IMP:  1. DVT  2. possible sepsis  3. bilateral pl effusions/ chf, received one dose of lasix. appears comfortable lying flat  4. parkinsons disease/dementia    Plan:  echo, check BNP  cont acei and BB  low dose lasix  will follow

## 2023-10-02 NOTE — PROGRESS NOTE ADULT - SUBJECTIVE AND OBJECTIVE BOX
Date of service: 10-02-23 @ 16:08    Lying in bed in NAD  Has right lower leg redness and swelling  Has low grade fever  Has local pain    ROS: no fever or chills; denies dizziness, no HA, no SOB or cough, no abdominal pain, no diarrhea or constipation; no dysuria    MEDICATIONS  (STANDING):  apixaban 5 milliGRAM(s) Enteral Tube every 12 hours  ascorbic acid 500 milliGRAM(s) Oral daily  aspirin  chewable 81 milliGRAM(s) Oral daily  atorvastatin 20 milliGRAM(s) Oral at bedtime  carbidopa/levodopa  25/100 1 Tablet(s) Oral <User Schedule>  carbidopa/levodopa  25/100 1.5 Tablet(s) Oral <User Schedule>  cefTRIAXone Injectable. 1000 milliGRAM(s) IV Push every 24 hours  donepezil 5 milliGRAM(s) Oral at bedtime  doxazosin 2 milliGRAM(s) Oral at bedtime  doxycycline monohydrate Capsule 100 milliGRAM(s) Oral every 12 hours  lisinopril 2.5 milliGRAM(s) Oral every 12 hours  metoprolol tartrate 25 milliGRAM(s) Oral daily  pantoprazole    Tablet 40 milliGRAM(s) Oral before breakfast  QUEtiapine 25 milliGRAM(s) Oral two times a day    Vital Signs Last 24 Hrs  T(C): 36.9 (02 Oct 2023 07:58), Max: 37.8 (01 Oct 2023 20:58)  T(F): 98.4 (02 Oct 2023 07:58), Max: 100 (01 Oct 2023 20:58)  HR: 90 (02 Oct 2023 07:58) (90 - 99)  BP: 156/91 (02 Oct 2023 07:58) (145/92 - 156/91)  BP(mean): --  RR: 20 (01 Oct 2023 20:58) (20 - 20)  SpO2: 90% (02 Oct 2023 07:58) (90% - 93%)    Parameters below as of 02 Oct 2023 07:58  Patient On (Oxygen Delivery Method): room air     Physical exam:    Constitutional:  No acute distress  HEENT: NC/AT, EOMI, PERRLA, conjunctivae clear; ears and nose atraumatic  Neck: supple; thyroid not palpable  Back: no tenderness  Respiratory: respiratory effort normal; crackles at bases  Cardiovascular: S1S2 regular, no murmurs  Abdomen: soft, not tender, not distended, positive BS; no liver or spleen organomegaly, PEG  Genitourinary: no suprapubic tenderness  Lymphatic: no LN palpable  Musculoskeletal: no muscle tenderness, no joint swelling or tenderness  Extremities: 2+ pedal edema  Right foot, ankle and calf erythema, edema and warmth; edema extending to lower right thigh, - improving  several dry scabs  Neurological/ Psychiatric: lethargic, judgement and insight impaired; moving all extremities  Skin: no rashes; no palpable lesions    Labs: reviewed                        8.7    1.97  )-----------( 76       ( 02 Oct 2023 07:14 )             25.6     10-02    139  |  111<H>  |  23  ----------------------------<  99  3.4<L>   |  22  |  0.50    Ca    7.6<L>      02 Oct 2023 07:14    TPro  4.9<L>  /  Alb  1.8<L>  /  TBili  0.8  /  DBili  x   /  AST  34  /  ALT  42  /  AlkPhos  221<H>  10-01    Ferritin: 452 ng/mL (10-01-23 @ 11:54)  D-Dimer Assay, Quantitative: 867 ng/mL DDU (10-01-23 @ 07:44)                        9.2    2.23  )-----------( 83       ( 01 Oct 2023 11:54 )             26.9     10-01    138  |  111<H>  |  20  ----------------------------<  95  3.8   |  22  |  0.51    Ca    7.4<L>      01 Oct 2023 07:44    TPro  4.9<L>  /  Alb  1.8<L>  /  TBili  0.8  /  DBili  x   /  AST  34  /  ALT  42  /  AlkPhos  221<H>  10-01     LIVER FUNCTIONS - ( 01 Oct 2023 07:44 )  Alb: 1.8 g/dL / Pro: 4.9 gm/dL / ALK PHOS: 221 U/L / ALT: 42 U/L / AST: 34 U/L / GGT: x           Urinalysis (09-30 @ 17:55)  Urine Appearance: Clear  Protein, Urine: Negative    Urine Microscopic-Add On (NC) (09-30 @ 17:55)  White Blood Cell - Urine: 0-2 /HPF  Red Blood Cell - Urine: 0-2 /HPF  Comment - Urine: Occasional mucous strands    Rhinovirus (09-30 @ 12:58)  Detected    Culture - Urine (collected 30 Sep 2023 17:55)  Source: Clean Catch Clean Catch (Midstream)  Final Report (02 Oct 2023 07:30):    No growth    Culture - Blood (collected 30 Sep 2023 14:07)  Source: .Blood None  Preliminary Report (01 Oct 2023 23:01):    No growth at 24 hours    Culture - Blood (collected 30 Sep 2023 12:58)  Source: .Blood None  Preliminary Report (01 Oct 2023 23:01):    No growth at 24 hours    Radiology: all available radiological tests reviewed    < from: CT Chest w/ IV Cont (09.30.23 @ 17:15) >  Interstitial pulmonary edema and moderate pleural effusions.  < end of copied text >    Advanced directives addressed: full resuscitation

## 2023-10-02 NOTE — CONSULT NOTE ADULT - SUBJECTIVE AND OBJECTIVE BOX
Patient is a 87y old  Male who presents with a chief complaint of SOB  Pulm: Doesn't recall name   Cardio: Dr. Alejandro Arredondo (01 Oct 2023 13:29)      HPI:  86 y/o M w/ PMH of BPH, diverticulosis, GERD, HTN, PED placement, necrotizing enterocolitis, osteoporosis, parkinson's dementia, TIA prostate cancer, hearing loss, CVA, p/w worsening RLE / LUE swelling. Patient is unable to provide history, son and wife at bedside provides history. States that [facility] with [facility] D[facility] and treated for cellulitis. He was discharged yesterday, and this AM family noticed patient's RLE and LUE swelling was worsening. Patient's son states they were told patient had ulcers in intestines, and considered risk of this prior to starting Eliquis. Son states patient has brown stool, no melena / hematochezia. States patient is an aspiration risk, and previously treated for aspiration PNA as well. [facility] also [facility] that [facility] had [facility] in his lungs. In the morning patient also c/o HA to son.     10/2/23  Cardiology. 87 year old male with RLE DVT, parkinsons dementia admitted with worsening swelling of rle and sepsis. ct performed shows bilat effusions and possible chf. history is obtained from chart. patient denies dyspnea and appears comfortable    PSH: Hernia repair, PEG placement     Social Hx: Denies tobacco / etoh / drugs     Family Hx: No pertinent family hx    (30 Sep 2023 21:34)      PAST MEDICAL & SURGICAL HISTORY:  Parkinsons disease      HTN (hypertension)      Osteoporosis      Prostate cancer      Personal history of transient ischemic attack (TIA), and cerebral infarction without residual deficits      Syncope and collapse      Bacteremia      Benign prostatic hyperplasia with lower urinary tract symptoms      Necrotizing enterocolitis      Sensorineural hearing loss (SNHL) of both ears      GERD (gastroesophageal reflux disease)      Colonic polyp      Diverticulosis      Nonrheumatic aortic valve insufficiency      Chronic rhinitis      H/O hernia repair          PREVIOUS DIAGNOSTIC TESTING:      ECHO  FINDINGS:    STRESS  FINDINGS:    CATHETERIZATION  FINDINGS:    MEDICATIONS  (STANDING):  apixaban 5 milliGRAM(s) Enteral Tube every 12 hours  ascorbic acid 500 milliGRAM(s) Oral daily  aspirin  chewable 81 milliGRAM(s) Oral daily  atorvastatin 20 milliGRAM(s) Oral at bedtime  carbidopa/levodopa  25/100 1 Tablet(s) Oral <User Schedule>  carbidopa/levodopa  25/100 1.5 Tablet(s) Oral <User Schedule>  cefTRIAXone Injectable. 1000 milliGRAM(s) IV Push every 24 hours  donepezil 5 milliGRAM(s) Oral at bedtime  doxazosin 2 milliGRAM(s) Oral at bedtime  doxycycline monohydrate Capsule 100 milliGRAM(s) Oral every 12 hours  lisinopril 2.5 milliGRAM(s) Oral every 12 hours  metoprolol tartrate 25 milliGRAM(s) Oral daily  pantoprazole    Tablet 40 milliGRAM(s) Oral before breakfast  QUEtiapine 25 milliGRAM(s) Oral two times a day    MEDICATIONS  (PRN):      FAMILY HISTORY:  Family history unobtainable due to patient's condition        SOCIAL HISTORY:  ***    REVIEW OF SYSTEM:  Pertinent items are noted in HPI.  Constitutional  Eyes:    Ears, nose, mouth,   throat, and face:    Neck:   Respiratory:   and wheezing  Cardiovascular:    Gastrointestinal:  Genitourinary:     Hematologic/lymphatic:   Musculoskeletal:   Neurological:   Behavioral/Psych:        Vital Signs Last 24 Hrs  T(C): 36.9 (02 Oct 2023 07:58), Max: 37.8 (01 Oct 2023 20:58)  T(F): 98.4 (02 Oct 2023 07:58), Max: 100 (01 Oct 2023 20:58)  HR: 90 (02 Oct 2023 07:58) (76 - 99)  BP: 156/91 (02 Oct 2023 07:58) (113/70 - 156/91)  BP(mean): --  RR: 20 (01 Oct 2023 20:58) (19 - 20)  SpO2: 90% (02 Oct 2023 07:58) (90% - 93%)    Parameters below as of 02 Oct 2023 07:58  Patient On (Oxygen Delivery Method): room air        I&O's Summary    PHYSICAL EXAM  General Appearance: chronically ill appearing, mostly non verbal  HEENT:ncat    Neck: Supple,  no carotid bruit or JVD  Back: Symmetric, no  tenderness,no soft tissue tenderness  Lungs: dec bs bilat    Heart: Regular rate and rhythm, S1, S2 normal, 2/6 bhavik base  Abdomen: Soft, non-tender, bowel sounds active , no hepatosplenomegaly  Extremities: 1+ rle swelling  Skin:  Neurologic: arouseable        INTERPRETATION OF TELEMETRY:    ECG:        LABS:                          8.7    1.97  )-----------( 76       ( 02 Oct 2023 07:14 )             25.6     10-02    139  |  111<H>  |  23  ----------------------------<  99  3.4<L>   |  22  |  0.50    Ca    7.6<L>      02 Oct 2023 07:14    TPro  4.9<L>  /  Alb  1.8<L>  /  TBili  0.8  /  DBili  x   /  AST  34  /  ALT  42  /  AlkPhos  221<H>  10-01          Pro BNP  -- 10-01 @ 07:44  D Dimer  867 10-01 @ 07:44    PT/INR - ( 01 Oct 2023 07:44 )   PT: 18.5 sec;   INR: 1.66 ratio         PTT - ( 01 Oct 2023 07:44 )  PTT:30.7 sec  Urinalysis Basic - ( 02 Oct 2023 07:14 )    Color: x / Appearance: x / SG: x / pH: x  Gluc: 99 mg/dL / Ketone: x  / Bili: x / Urobili: x   Blood: x / Protein: x / Nitrite: x   Leuk Esterase: x / RBC: x / WBC x   Sq Epi: x / Non Sq Epi: x / Bacteria: x            RADIOLOGY & ADDITIONAL STUDIES:    IMPRESSION:    PLAN:

## 2023-10-02 NOTE — PROGRESS NOTE ADULT - ASSESSMENT
a/p:    *URI due to Rhinovirus:  supportive care    *Pulmonary edema and effusions:  suspect Acute on chronic diastolic Chf  Lasix 40mg IV x 1 today, trial again in AM   f/u renal function in am; consider continuing diuretic according to renal  function     *RLE DVT:  -c/w Apixaban   [facility]   c/w Ceftriaxone for mild cellulitis of Rt leg    *Pancytopenia:  ? due to viral infection  normal ldh, and haptoglobin   Hematology consult    Thrombocytopenia: r/o HIT  HIT AB - negative   will c/w Apixaban    *PEG tube in place  -Nutrition consult   -cw Jevity 360cc bolus TID    *Unchanged L2 compression farcture / old rub and R clavicle fx / R renal cyst incidentally noted on CT  -F/u outpatient

## 2023-10-02 NOTE — PROGRESS NOTE ADULT - SUBJECTIVE AND OBJECTIVE BOX
Subjective:    pat much more awake, no respiratory complaint.    Home Medications:  aspirin 81 mg oral tablet, chewable: 1 tab(s) by PEG tube once a day (30 Sep 2023 18:38)  atorvastatin 20 mg oral tablet: 1 tab(s) by PEG tube once a day (at bedtime) (30 Sep 2023 18:38)  carbidopa-levodopa 25 mg-100 mg oral tablet: 1.5 tab(s) by gastrostomy tube 2 times a day at 8 AM and 12 PM (30 Sep 2023 18:34)  carbidopa-levodopa 25 mg-100 mg oral tablet: 1 tab(s) by gastrostomy tube 2 times a day at 4 PM and 8 PM (30 Sep 2023 18:34)  donepezil 5 mg oral tablet: 1 tab(s) by PEG tube once a day (at bedtime) (30 Sep 2023 18:38)  doxazosin 2 mg oral tablet: 1 tab(s) by PEG tube once a day (at bedtime) (30 Sep 2023 18:34)  Eliquis 5 mg oral tablet: 1 tab(s) orally 2 times a day (30 Sep 2023 18:38)  lansoprazole 30 mg oral tablet, disintegratin cap(s) by PEG tube once a day (30 Sep 2023 18:38)  lisinopril 2.5 mg oral tablet: 1 tab(s) by PEG tube 2 times a day (30 Sep 2023 18:38)  metoprolol tartrate 50 mg oral tablet: 0.5 tab(s) by PEG tube once a day (30 Sep 2023 18:38)  QUEtiapine 25 mg oral tablet: 1 tab(s) orally 2 times a day (30 Sep 2023 21:50)  Vitamin C 500 mg/5 mL oral liquid: 5 milliliter(s) orally once a day (30 Sep 2023 21:50)    MEDICATIONS  (STANDING):  apixaban 5 milliGRAM(s) Enteral Tube every 12 hours  ascorbic acid 500 milliGRAM(s) Oral daily  aspirin  chewable 81 milliGRAM(s) Oral daily  atorvastatin 20 milliGRAM(s) Oral at bedtime  carbidopa/levodopa  25/100 1 Tablet(s) Oral <User Schedule>  carbidopa/levodopa  25/100 1.5 Tablet(s) Oral <User Schedule>  cefTRIAXone Injectable. 1000 milliGRAM(s) IV Push every 24 hours  donepezil 5 milliGRAM(s) Oral at bedtime  doxazosin 2 milliGRAM(s) Oral at bedtime  doxycycline monohydrate Capsule 100 milliGRAM(s) Oral every 12 hours  lisinopril 2.5 milliGRAM(s) Oral every 12 hours  metoprolol tartrate 25 milliGRAM(s) Oral daily  pantoprazole    Tablet 40 milliGRAM(s) Oral before breakfast  QUEtiapine 25 milliGRAM(s) Oral two times a day    MEDICATIONS  (PRN):      Allergies    Originally Entered as [Unknown] reaction to [fresh fruits] (Unknown)  apple (Anaphylaxis)  raw, fresh fruits- reyn[employer] family (apple, pear, apricot, peach, fig); processed/cooked is ok. (Anaphylaxis)  No Known Drug Allergies    Intolerances        Vital Signs Last 24 Hrs  T(C): 36.9 (02 Oct 2023 07:58), Max: 37.8 (01 Oct 2023 20:58)  T(F): 98.4 (02 Oct 2023 07:58), Max: 100 (01 Oct 2023 20:58)  HR: 90 (02 Oct 2023 07:58) (76 - 99)  BP: 156/91 (02 Oct 2023 07:58) (115/65 - 156/91)  BP(mean): --  RR: 20 (01 Oct 2023 20:58) (20 - 20)  SpO2: 90% (02 Oct 2023 07:58) (90% - 93%)    Parameters below as of 02 Oct 2023 07:58  Patient On (Oxygen Delivery Method): room air          PHYSICAL EXAMINATION:    NECK:  Supple. No lymphadenopathy. Jugular venous pressure not elevated. Carotids equal.   HEART:   The cardiac impulse has a normal quality. Reg., Nl S1 and S2.  There are no murmurs, rubs or gallops noted  CHEST:  Chest crackles to auscultation. Normal respiratory effort.  ABDOMEN:  Soft and nontender.   EXTREMITIES:  There is no edema.       LABS:                        8.7    1.97  )-----------( 76       ( 02 Oct 2023 07:14 )             25.6     10-02    139  |  111<H>  |  23  ----------------------------<  99  3.4<L>   |  22  |  0.50    Ca    7.6<L>      02 Oct 2023 07:14    TPro  4.9<L>  /  Alb  1.8<L>  /  TBili  0.8  /  DBili  x   /  AST  34  /  ALT  42  /  AlkPhos  221<H>  10-01    PT/INR - ( 01 Oct 2023 07:44 )   PT: 18.5 sec;   INR: 1.66 ratio         PTT - ( 01 Oct 2023 07:44 )  PTT:30.7 sec  Urinalysis Basic - ( 02 Oct 2023 07:14 )    Color: x / Appearance: x / SG: x / pH: x  Gluc: 99 mg/dL / Ketone: x  / Bili: x / Urobili: x   Blood: x / Protein: x / Nitrite: x   Leuk Esterase: x / RBC: x / WBC x   Sq Epi: x / Non Sq Epi: x / Bacteria: x

## 2023-10-02 NOTE — PROGRESS NOTE ADULT - ASSESSMENT
88 y/o Male with h/o BPH, RLE DVT, diverticulosis, GERD, HTN, PEG placement, necrotizing enterocolitis, osteoporosis, Parkinson's dementia, PEG placement, TIA prostate cancer, hearing loss, old CVA was admitted on 9/30 for worsening RLE / LUE swelling. Patient is unable to provide history, son and wife at bedside provides history. States that [facility] with RLE D[facility] and treated for cellulitis. He was discharged on the day PTA, and this AM family noticed patient's RLE and LUE swelling was worsening. Patient's son states they were told patient had ulcers in intestines, and considered risk of this prior to starting Eliquis. Son states patient has brown stool, no melena / hematochezia. States patient is an aspiration risk, and previously treated for aspiration PNA as well. [facility] was also [facility] that patient had [facility] in his lungs. In ER he received ceftriaxone.     PROBLEMS:    Low grade fever-URI with rhinovirus  B/l pleural effusions with pulmonary congestion  RLE DVT  Metabolic encephalopathy   Parkinsons disease  HTN (hypertension)  Prostate cancer  Nonrheumatic aortic valve insufficiency  Chronic rhinitis    PLAN:    pulmonary better  Unlikely pneumonia  BC x 2, urine c/s  Ceftriaxone 1 gm IV qd and doxycycline 100 mg PO q12h  Aspiration precautions  Supportive care  Eliquis  D/W WIFE & DAUGHTER IN DETAIL

## 2023-10-02 NOTE — PROGRESS NOTE ADULT - ASSESSMENT
86 y/o Male with h/o BPH, RLE DVT, diverticulosis, GERD, HTN, PEG placement, necrotizing enterocolitis, osteoporosis, Parkinson's dementia, PEG placement, TIA prostate cancer, hearing loss, old CVA was admitted on 9/30 for worsening RLE / LUE swelling. Patient is unable to provide history, son and wife at bedside provides history. States that [facility] diagnosed with RLE DVT and treated for cellulitis. He was discharged on the day PTA, and this AM family noticed patient's RLE and LUE swelling was worsening. Patient's son states they were told patient had ulcers in intestines, and considered risk of this prior to starting Eliquis. Son states patient has brown stool, no melena / hematochezia. States patient is an aspiration risk, and previously treated for aspiration PNA as well. [facility] also [facility] that patient had [facility] in his lungs. In ER he received ceftriaxone.     1. Low grade fever. B/l pleural effusions with pulmonary congestion. RLE DVT. URI with rhinovirus. Parkinson disease. Metabolic encephalopathy.   -doubt pneumonia  -BC x 2, urine c/s negative to date  -on ceftriaxone 1 gm IV qd and doxycycline 100 mg PO q12h # 2  -tolerating abx well so far; no side effects noted  -elevate leg  -aspiration precautions  -continue abx coverage   -monitor temps  -f/u CBC  -supportive care  2. Other issues:   -care per medicine

## 2023-10-02 NOTE — PROGRESS NOTE ADULT - SUBJECTIVE AND OBJECTIVE BOX
History of Present Illness:   86 y/o M w/ PMH of BPH, diverticulosis, GERD, HTN, Necrotizing enterocolitis, osteoporosis, parkinson's dementia, TIA prostate cancer, hearing loss, CVA, s/p PEG p/w worsening RLE / LUE swelling. Patient is unable to provide history, son and wife at bedside provides history. States that [facility] diagnosed with RLE DVT and treated for cellulitis. He was discharged yesterday, and this AM family noticed patient's RLE and LUE swelling was worsening. States patient is an aspiration risk, and previously treated for aspiration PNA as well. [facility] was also [facility] that patient had fluid in his lungs. In the morning patient also c/o HA to son.     10.1: no dyspnea, confused  10/2: laying in bed, denies pain or dyspnea. on room air. wife at bedside and updated on plan.     REVIEW OF SYSTEMS: unable to obtain fully due to confusion         All other review of systems is negative unless indicated above    Vital Signs Last 24 Hrs  T(C): 36.9 (02 Oct 2023 07:58), Max: 37.8 (01 Oct 2023 20:58)  T(F): 98.4 (02 Oct 2023 07:58), Max: 100 (01 Oct 2023 20:58)  HR: 90 (02 Oct 2023 07:58) (76 - 99)  BP: 156/91 (02 Oct 2023 07:58) (115/65 - 156/91)  BP(mean): --  RR: 20 (01 Oct 2023 20:58) (20 - 20)  SpO2: 90% (02 Oct 2023 07:58) (90% - 93%)    Parameters below as of 02 Oct 2023 07:58  Patient On (Oxygen Delivery Method): room air        PHYSICAL EXAM:    GENERAL: NAD  HEENT:  NC/AT, EOMI, PERRLA, No scleral icterus, Moist mucous membranes  NECK: Supple, No JVD  CNS:  Alert & Oriented X1, Motor Strength 5/5 B/L upper and lower extremities; DTRs 2+ intact   LUNG: Normal Breath sounds, +basilar rales, No rhonchi, No wheezing  HEART: RRR; No murmurs, No rubs  ABDOMEN: +BS, ST/ND/NT, +PEG  GENITOURINARY: Voiding, Bladder not distended  EXTREMITIES:  +RLE edema   MUSCULOSKELTAL: Joints normal ROM, No TTP, No effusion  SKIN: no rashes                            8.7    2.12  )-----------( 79       ( 01 Oct 2023 07:44 )             26.0     10-01    138  |  111<H>  |  20  ----------------------------<  95  3.8   |  22  |  0.51    Ca    7.4<L>      01 Oct 2023 07:44    TPro  4.9<L>  /  Alb  1.8<L>  /  TBili  0.8  /  DBili  x   /  AST  34  /  ALT  42  /  AlkPhos  221<H>  10-01    Pro-Brain Natriuretic Peptide: 1304 pg/mL (10.02.23 @ 07:14)        MEDICATIONS  (STANDING):  apixaban 5 milliGRAM(s) Enteral Tube every 12 hours  ascorbic acid 500 milliGRAM(s) Oral daily  aspirin  chewable 81 milliGRAM(s) Oral daily  atorvastatin 20 milliGRAM(s) Oral at bedtime  carbidopa/levodopa  25/100 1 Tablet(s) Oral <User Schedule>  carbidopa/levodopa  25/100 1.5 Tablet(s) Oral <User Schedule>  donepezil 5 milliGRAM(s) Oral at bedtime  doxazosin 2 milliGRAM(s) Oral at bedtime  furosemide   Injectable 40 milliGRAM(s) IV Push once  lisinopril 2.5 milliGRAM(s) Oral every 12 hours  metoprolol tartrate 25 milliGRAM(s) Oral daily  pantoprazole    Tablet 40 milliGRAM(s) Oral before breakfast  piperacillin/tazobactam IVPB.. 3.375 Gram(s) IV Intermittent every 8 hours  potassium chloride   Powder 20 milliEquivalent(s) Oral once  QUEtiapine 25 milliGRAM(s) Oral two times a day  vancomycin  IVPB 1000 milliGRAM(s) IV Intermittent every 12 hours      MEDICATIONS  (PRN):      CT Chest: fluid overload, mild to mod bilat effusions, no Pna      < from: CT Chest w/ IV Cont (09.30.23 @ 17:15) >  IMPRESSION:    Interstitial pulmonary edema and moderate pleural effusions.    --- End of Report ---            NITIN GARZA M.D., ATTENDING RADIOLOGIST  This document has been electronically signed. Sep 30 2023  5:28PM    < end of copied text >

## 2023-10-03 LAB
ANION GAP SERPL CALC-SCNC: 5 MMOL/L — SIGNIFICANT CHANGE UP (ref 5–17)
BUN SERPL-MCNC: 21 MG/DL — SIGNIFICANT CHANGE UP (ref 7–23)
CALCIUM SERPL-MCNC: 7.4 MG/DL — LOW (ref 8.5–10.1)
CHLORIDE SERPL-SCNC: 111 MMOL/L — HIGH (ref 96–108)
CO2 SERPL-SCNC: 24 MMOL/L — SIGNIFICANT CHANGE UP (ref 22–31)
CREAT SERPL-MCNC: 0.58 MG/DL — SIGNIFICANT CHANGE UP (ref 0.5–1.3)
EGFR: 94 ML/MIN/1.73M2 — SIGNIFICANT CHANGE UP
EPO SERPL-MCNC: 202.3 MIU/ML — HIGH (ref 2.6–18.5)
GLUCOSE SERPL-MCNC: 137 MG/DL — HIGH (ref 70–99)
HCT VFR BLD CALC: 25.9 % — LOW (ref 39–50)
HGB BLD-MCNC: 8.9 G/DL — LOW (ref 13–17)
MCHC RBC-ENTMCNC: 28.9 PG — SIGNIFICANT CHANGE UP (ref 27–34)
MCHC RBC-ENTMCNC: 34.4 GM/DL — SIGNIFICANT CHANGE UP (ref 32–36)
MCV RBC AUTO: 84.1 FL — SIGNIFICANT CHANGE UP (ref 80–100)
PLATELET # BLD AUTO: 82 K/UL — LOW (ref 150–400)
POTASSIUM SERPL-MCNC: 3.3 MMOL/L — LOW (ref 3.5–5.3)
POTASSIUM SERPL-SCNC: 3.3 MMOL/L — LOW (ref 3.5–5.3)
PS EXPRESSION INTERPRETATION: SIGNIFICANT CHANGE UP
PS EXPRESSION PF4 30 MCG/100U HEPARIN: 2 % — SIGNIFICANT CHANGE UP
PS EXPRESSION PF4 30 MCG: 2 % — SIGNIFICANT CHANGE UP
PS EXPRESSION RESULT: NEGATIVE — SIGNIFICANT CHANGE UP
RBC # BLD: 3.08 M/UL — LOW (ref 4.2–5.8)
RBC # FLD: 15 % — HIGH (ref 10.3–14.5)
SODIUM SERPL-SCNC: 140 MMOL/L — SIGNIFICANT CHANGE UP (ref 135–145)
WBC # BLD: 4.37 K/UL — SIGNIFICANT CHANGE UP (ref 3.8–10.5)
WBC # FLD AUTO: 4.37 K/UL — SIGNIFICANT CHANGE UP (ref 3.8–10.5)

## 2023-10-03 PROCEDURE — 99232 SBSQ HOSP IP/OBS MODERATE 35: CPT

## 2023-10-03 PROCEDURE — 93306 TTE W/DOPPLER COMPLETE: CPT | Mod: 26

## 2023-10-03 PROCEDURE — 71045 X-RAY EXAM CHEST 1 VIEW: CPT | Mod: 26

## 2023-10-03 RX ADMIN — APIXABAN 5 MILLIGRAM(S): 2.5 TABLET, FILM COATED ORAL at 10:31

## 2023-10-03 RX ADMIN — DONEPEZIL HYDROCHLORIDE 5 MILLIGRAM(S): 10 TABLET, FILM COATED ORAL at 21:52

## 2023-10-03 RX ADMIN — QUETIAPINE FUMARATE 25 MILLIGRAM(S): 200 TABLET, FILM COATED ORAL at 10:31

## 2023-10-03 RX ADMIN — Medication 500 MILLIGRAM(S): at 10:31

## 2023-10-03 RX ADMIN — PANTOPRAZOLE SODIUM 40 MILLIGRAM(S): 20 TABLET, DELAYED RELEASE ORAL at 10:31

## 2023-10-03 RX ADMIN — Medication 40 MILLIGRAM(S): at 10:32

## 2023-10-03 RX ADMIN — Medication 2 MILLIGRAM(S): at 21:52

## 2023-10-03 RX ADMIN — CEFTRIAXONE 1000 MILLIGRAM(S): 500 INJECTION, POWDER, FOR SOLUTION INTRAMUSCULAR; INTRAVENOUS at 18:23

## 2023-10-03 RX ADMIN — CARBIDOPA AND LEVODOPA 1 TABLET(S): 25; 100 TABLET ORAL at 21:52

## 2023-10-03 RX ADMIN — APIXABAN 5 MILLIGRAM(S): 2.5 TABLET, FILM COATED ORAL at 21:52

## 2023-10-03 RX ADMIN — LISINOPRIL 2.5 MILLIGRAM(S): 2.5 TABLET ORAL at 21:52

## 2023-10-03 RX ADMIN — QUETIAPINE FUMARATE 25 MILLIGRAM(S): 200 TABLET, FILM COATED ORAL at 21:52

## 2023-10-03 RX ADMIN — Medication 25 MILLIGRAM(S): at 10:32

## 2023-10-03 RX ADMIN — Medication 81 MILLIGRAM(S): at 10:31

## 2023-10-03 RX ADMIN — ATORVASTATIN CALCIUM 20 MILLIGRAM(S): 80 TABLET, FILM COATED ORAL at 21:51

## 2023-10-03 RX ADMIN — Medication 100 MILLIGRAM(S): at 10:33

## 2023-10-03 RX ADMIN — CARBIDOPA AND LEVODOPA 1.5 TABLET(S): 25; 100 TABLET ORAL at 10:33

## 2023-10-03 RX ADMIN — CARBIDOPA AND LEVODOPA 1 TABLET(S): 25; 100 TABLET ORAL at 18:23

## 2023-10-03 RX ADMIN — CARBIDOPA AND LEVODOPA 1.5 TABLET(S): 25; 100 TABLET ORAL at 18:57

## 2023-10-03 RX ADMIN — LISINOPRIL 2.5 MILLIGRAM(S): 2.5 TABLET ORAL at 10:34

## 2023-10-03 RX ADMIN — Medication 100 MILLIGRAM(S): at 21:52

## 2023-10-03 NOTE — PROGRESS NOTE ADULT - ASSESSMENT
88 y/o Male with h/o BPH, RLE DVT, diverticulosis, GERD, HTN, PEG placement, necrotizing enterocolitis, osteoporosis, Parkinson's dementia, PEG placement, TIA prostate cancer, hearing loss, old CVA was admitted on 9/30 for worsening RLE / LUE swelling. Patient is unable to provide history, son and wife at bedside provides history. States that [facility] with RLE DVT and treated for cellulitis. He was discharged on the day PTA, and this AM family noticed patient's RLE and LUE swelling was worsening. Patient's son states they were told patient had ulcers in intestines, and considered risk of this prior to starting Eliquis. Son states patient has brown stool, no melena / hematochezia. States patient is an aspiration risk, and previously treated for aspiration PNA as well. [facility] also [facility] that patient had [facility] in his lungs. In ER he received ceftriaxone.     1. Low grade fever. B/l pleural effusions with pulmonary congestion. RLE DVT. URI with rhinovirus. Parkinson disease. Metabolic encephalopathy.   -doubt pneumonia  -BC x 2, urine c/s negative to date  -on ceftriaxone 1 gm IV qd and doxycycline 100 mg PO q12h # 3  -tolerating abx well so far; no side effects noted  -elevate leg  -aspiration precautions  -continue abx coverage with doxy PO for 10 more days  -monitor temps  -f/u CBC  -supportive care  2. Other issues:   -care per medicine

## 2023-10-03 NOTE — PROGRESS NOTE ADULT - SUBJECTIVE AND OBJECTIVE BOX
Date of service: 10-03-23 @ 13:28    Lying in bed in NAD  Weak looking  Has right lower leg edema  Denies pain    ROS: no fever or chills; poorly verbal    MEDICATIONS  (STANDING):  apixaban 5 milliGRAM(s) Enteral Tube every 12 hours  ascorbic acid 500 milliGRAM(s) Oral daily  aspirin  chewable 81 milliGRAM(s) Oral daily  atorvastatin 20 milliGRAM(s) Oral at bedtime  carbidopa/levodopa  25/100 1 Tablet(s) Oral <User Schedule>  carbidopa/levodopa  25/100 1.5 Tablet(s) Oral <User Schedule>  cefTRIAXone Injectable. 1000 milliGRAM(s) IV Push every 24 hours  donepezil 5 milliGRAM(s) Oral at bedtime  doxazosin 2 milliGRAM(s) Oral at bedtime  doxycycline monohydrate Capsule 100 milliGRAM(s) Oral every 12 hours  furosemide   Injectable 40 milliGRAM(s) IV Push daily  lisinopril 2.5 milliGRAM(s) Oral every 12 hours  metoprolol tartrate 25 milliGRAM(s) Oral daily  pantoprazole    Tablet 40 milliGRAM(s) Oral before breakfast  QUEtiapine 25 milliGRAM(s) Oral two times a day    Vital Signs Last 24 Hrs  T(C): 36.9 (03 Oct 2023 08:34), Max: 37.3 (02 Oct 2023 23:52)  T(F): 98.4 (03 Oct 2023 08:34), Max: 99.1 (02 Oct 2023 23:52)  HR: 104 (03 Oct 2023 08:34) (73 - 104)  BP: 151/89 (03 Oct 2023 08:34) (122/75 - 151/89)  BP(mean): --  RR: 18 (03 Oct 2023 08:34) (18 - 18)  SpO2: 93% (03 Oct 2023 08:34) (90% - 93%)    Parameters below as of 03 Oct 2023 08:34  Patient On (Oxygen Delivery Method): nasal cannula  O2 Flow (L/min): 2       Physical exam:    Constitutional:  No acute distress  HEENT: NC/AT, EOMI, PERRLA, conjunctivae clear; ears and nose atraumatic  Neck: supple; thyroid not palpable  Back: no tenderness  Respiratory: respiratory effort normal; crackles at bases  Cardiovascular: S1S2 regular, no murmurs  Abdomen: soft, not tender, not distended, positive BS; no liver or spleen organomegaly, PEG  Genitourinary: no suprapubic tenderness  Lymphatic: no LN palpable  Musculoskeletal: no muscle tenderness, no joint swelling or tenderness  Extremities: 2+ pedal edema  Right foot, ankle and calf erythema, edema and warmth; edema extending to lower right thigh, - improving  several dry scabs  Neurological/ Psychiatric: lethargic, judgement and insight impaired; moving all extremities  Skin: no rashes; no palpable lesions    Labs: reviewed                        8.9    4.37  )-----------( 82       ( 03 Oct 2023 12:57 )             25.9     10-02    139  |  111<H>  |  23  ----------------------------<  99  3.4<L>   |  22  |  0.50    Ca    7.6<L>      02 Oct 2023 07:14    Ferritin: 452 ng/mL (10-01-23 @ 11:54)  D-Dimer Assay, Quantitative: 867 ng/mL DDU (10-01-23 @ 07:44)                        8.7    1.97  )-----------( 76       ( 02 Oct 2023 07:14 )             25.6     10-02    139  |  111<H>  |  23  ----------------------------<  99  3.4<L>   |  22  |  0.50    Ca    7.6<L>      02 Oct 2023 07:14    TPro  4.9<L>  /  Alb  1.8<L>  /  TBili  0.8  /  DBili  x   /  AST  34  /  ALT  42  /  AlkPhos  221<H>  10-01    Ferritin: 452 ng/mL (10-01-23 @ 11:54)  D-Dimer Assay, Quantitative: 867 ng/mL DDU (10-01-23 @ 07:44)                        9.2    2.23  )-----------( 83       ( 01 Oct 2023 11:54 )             26.9     10-01    138  |  111<H>  |  20  ----------------------------<  95  3.8   |  22  |  0.51    Ca    7.4<L>      01 Oct 2023 07:44    TPro  4.9<L>  /  Alb  1.8<L>  /  TBili  0.8  /  DBili  x   /  AST  34  /  ALT  42  /  AlkPhos  221<H>  10-01     LIVER FUNCTIONS - ( 01 Oct 2023 07:44 )  Alb: 1.8 g/dL / Pro: 4.9 gm/dL / ALK PHOS: 221 U/L / ALT: 42 U/L / AST: 34 U/L / GGT: x           Urinalysis (09-30 @ 17:55)  Urine Appearance: Clear  Protein, Urine: Negative    Urine Microscopic-Add On (NC) (09-30 @ 17:55)  White Blood Cell - Urine: 0-2 /HPF  Red Blood Cell - Urine: 0-2 /HPF  Comment - Urine: Occasional mucous strands    Rhinovirus (09-30 @ 12:58)  Detected    Culture - Urine (collected 30 Sep 2023 17:55)  Source: Clean Catch Clean Catch (Midstream)  Final Report (02 Oct 2023 07:30):    No growth    Culture - Blood (collected 30 Sep 2023 14:07)  Source: .Blood None  Preliminary Report (01 Oct 2023 23:01):    No growth at 24 hours    Culture - Blood (collected 30 Sep 2023 12:58)  Source: .Blood None  Preliminary Report (01 Oct 2023 23:01):    No growth at 24 hours    Radiology: all available radiological tests reviewed    < from: CT Chest w/ IV Cont (09.30.23 @ 17:15) >  Interstitial pulmonary edema and moderate pleural effusions.  < end of copied text >    Advanced directives addressed: full resuscitation

## 2023-10-03 NOTE — PROGRESS NOTE ADULT - SUBJECTIVE AND OBJECTIVE BOX
History of Present Illness:   88 y/o M w/ PMH of BPH, diverticulosis, GERD, HTN, Necrotizing enterocolitis, osteoporosis, parkinson's dementia, TIA prostate cancer, hearing loss, CVA, s/p PEG p/w worsening RLE / LUE swelling. Patient is unable to provide history, son and wife at bedside provides history. States that [facility] with RLE D[facility] and treated for cellulitis. He was discharged yesterday, and this AM family noticed patient's RLE and LUE swelling was worsening. States patient is an aspiration risk, and previously treated for aspiration PNA as well. [facility] also [facility] that patient had [facility] in his lungs. In the morning patient also c/o HA to son.     10.1: no dyspnea, confused  10/2: laying in bed, denies pain or dyspnea. on room air. wife at bedside and updated on plan.   10/3: appearing comfortable. no dyspnea or cough.       REVIEW OF SYSTEMS: unable to obtain fully due to confusion         All other review of systems is negative unless indicated above    Vital Signs Last 24 Hrs  T(C): 36.9 (03 Oct 2023 08:34), Max: 37.3 (02 Oct 2023 23:52)  T(F): 98.4 (03 Oct 2023 08:34), Max: 99.1 (02 Oct 2023 23:52)  HR: 104 (03 Oct 2023 08:34) (73 - 104)  BP: 151/89 (03 Oct 2023 08:34) (122/75 - 151/89)  BP(mean): --  RR: 18 (03 Oct 2023 08:34) (18 - 18)  SpO2: 93% (03 Oct 2023 08:34) (90% - 93%)    Parameters below as of 03 Oct 2023 08:34  Patient On (Oxygen Delivery Method): nasal cannula  O2 Flow (L/min): 2      PHYSICAL EXAM:    GENERAL: NAD, frail, elderly chronically ill appearing.   HEENT:  NC/AT, EOMI, PERRLA, No scleral icterus, Moist mucous membranes  NECK: Supple, No JVD  CNS:  Alert & Oriented X1, Motor Strength 5/5 B/L upper and lower extremities; DTRs 2+ intact   LUNG: Normal Breath sounds, +basilar rales, No rhonchi, No wheezing  HEART: RRR; No murmurs, No rubs  ABDOMEN: +BS, ST/ND/NT, +PEG  GENITOURINARY: Voiding, Bladder not distended  EXTREMITIES:  +RLE edema   MUSCULOSKELTAL: Joints normal ROM, No TTP, No effusion  SKIN: no rashes                            8.7    2.12  )-----------( 79       ( 01 Oct 2023 07:44 )             26.0     10-01    138  |  111<H>  |  20  ----------------------------<  95  3.8   |  22  |  0.51    Ca    7.4<L>      01 Oct 2023 07:44    TPro  4.9<L>  /  Alb  1.8<L>  /  TBili  0.8  /  DBili  x   /  AST  34  /  ALT  42  /  AlkPhos  221<H>  10-01    Pro-Brain Natriuretic Peptide: 1304 pg/mL (10.02.23 @ 07:14)        MEDICATIONS  (STANDING):  apixaban 5 milliGRAM(s) Enteral Tube every 12 hours  ascorbic acid 500 milliGRAM(s) Oral daily  aspirin  chewable 81 milliGRAM(s) Oral daily  atorvastatin 20 milliGRAM(s) Oral at bedtime  carbidopa/levodopa  25/100 1 Tablet(s) Oral <User Schedule>  carbidopa/levodopa  25/100 1.5 Tablet(s) Oral <User Schedule>  donepezil 5 milliGRAM(s) Oral at bedtime  doxazosin 2 milliGRAM(s) Oral at bedtime  furosemide   Injectable 40 milliGRAM(s) IV Push once  lisinopril 2.5 milliGRAM(s) Oral every 12 hours  metoprolol tartrate 25 milliGRAM(s) Oral daily  pantoprazole    Tablet 40 milliGRAM(s) Oral before breakfast  piperacillin/tazobactam IVPB.. 3.375 Gram(s) IV Intermittent every 8 hours  potassium chloride   Powder 20 milliEquivalent(s) Oral once  QUEtiapine 25 milliGRAM(s) Oral two times a day  vancomycin  IVPB 1000 milliGRAM(s) IV Intermittent every 12 hours      MEDICATIONS  (PRN):      CT Chest: fluid overload, mild to mod bilat effusions, no Pna      < from: CT Chest w/ IV Cont (09.30.23 @ 17:15) >  IMPRESSION:    Interstitial pulmonary edema and moderate pleural effusions.    --- End of Report ---            NITIN GARZA M.D., ATTENDING RADIOLOGIST  This document has been electronically signed. Sep 30 2023  5:28PM    < end of copied text >

## 2023-10-03 NOTE — PROGRESS NOTE ADULT - ASSESSMENT
a/p:    *URI due to Rhinovirus:  supportive care    *Pulmonary edema and effusions:  suspect Acute on chronic diastolic Chf  Lasix 40mg IV x 1 today, trial again today  f/u renal function in am; consider continuing diuretic according to renal  function     *RLE DVT:  -c/w Apixaban   [facility]   c/w Ceftriaxone for mild cellulitis of Rt leg    *Pancytopenia:  - slowly improving   ? due to cellultis and URI  infection  normal ldh, and haptoglobin   Hematology consult    Thrombocytopenia: r/o HIT  HIT AB - negative   will c/w Apixaban    *PEG tube in place  -Nutrition consult   -cw Jevity 360cc bolus TID    severe protein calorie malnutrition:  -diet liberalized to regular  -encouraged oral intake  -oral vitamins recommended upon discharge  -nutrition eval appreciated      *Unchanged L2 compression farcture / old rub and R clavicle fx / R renal cyst incidentally noted on CT  -F/u outpatient      a/p:    *URI due to Rhinovirus:  supportive care    *Pulmonary edema and effusions:  suspect Acute on chronic diastolic Chf  Lasix 40mg IV x 1 today, trial again today  f/u renal function in am; consider continuing diuretic according to renal  function     *RLE DVT:  -c/w Apixaban   [facility]   c/w Ceftriaxone for mild cellulitis of Rt leg    *Pancytopenia:  - slowly improving   ? due to cellultis and URI  infection  normal ldh, and haptoglobin   Hematology consult    Thrombocytopenia: r/o HIT  HIT AB - negative   will c/w Apixaban    *PEG tube in place  -Nutrition consult   -cw Jevity 360cc bolus TID    severe protein calorie malnutrition:  -diet PEG tube feeds  - supplements per nutrition reccomednations   -nutrition eval appreciated      *Unchanged L2 compression farcture / old rub and R clavicle fx / R renal cyst incidentally noted on CT  -F/u outpatient

## 2023-10-03 NOTE — PROGRESS NOTE ADULT - ASSESSMENT
1. DVT  2. possible sepsis  3. bilateral pl effusions/ chf, mild elevation in bnp noted  4. parkinsons disease/dementia    Plan:  await echo  cont acei and BB and iv lasix

## 2023-10-03 NOTE — PROGRESS NOTE ADULT - SUBJECTIVE AND OBJECTIVE BOX
Subjective:    pat better, lying in bed, no respiratory complaint.    Home Medications:  aspirin 81 mg oral tablet, chewable: 1 tab(s) by PEG tube once a day (30 Sep 2023 18:38)  atorvastatin 20 mg oral tablet: 1 tab(s) by PEG tube once a day (at bedtime) (30 Sep 2023 18:38)  carbidopa-levodopa 25 mg-100 mg oral tablet: 1.5 tab(s) by gastrostomy tube 2 times a day at 8 AM and 12 PM (30 Sep 2023 18:34)  carbidopa-levodopa 25 mg-100 mg oral tablet: 1 tab(s) by gastrostomy tube 2 times a day at 4 PM and 8 PM (30 Sep 2023 18:34)  donepezil 5 mg oral tablet: 1 tab(s) by PEG tube once a day (at bedtime) (30 Sep 2023 18:38)  doxazosin 2 mg oral tablet: 1 tab(s) by PEG tube once a day (at bedtime) (30 Sep 2023 18:34)  Eliquis 5 mg oral tablet: 1 tab(s) orally 2 times a day (30 Sep 2023 18:38)  lansoprazole 30 mg oral tablet, disintegratin cap(s) by PEG tube once a day (30 Sep 2023 18:38)  lisinopril 2.5 mg oral tablet: 1 tab(s) by PEG tube 2 times a day (30 Sep 2023 18:38)  metoprolol tartrate 50 mg oral tablet: 0.5 tab(s) by PEG tube once a day (30 Sep 2023 18:38)  QUEtiapine 25 mg oral tablet: 1 tab(s) orally 2 times a day (30 Sep 2023 21:50)  Vitamin C 500 mg/5 mL oral liquid: 5 milliliter(s) orally once a day (30 Sep 2023 21:50)    MEDICATIONS  (STANDING):  apixaban 5 milliGRAM(s) Enteral Tube every 12 hours  ascorbic acid 500 milliGRAM(s) Oral daily  aspirin  chewable 81 milliGRAM(s) Oral daily  atorvastatin 20 milliGRAM(s) Oral at bedtime  carbidopa/levodopa  25/100 1 Tablet(s) Oral <User Schedule>  carbidopa/levodopa  25/100 1.5 Tablet(s) Oral <User Schedule>  cefTRIAXone Injectable. 1000 milliGRAM(s) IV Push every 24 hours  donepezil 5 milliGRAM(s) Oral at bedtime  doxazosin 2 milliGRAM(s) Oral at bedtime  doxycycline monohydrate Capsule 100 milliGRAM(s) Oral every 12 hours  furosemide   Injectable 40 milliGRAM(s) IV Push daily  lisinopril 2.5 milliGRAM(s) Oral every 12 hours  metoprolol tartrate 25 milliGRAM(s) Oral daily  pantoprazole    Tablet 40 milliGRAM(s) Oral before breakfast  QUEtiapine 25 milliGRAM(s) Oral two times a day    MEDICATIONS  (PRN):      Allergies    Originally Entered as [Unknown] reaction to [fresh fruits] (Unknown)  apple (Anaphylaxis)  raw, fresh fruits- reynal[employer] family (apple, pear, apricot, peach, fig); processed/cooked is ok. (Anaphylaxis)  No Known Drug Allergies    Intolerances        Vital Signs Last 24 Hrs  T(C): 36.9 (03 Oct 2023 08:34), Max: 37.3 (02 Oct 2023 23:52)  T(F): 98.4 (03 Oct 2023 08:34), Max: 99.1 (02 Oct 2023 23:52)  HR: 104 (03 Oct 2023 08:34) (73 - 104)  BP: 151/89 (03 Oct 2023 08:34) (122/75 - 151/89)  BP(mean): --  RR: 18 (03 Oct 2023 08:34) (18 - 18)  SpO2: 93% (03 Oct 2023 08:34) (90% - 93%)    Parameters below as of 03 Oct 2023 08:34  Patient On (Oxygen Delivery Method): nasal cannula  O2 Flow (L/min): 2        PHYSICAL EXAMINATION:    NECK:  Supple. No lymphadenopathy. Jugular venous pressure not elevated. Carotids equal.   HEART:   The cardiac impulse has a normal quality. Reg., Nl S1 and S2.  There are no murmurs, rubs or gallops noted  CHEST:  Chest crackles to auscultation. Normal respiratory effort.  ABDOMEN:  Soft and nontender.   EXTREMITIES:  There is no edema.       LABS:                        8.9    4.37  )-----------( 82       ( 03 Oct 2023 12:57 )             25.9     10-02    139  |  111<H>  |  23  ----------------------------<  99  3.4<L>   |  22  |  0.50    Ca    7.6<L>      02 Oct 2023 07:14        Urinalysis Basic - ( 02 Oct 2023 07:14 )    Color: x / Appearance: x / SG: x / pH: x  Gluc: 99 mg/dL / Ketone: x  / Bili: x / Urobili: x   Blood: x / Protein: x / Nitrite: x   Leuk Esterase: x / RBC: x / WBC x   Sq Epi: x / Non Sq Epi: x / Bacteria: x

## 2023-10-03 NOTE — PROGRESS NOTE ADULT - SUBJECTIVE AND OBJECTIVE BOX
Patient is a 87y old  Male who presents with a chief complaint of PCP: Dr. Gulshan Browning   Heme: Denies having one   Pulm: Doesn't recall name   Cardio: Dr. Alejandro Arredondo (02 Oct 2023 16:08)      HPI:  86 y/o M w/ PMH of BPH, diverticulosis, GERD, HTN, PED placement, necrotizing enterocolitis, osteoporosis, parkinson's dementia, TIA prostate cancer, hearing loss, CVA, p/w worsening RLE / LUE swelling. Patient is unable to provide history, son and wife at bedside provides history. [facility] that [facility] and treated for cellulitis. He was discharged yesterday, and this AM family noticed patient's RLE and LUE swelling was worsening. Patient's son states they were told patient had ulcers in intestines, and considered risk of this prior to starting Eliquis. Son states patient has brown stool, no melena / hematochezia. States patient is an aspiration risk, and previously treated for aspiration PNA as well. [facility] also [facility] that [facility] had [facility] in his lungs. In the morning patient also c/o HA to son.     10/2/23  Cardiology. 87 year old male with RLE DVT, parkinsons dementia admitted with worsening swelling of rle and sepsis. ct performed shows bilat effusions and possible chf. history is obtained from chart. patient denies dyspnea and appears comfortable  10/3 appears comfortable. patient is asleep    PSH: Hernia repair, PEG placement     Social Hx: Denies tobacco / etoh / drugs     Family Hx: No pertinent family hx    (30 Sep 2023 21:34)      PAST MEDICAL & SURGICAL HISTORY:  Parkinsons disease      HTN (hypertension)      Osteoporosis      Prostate cancer      Personal history of transient ischemic attack (TIA), and cerebral infarction without residual deficits      Syncope and collapse      Bacteremia      Benign prostatic hyperplasia with lower urinary tract symptoms      Necrotizing enterocolitis      Sensorineural hearing loss (SNHL) of both ears      GERD (gastroesophageal reflux disease)      Colonic polyp      Diverticulosis      Nonrheumatic aortic valve insufficiency      Chronic rhinitis      H/O hernia repair            MEDICATIONS  (STANDING):  apixaban 5 milliGRAM(s) Enteral Tube every 12 hours  ascorbic acid 500 milliGRAM(s) Oral daily  aspirin  chewable 81 milliGRAM(s) Oral daily  atorvastatin 20 milliGRAM(s) Oral at bedtime  carbidopa/levodopa  25/100 1 Tablet(s) Oral <User Schedule>  carbidopa/levodopa  25/100 1.5 Tablet(s) Oral <User Schedule>  cefTRIAXone Injectable. 1000 milliGRAM(s) IV Push every 24 hours  donepezil 5 milliGRAM(s) Oral at bedtime  doxazosin 2 milliGRAM(s) Oral at bedtime  doxycycline monohydrate Capsule 100 milliGRAM(s) Oral every 12 hours  furosemide   Injectable 40 milliGRAM(s) IV Push daily  lisinopril 2.5 milliGRAM(s) Oral every 12 hours  metoprolol tartrate 25 milliGRAM(s) Oral daily  pantoprazole    Tablet 40 milliGRAM(s) Oral before breakfast  QUEtiapine 25 milliGRAM(s) Oral two times a day    MEDICATIONS  (PRN):          Vital Signs Last 24 Hrs  T(C): 37.3 (02 Oct 2023 23:52), Max: 37.3 (02 Oct 2023 23:52)  T(F): 99.1 (02 Oct 2023 23:52), Max: 99.1 (02 Oct 2023 23:52)  HR: 73 (02 Oct 2023 23:52) (73 - 103)  BP: 122/75 (02 Oct 2023 23:52) (122/75 - 156/91)  BP(mean): --  RR: 18 (02 Oct 2023 23:52) (18 - 18)  SpO2: 90% (02 Oct 2023 23:52) (90% - 92%)    Parameters below as of 02 Oct 2023 23:52  Patient On (Oxygen Delivery Method): room air        I&O's Summary      PHYSICAL EXAM  General Appearance: chronically ill, comfortable  HEENT:   Neck:   Back:   Lungs: dec bs but clear anteriorly  Heart: rrs1s2 2/6 bhavik base  Abdomen: soft  Extremities: 2+ rle edema  Skin:   Neurologic:       INTERPRETATION OF TELEMETRY:    ECG:        LABS:                          8.7    1.97  )-----------( 76       ( 02 Oct 2023 07:14 )             25.6     10-02    139  |  111<H>  |  23  ----------------------------<  99  3.4<L>   |  22  |  0.50    Ca    7.6<L>      02 Oct 2023 07:14    TPro  4.9<L>  /  Alb  1.8<L>  /  TBili  0.8  /  DBili  x   /  AST  34  /  ALT  42  /  AlkPhos  221<H>  10-01          Pro BNP  -- 10-01 @ 07:44  D Dimer  867 10-01 @ 07:44    PT/INR - ( 01 Oct 2023 07:44 )   PT: 18.5 sec;   INR: 1.66 ratio         PTT - ( 01 Oct 2023 07:44 )  PTT:30.7 sec  Urinalysis Basic - ( 02 Oct 2023 07:14 )    Color: x / Appearance: x / SG: x / pH: x  Gluc: 99 mg/dL / Ketone: x  / Bili: x / Urobili: x   Blood: x / Protein: x / Nitrite: x   Leuk Esterase: x / RBC: x / WBC x   Sq Epi: x / Non Sq Epi: x / Bacteria: x            RADIOLOGY & ADDITIONAL STUDIES:

## 2023-10-04 DIAGNOSIS — G20.A1 PARKINSON'S DISEASE WITHOUT DYSKINESIA, WITHOUT MENTION OF FLUCTUATIONS: ICD-10-CM

## 2023-10-04 LAB
ANION GAP SERPL CALC-SCNC: 5 MMOL/L — SIGNIFICANT CHANGE UP (ref 5–17)
BUN SERPL-MCNC: 19 MG/DL — SIGNIFICANT CHANGE UP (ref 7–23)
CALCIUM SERPL-MCNC: 7.7 MG/DL — LOW (ref 8.5–10.1)
CHLORIDE SERPL-SCNC: 113 MMOL/L — HIGH (ref 96–108)
CO2 SERPL-SCNC: 25 MMOL/L — SIGNIFICANT CHANGE UP (ref 22–31)
CREAT SERPL-MCNC: 0.49 MG/DL — LOW (ref 0.5–1.3)
EGFR: 99 ML/MIN/1.73M2 — SIGNIFICANT CHANGE UP
GLUCOSE SERPL-MCNC: 103 MG/DL — HIGH (ref 70–99)
HCT VFR BLD CALC: 28.1 % — LOW (ref 39–50)
HGB BLD-MCNC: 9.3 G/DL — LOW (ref 13–17)
MCHC RBC-ENTMCNC: 28.5 PG — SIGNIFICANT CHANGE UP (ref 27–34)
MCHC RBC-ENTMCNC: 33.1 GM/DL — SIGNIFICANT CHANGE UP (ref 32–36)
MCV RBC AUTO: 86.2 FL — SIGNIFICANT CHANGE UP (ref 80–100)
PLATELET # BLD AUTO: 97 K/UL — LOW (ref 150–400)
POTASSIUM SERPL-MCNC: 3.6 MMOL/L — SIGNIFICANT CHANGE UP (ref 3.5–5.3)
POTASSIUM SERPL-SCNC: 3.6 MMOL/L — SIGNIFICANT CHANGE UP (ref 3.5–5.3)
RBC # BLD: 3.26 M/UL — LOW (ref 4.2–5.8)
RBC # FLD: 15.3 % — HIGH (ref 10.3–14.5)
SODIUM SERPL-SCNC: 143 MMOL/L — SIGNIFICANT CHANGE UP (ref 135–145)
WBC # BLD: 6.38 K/UL — SIGNIFICANT CHANGE UP (ref 3.8–10.5)
WBC # FLD AUTO: 6.38 K/UL — SIGNIFICANT CHANGE UP (ref 3.8–10.5)

## 2023-10-04 PROCEDURE — 99498 ADVNCD CARE PLAN ADDL 30 MIN: CPT

## 2023-10-04 PROCEDURE — 99223 1ST HOSP IP/OBS HIGH 75: CPT

## 2023-10-04 PROCEDURE — 99497 ADVNCD CARE PLAN 30 MIN: CPT | Mod: 25

## 2023-10-04 PROCEDURE — 99222 1ST HOSP IP/OBS MODERATE 55: CPT

## 2023-10-04 RX ORDER — PIPERACILLIN AND TAZOBACTAM 4; .5 G/20ML; G/20ML
3.38 INJECTION, POWDER, LYOPHILIZED, FOR SOLUTION INTRAVENOUS ONCE
Refills: 0 | Status: DISCONTINUED | OUTPATIENT
Start: 2023-10-04 | End: 2023-10-04

## 2023-10-04 RX ORDER — PIPERACILLIN AND TAZOBACTAM 4; .5 G/20ML; G/20ML
3.38 INJECTION, POWDER, LYOPHILIZED, FOR SOLUTION INTRAVENOUS ONCE
Refills: 0 | Status: COMPLETED | OUTPATIENT
Start: 2023-10-04 | End: 2023-10-04

## 2023-10-04 RX ORDER — PIPERACILLIN AND TAZOBACTAM 4; .5 G/20ML; G/20ML
3.38 INJECTION, POWDER, LYOPHILIZED, FOR SOLUTION INTRAVENOUS EVERY 8 HOURS
Refills: 0 | Status: DISCONTINUED | OUTPATIENT
Start: 2023-10-04 | End: 2023-10-04

## 2023-10-04 RX ADMIN — CARBIDOPA AND LEVODOPA 1 TABLET(S): 25; 100 TABLET ORAL at 17:26

## 2023-10-04 RX ADMIN — CARBIDOPA AND LEVODOPA 1 TABLET(S): 25; 100 TABLET ORAL at 22:52

## 2023-10-04 RX ADMIN — Medication 25 MILLIGRAM(S): at 10:18

## 2023-10-04 RX ADMIN — ATORVASTATIN CALCIUM 20 MILLIGRAM(S): 80 TABLET, FILM COATED ORAL at 22:51

## 2023-10-04 RX ADMIN — Medication 2 MILLIGRAM(S): at 22:54

## 2023-10-04 RX ADMIN — PANTOPRAZOLE SODIUM 40 MILLIGRAM(S): 20 TABLET, DELAYED RELEASE ORAL at 14:28

## 2023-10-04 RX ADMIN — QUETIAPINE FUMARATE 25 MILLIGRAM(S): 200 TABLET, FILM COATED ORAL at 10:18

## 2023-10-04 RX ADMIN — LISINOPRIL 2.5 MILLIGRAM(S): 2.5 TABLET ORAL at 22:56

## 2023-10-04 RX ADMIN — Medication 100 MILLIGRAM(S): at 22:55

## 2023-10-04 RX ADMIN — QUETIAPINE FUMARATE 25 MILLIGRAM(S): 200 TABLET, FILM COATED ORAL at 22:57

## 2023-10-04 RX ADMIN — APIXABAN 5 MILLIGRAM(S): 2.5 TABLET, FILM COATED ORAL at 22:52

## 2023-10-04 RX ADMIN — APIXABAN 5 MILLIGRAM(S): 2.5 TABLET, FILM COATED ORAL at 10:16

## 2023-10-04 RX ADMIN — LISINOPRIL 2.5 MILLIGRAM(S): 2.5 TABLET ORAL at 10:18

## 2023-10-04 RX ADMIN — PIPERACILLIN AND TAZOBACTAM 200 GRAM(S): 4; .5 INJECTION, POWDER, LYOPHILIZED, FOR SOLUTION INTRAVENOUS at 10:22

## 2023-10-04 RX ADMIN — CARBIDOPA AND LEVODOPA 1.5 TABLET(S): 25; 100 TABLET ORAL at 08:13

## 2023-10-04 RX ADMIN — CARBIDOPA AND LEVODOPA 1.5 TABLET(S): 25; 100 TABLET ORAL at 14:26

## 2023-10-04 RX ADMIN — DONEPEZIL HYDROCHLORIDE 5 MILLIGRAM(S): 10 TABLET, FILM COATED ORAL at 22:53

## 2023-10-04 RX ADMIN — Medication 100 MILLIGRAM(S): at 10:19

## 2023-10-04 RX ADMIN — CEFTRIAXONE 1000 MILLIGRAM(S): 500 INJECTION, POWDER, FOR SOLUTION INTRAMUSCULAR; INTRAVENOUS at 14:29

## 2023-10-04 RX ADMIN — Medication 81 MILLIGRAM(S): at 10:17

## 2023-10-04 RX ADMIN — Medication 40 MILLIGRAM(S): at 10:16

## 2023-10-04 RX ADMIN — Medication 500 MILLIGRAM(S): at 10:16

## 2023-10-04 NOTE — PROGRESS NOTE ADULT - SUBJECTIVE AND OBJECTIVE BOX
CC: Sepsis   SUBJECTIVE:   HPI: 87-year-old Male with PMHx BPH, diverticulosis, GERD, HTN, necrotizing enterocolitis, PEG tube in place, tube feeding dependent, osteoporosis, Parkinson's dementia, TIA, CVA, prostate CA, sensorineural hearing loss presented to ED for evaluation of worsening RLE/LUE edema. Pt unable to provide history, A&OX1 at baseline. Son and wife at bedside during admission to provide history. State that [facility] with RLE DVT (started on Eliquis) and treated for cellulitis. [facility] and presented to [place] ED 9/30 as family noticed worsening edema. Pts son states they were told the patient had ulcers in his intestines, considered this risk prior to initiation of Eliquis. Son endorses pt having brown stools, denies melena/hematochezia. States pt previously treated for aspiration PNA and identified as high risk. Reports that [facility] stated that [facility] patient has [facility] in his lungs.     10/1: no dyspnea, confused  10/2: laying in bed, denies pain or dyspnea. on room air. wife at bedside and updated on plan.  10/3:   10/4: pt examined laying in bed. denies pain or dyspnea. no acute distress. on room air.     REVIEW OF SYSTEMS: unable to obtain 2/2 pts mental status, pt denies pain but otherwise unable to specify     Vital Signs Last 24 Hrs  T(C): 36.7 (04 Oct 2023 08:04), Max: 37.6 (03 Oct 2023 23:22)  T(F): 98.1 (04 Oct 2023 08:04), Max: 99.6 (03 Oct 2023 23:22)  HR: 86 (04 Oct 2023 08:04) (86 - 105)  BP: 133/59 (04 Oct 2023 08:04) (127/68 - 145/87)  BP(mean): --  RR: 18 (04 Oct 2023 08:04) (18 - 19)  SpO2: 97% (04 Oct 2023 08:04) (90% - 97%)    Parameters below as of 04 Oct 2023 08:04  Patient On (Oxygen Delivery Method): nasal cannula        I&O's Summary    03 Oct 2023 07:01  -  04 Oct 2023 07:00  --------------------------------------------------------  IN: 0 mL / OUT: 1 mL / NET: -1 mL      PHYSICAL EXAM:    Constitutional: NAD, awake and alert, oriented x 1, cachectic   HEENT: PERR, hard of hearing, MMM  Neck: Soft and supple, No LAD, No JVD  Respiratory: Breath sounds diminished in all fields, clear anteriorly   Cardiovascular: S1 and S2, regular rate and rhythm, no Murmurs, gallops or rubs  Gastrointestinal: Bowel Sounds present, soft, nontender, nondistended, no guarding, no rebound  Extremities: + 2 pitting edema to RLE  Vascular: 2+ peripheral pulses  Neurological: A/O x 1   Musculoskeletal: unable to assess 2/2 patient unable to follow commands  Skin: shiny BL LE with ecchymosis    MEDICATIONS:  MEDICATIONS  (STANDING):  apixaban 5 milliGRAM(s) Enteral Tube every 12 hours  ascorbic acid 500 milliGRAM(s) Oral daily  aspirin  chewable 81 milliGRAM(s) Oral daily  atorvastatin 20 milliGRAM(s) Oral at bedtime  carbidopa/levodopa  25/100 1 Tablet(s) Oral <User Schedule>  carbidopa/levodopa  25/100 1.5 Tablet(s) Oral <User Schedule>  cefTRIAXone Injectable. 1000 milliGRAM(s) IV Push every 24 hours  donepezil 5 milliGRAM(s) Oral at bedtime  doxazosin 2 milliGRAM(s) Oral at bedtime  doxycycline monohydrate Capsule 100 milliGRAM(s) Oral every 12 hours  furosemide   Injectable 40 milliGRAM(s) IV Push daily  lisinopril 2.5 milliGRAM(s) Oral every 12 hours  metoprolol tartrate 25 milliGRAM(s) Oral daily  pantoprazole    Tablet 40 milliGRAM(s) Oral before breakfast  piperacillin/tazobactam IVPB.- 3.375 Gram(s) IV Intermittent once  piperacillin/tazobactam IVPB.. 3.375 Gram(s) IV Intermittent every 8 hours  QUEtiapine 25 milliGRAM(s) Oral two times a day      LABS: All Labs Reviewed:                        9.3    6.38  )-----------( 97       ( 04 Oct 2023 08:34 )             28.1     10-04    143  |  113<H>  |  19  ----------------------------<  103<H>  3.6   |  25  |  0.49<L>    Ca    7.7<L>      04 Oct 2023 08:34      Blood Culture: 09-30 @ 17:55  Organism --  Gram Stain Blood -- Gram Stain --  Specimen Source Clean Catch Clean Catch (Midstream)  Culture-Blood --    09-30 @ 14:07  Organism --  Gram Stain Blood -- Gram Stain --  Specimen Source .Blood None  Culture-Blood --    09-30 @ 12:58  Organism --  Gram Stain Blood -- Gram Stain --  Specimen Source .Blood None  Culture-Blood --        RADIOLOGY/EKG:    < from: Xray Chest 1 View- PORTABLE-Urgent (Xray Chest 1 View- PORTABLE-Urgent .) (10.03.23 @ 19:59) >  IMPRESSION:  Progression of effusions.        Thank you for the courtesy of this referral.    --- End of Report ---            DUY DENISE MD; Attending Interventional Radiologist  This document has been electronically signed. Oct  4 2023 10:36AM    < end of copied text >    < from: CT Chest w/ IV Cont (09.30.23 @ 17:15) >  IMPRESSION:    Interstitial pulmonary edema and moderate pleural effusions.    --- End of Report ---            NITIN GARZA M.D., ATTENDING RADIOLOGIST  This document has been electronically signed. Sep 30 2023  5:28PM    < end of copied text >    < from: US Duplex Venous Upper Ext Ltd, Left (09.30.23 @ 16:42) >  IMPRESSION:  No evidence of left upper extremity deep venous thrombosis.        --- End of Report ---            JAE MCDOWELL MD; Attending Radiologist  This document has been electronically signed. Sep 30 2023  5:04PM    < end of copied text >    < from:  Duplex Venous Lower Ext Ltd, Right (09.30.23 @ 15:04) >  IMPRESSION:      Occlusive thrombosis right common femoral vein to the calf veins. Upper   extent of thrombus not demonstrated. (Above and below the knee)        --- End of Report ---            CADY AIKEN MD; Attending Radiologist  This document has been electronically signed. Sep 30 2023  3:13PM    < end of copied text >    < from: 12 Lead ECG (09.30.23 @ 10:52) >    Ventricular Rate 94 BPM    Atrial Rate 94 BPM    P-R Interval 158 ms    QRS Duration 82 ms    Q-T Interval 354 ms    QTC Calculation(Bazett) 442 ms    P Axis 33 degrees    R Axis 8 degrees    T Axis 34 degrees    Diagnosis Line Sinus rhythm with Premature supraventricular complexes  Low voltage QRS  Cannot rule out Anterior infarct (cited on or before 01-MAR-2018)  Abnormal ECG  Confirmed by RONALDO PATEL MD ([id number]) on 9/30/2023 12:40:57 PM    < end of copied text >           CC: Sepsis   SUBJECTIVE:   HPI: 87-year-old Male with PMHx BPH, diverticulosis, GERD, HTN, necrotizing enterocolitis, PEG tube in place, tube feeding dependent, osteoporosis, Parkinson's dementia, TIA, CVA, prostate CA, sensorineural hearing loss presented to ED for evaluation of worsening RLE/LUE edema. Pt unable to provide history, A&OX1 at baseline. Son and wife at bedside during admission to provide history. State that [facility] with RLE DVT (started on Eliquis) and treated for cellulitis. [facility] and presented to [place] ED 9/30 as family noticed worsening edema. Pts son states they were told the patient had ulcers in his intestines, considered this risk prior to initiation of Eliquis. Son endorses pt having brown stools, denies melena/hematochezia. States pt previously treated for aspiration PNA and identified as high risk. [facility] that [facility] has fluid in his lungs.     10/1: no dyspnea, confused  10/2: laying in bed, denies pain or dyspnea. on room air. wife at bedside and updated on plan.  10/3:   10/4: pt examined laying in bed. denies pain or dyspnea. no acute distress. on room air.     REVIEW OF SYSTEMS: unable to obtain 2/2 pts mental status, pt denies pain but otherwise unable to specify     Vital Signs Last 24 Hrs  T(C): 36.7 (04 Oct 2023 08:04), Max: 37.6 (03 Oct 2023 23:22)  T(F): 98.1 (04 Oct 2023 08:04), Max: 99.6 (03 Oct 2023 23:22)  HR: 86 (04 Oct 2023 08:04) (86 - 105)  BP: 133/59 (04 Oct 2023 08:04) (127/68 - 145/87)  BP(mean): --  RR: 18 (04 Oct 2023 08:04) (18 - 19)  SpO2: 97% (04 Oct 2023 08:04) (90% - 97%)    Parameters below as of 04 Oct 2023 08:04  Patient On (Oxygen Delivery Method): nasal cannula        I&O's Summary    03 Oct 2023 07:01  -  04 Oct 2023 07:00  --------------------------------------------------------  IN: 0 mL / OUT: 1 mL / NET: -1 mL      PHYSICAL EXAM:    Constitutional: NAD, awake and alert, oriented x 1, cachectic   HEENT: PERR, hard of hearing, MMM  Neck: Soft and supple, No LAD, No JVD  Respiratory: Breath sounds diminished in all fields, clear anteriorly   Cardiovascular: S1 and S2, regular rate and rhythm, no Murmurs, gallops or rubs  Gastrointestinal: Bowel Sounds present, soft, nontender, nondistended, no guarding, no rebound  Extremities: + 2 pitting edema to RLE  Vascular: 2+ peripheral pulses  Neurological: A/O x 1   Musculoskeletal: unable to assess 2/2 patient unable to follow commands  Skin: shiny BL LE with ecchymosis      MEDICATIONS:  MEDICATIONS  (STANDING):  apixaban 5 milliGRAM(s) Enteral Tube every 12 hours  ascorbic acid 500 milliGRAM(s) Oral daily  aspirin  chewable 81 milliGRAM(s) Oral daily  atorvastatin 20 milliGRAM(s) Oral at bedtime  carbidopa/levodopa  25/100 1 Tablet(s) Oral <User Schedule>  carbidopa/levodopa  25/100 1.5 Tablet(s) Oral <User Schedule>  cefTRIAXone Injectable. 1000 milliGRAM(s) IV Push every 24 hours  donepezil 5 milliGRAM(s) Oral at bedtime  doxazosin 2 milliGRAM(s) Oral at bedtime  doxycycline monohydrate Capsule 100 milliGRAM(s) Oral every 12 hours  furosemide   Injectable 40 milliGRAM(s) IV Push daily  lisinopril 2.5 milliGRAM(s) Oral every 12 hours  metoprolol tartrate 25 milliGRAM(s) Oral daily  pantoprazole    Tablet 40 milliGRAM(s) Oral before breakfast  piperacillin/tazobactam IVPB.- 3.375 Gram(s) IV Intermittent once  piperacillin/tazobactam IVPB.. 3.375 Gram(s) IV Intermittent every 8 hours  QUEtiapine 25 milliGRAM(s) Oral two times a day      LABS: All Labs Reviewed:                        9.3    6.38  )-----------( 97       ( 04 Oct 2023 08:34 )             28.1     10-04    143  |  113<H>  |  19  ----------------------------<  103<H>  3.6   |  25  |  0.49<L>    Ca    7.7<L>      04 Oct 2023 08:34      RADIOLOGY/EKG:    < from: Xray Chest 1 View- PORTABLE-Urgent (Xray Chest 1 View- PORTABLE-Urgent .) (10.03.23 @ 19:59) >  IMPRESSION:  Progression of effusions.        Thank you for the courtesy of this referral.    --- End of Report ---            DUY DENISE MD; Attending Interventional Radiologist  This document has been electronically signed. Oct  4 2023 10:36AM    < end of copied text >    < from: CT Chest w/ IV Cont (09.30.23 @ 17:15) >  IMPRESSION:    Interstitial pulmonary edema and moderate pleural effusions.    --- End of Report ---        NITIN GARZA M.D., ATTENDING RADIOLOGIST  This document has been electronically signed. Sep 30 2023  5:28PM    < end of copied text >    < from: US Duplex Venous Upper Ext Ltd, Left (09.30.23 @ 16:42) >  IMPRESSION:  No evidence of left upper extremity deep venous thrombosis.        --- End of Report ---      JAE MCDOWELL MD; Attending Radiologist  This document has been electronically signed. Sep 30 2023  5:04PM    < end of copied text >    < from: US Duplex Venous Lower Ext Ltd, Right (09.30.23 @ 15:04) >  IMPRESSION:      Occlusive thrombosis right common femoral vein to the calf veins. Upper   < from: TTE Echo Complete w/o Contrast w/ Doppler (10.03.23 @ 12:31) >   Summary     The mitral valve leaflets appear thickened.   Trace mitral regurgitation is present.   EA reversal of the mitral inflow consistent with reduced compliance of   the   left ventricle.   The aortic valve is not well visualized, appears moderately calcified.   Valve opening seems to be restricted.   Peak and mean transaortic gradients are 47 and 24 mmHg respectively; this   finding is consistent with moderate to severe aortic stenosis.   ELLI by continuity equation (1.1 cm2) and dimensionless index suggest   (.34)   Pedoff velocity measured 4.0 meters/sec   there mobile echogenic appears to be attached ventricular side of aortic   valve or base of AML , which is noted in 2021 study too ,may be   calcification vs artifact   Mild concentric left ventricular hypertrophy is present.   The left ventricle is normal in wall motion and contractility.   Estimated left ventricular ejection fraction is 60-65 %.   The left ventricle cavity is underdistended.   Normal appearing right ventricle structure and function.     clinical correlation noted     Signature     ----------------------------------------------------------------   Electronically signed by Venugopal Palla, MD(Interpreting   physician) on 10/03/2023 08:22 PM   ----------------------------------------------------------------    Valves      < end of copied text >  extent of thrombus not demonstrated. (Above and below the knee)        --- End of Report ---    CADY AIKEN MD; Attending Radiologist  This document has been electronically signed. Sep 30 2023  3:13PM    < end of copied text >    < from: 12 Lead ECG (09.30.23 @ 10:52) >    Ventricular Rate 94 BPM    Atrial Rate 94 BPM    P-R Interval 158 ms    QRS Duration 82 ms    Q-T Interval 354 ms    QTC Calculation(Bazett) 442 ms    P Axis 33 degrees    R Axis 8 degrees    T Axis 34 degrees    Diagnosis Line Sinus rhythm with Premature supraventricular complexes  Low voltage QRS  Cannot rule out Anterior infarct (cited on or before 01-MAR-2018)  Abnormal ECG  Confirmed by RONALDO PATEL MD ([id number]) on 9/30/2023 12:40:57 PM    < end of copied text >

## 2023-10-04 NOTE — CONSULT NOTE ADULT - ASSESSMENT
87y Male PMHx BPH, AS, CHF, pleural effusions diverticulosis, GERD, HTN, DVT on eliquis, necrotizing enterocolitis, PEG tube in place, tube feeding dependent, osteoporosis, Parkinson's dementia, TIA, CVA, prostate CA, sensorineural hearing loss admitted w worsening RLE/LUE edema. Called for worsening effusions on CXR. 87y Male PMHx BPH, AS, CHF, pleural effusions diverticulosis, GERD, HTN, DVT on eliquis, necrotizing enterocolitis, PEG tube in place, tube feeding dependent, osteoporosis, Parkinson's dementia, TIA, CVA, prostate CA, sensorineural hearing loss admitted w worsening RLE/LUE edema. Called for worsening effusions on CXR.    Pt in no distress, on supplemental O2 w b/l effusions  Dr. Pittman to review case.

## 2023-10-04 NOTE — GOALS OF CARE CONVERSATION - ADVANCED CARE PLANNING - CONVERSATION DETAILS
HPI: As per medical record,   86 y/o M w/ PMH of BPH, diverticulosis, GERD, HTN, PED placement, necrotizing enterocolitis, osteoporosis, parkinson's dementia, TIA prostate cancer, hearing loss, CVA, p/w worsening RLE / LUE swelling. Patient is unable to provide history, son and wife at bedside provides history. States that [facility] with RLE D[facility] and treated for cellulitis. He was discharged yesterday, and this AM family noticed patient's RLE and LUE swelling was worsening. Patient's son states they were told patient had ulcers in intestines, and considered risk of this prior to starting Eliquis. Son states patient has brown stool, no melena / hematochezia. States patient is an aspiration risk, and previously treated for aspiration PNA as well. [facility] also [facility] that patient had fluid in his lungs. In the morning patient also c/o HA to son.  (30 Sep 2023 21:34)    PERTINENT PMH REVIEWED:  [ X ] YES [ ] NO           Primary Contact: spouse Dona ([phone])    HCP [ X ] Surrogate [   ] Guardian [   ]    Mental Status: Pt. lacks capacity  Concerns of Depression [  ] unable to assess  Anxiety [   ] unable to assess  Baseline ADLs (prior to admission):  Independent [ ] moderately [ ] fully   Dependent   [ ] moderately [X ]fully    Family Meeting attendees: Spouse & daughter participated in discussion with team.     Anticipated Grief: Patient[  ] Family [ X ]   - daughter tearful on phone    Caregiver Renner Assessed: Yes [ X ] No [  ]    Baptism: Buddhism.    Spiritual Concerns: Not identified,  available as needed.    Goals of Care: Spouse not quite ready to invoke hospice at this time    Previous Services: [facility] benefits, Aide services at home through private hire and the [facility]     ADVANCE DIRECTIVES:    -Pt. lacks capacity  -HCP located on [employer] names spouse Kaitlyn as primary agent & Lisa as alternate  -MOLST reflecting DNR/DNI/trial NIV    Anticipated D/C Plan: Likely return home with [facility] services                     Summary: This SW & Pall NP Megan reached out to patient's family to discuss goals of care & provide support. Palliative roles explained. Patient known to team from previous admissions. Pt lacks capacity and is unable to participate in discussion. Pt's spouse Liz and daughter Lisa participated in discussion via telephone. They acknowledge meeting with team previously. Liz explains that pt was not home long until they had to bring patient back to the hospital due to secretions and swelling. [facility], [facility] Family also report that patient has since receive botox injections to salivary glands which has helped with oral secretions. They have also obtained oral suction machine from the [facility]. They note that pt has not had any pleasure feeds since last admission.     Dona confirms that [facility] provides them with 16 hrs/week of aides services and the rest is private pay. There is a time between 3PM and 6PM at which time pt's family takes turns caring for patient.     Team review hospice philosophy and services in detail. All questions addressed. It appears that daughter would be ready to place patient on hospice but spouse is not quite ready. Spouse Liz does explain that she understands they will need to place him on hospice in the future but she isnt ready at this moment. Family confirms MOLST reflecting DNR/DNI w/trial of NIV.     Emotional support provided. MOLST on chart. Our team will continue to follow.

## 2023-10-04 NOTE — CONSULT NOTE ADULT - REASON FOR ADMISSION
PCP: Dr. Gulshan Browning   Heme: Denies having one   Pulm: Doesn't recall name   Cardio: Dr. Alejandro Arredondo
PCP: Dr. Gulshan Browning   Heme: Denies having one   Pulm: Doesn't recall name   Cardio: Dr. Alejandro Arredondo
SOB  Pulm: Doesn't recall name   Cardio: Dr. Alejandro Arredondo
PCP: Dr. Gulshan Browning   Heme: Denies having one   Pulm: Doesn't recall name   Cardio: Dr. Alejandro Arredondo
PCP: Dr. Gulshan Browning    p/w worsening RLE / LUE swelling.

## 2023-10-04 NOTE — CONSULT NOTE ADULT - PROVIDER SPECIALTY LIST ADULT
Heme/Onc
Pulmonology
Palliative Care
Cardiology
Infectious Disease
Thoracic Surgery
Vascular Surgery

## 2023-10-04 NOTE — PROGRESS NOTE ADULT - ASSESSMENT
1. DVT  2. possible sepsis  3. acute HRpEF.  echo confirms mod-severe AS, likely severe with nl lvf, improving.  4. parkinsons disease/dementia    Plan:  cont acei and BB and iv lasix  repeat chest xray and consider transition to oral lasix  will see prn only  thank you

## 2023-10-04 NOTE — PROGRESS NOTE ADULT - SUBJECTIVE AND OBJECTIVE BOX
Subjective:    pat last night sob, CXR shows worsening of pleural effusion, lathergy but arousable. Concern for aspiration, was started iv zosyn.    Home Medications:  aspirin 81 mg oral tablet, chewable: 1 tab(s) by PEG tube once a day (30 Sep 2023 18:38)  atorvastatin 20 mg oral tablet: 1 tab(s) by PEG tube once a day (at bedtime) (30 Sep 2023 18:38)  carbidopa-levodopa 25 mg-100 mg oral tablet: 1.5 tab(s) by gastrostomy tube 2 times a day at 8 AM and 12 PM (30 Sep 2023 18:34)  carbidopa-levodopa 25 mg-100 mg oral tablet: 1 tab(s) by gastrostomy tube 2 times a day at 4 PM and 8 PM (30 Sep 2023 18:34)  donepezil 5 mg oral tablet: 1 tab(s) by PEG tube once a day (at bedtime) (30 Sep 2023 18:38)  doxazosin 2 mg oral tablet: 1 tab(s) by PEG tube once a day (at bedtime) (30 Sep 2023 18:34)  Eliquis 5 mg oral tablet: 1 tab(s) orally 2 times a day (30 Sep 2023 18:38)  lansoprazole 30 mg oral tablet, disintegratin cap(s) by PEG tube once a day (30 Sep 2023 18:38)  lisinopril 2.5 mg oral tablet: 1 tab(s) by PEG tube 2 times a day (30 Sep 2023 18:38)  metoprolol tartrate 50 mg oral tablet: 0.5 tab(s) by PEG tube once a day (30 Sep 2023 18:38)  QUEtiapine 25 mg oral tablet: 1 tab(s) orally 2 times a day (30 Sep 2023 21:50)  Vitamin C 500 mg/5 mL oral liquid: 5 milliliter(s) orally once a day (30 Sep 2023 21:50)    MEDICATIONS  (STANDING):  apixaban 5 milliGRAM(s) Enteral Tube every 12 hours  ascorbic acid 500 milliGRAM(s) Oral daily  aspirin  chewable 81 milliGRAM(s) Oral daily  atorvastatin 20 milliGRAM(s) Oral at bedtime  carbidopa/levodopa  25/100 1 Tablet(s) Oral <User Schedule>  carbidopa/levodopa  25/100 1.5 Tablet(s) Oral <User Schedule>  cefTRIAXone Injectable. 1000 milliGRAM(s) IV Push every 24 hours  donepezil 5 milliGRAM(s) Oral at bedtime  doxazosin 2 milliGRAM(s) Oral at bedtime  doxycycline monohydrate Capsule 100 milliGRAM(s) Oral every 12 hours  furosemide   Injectable 40 milliGRAM(s) IV Push daily  lisinopril 2.5 milliGRAM(s) Oral every 12 hours  metoprolol tartrate 25 milliGRAM(s) Oral daily  pantoprazole    Tablet 40 milliGRAM(s) Oral before breakfast  piperacillin/tazobactam IVPB.- 3.375 Gram(s) IV Intermittent once  piperacillin/tazobactam IVPB.. 3.375 Gram(s) IV Intermittent every 8 hours  QUEtiapine 25 milliGRAM(s) Oral two times a day    MEDICATIONS  (PRN):      Allergies    Originally Entered as [Unknown] reaction to [fresh fruits] (Unknown)  apple (Anaphylaxis)  raw, fresh fruits- reynaldo family (apple, pear, apricot, peach, fig); processed/cooked is ok. (Anaphylaxis)  No Known Drug Allergies    Intolerances        Vital Signs Last 24 Hrs  T(C): 36.7 (04 Oct 2023 08:04), Max: 37.6 (03 Oct 2023 23:22)  T(F): 98.1 (04 Oct 2023 08:04), Max: 99.6 (03 Oct 2023 23:22)  HR: 86 (04 Oct 2023 08:04) (86 - 105)  BP: 133/59 (04 Oct 2023 08:04) (127/68 - 145/87)  BP(mean): --  RR: 18 (04 Oct 2023 08:04) (18 - 19)  SpO2: 97% (04 Oct 2023 08:04) (90% - 97%)    Parameters below as of 04 Oct 2023 08:04  Patient On (Oxygen Delivery Method): nasal cannula          PHYSICAL EXAMINATION:    NECK:  Supple. No lymphadenopathy. Jugular venous pressure not elevated. Carotids equal.   HEART:   The cardiac impulse has a normal quality. Reg., Nl S1 and S2.  There are no murmurs, rubs or gallops noted  CHEST:  Chest crackles to auscultation. Normal respiratory effort.  ABDOMEN:  Soft and nontender.   EXTREMITIES:  There is no edema.       LABS:                        9.3    6.38  )-----------( 97       ( 04 Oct 2023 08:34 )             28.1     10    143  |  113<H>  |  19  ----------------------------<  103<H>  3.6   |  25  |  0.49<L>    Ca    7.7<L>      04 Oct 2023 08:34        Urinalysis Basic - ( 04 Oct 2023 08:34 )    Color: x / Appearance: x / SG: x / pH: x  Gluc: 103 mg/dL / Ketone: x  / Bili: x / Urobili: x   Blood: x / Protein: x / Nitrite: x   Leuk Esterase: x / RBC: x / WBC x   Sq Epi: x / Non Sq Epi: x / Bacteria: x      Xray Chest 1 View- PORTABLE-Urgent (Xray Chest 1 View- PORTABLE-Urgent .) (10.03.23 @ 19:59) >  Frontal expiratory view of the chest shows the heart to be normal in   size. The lungs show mild pulmonary congestion with progression of   pleural effusions. There is no evidence of pneumothorax.    IMPRESSION:  Progression of effusions.

## 2023-10-04 NOTE — PROGRESS NOTE ADULT - SUBJECTIVE AND OBJECTIVE BOX
Patient is a 87y old  Male who presents with a chief complaint of PCP: Dr. Gulshan Browning   Heme: Denies having one   Pulm: Doesn't recall name   Cardio: Dr. Alejandro Arredondo (03 Oct 2023 13:27)      HPI:  86 y/o M w/ PMH of BPH, diverticulosis, GERD, HTN, PED placement, necrotizing enterocolitis, osteoporosis, parkinson's dementia, TIA prostate cancer, hearing loss, CVA, p/w worsening RLE / LUE swelling. Patient is unable to provide history, son and wife at bedside provides history. States that [facility] with RLE D[facility] and treated for cellulitis. He was discharged yesterday, and this AM family noticed patient's RLE and LUE swelling was worsening. Patient's son states they were told patient had ulcers in intestines, and considered risk of this prior to starting Eliquis. Son states patient has brown stool, no melena / hematochezia. States patient is an aspiration risk, and previously treated for aspiration PNA as well. [facility] was also [facility] that [facility] had fluid in his lungs. In the morning patient also c/o HA to son.     10/2/23  Cardiology. 87 year old male with RLE DVT, parkinsons dementia admitted with worsening swelling of rle and sepsis. ct performed shows bilat effusions and possible chf. history is obtained from chart. patient denies dyspnea and appears comfortable  10/3 appears comfortable. patient is asleep  10/4 sleeping appears comfortable        PSH: Hernia repair, PEG placement     Social Hx: Denies tobacco / etoh / drugs     Family Hx: No pertinent family hx    (30 Sep 2023 21:34)      PAST MEDICAL & SURGICAL HISTORY:  Parkinsons disease      HTN (hypertension)      Osteoporosis      Prostate cancer      Personal history of transient ischemic attack (TIA), and cerebral infarction without residual deficits      Syncope and collapse      Bacteremia      Benign prostatic hyperplasia with lower urinary tract symptoms      Necrotizing enterocolitis      Sensorineural hearing loss (SNHL) of both ears      GERD (gastroesophageal reflux disease)      Colonic polyp      Diverticulosis      Nonrheumatic aortic valve insufficiency      Chronic rhinitis      H/O hernia repair            MEDICATIONS  (STANDING):  apixaban 5 milliGRAM(s) Enteral Tube every 12 hours  ascorbic acid 500 milliGRAM(s) Oral daily  aspirin  chewable 81 milliGRAM(s) Oral daily  atorvastatin 20 milliGRAM(s) Oral at bedtime  carbidopa/levodopa  25/100 1 Tablet(s) Oral <User Schedule>  carbidopa/levodopa  25/100 1.5 Tablet(s) Oral <User Schedule>  cefTRIAXone Injectable. 1000 milliGRAM(s) IV Push every 24 hours  donepezil 5 milliGRAM(s) Oral at bedtime  doxazosin 2 milliGRAM(s) Oral at bedtime  doxycycline monohydrate Capsule 100 milliGRAM(s) Oral every 12 hours  furosemide   Injectable 40 milliGRAM(s) IV Push daily  lisinopril 2.5 milliGRAM(s) Oral every 12 hours  metoprolol tartrate 25 milliGRAM(s) Oral daily  pantoprazole    Tablet 40 milliGRAM(s) Oral before breakfast  QUEtiapine 25 milliGRAM(s) Oral two times a day    MEDICATIONS  (PRN):          Vital Signs Last 24 Hrs  T(C): 36.7 (04 Oct 2023 08:04), Max: 37.6 (03 Oct 2023 23:22)  T(F): 98.1 (04 Oct 2023 08:04), Max: 99.6 (03 Oct 2023 23:22)  HR: 86 (04 Oct 2023 08:04) (86 - 105)  BP: 133/59 (04 Oct 2023 08:04) (127/68 - 151/89)  BP(mean): --  RR: 18 (04 Oct 2023 08:04) (18 - 19)  SpO2: 97% (04 Oct 2023 08:04) (90% - 97%)    Parameters below as of 04 Oct 2023 08:04  Patient On (Oxygen Delivery Method): nasal cannula        I&O's Summary    03 Oct 2023 07:01  -  04 Oct 2023 07:00  --------------------------------------------------------  IN: 0 mL / OUT: 1 mL / NET: -1 mL        PHYSICAL EXAM  General Appearance: chronically ill, comfortable  HEENT:   Neck:   Back:   Lungs: dec bs but clear anteriorly  Heart: rrs1s2 2/6 bhavik base  Abdomen: soft  Extremities: no edema  Skin:   Neurologic:         INTERPRETATION OF TELEMETRY:    ECG:        LABS:                          8.9    4.37  )-----------( 82       ( 03 Oct 2023 12:57 )             25.9     10-03    140  |  111<H>  |  21  ----------------------------<  137<H>  3.3<L>   |  24  |  0.58    Ca    7.4<L>      03 Oct 2023 12:57            Pro BNP  -- 10-01 @ 07:44  D Dimer  867 10-01 @ 07:44      Urinalysis Basic - ( 03 Oct 2023 12:57 )    Color: x / Appearance: x / SG: x / pH: x  Gluc: 137 mg/dL / Ketone: x  / Bili: x / Urobili: x   Blood: x / Protein: x / Nitrite: x   Leuk Esterase: x / RBC: x / WBC x   Sq Epi: x / Non Sq Epi: x / Bacteria: x            RADIOLOGY & ADDITIONAL STUDIES:

## 2023-10-04 NOTE — CONSULT NOTE ADULT - ASSESSMENT
86 y/o M w/ PMH of BPH, diverticulosis, GERD, HTN, PED placement, necrotizing enterocolitis, osteoporosis, parkinson's dementia, TIA prostate cancer, hearing loss, CVA, p/w worsening RLE / LUE swelling. Patient is unable to provide history, son and wife at bedside provides history. States that [facility] with RLE D[facility] and treated for cellulitis. He was discharged yesterday, and this AM family noticed patient's RLE and LUE swelling was worsening. Patient's son states they were told patient had ulcers in intestines, and considered risk of this prior to starting Eliquis. Son states patient has brown stool, no melena / hematochezia. States patient is an aspiration risk, and previously treated for aspiration PNA as well. [facility] was also [facility] that patient had fluid in his lungs. In the morning patient also c/o HA to son.     1. Pulmonary edema and pleural effusions  -suspect CHR exacerbation  -IV lasix daily  -cardiology following  -patient with low grade fevers  -blood cultures and ucx pending NGTD  -on IV zosyn, ceftriaxone, and PO doxy  -possible asp PNA; patient has PEG receiving bolus TF   -ID following  -pulmonary following    2. Parkinson's dementia  -per family, recent functional decline since January of this year  -dysphagia with PEG placement in January 2023  -on bolus TF at home  -patient with recurrent aspiration PNA  -baseline wheelchair bound  -poor prognosis, multiple rehospitalization in past 2 months  -Palliative strongly suggested hospice to family; wife not ready for hospice at this time      Process of Care   --Reviewed dx/treatment problems and alignment with Goals of Care        Physical Aspects of Care   --Pain   no nonverbal indicators of pain  c/w current management     --Bowel Regimen   risk for constipation d/t immobility   daily dulcolax as needed    --Dyspnea   on 2LNC  comfortable and in NAD     --Nausea Vomiting   denies     --Weakness   PT as tolerated    WCB at baseline      Psychological and Psychiatric Aspects of Care:    --Grief/ Bereavement: emotional support provided   --Hx of psychiatric dx: none   --Pastoral Care Available PRN        Social Aspects of Care   --SW involved        Cultural Aspects   -Primary Language: English           Goals of Care: MOLST with current limitations of DNR/DNI and trial NIV. Spoke with wife and daughter on 10/4. Palliative strongly suggested patient returning home on hospice. Wife not quite ready for hospice, but will consider in future.          We discussed Palliative Care team being a supportive team when a patient has ongoing illnesses.  We also discussed that it is not an end of life care service, but can help navigate symptoms and emotional support throughout their hospital stay here.           Ethical and Legal Aspects: NA           Capacity- patient A&Ox1/minimally verbal, hx of Parkinson's dementia; does not have medical decision making capacity at this time       HCP/Surrogate: Dona Holder [phone]        Code Status: DNR/DNI trial NIV    MOLST: prior MOLST in chart, family confirmed limitations of DNR/DNI trial NIV     Dispo Plan: home with home care vs. home hospice          Discussed With: Dr. Resendiz & Sarah Goetz NP          Case coordinated with attending and SW and RN            Time Spent: 75 minutes including the care, coordination and counseling of this patient, 50% of which was spent coordinating and counseling.

## 2023-10-04 NOTE — CONSULT NOTE ADULT - CONVERSATION DETAILS
Spoke with patient's wife and daughter via phone alongside Laverne Garza LMSW. Explained role of palliative medicine.     Per family, [facility] for multiple issues including recurrent aspiration pneumonia, CHF exacerbations, DVT and cellulitis. When not hospitalized, patient living at home with wife and has aides 7 days a week during day time and evening hours via the [facility] and private hire aides. Patient is baseline wheelchair bound at home and needs assistance with all ADLs and IADLs. He has a PEG and receives bolus tube feeds at home.      MOLST was confirmed with current limitations in place of DNR/DNI and trial NIV. We discussed the option of patient returning home on hospice. Hospice services were explained in detail to wife and daughter.  We explained that hospice would be a way to care for patient in home environment and focus on quality of life and symptom management, preventing frequent trips back and forth to the hospital for inevitable issues that will come up as patient's dementia progresses. Wife and daughter understanding that patient's dementia is progressive and advanced and also understanding that he has other underlying comorbidities contributing to his recurrent hospitalizations. Both wife and daughter were considering instating hospice care, but the wife wishes for patient to return home with home care once more, and if he is to return to the hospital shortly after discharge- she would like to consider hospice for her  at that point.  Daughter wishes to respect wife's wishes for patient's care. We explained that if they change they mind at any point during his current hospitalization- our team may be contacted to help with home hospice referral.     All questions were addressed regarding patient's current medical condition and treatment plan. Our team's contact information was provided to the family, and they were told they could reach out to us at any point for support or further questions.  No change in care at this time. Maintain current limitations of DNR/DNI and trial NIV. Palliative will continue to follow.

## 2023-10-04 NOTE — CONSULT NOTE ADULT - SUBJECTIVE AND OBJECTIVE BOX
HPI: 86 y/o M w/ PMH of BPH, diverticulosis, GERD, HTN, PED placement, necrotizing enterocolitis, osteoporosis, parkinson's dementia, TIA prostate cancer, hearing loss, CVA, p/w worsening RLE / LUE swelling. Patient is unable to provide history, son and wife at bedside provides history. States that [facility] with RLE D[facility] and treated for cellulitis. He was discharged yesterday, and this AM family noticed patient's RLE and LUE swelling was worsening. Patient's son states they were told patient had ulcers in intestines, and considered risk of this prior to starting Eliquis. Son states patient has brown stool, no melena / hematochezia. States patient is an aspiration risk, and previously treated for aspiration PNA as well. [facility] was also told [facility] that patient had fluid in his lungs. In the morning patient also c/o HA to son.       PAIN: ( )Yes   (X)No  no nonverbal signs or symptoms    DYSPNEA: ( ) Yes  (X) No  appears comfortable, in NAD, on 2LNC      PAST MEDICAL & SURGICAL HISTORY:  Parkinsons disease  HTN (hypertension)  Osteoporosis  Prostate cancer  Personal history of transient ischemic attack (TIA), and cerebral infarction without residual deficits  Syncope and collapse  Bacteremia  Benign prostatic hyperplasia with lower urinary tract symptoms  Necrotizing enterocolitis  Sensorineural hearing loss (SNHL) of both ears  GERD (gastroesophageal reflux disease)  Colonic polyp  Diverticulosis  Nonrheumatic aortic valve insufficiency  Chronic rhinitis  H/O hernia repair      SOCIAL HX:    Hx opiate tolerance ( )YES  ( )NO    Baseline ADLs  (Prior to Admission)  ( ) Independent   (X)Dependent  wheelchair bound at baseline      FAMILY HISTORY:  Family history unobtainable due to patient's condition        Review of Systems:    Unable to obtain/Limited due to: advanced dementia/minimally verbal      PHYSICAL EXAM:    Vital Signs Last 24 Hrs  T(C): 36.7 (04 Oct 2023 08:04), Max: 37.6 (03 Oct 2023 23:22)  T(F): 98.1 (04 Oct 2023 08:04), Max: 99.6 (03 Oct 2023 23:22)  HR: 86 (04 Oct 2023 08:04) (86 - 105)  BP: 133/59 (04 Oct 2023 08:04) (127/68 - 145/87)  BP(mean): --  RR: 18 (04 Oct 2023 08:04) (18 - 19)  SpO2: 97% (04 Oct 2023 08:04) (90% - 97%)    Parameters below as of 04 Oct 2023 08:04  Patient On (Oxygen Delivery Method): nasal cannula      Daily     Daily Weight in k.5 (04 Oct 2023 06:15)    PPSV2:  20%  FAST: 7D    General: Lethargic, arouses to voice. Chronically ill-appearing. In NAD.  Mental Status: A&Ox1.  HEENT: Dry mucous membranes.  Lungs: Diminished lung sounds bilaterally  Cardiac: Regular rate and rhythm. S1S2.  GI: +PEG.  : No suprapubic tenderness.  Ext: +2 RLE edema and erythema.  Neuro: A&Ox1. Minimally verbal. Limited exam.      LABS:                        9.3    6.38  )-----------( 97       ( 04 Oct 2023 08:34 )             28.1     10    143  |  113<H>  |  19  ----------------------------<  103<H>  3.6   |  25  |  0.49<L>    Ca    7.7<L>      04 Oct 2023 08:34        Albumin: Albumin: 1.8 g/dL (10-01 @ 07:44)      Allergies    Originally Entered as [Unknown] reaction to [fresh fruits] (Unknown)  apple (Anaphylaxis)  raw, fresh fruits- reynaldo family (apple, pear, apricot, peach, fig); processed/cooked is ok. (Anaphylaxis)  No Known Drug Allergies    Intolerances      MEDICATIONS  (STANDING):  apixaban 5 milliGRAM(s) Enteral Tube every 12 hours  ascorbic acid 500 milliGRAM(s) Oral daily  aspirin  chewable 81 milliGRAM(s) Oral daily  atorvastatin 20 milliGRAM(s) Oral at bedtime  carbidopa/levodopa  25/100 1 Tablet(s) Oral <User Schedule>  carbidopa/levodopa  25/100 1.5 Tablet(s) Oral <User Schedule>  cefTRIAXone Injectable. 1000 milliGRAM(s) IV Push every 24 hours  donepezil 5 milliGRAM(s) Oral at bedtime  doxazosin 2 milliGRAM(s) Oral at bedtime  doxycycline monohydrate Capsule 100 milliGRAM(s) Oral every 12 hours  furosemide   Injectable 40 milliGRAM(s) IV Push daily  lisinopril 2.5 milliGRAM(s) Oral every 12 hours  metoprolol tartrate 25 milliGRAM(s) Oral daily  pantoprazole    Tablet 40 milliGRAM(s) Oral before breakfast  piperacillin/tazobactam IVPB.- 3.375 Gram(s) IV Intermittent once  piperacillin/tazobactam IVPB.. 3.375 Gram(s) IV Intermittent every 8 hours  QUEtiapine 25 milliGRAM(s) Oral two times a day    MEDICATIONS  (PRN):      RADIOLOGY/ADDITIONAL STUDIES:  < from: Xray Chest 1 View- PORTABLE-Urgent (Xray Chest 1 View- PORTABLE-Urgent .) (10.03.23 @ 19:59) >    ACC: [id number] EXAM:  XR CHEST PORTABLE URGENT 1V   ORDERED BY: CLAUDIA CEBALLOS     PROCEDURE DATE:  10/03/2023          INTERPRETATION:  Chest one view    HISTORY: Shortness of breath    COMPARISON STUDY: 2023    Frontal expiratory view of the chest shows the heart to be normal in   size. The lungs show mild pulmonary congestion with progression of   pleural effusions. There is no evidence of pneumothorax.    IMPRESSION:  Progression of effusions.        Thank you for the courtesy of this referral.    --- End of Report ---            DUY DENISE MD; Attending Interventional Radiologist  This document has been electronically signed. Oct  4 2023 10:36AM    < end of copied text >

## 2023-10-04 NOTE — PROGRESS NOTE ADULT - ASSESSMENT
88 y/o Male with h/o BPH, RLE DVT, diverticulosis, GERD, HTN, PEG placement, necrotizing enterocolitis, osteoporosis, Parkinson's dementia, PEG placement, TIA prostate cancer, hearing loss, old CVA was admitted on 9/30 for worsening RLE / LUE swelling. Patient is unable to provide history, son and wife at bedside provides history. States that [facility] with [facility] D[facility] and treated for cellulitis. He was discharged on the day PTA, and this AM family noticed patient's RLE and LUE swelling was worsening. Patient's son states they were told patient had ulcers in intestines, and considered risk of this prior to starting Eliquis. Son states patient has brown stool, no melena / hematochezia. States patient is an aspiration risk, and previously treated for aspiration PNA as well. [facility] also [facility] that [facility] had [facility] in his lungs. In ER he received ceftriaxone.     PROBLEMS:    Low grade fever-URI with rhinovirus  B/l pleural effusions with pulmonary congestion  RLE DVT  Metabolic encephalopathy   Parkinsons disease  HTN (hypertension)  Prostate cancer  Nonrheumatic aortic valve insufficiency  Chronic rhinitis    PLAN:    Concern for aspiration started iv zosyn  CXR worsening pleural effusion-ct surgery for thorocentesis-d/w NP MARCEL  BC x 2, urine c/s  Aspiration precautions  Supportive care  Eliquis  D/W WIFE & DAUGHTER IN DETAIL

## 2023-10-04 NOTE — CONSULT NOTE ADULT - SUBJECTIVE AND OBJECTIVE BOX
History of Present Illness:  87y Male PMHx BPH, AS, CHF, pleural effusions diverticulosis, GERD, HTN, DVT on eliquis, necrotizing enterocolitis, PEG tube in place, tube feeding dependent, osteoporosis, Parkinson's dementia, TIA, CVA, prostate CA, sensorineural hearing loss admitted w worsening RLE/LUE edema. Called for worsening effusions on CXR.      PMH/PSH:  Parkinsons disease    HTN (hypertension)    Osteoporosis    Prostate cancer    Personal history of transient ischemic attack (TIA), and cerebral infarction without residual deficits    Syncope and collapse    Bacteremia    Benign prostatic hyperplasia with lower urinary tract symptoms    Necrotizing enterocolitis    Sensorineural hearing loss (SNHL) of both ears    GERD (gastroesophageal reflux disease)    Colonic polyp    Diverticulosis    Nonrheumatic aortic valve insufficiency    Chronic rhinitis      H/O hernia repair        Relevant Family History  FAMILY HISTORY:  Family history unobtainable due to patient's condition        SOCIAL HISTORY:  Smoker: [ ] Yes  [x ] No        PACK YEARS:                         WHEN QUIT?  ETOH use: [ ] Yes  [ x] No              FREQUENCY / QUANTITY:  Ilicit Drug use:  [ ] Yes  x] No  Live with: home w family      MEDICATIONS  (STANDING):  apixaban 5 milliGRAM(s) Enteral Tube every 12 hours  ascorbic acid 500 milliGRAM(s) Oral daily  aspirin  chewable 81 milliGRAM(s) Oral daily  atorvastatin 20 milliGRAM(s) Oral at bedtime  carbidopa/levodopa  25/100 1 Tablet(s) Oral <User Schedule>  carbidopa/levodopa  25/100 1.5 Tablet(s) Oral <User Schedule>  cefTRIAXone Injectable. 1000 milliGRAM(s) IV Push every 24 hours  donepezil 5 milliGRAM(s) Oral at bedtime  doxazosin 2 milliGRAM(s) Oral at bedtime  doxycycline monohydrate Capsule 100 milliGRAM(s) Oral every 12 hours  furosemide   Injectable 40 milliGRAM(s) IV Push daily  lisinopril 2.5 milliGRAM(s) Oral every 12 hours  metoprolol tartrate 25 milliGRAM(s) Oral daily  pantoprazole    Tablet 40 milliGRAM(s) Oral before breakfast  QUEtiapine 25 milliGRAM(s) Oral two times a day    MEDICATIONS  (PRN):      Allergies: Originally Entered as [Unknown] reaction to [fresh fruits] (Unknown)  apple (Anaphylaxis)  raw, fresh fruits- reynaldo family (apple, pear, apricot, peach, fig); processed/cooked is ok. (Anaphylaxis)  No Known Drug Allergies                                                            LABS:                        9.3    6.38  )-----------( 97       ( 04 Oct 2023 08:34 )             28.1     10-04    143  |  113<H>  |  19  ----------------------------<  103<H>  3.6   |  25  |  0.49<L>    Ca    7.7<L>      04 Oct 2023 08:34        Urinalysis Basic - ( 04 Oct 2023 08:34 )    Color: x / Appearance: x / SG: x / pH: x  Gluc: 103 mg/dL / Ketone: x  / Bili: x / Urobili: x   Blood: x / Protein: x / Nitrite: x   Leuk Esterase: x / RBC: x / WBC x   Sq Epi: x / Non Sq Epi: x / Bacteria: x      < from: CT Chest w/ IV Cont (09.30.23 @ 17:15) >    FINDINGS:    LUNGS/AIRWAYS/PLEURA: Patent trachea and bronchi. Bilateral interlobular   septal thickening. Unchanged mild distal airway impaction in the medial   segment of the middle lobe. New moderate pleural effusions and associated   passive atelectasis of the lower lobes.    LYMPH NODES/MEDIASTINUM: Unremarkable.    HEART/VASCULATURE: Normal heart size. No pericardial effusion. Coronary   artery calcifications. Normal caliber aorta. Calcified aortic valve.    UPPER ABDOMEN: Gastrostomy balloon in the stomach. Right renal cyst.    BONES: Unchanged L2 compression fracture. Old fractures of multiple ribs   and the right clavicle.      IMPRESSION:    Interstitial pulmonary edema and moderate pleural effusions.    < end of copied text >    < from: Xray Chest 1 View- PORTABLE-Urgent (Xray Chest 1 View- PORTABLE-Urgent .) (10.03.23 @ 19:59) >  HISTORY: Shortness of breath    COMPARISON STUDY: 9/30/2023    Frontal expiratory view of the chest shows the heart to be normal in   size. The lungs show mild pulmonary congestion with progression of   pleural effusions. There is no evidence of pneumothorax.    IMPRESSION:  Progression of effusions.    < end of copied text >              Review of Systems         unable to answer    T(C): 37 (10-04-23 @ 15:53), Max: 37.6 (10-03-23 @ 23:22)  HR: 78 (10-04-23 @ 15:53) (78 - 105)  BP: 113/67 (10-04-23 @ 15:53) (113/67 - 145/87)  ABP: --  ABP(mean): --  RR: 19 (10-04-23 @ 15:53) (18 - 19)  SpO2: 96% (10-04-23 @ 15:53) (92% - 97%)    Physical Exam  General:NAD                                                         Neuro: A+O x 3, non-focal, speech clear and intact                     Eyes: PERRL, EOMI   ENT: Normal exam of nasal/oral mucosa with absence of cyanosis.   Neck: supple, no JVD, trachea midline   Chest: CTA, equal bilaterally, no wheezes/rales/rhonchi, normal excursion, no accessory muscle use note  CV: RRR, +S1S2  GI: soft, NT, ND, +BS, no organomegaly noted  Extremities: STINSON x 4, no C/C/E, distal motor/neuro/circ intact  SKIN: warm, dry, intact    History of Present Illness:  87y Male PMHx BPH, AS, CHF, pleural effusions diverticulosis, GERD, HTN, DVT on eliquis, necrotizing enterocolitis, PEG tube in place, tube feeding dependent, osteoporosis, Parkinson's dementia, TIA, CVA, prostate CA, sensorineural hearing loss admitted w worsening RLE/LUE edema. Called for worsening effusions on CXR.  Pt seen in bed, on 2L, in no distress, awake, unable to answer questions. Mumbling. No family at bedside      PMH/PSH:  Parkinsons disease    HTN (hypertension)    Osteoporosis    Prostate cancer    Personal history of transient ischemic attack (TIA), and cerebral infarction without residual deficits    Syncope and collapse    Bacteremia    Benign prostatic hyperplasia with lower urinary tract symptoms    Necrotizing enterocolitis    Sensorineural hearing loss (SNHL) of both ears    GERD (gastroesophageal reflux disease)    Colonic polyp    Diverticulosis    Nonrheumatic aortic valve insufficiency    Chronic rhinitis      H/O hernia repair        Relevant Family History  FAMILY HISTORY:  Family history unobtainable due to patient's condition        SOCIAL HISTORY:  Smoker: [ ] Yes  [x ] No        PACK YEARS:                         WHEN QUIT?  ETOH use: [ ] Yes  [ x] No              FREQUENCY / QUANTITY:  Ilicit Drug use:  [ ] Yes  x] No  Live with: home w family      MEDICATIONS  (STANDING):  apixaban 5 milliGRAM(s) Enteral Tube every 12 hours  ascorbic acid 500 milliGRAM(s) Oral daily  aspirin  chewable 81 milliGRAM(s) Oral daily  atorvastatin 20 milliGRAM(s) Oral at bedtime  carbidopa/levodopa  25/100 1 Tablet(s) Oral <User Schedule>  carbidopa/levodopa  25/100 1.5 Tablet(s) Oral <User Schedule>  cefTRIAXone Injectable. 1000 milliGRAM(s) IV Push every 24 hours  donepezil 5 milliGRAM(s) Oral at bedtime  doxazosin 2 milliGRAM(s) Oral at bedtime  doxycycline monohydrate Capsule 100 milliGRAM(s) Oral every 12 hours  furosemide   Injectable 40 milliGRAM(s) IV Push daily  lisinopril 2.5 milliGRAM(s) Oral every 12 hours  metoprolol tartrate 25 milliGRAM(s) Oral daily  pantoprazole    Tablet 40 milliGRAM(s) Oral before breakfast  QUEtiapine 25 milliGRAM(s) Oral two times a day    MEDICATIONS  (PRN):      Allergies: Originally Entered as [Unknown] reaction to [fresh fruits] (Unknown)  apple (Anaphylaxis)  raw, fresh fruits- reynaldo family (apple, pear, apricot, peach, fig); processed/cooked is ok. (Anaphylaxis)  No Known Drug Allergies                                                            LABS:                        9.3    6.38  )-----------( 97       ( 04 Oct 2023 08:34 )             28.1     10-04    143  |  113<H>  |  19  ----------------------------<  103<H>  3.6   |  25  |  0.49<L>    Ca    7.7<L>      04 Oct 2023 08:34        Urinalysis Basic - ( 04 Oct 2023 08:34 )    Color: x / Appearance: x / SG: x / pH: x  Gluc: 103 mg/dL / Ketone: x  / Bili: x / Urobili: x   Blood: x / Protein: x / Nitrite: x   Leuk Esterase: x / RBC: x / WBC x   Sq Epi: x / Non Sq Epi: x / Bacteria: x      < from: CT Chest w/ IV Cont (09.30.23 @ 17:15) >    FINDINGS:    LUNGS/AIRWAYS/PLEURA: Patent trachea and bronchi. Bilateral interlobular   septal thickening. Unchanged mild distal airway impaction in the medial   segment of the middle lobe. New moderate pleural effusions and associated   passive atelectasis of the lower lobes.    LYMPH NODES/MEDIASTINUM: Unremarkable.    HEART/VASCULATURE: Normal heart size. No pericardial effusion. Coronary   artery calcifications. Normal caliber aorta. Calcified aortic valve.    UPPER ABDOMEN: Gastrostomy balloon in the stomach. Right renal cyst.    BONES: Unchanged L2 compression fracture. Old fractures of multiple ribs   and the right clavicle.      IMPRESSION:    Interstitial pulmonary edema and moderate pleural effusions.    < end of copied text >    < from: Xray Chest 1 View- PORTABLE-Urgent (Xray Chest 1 View- PORTABLE-Urgent .) (10.03.23 @ 19:59) >  HISTORY: Shortness of breath    COMPARISON STUDY: 9/30/2023    Frontal expiratory view of the chest shows the heart to be normal in   size. The lungs show mild pulmonary congestion with progression of   pleural effusions. There is no evidence of pneumothorax.    IMPRESSION:  Progression of effusions.    < end of copied text >              Review of Systems         unable to answer    T(C): 37 (10-04-23 @ 15:53), Max: 37.6 (10-03-23 @ 23:22)  HR: 78 (10-04-23 @ 15:53) (78 - 105)  BP: 113/67 (10-04-23 @ 15:53) (113/67 - 145/87)  ABP: --  ABP(mean): --  RR: 19 (10-04-23 @ 15:53) (18 - 19)  SpO2: 96% (10-04-23 @ 15:53) (92% - 97%)    Physical Exam  General:NAD                                                         Neuro: awake does not answer questions, mumbling                     Eyes: PERRL, EOMI   ENT: Normal exam of nasal/oral mucosa with absence of cyanosis.   Neck: supple, no JVD, trachea midline   Chest: decreased b/l, no accessory muscle use note  CV: RRR,   GI: soft, NT, ND, +PEG  Extremities: RLE edema  SKIN: warm, dry, some scars on leg

## 2023-10-04 NOTE — PROGRESS NOTE ADULT - ASSESSMENT
87-year-old Male with PMhx BPH, diverticulosis, GERD, HTN, necrotizing enterocolitis, PEG tube in place, tube feeding dependent, osteoporosis, Parkinson's dementia, TIA, CVA, prostate CA, sensorineural hearing loss admitted through ED for worsening RLE, LUE edema. Patient A&OX1 at baseline, history provided by family. Recently d/c'd from [facility] 9/29 s/p dx of RLE DVT (started on Eliquis), cellulitis, and "fluid on lungs".     # Pulmonary Edema and Effusions d/t Acute HFpEF   - suspect diastolic CHF   - furosemide 40 mg IV o.d.   - cards consult: acute HFpEF, TTE: moderate-severe AS; recc: c/w ACE (lisinopril) and BB (metoprolol), rpt CXR, consider transition to PO furosemide   - rpt CXR with worsening pleural effusions, will c/w IV furosemide for now   - pulmonology requesting thoracic consult for worsening L pleural effusion with new R sided pleural effusion     # RLE DVT:  - c/w apixaban     # Cellulitis RLE:  - c/w ceftriaxone o.d. x 7 days and doxycycline 100 mg PO b.i.d. per ID recc x 10 more days  - ID consult: elevate leg  - monitor temps, f/u CBC    # low grade temps 2/2 URI d/t Entero/Rhinovirus, possible aspiration PNA  - monitor temps   - supportive care   - Zosyn started for aspiration PNA   - trend CBC     # Pancytopenia, improving   - normalizing, WBC now wnl   - likely 2/2 cellulitis and URI   - normal ldh and haptoglobin, r/o hemolytic anemia   - pending heme consult     # Thrombocytopenia, improving   - PLT now 97 from 79  - heparin-PF4 AB negative, r/o HIT   - c/w apixaban     # PEG tube, feedings  - nutrition consult appreciated  - c/w Jevity 360 cc bolus t.i.d.   - aspiration precautions, sit pt upright during feedings  - oronasopharyngeal suctioning PRN     # Normocytic Anemia  - Hgb/Hct 9.3/28.1, MCV 86.2   - heme consult appreciated likely d/t bone marrow suppression 2/2 ongoing infections, antibiotic use, recent hospitalization possibly dilutional/d/t nutritional deficiency  - HIT r/o d/t normal haptoglobin, LDH   - no deficiency in b12 or folate   - c/w CBC monitoring, transfuse PRN if symptomatic/hemodynamically unstable     # Severe Protein Calorie Malnutrition  - diet: PEG tube feeds  - supplements per nutrition recommendations     # GERD, HTN, Parkinson's Dementia  - c/w home meds: pantoprazole, lisinopril, metoprolol, seroquel, carvidopa-levodopa,     # Unchanged L2 compression fx/rib and R clavicle fx/R renal cyst incidental findings on CT  - f/u outpatient     # DVT Prophylaxis  - c/w ASA, apixaban        87-year-old Male with PMhx BPH, diverticulosis, GERD, HTN, necrotizing enterocolitis, PEG tube in place, tube feeding dependent, osteoporosis, Parkinson's dementia, TIA, CVA, prostate CA, sensorineural hearing loss admitted through ED for worsening RLE, LUE edema. Patient A&OX1 at baseline, history provided by family. Recently d/c'd from [facility] 9/29 s/p dx of RLE DVT (started on Eliquis), cellulitis, and "fluid on lungs".     # Pulmonary Edema and Effusions d/t Acute HFpEF   - suspect diastolic CHF   - furosemide 40 mg IV o.d.   - cards consult: acute HFpEF, TTE: moderate-severe AS; recc: c/w ACE (lisinopril) and BB (metoprolol), rpt CXR, consider transition to PO furosemide   - rpt CXR with worsening pleural effusions, will c/w IV furosemide for now   - pulmonology /  thoracic consult for worsening L pleural effusion with new R sided pleural effusion     # RLE DVT:  - c/w apixaban     # Cellulitis RLE:  - c/w ceftriaxone o.d. x 7 days and doxycycline 100 mg PO b.i.d. per ID recc x 10 more days  - ID consult: elevate leg  - monitor temps, f/u CBC    # low grade temps 2/2 URI d/t Entero/Rhinovirus, possible aspiration PNA  - monitor temps   - supportive care   - Zosyn started for aspiration PNA   - trend CBC     # Pancytopenia, improving   - normalizing, WBC now wnl   - likely 2/2 cellulitis and URI   - normal ldh and haptoglobin, r/o hemolytic anemia   - pending heme consult   - PLT now 97 from 79  - heparin-PF4 AB negative, r/o HIT   - c/w apixaban     # PEG tube, feedings  - nutrition consult appreciated  - c/w Jevity 360 cc bolus t.i.d.   - aspiration precautions, sit pt upright during feedings  - oronasopharyngeal suctioning PRN     # Normocytic Anemia  - Hgb/Hct 9.3/28.1, MCV 86.2   - heme consult appreciated likely d/t bone marrow suppression 2/2 ongoing infections, antibiotic use, recent hospitalization possibly dilutional/d/t nutritional deficiency  - HIT r/o d/t normal haptoglobin, LDH   - no deficiency in b12 or folate   - c/w CBC monitoring, transfuse PRN if symptomatic/hemodynamically unstable     # Severe Protein Calorie Malnutrition  - diet: PEG tube feeds  - supplements per nutrition recommendations     # GERD, HTN, Parkinson's Dementia  - c/w home meds: pantoprazole, lisinopril, metoprolol, seroquel, carvidopa-levodopa,     # Unchanged L2 compression fx/rib and R clavicle fx/R renal cyst incidental findings on CT  - f/u outpatient     # DVT Prophylaxis  - c/w ASA, apixaban

## 2023-10-04 NOTE — PHARMACOTHERAPY INTERVENTION NOTE - COMMENTS
Medication history complete, reviewed medications with patient spouse at bedside and confirmed with doctor first med hx.  
Case discussed with Dr. Doss, discontinued Zosyn given unlikely pneumonia. 
0 = independent

## 2023-10-04 NOTE — CONSULT NOTE ADULT - CONSULT REQUESTED DATE/TIME
01-Oct-2023 10:54
04-Oct-2023 12:08
01-Oct-2023 13:29
01-Oct-2023 14:45
02-Oct-2023 08:19
04-Oct-2023 18:33
01-Oct-2023 10:49

## 2023-10-05 PROCEDURE — 99232 SBSQ HOSP IP/OBS MODERATE 35: CPT

## 2023-10-05 PROCEDURE — 93971 EXTREMITY STUDY: CPT | Mod: 26,RT

## 2023-10-05 RX ORDER — SODIUM CHLORIDE 9 MG/ML
4 INJECTION INTRAMUSCULAR; INTRAVENOUS; SUBCUTANEOUS EVERY 12 HOURS
Refills: 0 | Status: DISCONTINUED | OUTPATIENT
Start: 2023-10-05 | End: 2023-10-14

## 2023-10-05 RX ADMIN — QUETIAPINE FUMARATE 25 MILLIGRAM(S): 200 TABLET, FILM COATED ORAL at 09:08

## 2023-10-05 RX ADMIN — Medication 25 MILLIGRAM(S): at 09:08

## 2023-10-05 RX ADMIN — CEFTRIAXONE 1000 MILLIGRAM(S): 500 INJECTION, POWDER, FOR SOLUTION INTRAMUSCULAR; INTRAVENOUS at 15:24

## 2023-10-05 RX ADMIN — APIXABAN 5 MILLIGRAM(S): 2.5 TABLET, FILM COATED ORAL at 20:38

## 2023-10-05 RX ADMIN — Medication 100 MILLIGRAM(S): at 20:39

## 2023-10-05 RX ADMIN — Medication 100 MILLIGRAM(S): at 09:08

## 2023-10-05 RX ADMIN — LISINOPRIL 2.5 MILLIGRAM(S): 2.5 TABLET ORAL at 15:24

## 2023-10-05 RX ADMIN — CARBIDOPA AND LEVODOPA 1 TABLET(S): 25; 100 TABLET ORAL at 20:38

## 2023-10-05 RX ADMIN — APIXABAN 5 MILLIGRAM(S): 2.5 TABLET, FILM COATED ORAL at 09:07

## 2023-10-05 RX ADMIN — CARBIDOPA AND LEVODOPA 1.5 TABLET(S): 25; 100 TABLET ORAL at 09:07

## 2023-10-05 RX ADMIN — DONEPEZIL HYDROCHLORIDE 5 MILLIGRAM(S): 10 TABLET, FILM COATED ORAL at 20:38

## 2023-10-05 RX ADMIN — Medication 81 MILLIGRAM(S): at 09:08

## 2023-10-05 RX ADMIN — QUETIAPINE FUMARATE 25 MILLIGRAM(S): 200 TABLET, FILM COATED ORAL at 20:39

## 2023-10-05 RX ADMIN — LISINOPRIL 2.5 MILLIGRAM(S): 2.5 TABLET ORAL at 20:39

## 2023-10-05 RX ADMIN — ATORVASTATIN CALCIUM 20 MILLIGRAM(S): 80 TABLET, FILM COATED ORAL at 20:38

## 2023-10-05 RX ADMIN — PANTOPRAZOLE SODIUM 40 MILLIGRAM(S): 20 TABLET, DELAYED RELEASE ORAL at 09:08

## 2023-10-05 RX ADMIN — Medication 500 MILLIGRAM(S): at 09:09

## 2023-10-05 RX ADMIN — Medication 40 MILLIGRAM(S): at 09:09

## 2023-10-05 RX ADMIN — CARBIDOPA AND LEVODOPA 1.5 TABLET(S): 25; 100 TABLET ORAL at 14:04

## 2023-10-05 RX ADMIN — Medication 2 MILLIGRAM(S): at 20:38

## 2023-10-05 RX ADMIN — CARBIDOPA AND LEVODOPA 1 TABLET(S): 25; 100 TABLET ORAL at 15:23

## 2023-10-05 NOTE — PROGRESS NOTE ADULT - ASSESSMENT
86 y/o Male with h/o BPH, RLE DVT, diverticulosis, GERD, HTN, PEG placement, necrotizing enterocolitis, osteoporosis, Parkinson's dementia, PEG placement, TIA prostate cancer, hearing loss, old CVA was admitted on 9/30 for worsening RLE / LUE swelling. Patient is unable to provide history, son and wife at bedside provides history. [facility] that [facility] with RLE [facility] and treated for cellulitis. He was discharged on the day PTA, and this AM family noticed patient's RLE and LUE swelling was worsening. Patient's son states they were told patient had ulcers in intestines, and considered risk of this prior to starting Eliquis. Son states patient has brown stool, no melena / hematochezia. States patient is an aspiration risk, and previously treated for aspiration PNA as well. [facility] that patient had [facility] in his lungs. In ER he received ceftriaxone.     PROBLEMS:    Low grade fever-URI with rhinovirus  B/l pleural effusions with pulmonary congestion  RLE DVT  Metabolic encephalopathy   Parkinsons disease  HTN (hypertension)  Prostate cancer  Nonrheumatic aortic valve insufficiency  Chronic rhinitis    PLAN:    pulmonary better  IV rhocephin/doxycline  CXR worsening pleural effusion-ct surgery for thorocentesis-d/w NP MARCEL  BC x 2, urine c/s  Aspiration precautions  Supportive care  Eliquis  D/W WIFE & DAUGHTER IN DETAIL

## 2023-10-05 NOTE — PROGRESS NOTE ADULT - SUBJECTIVE AND OBJECTIVE BOX
CC: Sepsis   SUBJECTIVE:   HPI: 87-year-old Male with PMHx BPH, diverticulosis, GERD, HTN, necrotizing enterocolitis, PEG tube in place, tube feeding dependent, osteoporosis, Parkinson's dementia, TIA, CVA, prostate CA, sensorineural hearing loss presented to ED for evaluation of worsening RLE/LUE edema. Pt unable to provide history, A&OX1 at baseline. Son and wife at bedside during admission to provide history. [facility] that [facility] with RLE DVT (started on Eliquis) and treated for cellulitis. [facility] and presented to [facility] ED 9/30 as family noticed worsening edema. Pts son states they were told the patient had ulcers in his intestines, considered this risk prior to initiation of Eliquis. Son endorses pt having brown stools, denies melena/hematochezia. States pt previously treated for aspiration PNA and identified as high risk. [facility] that [facility] stated that the patient has [facility] in his lungs.     10/1: no dyspnea, confused  10/2: laying in bed, denies pain or dyspnea. on room air. wife at bedside and updated on plan.  10/3:   10/4: pt examined laying in bed. denies pain or dyspnea. no acute distress. on room air.   10/5: pt examined laying in bed. oropharyngeal suctioning provided with yellow thick phlegm. no acute distress. on 2 L O2 via NC. no family at bedside during time of assessment.     REVIEW OF SYSTEMS: unable to obtain 2/2 pts mental status, pt denies pain but otherwise unable to specify     Vital Signs Last 24 Hrs  T(C): 36.7 (04 Oct 2023 08:04), Max: 37.6 (03 Oct 2023 23:22)  T(F): 98.1 (04 Oct 2023 08:04), Max: 99.6 (03 Oct 2023 23:22)  HR: 86 (04 Oct 2023 08:04) (86 - 105)  BP: 133/59 (04 Oct 2023 08:04) (127/68 - 145/87)  BP(mean): --  RR: 18 (04 Oct 2023 08:04) (18 - 19)  SpO2: 97% (04 Oct 2023 08:04) (90% - 97%)    Parameters below as of 04 Oct 2023 08:04  Patient On (Oxygen Delivery Method): nasal cannula        I&O's Summary    03 Oct 2023 07:01  -  04 Oct 2023 07:00  --------------------------------------------------------  IN: 0 mL / OUT: 1 mL / NET: -1 mL      PHYSICAL EXAM:    Constitutional: NAD, awake and alert, oriented x 1, cachectic   HEENT: PERR, hard of hearing, MMM  Neck: Soft and supple, No LAD, No JVD  Respiratory: Coughing up yellow thick phlegm, assisted by oropharyngeal suctioning. Breath sounds diminished in all fields, clear anteriorly   Cardiovascular: S1 and S2, regular rate and rhythm, no Murmurs, gallops or rubs  Gastrointestinal: Bowel Sounds present, soft, nontender, nondistended, no guarding, no rebound  Extremities: + 2 pitting edema to RLE  Vascular: 2+ peripheral pulses  Neurological: A/O x 1   Musculoskeletal: unable to assess 2/2 patient unable to follow commands  Skin: shiny BL LE with intermittent ecchymosis      MEDICATIONS:  MEDICATIONS  (STANDING):  apixaban 5 milliGRAM(s) Enteral Tube every 12 hours  ascorbic acid 500 milliGRAM(s) Oral daily  aspirin  chewable 81 milliGRAM(s) Oral daily  atorvastatin 20 milliGRAM(s) Oral at bedtime  carbidopa/levodopa  25/100 1 Tablet(s) Oral <User Schedule>  carbidopa/levodopa  25/100 1.5 Tablet(s) Oral <User Schedule>  cefTRIAXone Injectable. 1000 milliGRAM(s) IV Push every 24 hours  donepezil 5 milliGRAM(s) Oral at bedtime  doxazosin 2 milliGRAM(s) Oral at bedtime  doxycycline monohydrate Capsule 100 milliGRAM(s) Oral every 12 hours  furosemide   Injectable 40 milliGRAM(s) IV Push daily  lisinopril 2.5 milliGRAM(s) Oral every 12 hours  metoprolol tartrate 25 milliGRAM(s) Oral daily  pantoprazole    Tablet 40 milliGRAM(s) Oral before breakfast  piperacillin/tazobactam IVPB.- 3.375 Gram(s) IV Intermittent once  piperacillin/tazobactam IVPB.. 3.375 Gram(s) IV Intermittent every 8 hours  QUEtiapine 25 milliGRAM(s) Oral two times a day      LABS: All Labs Reviewed:                        9.3    6.38  )-----------( 97       ( 04 Oct 2023 08:34 )             28.1     10-04    143  |  113<H>  |  19  ----------------------------<  103<H>  3.6   |  25  |  0.49<L>    Ca    7.7<L>      04 Oct 2023 08:34      RADIOLOGY/EKG:    < from: Xray Chest 1 View- PORTABLE-Urgent (Xray Chest 1 View- PORTABLE-Urgent .) (10.03.23 @ 19:59) >  IMPRESSION:  Progression of effusions.        Thank you for the courtesy of this referral.    --- End of Report ---            DUY DENISE MD; Attending Interventional Radiologist  This document has been electronically signed. Oct  4 2023 10:36AM    < end of copied text >    < from: CT Chest w/ IV Cont (09.30.23 @ 17:15) >  IMPRESSION:    Interstitial pulmonary edema and moderate pleural effusions.    --- End of Report ---        NITIN GARZA M.D., ATTENDING RADIOLOGIST  This document has been electronically signed. Sep 30 2023  5:28PM    < end of copied text >    < from: US Duplex Venous Upper Ext Ltd, Left (09.30.23 @ 16:42) >  IMPRESSION:  No evidence of left upper extremity deep venous thrombosis.        --- End of Report ---      JAE MCDOWELL MD; Attending Radiologist  This document has been electronically signed. Sep 30 2023  5:04PM    < end of copied text >    < from: US Duplex Venous Lower Ext Ltd, Right (09.30.23 @ 15:04) >  IMPRESSION:      Occlusive thrombosis right common femoral vein to the calf veins. Upper   < from: TTE Echo Complete w/o Contrast w/ Doppler (10.03.23 @ 12:31) >   Summary     The mitral valve leaflets appear thickened.   Trace mitral regurgitation is present.   EA reversal of the mitral inflow consistent with reduced compliance of   the   left ventricle.   The aortic valve is not well visualized, appears moderately calcified.   Valve opening seems to be restricted.   Peak and mean transaortic gradients are 47 and 24 mmHg respectively; this   finding is consistent with moderate to severe aortic stenosis.   ELLI by continuity equation (1.1 cm2) and dimensionless index suggest   (.34)   Pedoff velocity measured 4.0 meters/sec   there mobile echogenic appears to be attached ventricular side of aortic   valve or base of AML , which is noted in 2021 study too ,may be   calcification vs artifact   Mild concentric left ventricular hypertrophy is present.   The left ventricle is normal in wall motion and contractility.   Estimated left ventricular ejection fraction is 60-65 %.   The left ventricle cavity is underdistended.   Normal appearing right ventricle structure and function.     clinical correlation noted     Signature     ----------------------------------------------------------------   Electronically signed by Venugopal Palla, MD(Interpreting   physician) on 10/03/2023 08:22 PM   ----------------------------------------------------------------    Valves      < end of copied text >  extent of thrombus not demonstrated. (Above and below the knee)        --- End of Report ---    CADY AIKEN MD; Attending Radiologist  This document has been electronically signed. Sep 30 2023  3:13PM    < end of copied text >    < from: 12 Lead ECG (09.30.23 @ 10:52) >    Ventricular Rate 94 BPM    Atrial Rate 94 BPM    P-R Interval 158 ms    QRS Duration 82 ms    Q-T Interval 354 ms    QTC Calculation(Bazett) 442 ms    P Axis 33 degrees    R Axis 8 degrees    T Axis 34 degrees    Diagnosis Line Sinus rhythm with Premature supraventricular complexes  Low voltage QRS  Cannot rule out Anterior infarct (cited on or before 01-MAR-2018)  Abnormal ECG  Confirmed by RONALDO PATEL MD ([id number]) on 9/30/2023 12:40:57 PM    < end of copied text >           CC: Sepsis   SUBJECTIVE:   HPI: 87-year-old Male with PMHx BPH, diverticulosis, GERD, HTN, necrotizing enterocolitis, PEG tube in place, tube feeding dependent, osteoporosis, Parkinson's dementia, TIA, CVA, prostate CA, sensorineural hearing loss presented to ED for evaluation of worsening RLE/LUE edema. Pt unable to provide history, A&OX1 at baseline. Son and wife at bedside during admission to provide history. State that [facility] with RLE DVT (started on Eliquis) and treated for cellulitis. [facility] and presented to [facility] ED 9/30 as family noticed worsening edema. Pts son states they were told the patient had ulcers in his intestines, considered this risk prior to initiation of Eliquis. Son endorses pt having brown stools, denies melena/hematochezia. States pt previously treated for aspiration PNA and identified as high risk. [facility] stated that the patient has fluid in his lungs.     10/1: no dyspnea, confused  10/2: laying in bed, denies pain or dyspnea. on room air. wife at bedside and updated on plan.  10/3:   10/4: pt examined laying in bed. denies pain or dyspnea. no acute distress. on room air.   10/5: pt examined laying in bed. oropharyngeal suctioning provided with yellow thick phlegm. no acute distress. on 2 L O2 via NC. no family at bedside during time of assessment.     REVIEW OF SYSTEMS: unable to obtain 2/2 pts mental status, pt denies pain but otherwise unable to specify     Vital Signs Last 24 Hrs  T(C): 36.7 (04 Oct 2023 08:04), Max: 37.6 (03 Oct 2023 23:22)  T(F): 98.1 (04 Oct 2023 08:04), Max: 99.6 (03 Oct 2023 23:22)  HR: 86 (04 Oct 2023 08:04) (86 - 105)  BP: 133/59 (04 Oct 2023 08:04) (127/68 - 145/87)  BP(mean): --  RR: 18 (04 Oct 2023 08:04) (18 - 19)  SpO2: 97% (04 Oct 2023 08:04) (90% - 97%)    Parameters below as of 04 Oct 2023 08:04  Patient On (Oxygen Delivery Method): nasal cannula        I&O's Summary    03 Oct 2023 07:01  -  04 Oct 2023 07:00  --------------------------------------------------------  IN: 0 mL / OUT: 1 mL / NET: -1 mL      PHYSICAL EXAM:    Constitutional: NAD, awake and alert, oriented x 1, cachectic   HEENT: PERR, hard of hearing, MMM  Neck: Soft and supple, No LAD, No JVD  Respiratory: Coughing up yellow thick phlegm, assisted by oropharyngeal suctioning. Breath sounds diminished in all fields, clear anteriorly   Cardiovascular: S1 and S2, regular rate and rhythm, no Murmurs, gallops or rubs  Gastrointestinal: Bowel Sounds present, soft, nontender, nondistended, no guarding, no rebound  Extremities: + 2 pitting edema to RLE  Vascular: 2+ peripheral pulses  Neurological: A/O x 1   Musculoskeletal: unable to assess 2/2 patient unable to follow commands  Skin: shiny BL LE with intermittent ecchymosis, cellulitis improved       MEDICATIONS:  MEDICATIONS  (STANDING):  apixaban 5 milliGRAM(s) Enteral Tube every 12 hours  ascorbic acid 500 milliGRAM(s) Oral daily  aspirin  chewable 81 milliGRAM(s) Oral daily  atorvastatin 20 milliGRAM(s) Oral at bedtime  carbidopa/levodopa  25/100 1 Tablet(s) Oral <User Schedule>  carbidopa/levodopa  25/100 1.5 Tablet(s) Oral <User Schedule>  cefTRIAXone Injectable. 1000 milliGRAM(s) IV Push every 24 hours  donepezil 5 milliGRAM(s) Oral at bedtime  doxazosin 2 milliGRAM(s) Oral at bedtime  doxycycline monohydrate Capsule 100 milliGRAM(s) Oral every 12 hours  furosemide   Injectable 40 milliGRAM(s) IV Push daily  lisinopril 2.5 milliGRAM(s) Oral every 12 hours  metoprolol tartrate 25 milliGRAM(s) Oral daily  pantoprazole    Tablet 40 milliGRAM(s) Oral before breakfast  piperacillin/tazobactam IVPB.- 3.375 Gram(s) IV Intermittent once  piperacillin/tazobactam IVPB.. 3.375 Gram(s) IV Intermittent every 8 hours  QUEtiapine 25 milliGRAM(s) Oral two times a day      LABS: All Labs Reviewed:                        9.3    6.38  )-----------( 97       ( 04 Oct 2023 08:34 )             28.1     10-04    143  |  113<H>  |  19  ----------------------------<  103<H>  3.6   |  25  |  0.49<L>    Ca    7.7<L>      04 Oct 2023 08:34      RADIOLOGY/EKG:    < from: Xray Chest 1 View- PORTABLE-Urgent (Xray Chest 1 View- PORTABLE-Urgent .) (10.03.23 @ 19:59) >  IMPRESSION:  Progression of effusions.        Thank you for the courtesy of this referral.    --- End of Report ---            DUY DENISE MD; Attending Interventional Radiologist  This document has been electronically signed. Oct  4 2023 10:36AM    < end of copied text >    < from: CT Chest w/ IV Cont (09.30.23 @ 17:15) >  IMPRESSION:    Interstitial pulmonary edema and moderate pleural effusions.    --- End of Report ---        NITIN GARZA M.D., ATTENDING RADIOLOGIST  This document has been electronically signed. Sep 30 2023  5:28PM    < end of copied text >    < from: US Duplex Venous Upper Ext Ltd, Left (09.30.23 @ 16:42) >  IMPRESSION:  No evidence of left upper extremity deep venous thrombosis.        --- End of Report ---      JAE MCDOWELL MD; Attending Radiologist  This document has been electronically signed. Sep 30 2023  5:04PM    < end of copied text >    < from: US Duplex Venous Lower Ext Ltd, Right (09.30.23 @ 15:04) >  IMPRESSION:      Occlusive thrombosis right common femoral vein to the calf veins. Upper   < from: TTE Echo Complete w/o Contrast w/ Doppler (10.03.23 @ 12:31) >   Summary     The mitral valve leaflets appear thickened.   Trace mitral regurgitation is present.   EA reversal of the mitral inflow consistent with reduced compliance of   the   left ventricle.   The aortic valve is not well visualized, appears moderately calcified.   Valve opening seems to be restricted.   Peak and mean transaortic gradients are 47 and 24 mmHg respectively; this   finding is consistent with moderate to severe aortic stenosis.   ELLI by continuity equation (1.1 cm2) and dimensionless index suggest   (.34)   Pedoff velocity measured 4.0 meters/sec   there mobile echogenic appears to be attached ventricular side of aortic   valve or base of AML , which is noted in 2021 study too ,may be   calcification vs artifact   Mild concentric left ventricular hypertrophy is present.   The left ventricle is normal in wall motion and contractility.   Estimated left ventricular ejection fraction is 60-65 %.   The left ventricle cavity is underdistended.   Normal appearing right ventricle structure and function.     clinical correlation noted     Signature     ----------------------------------------------------------------   Electronically signed by Venugopal Palla, MD(Interpreting   physician) on 10/03/2023 08:22 PM   ----------------------------------------------------------------    Valves      < end of copied text >  extent of thrombus not demonstrated. (Above and below the knee)        --- End of Report ---    CADY AIKEN MD; Attending Radiologist  This document has been electronically signed. Sep 30 2023  3:13PM    < end of copied text >    < from: 12 Lead ECG (09.30.23 @ 10:52) >    Ventricular Rate 94 BPM    Atrial Rate 94 BPM    P-R Interval 158 ms    QRS Duration 82 ms    Q-T Interval 354 ms    QTC Calculation(Bazett) 442 ms    P Axis 33 degrees    R Axis 8 degrees    T Axis 34 degrees    Diagnosis Line Sinus rhythm with Premature supraventricular complexes  Low voltage QRS  Cannot rule out Anterior infarct (cited on or before 01-MAR-2018)  Abnormal ECG  Confirmed by RONALDO PATEL MD ([id number]) on 9/30/2023 12:40:57 PM    < end of copied text >

## 2023-10-05 NOTE — PROGRESS NOTE ADULT - SUBJECTIVE AND OBJECTIVE BOX
Subjective:    pat much more awake, yesterday coughed large mucus plugs, no respiratory distress.    Home Medications:  aspirin 81 mg oral tablet, chewable: 1 tab(s) by PEG tube once a day (30 Sep 2023 18:38)  atorvastatin 20 mg oral tablet: 1 tab(s) by PEG tube once a day (at bedtime) (30 Sep 2023 18:38)  carbidopa-levodopa 25 mg-100 mg oral tablet: 1.5 tab(s) by gastrostomy tube 2 times a day at 8 AM and 12 PM (30 Sep 2023 18:34)  carbidopa-levodopa 25 mg-100 mg oral tablet: 1 tab(s) by gastrostomy tube 2 times a day at 4 PM and 8 PM (30 Sep 2023 18:34)  donepezil 5 mg oral tablet: 1 tab(s) by PEG tube once a day (at bedtime) (30 Sep 2023 18:38)  doxazosin 2 mg oral tablet: 1 tab(s) by PEG tube once a day (at bedtime) (30 Sep 2023 18:34)  Eliquis 5 mg oral tablet: 1 tab(s) orally 2 times a day (30 Sep 2023 18:38)  lansoprazole 30 mg oral tablet, disintegratin cap(s) by PEG tube once a day (30 Sep 2023 18:38)  lisinopril 2.5 mg oral tablet: 1 tab(s) by PEG tube 2 times a day (30 Sep 2023 18:38)  metoprolol tartrate 50 mg oral tablet: 0.5 tab(s) by PEG tube once a day (30 Sep 2023 18:38)  QUEtiapine 25 mg oral tablet: 1 tab(s) orally 2 times a day (30 Sep 2023 21:50)  Vitamin C 500 mg/5 mL oral liquid: 5 milliliter(s) orally once a day (30 Sep 2023 21:50)    MEDICATIONS  (STANDING):  apixaban 5 milliGRAM(s) Enteral Tube every 12 hours  ascorbic acid 500 milliGRAM(s) Oral daily  aspirin  chewable 81 milliGRAM(s) Oral daily  atorvastatin 20 milliGRAM(s) Oral at bedtime  carbidopa/levodopa  25/100 1 Tablet(s) Oral <User Schedule>  carbidopa/levodopa  25/100 1.5 Tablet(s) Oral <User Schedule>  cefTRIAXone Injectable. 1000 milliGRAM(s) IV Push every 24 hours  donepezil 5 milliGRAM(s) Oral at bedtime  doxazosin 2 milliGRAM(s) Oral at bedtime  doxycycline monohydrate Capsule 100 milliGRAM(s) Oral every 12 hours  furosemide   Injectable 40 milliGRAM(s) IV Push daily  lisinopril 2.5 milliGRAM(s) Oral every 12 hours  metoprolol tartrate 25 milliGRAM(s) Oral daily  pantoprazole    Tablet 40 milliGRAM(s) Oral before breakfast  QUEtiapine 25 milliGRAM(s) Oral two times a day    MEDICATIONS  (PRN):      Allergies    Originally Entered as [Unknown] reaction to [fresh fruits] (Unknown)  apple (Anaphylaxis)  raw, fresh fruits- reynal[employer] family (apple, pear, apricot, peach, fig); processed/cooked is ok. (Anaphylaxis)  No Known Drug Allergies    Intolerances        Vital Signs Last 24 Hrs  T(C): 37.1 (05 Oct 2023 07:53), Max: 37.1 (05 Oct 2023 07:53)  T(F): 98.7 (05 Oct 2023 07:53), Max: 98.7 (05 Oct 2023 07:53)  HR: 78 (05 Oct 2023 07:53) (74 - 78)  BP: 148/76 (05 Oct 2023 07:53) (113/67 - 148/76)  BP(mean): --  RR: 18 (05 Oct 2023 07:53) (18 - 19)  SpO2: 95% (05 Oct 2023 07:53) (95% - 96%)    Parameters below as of 05 Oct 2023 07:53  Patient On (Oxygen Delivery Method): nasal cannula          PHYSICAL EXAMINATION:    NECK:  Supple. No lymphadenopathy. Jugular venous pressure not elevated. Carotids equal.   HEART:   The cardiac impulse has a normal quality. Reg., Nl S1 and S2.  There are no murmurs, rubs or gallops noted  CHEST:  Chest crackles to auscultation. Normal respiratory effort.  ABDOMEN:  Soft and nontender.   EXTREMITIES:  There is no edema.       LABS:                        9.3    6.38  )-----------( 97       ( 04 Oct 2023 08:34 )             28.1     10-04    143  |  113<H>  |  19  ----------------------------<  103<H>  3.6   |  25  |  0.49<L>    Ca    7.7<L>      04 Oct 2023 08:34        Urinalysis Basic - ( 04 Oct 2023 08:34 )    Color: x / Appearance: x / SG: x / pH: x  Gluc: 103 mg/dL / Ketone: x  / Bili: x / Urobili: x   Blood: x / Protein: x / Nitrite: x   Leuk Esterase: x / RBC: x / WBC x   Sq Epi: x / Non Sq Epi: x / Bacteria: x

## 2023-10-05 NOTE — PROGRESS NOTE ADULT - SUBJECTIVE AND OBJECTIVE BOX
Subjective: Pt in bed NAD no issues overnight     T(C): 37.1 (10-05-23 @ 07:53), Max: 37.1 (10-05-23 @ 07:53)  HR: 78 (10-05-23 @ 07:53) (74 - 78)  BP: 148/76 (10-05-23 @ 07:53) (113/67 - 148/76)  ABP: --  ABP(mean): --  RR: 18 (10-05-23 @ 07:53) (18 - 19)  SpO2: 95% (10-05-23 @ 07:53) (95% - 96%) 2 L NC   Wt(kg): --  CVP(mm Hg): --  CO: --  CI: --  PA: --                                              Tele: SR     CHEST TUBE:                               OUTPUT:     per 24 hours    AIR LEAKS:  [ ] YES [ ] NO       10-04    143  |  113<H>  |  19  ----------------------------<  103<H>  3.6   |  25  |  0.49<L>    Ca    7.7<L>      04 Oct 2023 08:34                                 9.3    6.38  )-----------( 97       ( 04 Oct 2023 08:34 )             28.1                 CAPILLARY BLOOD GLUCOSE               CXR:  < from: Xray Chest 1 View- PORTABLE-Urgent (Xray Chest 1 View- PORTABLE-Urgent .) (10.03.23 @ 19:59) >  Frontal expiratory view of the chest shows the heart to be normal in   size. The lungs show mild pulmonary congestion with progression of   pleural effusions. There is no evidence of pneumothorax.    IMPRESSION:  Progression of effusions.        Thank you for the courtesy of this referral.    < end of copied text >          Exam   Neuro:  alert , mild confusion   Pulm:  decreased b/l, Clear   CV: RRR   Abd:  Soft   Extremities:  Warm         Assessment:  87yMale    with PAST MEDICAL & SURGICAL HISTORY:  Parkinsons disease      HTN (hypertension)      Osteoporosis      Prostate cancer      Personal history of transient ischemic attack (TIA), and cerebral infarction without residual deficits      Syncope and collapse      Bacteremia      Benign prostatic hyperplasia with lower urinary tract symptoms      Necrotizing enterocolitis      Sensorineural hearing loss (SNHL) of both ears      GERD (gastroesophageal reflux disease)      Colonic polyp      Diverticulosis      Nonrheumatic aortic valve insufficiency      Chronic rhinitis      H/O hernia repair            Plan:

## 2023-10-05 NOTE — PROGRESS NOTE ADULT - ASSESSMENT
87y Male PMHx BPH, AS, CHF, pleural effusions diverticulosis, GERD, HTN, DVT on eliquis, necrotizing enterocolitis, PEG tube in place, tube feeding dependent, osteoporosis, Parkinson's dementia, TIA, CVA, prostate CA, sensorineural hearing loss admitted w worsening RLE/LUE edema. Called for worsening effusions on CXR.    Pt in no distress, on supplemental O2 w b/l effusions  Pt improved from yesterday   no thoracic intervention   will sign off   reconsult as needed   DW Dr barone

## 2023-10-05 NOTE — PROGRESS NOTE ADULT - SUBJECTIVE AND OBJECTIVE BOX
HPI: 86 y/o M w/ PMH of BPH, diverticulosis, GERD, HTN, PED placement, necrotizing enterocolitis, osteoporosis, parkinson's dementia, TIA prostate cancer, hearing loss, CVA, p/w worsening RLE / LUE swelling. Patient is unable to provide history, son and wife at bedside provides history. States that [facility] with RLE D[facility] and treated for cellulitis. He was discharged yesterday, and this AM family noticed patient's RLE and LUE swelling was worsening. Patient's son states they were told patient had ulcers in intestines, and considered risk of this prior to starting Eliquis. Son states patient has brown stool, no melena / hematochezia. States patient is an aspiration risk, and previously treated for aspiration PNA as well. [facility] also [facility] that [facility] had [facility] in his lungs. In the morning patient also c/o HA to son.     10/5: Seen and examined at bedside. More alert today. A&Ox1. Speech unintelligible. In NAD.      CODE STATUS: DNR/DNI trial NIV      Pain and Dyspnea:     no nonverbal indicators of pain  no dyspnea, appears comfortable and in NAD  on 2LNC      Review of Systems:    Unable to obtain/Limited due to: dementia/speech unintelligible        PHYSICAL EXAM:    Vital Signs Last 24 Hrs  T(C): 37.1 (05 Oct 2023 07:53), Max: 37.1 (05 Oct 2023 07:53)  T(F): 98.7 (05 Oct 2023 07:53), Max: 98.7 (05 Oct 2023 07:53)  HR: 78 (05 Oct 2023 07:53) (74 - 78)  BP: 148/76 (05 Oct 2023 07:53) (113/67 - 148/76)  BP(mean): --  RR: 18 (05 Oct 2023 07:53) (18 - 19)  SpO2: 95% (05 Oct 2023 07:53) (95% - 96%)    Parameters below as of 05 Oct 2023 07:53  Patient On (Oxygen Delivery Method): nasal cannula      Daily     Daily Weight in k.8 (05 Oct 2023 03:54)    PPSV2:  20%  FAST: 7D    General: Alert. Chronically ill-appearing. In NAD.  HEENT: Dry mucous membranes.  Lungs: Rhonchi to upper lung fields.  Cardiac: Regular rate and rhythm. S1S2.  GI: +PEG.  : No suprapubic tenderness.  Ext: +2 RLE edema and erythema.  Neuro: Speech incomprehensible/unintelligible. Not following commands. A&Ox1.      LABS and RADIOLOGY: REVIEWED

## 2023-10-05 NOTE — PROGRESS NOTE ADULT - ASSESSMENT
87-year-old Male with PMhx BPH, diverticulosis, GERD, HTN, necrotizing enterocolitis, PEG tube in place, tube feeding dependent, osteoporosis, Parkinson's dementia, TIA, CVA, prostate CA, sensorineural hearing loss admitted through ED for worsening RLE, LUE edema. Patient A&OX1 at baseline, history provided by family. Recently d/c'd from [facility] 9/29 s/p dx of RLE DVT (started on Eliquis), cellulitis, and "fluid on lungs".     # Pulmonary Edema and Effusions d/t Acute HFpEF   - suspect diastolic CHF   - furosemide 40 mg IV o.d.   - cards consult: acute HFpEF, TTE: moderate-severe AS; recc: c/w ACE (lisinopril) and BB (metoprolol), rpt CXR, consider transition to PO furosemide   - rpt CXR with worsening pleural effusions, will c/w IV furosemide for now   - pulmonology consult recc: IV zosyn for possible aspiration pneumonia and aspiration precautions; requesting thoracic surgery consult for possible thoracentesis   - pending thoracic surgery recommendations   - rpt CXR to assess improvement in effusions s/p dislodging of mucous plug 10/4    # RLE DVT:  - c/w apixaban     # Cellulitis RLE:  - c/w ceftriaxone o.d. x 7 days and doxycycline 100 mg PO b.i.d. per ID recc  - ID consult: elevate leg  - monitor temps, f/u CBC (WBC 6.38 10/5)    # Low Grade Temps 2/2 URI d/t Entero/Rhinovirus, possible Aspiration PNA  - monitor temps, afebrile   - supportive care   - IV Zosyn started for aspiration PNA   - trend CBC (WBC 6.38 10/5)    #Pancytopenia likely 2/2 cellulitis and URI, resolving  - normalizing: WBC now wnl, RBC/PLT improving    - normal ldh and haptoglobin, r/o hemolytic anemia   - pending heme consult   - PLT now 97 from 79  - heparin-PF4 AB negative, r/o HIT   - c/w apixaban     # PEG tube, feedings  - resume PEG tube feedings   - nutrition consult appreciated  - c/w Jevity 360 cc bolus t.i.d.   - aspiration precautions, sit pt upright during feedings  - oronasopharyngeal suctioning PRN     # Normocytic Anemia  - Hgb/Hct 9.3/28.1, MCV 86.2   - heme consult appreciated likely d/t bone marrow suppression 2/2 ongoing infections, antibiotic use, recent hospitalization possibly dilutional/d/t nutritional deficiency  - HIT r/o d/t normal haptoglobin, LDH   - no deficiency in b12 or folate   - c/w CBC monitoring, transfuse PRN if symptomatic/hemodynamically unstable     # Severe Protein Calorie Malnutrition  - diet: resume PEG tube feeds  - supplements per nutrition recommendations     # GERD, HTN, Parkinson's Dementia  - c/w home meds: pantoprazole, lisinopril, metoprolol, seroquel, carvidopa-levodopa,     # Unchanged L2 compression fx/rib and R clavicle fx/R renal cyst incidental findings on CT  - f/u outpatient     # DVT Prophylaxis  - c/w ASA, apixaban        87-year-old Male with PMhx BPH, diverticulosis, GERD, HTN, necrotizing enterocolitis, PEG tube in place, tube feeding dependent, osteoporosis, Parkinson's dementia, TIA, CVA, prostate CA, sensorineural hearing loss admitted through ED for worsening RLE, LUE edema. Patient A&OX1 at baseline, history provided by family. Recently d/c'd from [facility] 9/29 s/p dx of RLE DVT (started on Eliquis), cellulitis, and "fluid on lungs".     # Pulmonary Edema and Effusions d/t Acute HFpEF   - suspect diastolic CHF   - furosemide 40 mg IV o.d.   - cards consult: acute HFpEF, TTE: moderate-severe AS; recc: c/w ACE (lisinopril) and BB (metoprolol), rpt CXR, consider transition to PO furosemide   - rpt CXR with worsening pleural effusions, will c/w IV furosemide for now   - pulmonology consult recc: IV zosyn for possible aspiration pneumonia, now d/c'd as pt afebrile, WBC wnl; requesting thoracic surgery consult for possible thoracentesis   - pending thoracic surgery recommendations   - pt with improvement of clinical appearance s/p dislodgement of mucous plug 10/4; will start hypertonic saline nebulizer tx to assist with secretions    # RLE DVT:  - c/w apixaban     # Cellulitis RLE:  - c/w ceftriaxone o.d. x 7 days and doxycycline 100 mg PO b.i.d. per ID recc  - ID consult: elevate leg  - monitor temps, f/u CBC (WBC 6.38 10/5)    # Low Grade Temps 2/2 URI d/t Entero/Rhinovirus, possible Aspiration PNA  - monitor temps, afebrile   - supportive care   - IV Zosyn started for aspiration PNA- given x 1 , now d/c'd as pt clinically improving, afebrile, WBC wnl  - trend CBC (WBC 6.38 10/5)    #Pancytopenia likely 2/2 cellulitis and URI, resolving  - normalizing: WBC now wnl, RBC/PLT improving    - normal ldh and haptoglobin, r/o hemolytic anemia   - pending heme consult   - PLT now 97 from 79  - heparin-PF4 AB negative, r/o HIT   - c/w apixaban     # PEG tube, feedings  - resume PEG tube feedings @ 2100   - nutrition consult appreciated  - c/w Jevity 360 cc bolus t.i.d.   - aspiration precautions, sit pt upright during feedings  - oronasopharyngeal suctioning PRN     # Normocytic Anemia  - Hgb/Hct 9.3/28.1, MCV 86.2   - heme consult appreciated likely d/t bone marrow suppression 2/2 ongoing infections, antibiotic use, recent hospitalization possibly dilutional/d/t nutritional deficiency  - HIT r/o d/t normal haptoglobin, LDH   - no deficiency in b12 or folate   - c/w CBC monitoring, transfuse PRN if symptomatic/hemodynamically unstable     # Severe Protein Calorie Malnutrition  - diet: resume PEG tube feeds @ 2100  - supplements per nutrition recommendations     # GERD, HTN, Parkinson's Dementia  - c/w home meds: pantoprazole, lisinopril, metoprolol, seroquel, carvidopa-levodopa,     # Unchanged L2 compression fx/rib and R clavicle fx/R renal cyst incidental findings on CT  - f/u outpatient     # DVT Prophylaxis  - c/w ASA, apixaban        87-year-old Male with PMhx BPH, diverticulosis, GERD, HTN, necrotizing enterocolitis, PEG tube in place, tube feeding dependent, osteoporosis, Parkinson's dementia, TIA, CVA, prostate CA, sensorineural hearing loss admitted through ED for worsening RLE, LUE edema. Patient A&OX1 at baseline, history provided by family. Recently d/c'd from [facility] 9/29 s/p dx of RLE DVT (started on Eliquis), cellulitis, and "fluid on lungs".     # Pulmonary Edema and Effusions d/t Acute HFpEF   - suspect diastolic CHF   - furosemide 40 mg IV o.d.   - cards consult: acute HFpEF, TTE: moderate-severe AS; recc: c/w ACE (lisinopril) and BB (metoprolol), rpt CXR, consider transition to PO furosemide   - rpt CXR with worsening pleural effusions, will c/w IV furosemide for now   - pulmonology consult recc: IV zosyn for possible aspiration pneumonia, now d/c'd as pt afebrile, WBC wnl; requesting thoracic surgery consult for possible thoracentesis   - no interventions with thoracic. pt clinically improving.   - pt with improvement of clinical appearance s/p dislodgement of mucous plug 10/4; will start hypertonic saline nebulizer tx to assist with secretions    # RLE DVT:  - c/w apixaban     # Cellulitis RLE:  - improving overall   - c/w ceftriaxone o.d. x 7 days and doxycycline 100 mg PO b.i.d. per ID recc  - ID consult: elevate leg  - monitor temps, f/u CBC (WBC 6.38 10/5)    # Low Grade Temps 2/2 URI d/t Entero/Rhinovirus, possible Aspiration PNA  - monitor temps, afebrile   - supportive care   - IV Zosyn started for aspiration PNA- given x 1 , now d/c'd as pt clinically improving, afebrile, WBC wnl  - trend CBC (WBC 6.38 10/5)    #Pancytopenia likely 2/2 cellulitis and URI, resolving  - normalizing: WBC now wnl, RBC/PLT improving    - normal ldh and haptoglobin, ruled out hemolytic anemia   - heme consulted - likely due to acute infection   - PLT now 97 from 79  - heparin-PF4 AB negative, r/o HIT   - c/w apixaban     # PEG tube, feedings due to dysphagia - recent hx of botox injections   # Severe Protein Calorie Malnutrition  - supplements per nutrition recommendations   - resume PEG tube feedings @ 2100   - nutrition consult appreciated  - c/w Jevity 360 cc bolus t.i.d.   - aspiration precautions, sit pt upright during feedings  - oronasopharyngeal suctioning PRN     # Normocytic Anemia  - Hgb/Hct 9.3/28.1, MCV 86.2   - heme consult appreciated likely d/t bone marrow suppression 2/2 ongoing infections, antibiotic use, recent hospitalization possibly dilutional/d/t nutritional deficiency  - HIT r/o d/t normal haptoglobin, LDH   - no deficiency in b12 or folate   - c/w CBC monitoring, transfuse PRN if symptomatic/hemodynamically unstable         # GERD, HTN, Parkinson's Dementia  - c/w home meds: pantoprazole, lisinopril, metoprolol, seroquel, carvidopa-levodopa,     # Unchanged L2 compression fx/rib and R clavicle fx/R renal cyst incidental findings on CT  - f/u outpatient     # DVT Prophylaxis  - c/w ASA, apixaban         updated son and wife via telephone 10/5. all questions answered to best of my ability. overall pt very frail and high risk for aspiration and re-admission.   pt family maintains that he is dnr/dni and if he continues to deteriorate / will consider home hospice placement.   pall care has been consulted.

## 2023-10-05 NOTE — PROGRESS NOTE ADULT - ASSESSMENT
88 y/o M w/ PMH of BPH, diverticulosis, GERD, HTN, PED placement, necrotizing enterocolitis, osteoporosis, parkinson's dementia, TIA prostate cancer, hearing loss, CVA, p/w worsening RLE / LUE swelling. Patient is unable to provide history, son and wife at bedside provides history. States that [facility] with RLE D[facility] and treated for cellulitis. He was discharged yesterday, and this AM family noticed patient's RLE and LUE swelling was worsening. Patient's son states they were told patient had ulcers in intestines, and considered risk of this prior to starting Eliquis. Son states patient has brown stool, no melena / hematochezia. States patient is an aspiration risk, and previously treated for aspiration PNA as well. [facility] also [facility] that [facility] had [facility] in his lungs. In the morning patient also c/o HA to son.     1. Pulmonary edema and pleural effusions  -suspect CHF exacerbation  -IV lasix daily  -cardiology following  -patient with low grade fevers  -blood cultures and ucx pending NGTD  -on IV ceftriaxone, and PO doxy  -possible asp PNA; patient has PEG receiving bolus TF   -ID following  -pulmonary following  -thoracics consulted for possible thoracentesis    2. Parkinson's dementia  -per family, recent functional decline since January of this year  -dysphagia with PEG placement in January 2023  -on bolus TF at home  -patient with recurrent aspiration PNA  -baseline wheelchair bound  -poor prognosis, multiple rehospitalization in past 2 months  -Palliative strongly suggesting hospice to family; wife not ready for hospice at this time      Process of Care   --Reviewed dx/treatment problems and alignment with Goals of Care        Physical Aspects of Care   --Pain   no nonverbal indicators of pain  c/w current management     --Bowel Regimen   risk for constipation d/t immobility   daily dulcolax as needed    --Dyspnea   on 2LNC  comfortable and in NAD     --Nausea Vomiting   denies     --Weakness   PT as tolerated    WCB at baseline      Psychological and Psychiatric Aspects of Care:    --Grief/ Bereavement: emotional support provided   --Hx of psychiatric dx: none   --Pastoral Care Available PRN        Social Aspects of Care   --SW involved        Cultural Aspects   -Primary Language: English           Goals of Care: MOLST with current limitations of DNR/DNI and trial NIV. Spoke with wife and daughter at length on 10/4. Palliative strongly suggested patient returning home on hospice. Wife not quite ready for hospice, but will consider in future.          We discussed Palliative Care team being a supportive team when a patient has ongoing illnesses.  We also discussed that it is not an end of life care service, but can help navigate symptoms and emotional support throughout their hospital stay here.           Ethical and Legal Aspects: NA           Capacity- patient A&Ox1/minimally verbal, hx of Parkinson's dementia; does not have medical decision making capacity at this time       HCP/Surrogate: Dona Holder [phone]        Code Status: DNR/DNI trial NIV    MOLST: prior MOLST in chart, family confirmed limitations of DNR/DNI trial NIV     Dispo Plan: home with home care vs. home hospice          Discussed With: Dr. Resendiz & Sarah Goetz NP          Case coordinated with attending and SW and RN            Time Spent: 45 minutes including the care, coordination and counseling of this patient, 50% of which was spent coordinating and counseling.           86 y/o M w/ PMH of BPH, diverticulosis, GERD, HTN, PED placement, necrotizing enterocolitis, osteoporosis, parkinson's dementia, TIA prostate cancer, hearing loss, CVA, p/w worsening RLE / LUE swelling. Patient is unable to provide history, son and wife at bedside provides history. States that [facility] with RLE D[facility] and treated for cellulitis. He was discharged yesterday, and this AM family noticed patient's RLE and LUE swelling was worsening. Patient's son states they were told patient had ulcers in intestines, and considered risk of this prior to starting Eliquis. Son states patient has brown stool, no melena / hematochezia. States patient is an aspiration risk, and previously treated for aspiration PNA as well. [facility] that [facility] had [facility] in his lungs. In the morning patient also c/o HA to son.     1. Pulmonary edema and pleural effusions  -suspect CHF exacerbation  -IV lasix daily  -cardiology following  -patient with low grade fevers  -blood cultures and ucx pending NGTD  -on IV ceftriaxone, and PO doxy  -possible asp PNA; patient has PEG receiving bolus TF   -ID following  -pulmonary following  -thoracics consulted for possible thoracentesis    2. Parkinson's dementia  -per family, recent functional decline since January of this year  -dysphagia with PEG placement in January 2023  -on bolus TF at home  -patient with recurrent aspiration PNA  -baseline wheelchair bound  -poor prognosis, multiple rehospitalization in past 2 months  -Palliative strongly suggesting hospice to family; wife not ready for hospice at this time      Process of Care   --Reviewed dx/treatment problems and alignment with Goals of Care        Physical Aspects of Care   --Pain   no nonverbal indicators of pain  c/w current management     --Bowel Regimen   risk for constipation d/t immobility   daily dulcolax as needed    --Dyspnea   on 2LNC  comfortable and in NAD     --Nausea Vomiting   denies     --Weakness   PT as tolerated    WCB at baseline      Psychological and Psychiatric Aspects of Care:    --Grief/ Bereavement: emotional support provided   --Hx of psychiatric dx: none   --Pastoral Care Available PRN        Social Aspects of Care   --SW involved        Cultural Aspects   -Primary Language: English           Goals of Care: MOLST with current limitations of DNR/DNI and trial NIV. Spoke with wife and daughter at length on 10/4. Palliative strongly suggested patient returning home on hospice. Wife not quite ready for hospice, but will consider in future.          We discussed Palliative Care team being a supportive team when a patient has ongoing illnesses.  We also discussed that it is not an end of life care service, but can help navigate symptoms and emotional support throughout their hospital stay here.           Ethical and Legal Aspects: NA           Capacity- patient A&Ox1/minimally verbal, hx of Parkinson's dementia; does not have medical decision making capacity at this time       HCP/Surrogate: Dona Holder [phone]        Code Status: DNR/DNI trial NIV    MOLST: prior MOLST in chart, family confirmed limitations of DNR/DNI trial NIV     Dispo Plan: home with home care vs. home hospice          Discussed With: Dr. Resendiz & Sarah Goetz NP          Case coordinated with attending and SW and RN            Time Spent: 45 minutes including the care, coordination and counseling of this patient, 50% of which was spent coordinating and counseling.

## 2023-10-06 LAB
ANION GAP SERPL CALC-SCNC: 3 MMOL/L — LOW (ref 5–17)
BUN SERPL-MCNC: 31 MG/DL — HIGH (ref 7–23)
CALCIUM SERPL-MCNC: 7.6 MG/DL — LOW (ref 8.5–10.1)
CHLORIDE SERPL-SCNC: 109 MMOL/L — HIGH (ref 96–108)
CO2 SERPL-SCNC: 28 MMOL/L — SIGNIFICANT CHANGE UP (ref 22–31)
CREAT SERPL-MCNC: 0.6 MG/DL — SIGNIFICANT CHANGE UP (ref 0.5–1.3)
EGFR: 93 ML/MIN/1.73M2 — SIGNIFICANT CHANGE UP
GLUCOSE SERPL-MCNC: 150 MG/DL — HIGH (ref 70–99)
HCT VFR BLD CALC: 26.6 % — LOW (ref 39–50)
HGB BLD-MCNC: 8.7 G/DL — LOW (ref 13–17)
MCHC RBC-ENTMCNC: 28.4 PG — SIGNIFICANT CHANGE UP (ref 27–34)
MCHC RBC-ENTMCNC: 32.7 GM/DL — SIGNIFICANT CHANGE UP (ref 32–36)
MCV RBC AUTO: 86.9 FL — SIGNIFICANT CHANGE UP (ref 80–100)
PLATELET # BLD AUTO: 107 K/UL — LOW (ref 150–400)
POTASSIUM SERPL-MCNC: 3.3 MMOL/L — LOW (ref 3.5–5.3)
POTASSIUM SERPL-SCNC: 3.3 MMOL/L — LOW (ref 3.5–5.3)
RBC # BLD: 3.06 M/UL — LOW (ref 4.2–5.8)
RBC # FLD: 16.6 % — HIGH (ref 10.3–14.5)
SODIUM SERPL-SCNC: 140 MMOL/L — SIGNIFICANT CHANGE UP (ref 135–145)
WBC # BLD: 5.39 K/UL — SIGNIFICANT CHANGE UP (ref 3.8–10.5)
WBC # FLD AUTO: 5.39 K/UL — SIGNIFICANT CHANGE UP (ref 3.8–10.5)

## 2023-10-06 PROCEDURE — 99232 SBSQ HOSP IP/OBS MODERATE 35: CPT

## 2023-10-06 PROCEDURE — 99497 ADVNCD CARE PLAN 30 MIN: CPT

## 2023-10-06 RX ADMIN — APIXABAN 5 MILLIGRAM(S): 2.5 TABLET, FILM COATED ORAL at 20:45

## 2023-10-06 RX ADMIN — Medication 500 MILLIGRAM(S): at 08:39

## 2023-10-06 RX ADMIN — CEFTRIAXONE 1000 MILLIGRAM(S): 500 INJECTION, POWDER, FOR SOLUTION INTRAMUSCULAR; INTRAVENOUS at 14:12

## 2023-10-06 RX ADMIN — APIXABAN 5 MILLIGRAM(S): 2.5 TABLET, FILM COATED ORAL at 08:39

## 2023-10-06 RX ADMIN — ATORVASTATIN CALCIUM 20 MILLIGRAM(S): 80 TABLET, FILM COATED ORAL at 20:45

## 2023-10-06 RX ADMIN — Medication 40 MILLIGRAM(S): at 08:38

## 2023-10-06 RX ADMIN — CARBIDOPA AND LEVODOPA 1.5 TABLET(S): 25; 100 TABLET ORAL at 08:38

## 2023-10-06 RX ADMIN — CARBIDOPA AND LEVODOPA 1 TABLET(S): 25; 100 TABLET ORAL at 20:45

## 2023-10-06 RX ADMIN — Medication 100 MILLIGRAM(S): at 20:46

## 2023-10-06 RX ADMIN — Medication 100 MILLIGRAM(S): at 08:41

## 2023-10-06 RX ADMIN — SODIUM CHLORIDE 4 MILLILITER(S): 9 INJECTION INTRAMUSCULAR; INTRAVENOUS; SUBCUTANEOUS at 21:56

## 2023-10-06 RX ADMIN — CARBIDOPA AND LEVODOPA 1 TABLET(S): 25; 100 TABLET ORAL at 16:41

## 2023-10-06 RX ADMIN — QUETIAPINE FUMARATE 25 MILLIGRAM(S): 200 TABLET, FILM COATED ORAL at 20:46

## 2023-10-06 RX ADMIN — PANTOPRAZOLE SODIUM 40 MILLIGRAM(S): 20 TABLET, DELAYED RELEASE ORAL at 08:39

## 2023-10-06 RX ADMIN — CARBIDOPA AND LEVODOPA 1.5 TABLET(S): 25; 100 TABLET ORAL at 14:14

## 2023-10-06 RX ADMIN — LISINOPRIL 2.5 MILLIGRAM(S): 2.5 TABLET ORAL at 20:46

## 2023-10-06 RX ADMIN — DONEPEZIL HYDROCHLORIDE 5 MILLIGRAM(S): 10 TABLET, FILM COATED ORAL at 20:46

## 2023-10-06 RX ADMIN — LISINOPRIL 2.5 MILLIGRAM(S): 2.5 TABLET ORAL at 08:41

## 2023-10-06 RX ADMIN — Medication 2 MILLIGRAM(S): at 20:46

## 2023-10-06 RX ADMIN — Medication 25 MILLIGRAM(S): at 08:39

## 2023-10-06 RX ADMIN — QUETIAPINE FUMARATE 25 MILLIGRAM(S): 200 TABLET, FILM COATED ORAL at 08:38

## 2023-10-06 RX ADMIN — Medication 81 MILLIGRAM(S): at 08:39

## 2023-10-06 NOTE — PROGRESS NOTE ADULT - ASSESSMENT
86 y/o M w/ PMH of BPH, diverticulosis, GERD, HTN, PED placement, necrotizing enterocolitis, osteoporosis, parkinson's dementia, TIA prostate cancer, hearing loss, CVA, p/w worsening RLE / LUE swelling. Patient is unable to provide history, son and wife at bedside provides history. States that [facility] with RLE D[facility] and treated for cellulitis. He was discharged yesterday, and this AM family noticed patient's RLE and LUE swelling was worsening. Patient's son states they were told patient had ulcers in intestines, and considered risk of this prior to starting Eliquis. Son states patient has brown stool, no melena / hematochezia. States patient is an aspiration risk, and previously treated for aspiration PNA as well. [facility] that [facility] had [facility] in his lungs. In the morning patient also c/o HA to son.     1. Pulmonary edema and pleural effusions  -suspect CHF exacerbation  -IV lasix daily  -cardiology following  -patient with low grade fevers  -blood cultures and ucx pending NGTD  -on IV ceftriaxone, and PO doxy  -possible asp PNA; patient has PEG receiving bolus TF   -ID following  -pulmonary following  -thoracics consulted for possible thoracentesis: not pursuing, continue medical management at this time    2. Parkinson's dementia  -per family, recent functional decline since January of this year  -dysphagia with PEG placement in January 2023  -on bolus TF at home  -patient with recurrent aspiration PNA  -baseline wheelchair bound  -poor prognosis, multiple rehospitalization in past 2 months  -Palliative strongly suggesting hospice to family; wife not ready for hospice at this time; Palliative will continue to follow      Process of Care   --Reviewed dx/treatment problems and alignment with Goals of Care        Physical Aspects of Care   --Pain   no nonverbal indicators of pain  c/w current management     --Bowel Regimen   risk for constipation d/t immobility   daily dulcolax as needed    --Dyspnea   on 2LNC  comfortable and in NAD     --Nausea Vomiting   denies     --Weakness   PT as tolerated    WCB at baseline      Psychological and Psychiatric Aspects of Care:    --Grief/ Bereavement: emotional support provided   --Hx of psychiatric dx: none   --Pastoral Care Available PRN        Social Aspects of Care   --SW involved        Cultural Aspects   -Primary Language: English           Goals of Care: MOLST with current limitations of DNR/DNI and trial NIV. Spoke with wife and daughter at length on 10/4 and 10/6. Palliative strongly suggested patient returning home on hospice. Wife not quite ready for hospice, but is starting to consider. Family understanding of poor prognosis.           We discussed Palliative Care team being a supportive team when a patient has ongoing illnesses.  We also discussed that it is not an end of life care service, but can help navigate symptoms and emotional support throughout their hospital stay here.           Ethical and Legal Aspects: NA           Capacity- patient A&Ox1/minimally verbal, hx of Parkinson's dementia; does not have medical decision making capacity at this time       HCP/Surrogate: Dona Holder [phone]        Code Status: DNR/DNI trial NIV    MOLST: prior MOLST in chart, family confirmed limitations of DNR/DNI trial NIV     Dispo Plan: home with home care vs. home hospice          Discussed With: Dr. Resendiz & Sarah Goetz NP          Case coordinated with attending and SW and RN            Time Spent: 60 minutes including the care, coordination and counseling of this patient, 50% of which was spent coordinating and counseling.

## 2023-10-06 NOTE — PROGRESS NOTE ADULT - ASSESSMENT
87-year-old Male with PMhx BPH, diverticulosis, GERD, HTN, necrotizing enterocolitis, PEG tube in place, tube feeding dependent, osteoporosis, Parkinson's dementia, TIA, CVA, prostate CA, sensorineural hearing loss admitted through ED for worsening RLE, LUE edema. Patient A&OX1 at baseline, history provided by family. Recently d/c'd from [facility] 9/29 s/p dx of RLE DVT (started on Eliquis), cellulitis, and "fluid on lungs".     # Pulmonary Edema and Effusions d/t Acute HFpEF   - suspect diastolic CHF   - furosemide 40 mg IV o.d.   - cards consult: acute HFpEF, TTE: moderate-severe AS; recc: c/w ACE (lisinopril) and BB (metoprolol), rpt CXR, consider transition to PO furosemide   - rpt CXR with worsening pleural effusions, will c/w IV furosemide for now   - pulmonology consult recc: IV zosyn for possible aspiration pneumonia, now d/c'd as pt afebrile, WBC wnl; requesting thoracic surgery consult for possible thoracentesis   - no interventions with thoracic. pt clinically improving.   - pt with improvement of clinical appearance s/p dislodgement of mucous plug 10/4; cont hypertonic saline nebulizer tx to assist with secretion clearing     # RLE DVT:  - c/w apixaban     # Cellulitis RLE:  - improving overall   - c/w ceftriaxone o.d. x 7 days and doxycycline 100 mg PO b.i.d. per ID recc  - ID consult: elevate leg  - monitor temps, f/u CBC (WBC 6.38 10/5)    # Low Grade Temps 2/2 URI d/t Entero/Rhinovirus, possible Aspiration PNA  - temps resolved   - monitor temps, afebrile   - supportive care   - IV Zosyn started for aspiration PNA- given x 1 , now d/c'd as pt clinically improving, afebrile, WBC wnl  - trend CBC (WBC 6.38 10/5)    #Pancytopenia likely 2/2 cellulitis and URI, resolving  - normalizing: WBC now wnl, RBC/PLT improving    - normal ldh and haptoglobin, ruled out hemolytic anemia   - heme consulted - likely due to acute infection   - PLT now 97 from 79  - heparin-PF4 AB negative, r/o HIT   - c/w apixaban     # PEG tube, feedings due to dysphagia - recent hx of botox injections   # Severe Protein Calorie Malnutrition  - supplements per nutrition recommendations   - resume PEG tube feedings @ 2100   - nutrition consult appreciated  - c/w Jevity 360 cc bolus t.i.d.   - aspiration precautions, sit pt upright during feedings  - oronasopharyngeal suctioning PRN     # Normocytic Anemia  - Hgb/Hct 9.3/28.1, MCV 86.2   - heme consult appreciated likely d/t bone marrow suppression 2/2 ongoing infections, antibiotic use, recent hospitalization possibly dilutional/d/t nutritional deficiency  - HIT r/o d/t normal haptoglobin, LDH   - no deficiency in b12 or folate   - c/w CBC monitoring, transfuse PRN if symptomatic/hemodynamically unstable       # GERD, HTN, Parkinson's Dementia  - c/w home meds: pantoprazole, lisinopril, metoprolol, seroquel, carvidopa-levodopa,     # Unchanged L2 compression fx/rib and R clavicle fx/R renal cyst incidental findings on CT  - f/u outpatient     # DVT Prophylaxis  - c/w ASA, apixaban         updated son and wife via telephone 10/5. all questions answered to best of my ability. overall pt very frail and high risk for aspiration and re-admission.   pt family maintains that he is dnr/dni and if he continues to deteriorate / will consider home hospice placement.   pall care has been consulted - no decisions to dc on hospice at present time, but they understand that disease is progressing and pt requires significant care.     pt is high risk for re-admission despite efforts. no further limitations set on care at present.

## 2023-10-06 NOTE — PROGRESS NOTE ADULT - SUBJECTIVE AND OBJECTIVE BOX
HPI: 88 y/o M w/ PMH of BPH, diverticulosis, GERD, HTN, PED placement, necrotizing enterocolitis, osteoporosis, parkinson's dementia, TIA prostate cancer, hearing loss, CVA, p/w worsening RLE / LUE swelling. Patient is unable to provide history, son and wife at bedside provides history. States that [facility] with [facility] D[facility] and treated for cellulitis. He was discharged yesterday, and this AM family noticed patient's RLE and LUE swelling was worsening. Patient's son states they were told patient had ulcers in intestines, and considered risk of this prior to starting Eliquis. Son states patient has brown stool, no melena / hematochezia. States patient is an aspiration risk, and previously treated for aspiration PNA as well. [facility] also [facility] that [facility] had [facility] in his [facility]. In the morning patient also c/o HA to son.     10/5: Seen and examined at bedside. More alert today. A&Ox1. Speech unintelligible. In NAD.  10/6: Seen and examined at bedside. Arouses to verbal stimuli. A&Ox1. Minimally verbal, speech more clear today. In NAD. Spoke with patient's wife and son- considering hospice but not ready to make decision yet.      CODE STATUS: DNR/DNI trial NIV      Pain and Dyspnea:     no nonverbal indicators of pain  no dyspnea, on 2LNC, appears comfortable in NAD      Review of Systems:    Unable to obtain/Limited due to: dementia/minimally verbal        PHYSICAL EXAM:    Vital Signs Last 24 Hrs  T(C): 36.4 (06 Oct 2023 08:39), Max: 36.9 (05 Oct 2023 20:00)  T(F): 97.6 (06 Oct 2023 08:39), Max: 98.5 (05 Oct 2023 20:00)  HR: 84 (06 Oct 2023 08:39) (84 - 98)  BP: 131/75 (06 Oct 2023 08:39) (130/77 - 150/91)  BP(mean): --  RR: 18 (06 Oct 2023 08:39) (18 - 19)  SpO2: 99% (06 Oct 2023 08:39) (99% - 99%)    Parameters below as of 06 Oct 2023 08:39  Patient On (Oxygen Delivery Method): nasal cannula      Daily     Daily     PPSV2:  20%  FAST: 7D    General: Drowsy, arouses to voice. Chronically ill-appearing, in NAD.  HEENT: Dry mucous membranes.   Lungs: Diminished at b/l bases.  Cardiac: Regular rate and rhythm. S1S2.  GI: +PEG.  : No suprapubic tenderness.  Ext: LUE +2 edema. RLE +2 edema and erythema. +2 pedal pulses.  Neuro: A&Ox1. Minimally verbal. Limited exam.      LABS and RADIOLOGY: REVIEWED

## 2023-10-06 NOTE — PROGRESS NOTE ADULT - SUBJECTIVE AND OBJECTIVE BOX
CC: Sepsis   SUBJECTIVE:   HPI: 87-year-old Male with PMHx BPH, diverticulosis, GERD, HTN, necrotizing enterocolitis, PEG tube in place, tube feeding dependent, osteoporosis, Parkinson's dementia, TIA, CVA, prostate CA, sensorineural hearing loss presented to ED for evaluation of worsening RLE/LUE edema. Pt unable to provide history, A&OX1 at baseline. Son and wife at bedside during admission to provide history. [facility] that [facility] with RLE DVT (started on Eliquis) and treated for cellulitis. [facility] and presented to [place] ED 9/30 as family noticed worsening edema. Pts son states they were told the patient had ulcers in his intestines, considered this risk prior to initiation of Eliquis. Son endorses pt having brown stools, denies melena/hematochezia. States pt previously treated for aspiration PNA and identified as high risk. Reports that [facility] stated that the patient has [facility] in his [facility].     10/1: no dyspnea, confused  10/2: laying in bed, denies pain or dyspnea. on room air. wife at bedside and updated on plan.  10/3:   10/4: pt examined laying in bed. denies pain or dyspnea. no acute distress. on room air.   10/5: pt examined laying in bed. oropharyngeal suctioning provided with yellow thick phlegm. no acute distress. on 2 L O2 via NC. no family at bedside during time of assessment.   10/6: more alert and conversive today, denies pain or discomfort. no excess secretions       REVIEW OF SYSTEMS: unable to obtain 2/2 pts mental status, pt denies pain but otherwise unable to specify     Vital Signs Last 24 Hrs  T(C): 36.4 (06 Oct 2023 08:39), Max: 36.9 (05 Oct 2023 20:00)  T(F): 97.6 (06 Oct 2023 08:39), Max: 98.5 (05 Oct 2023 20:00)  HR: 84 (06 Oct 2023 08:39) (84 - 98)  BP: 131/75 (06 Oct 2023 08:39) (130/77 - 150/91)  BP(mean): --  RR: 18 (06 Oct 2023 08:39) (18 - 19)  SpO2: 99% (06 Oct 2023 08:39) (99% - 99%)    Parameters below as of 06 Oct 2023 08:39  Patient On (Oxygen Delivery Method): nasal cannula          I&O's Summary    03 Oct 2023 07:01  -  04 Oct 2023 07:00  --------------------------------------------------------  IN: 0 mL / OUT: 1 mL / NET: -1 mL      PHYSICAL EXAM:    Constitutional: NAD, awake and alert, oriented x 1, cachectic   HEENT: PERR, hard of hearing, MMM  Neck: Soft and supple, No LAD, No JVD  Respiratory: Coughing up yellow thick phlegm, assisted by oropharyngeal suctioning. Breath sounds diminished in all fields, clear anteriorly   Cardiovascular: S1 and S2, regular rate and rhythm, no Murmurs, gallops or rubs  Gastrointestinal: Bowel Sounds present, soft, nontender, nondistended, no guarding, no rebound  Extremities: + 2 pitting edema to RLE  Vascular: 2+ peripheral pulses  Neurological: A/O x 1   Musculoskeletal: unable to assess 2/2 patient unable to follow commands  Skin: shiny BL LE with intermittent ecchymosis, cellulitis improved       MEDICATIONS:  MEDICATIONS  (STANDING):  apixaban 5 milliGRAM(s) Enteral Tube every 12 hours  ascorbic acid 500 milliGRAM(s) Oral daily  aspirin  chewable 81 milliGRAM(s) Oral daily  atorvastatin 20 milliGRAM(s) Oral at bedtime  carbidopa/levodopa  25/100 1 Tablet(s) Oral <User Schedule>  carbidopa/levodopa  25/100 1.5 Tablet(s) Oral <User Schedule>  cefTRIAXone Injectable. 1000 milliGRAM(s) IV Push every 24 hours  donepezil 5 milliGRAM(s) Oral at bedtime  doxazosin 2 milliGRAM(s) Oral at bedtime  doxycycline monohydrate Capsule 100 milliGRAM(s) Oral every 12 hours  furosemide   Injectable 40 milliGRAM(s) IV Push daily  lisinopril 2.5 milliGRAM(s) Oral every 12 hours  metoprolol tartrate 25 milliGRAM(s) Oral daily  pantoprazole    Tablet 40 milliGRAM(s) Oral before breakfast  piperacillin/tazobactam IVPB.- 3.375 Gram(s) IV Intermittent once  piperacillin/tazobactam IVPB.. 3.375 Gram(s) IV Intermittent every 8 hours  QUEtiapine 25 milliGRAM(s) Oral two times a day      LABS: All Labs Reviewed:                        9.3    6.38  )-----------( 97       ( 04 Oct 2023 08:34 )             28.1     10-04    143  |  113<H>  |  19  ----------------------------<  103<H>  3.6   |  25  |  0.49<L>    Ca    7.7<L>      04 Oct 2023 08:34      RADIOLOGY/EKG:    < from: Xray Chest 1 View- PORTABLE-Urgent (Xray Chest 1 View- PORTABLE-Urgent .) (10.03.23 @ 19:59) >  IMPRESSION:  Progression of effusions.        Thank you for the courtesy of this referral.    --- End of Report ---            DUY DENISE MD; Attending Interventional Radiologist  This document has been electronically signed. Oct  4 2023 10:36AM    < end of copied text >    < from: CT Chest w/ IV Cont (09.30.23 @ 17:15) >  IMPRESSION:    Interstitial pulmonary edema and moderate pleural effusions.    --- End of Report ---        NITIN GARZA M.D., ATTENDING RADIOLOGIST  This document has been electronically signed. Sep 30 2023  5:28PM    < end of copied text >    < from: US Duplex Venous Upper Ext Ltd, Left (09.30.23 @ 16:42) >  IMPRESSION:  No evidence of left upper extremity deep venous thrombosis.        --- End of Report ---      JAE MCDOWELL MD; Attending Radiologist  This document has been electronically signed. Sep 30 2023  5:04PM    < end of copied text >    < from: US Duplex Venous Lower Ext Ltd, Right (09.30.23 @ 15:04) >  IMPRESSION:      Occlusive thrombosis right common femoral vein to the calf veins. Upper   < from: TTE Echo Complete w/o Contrast w/ Doppler (10.03.23 @ 12:31) >   Summary     The mitral valve leaflets appear thickened.   Trace mitral regurgitation is present.   EA reversal of the mitral inflow consistent with reduced compliance of   the   left ventricle.   The aortic valve is not well visualized, appears moderately calcified.   Valve opening seems to be restricted.   Peak and mean transaortic gradients are 47 and 24 mmHg respectively; this   finding is consistent with moderate to severe aortic stenosis.   ELLI by continuity equation (1.1 cm2) and dimensionless index suggest   (.34)   Pedoff velocity measured 4.0 meters/sec   there mobile echogenic appears to be attached ventricular side of aortic   valve or base of AML , which is noted in 2021 study too ,may be   calcification vs artifact   Mild concentric left ventricular hypertrophy is present.   The left ventricle is normal in wall motion and contractility.   Estimated left ventricular ejection fraction is 60-65 %.   The left ventricle cavity is underdistended.   Normal appearing right ventricle structure and function.     clinical correlation noted     Signature     ----------------------------------------------------------------   Electronically signed by Venugopal Palla, MD(Interpreting   physician) on 10/03/2023 08:22 PM   ----------------------------------------------------------------    Valves      < end of copied text >  extent of thrombus not demonstrated. (Above and below the knee)        --- End of Report ---    CADY AIKEN MD; Attending Radiologist  This document has been electronically signed. Sep 30 2023  3:13PM    < end of copied text >    < from: 12 Lead ECG (09.30.23 @ 10:52) >    Ventricular Rate 94 BPM    Atrial Rate 94 BPM    P-R Interval 158 ms    QRS Duration 82 ms    Q-T Interval 354 ms    QTC Calculation(Bazett) 442 ms    P Axis 33 degrees    R Axis 8 degrees    T Axis 34 degrees    Diagnosis Line Sinus rhythm with Premature supraventricular complexes  Low voltage QRS  Cannot rule out Anterior infarct (cited on or before 01-MAR-2018)  Abnormal ECG  Confirmed by RONALDO PATEL MD ([id number]) on 9/30/2023 12:40:57 PM    < end of copied text >

## 2023-10-06 NOTE — PROGRESS NOTE ADULT - NSPROGADDITIONALINFOA_GEN_ALL_CORE
I have personally seen and examined the patient with NP ronda Huang and have made changes to assessment and plan where appropriate
I have personally seen and examined the patient with NP ronda Huang and have made amendments to assessment and plan where needed
I have personally seen and examined the patient with NP ronda Huang and have made changes to assessment and plan where appropriate

## 2023-10-06 NOTE — PROGRESS NOTE ADULT - ASSESSMENT
86 y/o Male with h/o BPH, RLE DVT, diverticulosis, GERD, HTN, PEG placement, necrotizing enterocolitis, osteoporosis, Parkinson's dementia, PEG placement, TIA prostate cancer, hearing loss, old CVA was admitted on 9/30 for worsening RLE / LUE swelling. Patient is unable to provide history, son and wife at bedside provides history. [facility] that [facility] and treated for cellulitis. He was discharged on the day PTA, and this AM family noticed patient's RLE and LUE swelling was worsening. Patient's son states they were told patient had ulcers in intestines, and considered risk of this prior to starting Eliquis. Son states patient has brown stool, no melena / hematochezia. States patient is an aspiration risk, and previously treated for aspiration PNA as well. [facility] also [facility] that [facility] had [facility] in his lungs. In ER he received ceftriaxone.     PROBLEMS:    Low grade fever-URI with rhinovirus  B/l pleural effusions with pulmonary congestion  RLE DVT  Metabolic encephalopathy   Parkinsons disease  HTN (hypertension)  Prostate cancer  Nonrheumatic aortic valve insufficiency  Chronic rhinitis    PLAN:    pulmonary better-pallaitive eval-d/w NP  IV rhocephin/doxycline  CXR worsening pleural effusion-ct surgery for thorocentesis-d/w NP MARCEL  BC x 2, urine c/s  Aspiration precautions  Supportive care  Eliquis  D/W WIFE & DAUGHTER IN DETAIL

## 2023-10-07 PROCEDURE — 99233 SBSQ HOSP IP/OBS HIGH 50: CPT

## 2023-10-07 RX ORDER — POTASSIUM CHLORIDE 20 MEQ
40 PACKET (EA) ORAL ONCE
Refills: 0 | Status: DISCONTINUED | OUTPATIENT
Start: 2023-10-07 | End: 2023-10-07

## 2023-10-07 RX ORDER — POTASSIUM CHLORIDE 20 MEQ
40 PACKET (EA) ORAL ONCE
Refills: 0 | Status: COMPLETED | OUTPATIENT
Start: 2023-10-07 | End: 2023-10-07

## 2023-10-07 RX ADMIN — APIXABAN 5 MILLIGRAM(S): 2.5 TABLET, FILM COATED ORAL at 10:40

## 2023-10-07 RX ADMIN — LISINOPRIL 2.5 MILLIGRAM(S): 2.5 TABLET ORAL at 22:02

## 2023-10-07 RX ADMIN — Medication 81 MILLIGRAM(S): at 10:39

## 2023-10-07 RX ADMIN — Medication 100 MILLIGRAM(S): at 21:59

## 2023-10-07 RX ADMIN — Medication 2 MILLIGRAM(S): at 21:59

## 2023-10-07 RX ADMIN — CARBIDOPA AND LEVODOPA 1.5 TABLET(S): 25; 100 TABLET ORAL at 07:55

## 2023-10-07 RX ADMIN — CARBIDOPA AND LEVODOPA 1.5 TABLET(S): 25; 100 TABLET ORAL at 13:40

## 2023-10-07 RX ADMIN — PANTOPRAZOLE SODIUM 40 MILLIGRAM(S): 20 TABLET, DELAYED RELEASE ORAL at 10:44

## 2023-10-07 RX ADMIN — ATORVASTATIN CALCIUM 20 MILLIGRAM(S): 80 TABLET, FILM COATED ORAL at 21:56

## 2023-10-07 RX ADMIN — Medication 40 MILLIGRAM(S): at 10:43

## 2023-10-07 RX ADMIN — Medication 40 MILLIEQUIVALENT(S): at 10:54

## 2023-10-07 RX ADMIN — DONEPEZIL HYDROCHLORIDE 5 MILLIGRAM(S): 10 TABLET, FILM COATED ORAL at 21:58

## 2023-10-07 RX ADMIN — Medication 100 MILLIGRAM(S): at 10:42

## 2023-10-07 RX ADMIN — APIXABAN 5 MILLIGRAM(S): 2.5 TABLET, FILM COATED ORAL at 21:56

## 2023-10-07 RX ADMIN — QUETIAPINE FUMARATE 25 MILLIGRAM(S): 200 TABLET, FILM COATED ORAL at 21:57

## 2023-10-07 RX ADMIN — SODIUM CHLORIDE 4 MILLILITER(S): 9 INJECTION INTRAMUSCULAR; INTRAVENOUS; SUBCUTANEOUS at 08:08

## 2023-10-07 RX ADMIN — QUETIAPINE FUMARATE 25 MILLIGRAM(S): 200 TABLET, FILM COATED ORAL at 10:39

## 2023-10-07 RX ADMIN — CARBIDOPA AND LEVODOPA 1 TABLET(S): 25; 100 TABLET ORAL at 16:15

## 2023-10-07 RX ADMIN — LISINOPRIL 2.5 MILLIGRAM(S): 2.5 TABLET ORAL at 10:43

## 2023-10-07 RX ADMIN — Medication 25 MILLIGRAM(S): at 10:39

## 2023-10-07 RX ADMIN — CARBIDOPA AND LEVODOPA 1 TABLET(S): 25; 100 TABLET ORAL at 21:55

## 2023-10-07 RX ADMIN — Medication 500 MILLIGRAM(S): at 10:39

## 2023-10-07 RX ADMIN — CEFTRIAXONE 1000 MILLIGRAM(S): 500 INJECTION, POWDER, FOR SOLUTION INTRAMUSCULAR; INTRAVENOUS at 16:20

## 2023-10-07 NOTE — PROGRESS NOTE ADULT - ASSESSMENT
88 y/o Male with h/o BPH, RLE DVT, diverticulosis, GERD, HTN, PEG placement, necrotizing enterocolitis, osteoporosis, Parkinson's dementia, PEG placement, TIA prostate cancer, hearing loss, old CVA was admitted on 9/30 for worsening RLE / LUE swelling. Patient is unable to provide history, son and wife at bedside provides history. States that [facility] with RLE DVT and treated for cellulitis. He was discharged on the day PTA, and this AM family noticed patient's RLE and LUE swelling was worsening. Patient's son states they were told patient had ulcers in intestines, and considered risk of this prior to starting Eliquis. Son states patient has brown stool, no melena / hematochezia. States patient is an aspiration risk, and previously treated for aspiration PNA as well. [facility] also [facility] that [facility] had [facility] in his lungs. In ER he received ceftriaxone.     PROBLEMS:    Low grade fever-URI with rhinovirus  B/l pleural effusions with pulmonary congestion  RLE DVT  Metabolic encephalopathy   Parkinsons disease  HTN (hypertension)  Prostate cancer  Nonrheumatic aortic valve insufficiency  Chronic rhinitis    PLAN:    pulmonary better-pallaitive eval  IV rhocephin/doxycline  CXR worsening pleural effusion-ct surgery for thorocentesis-d/w NP MARCEL  BC x 2, urine c/s  Aspiration precautions  Supportive care  Eliquis  D/W WIFE & DAUGHTER IN DETAIL

## 2023-10-07 NOTE — PROGRESS NOTE ADULT - SUBJECTIVE AND OBJECTIVE BOX
CC: Sepsis   SUBJECTIVE:   HPI: 87-year-old Male with PMHx BPH, diverticulosis, GERD, HTN, necrotizing enterocolitis, PEG tube in place, tube feeding dependent, osteoporosis, Parkinson's dementia, TIA, CVA, prostate CA, sensorineural hearing loss presented to ED for evaluation of worsening RLE/LUE edema. Pt unable to provide history, A&OX1 at baseline. Son and wife at bedside during admission to provide history. State that [facility] with RLE DVT (started on Eliquis) and treated for cellulitis. [facility] and presented to [place] ED 9/30 as family noticed worsening edema. Pts son states they were told the patient had ulcers in his intestines, considered this risk prior to initiation of Eliquis. Son endorses pt having brown stools, denies melena/hematochezia. States pt previously treated for aspiration PNA and identified as high risk. Reports that [facility] stated that the patient has [facility] in his lungs.     S:  10/1: no dyspnea, confused  10/2: laying in bed, denies pain or dyspnea. on room air. wife at bedside and updated on plan.  10/3:   10/4: pt examined laying in bed. denies pain or dyspnea. no acute distress. on room air.   10/5: pt examined laying in bed. oropharyngeal suctioning provided with yellow thick phlegm. no acute distress. on 2 L O2 via NC. no family at bedside during time of assessment.   10/6: more alert and conversive today, denies pain or discomfort. no excess secretions   10/7: Awake, alert, confused at baseline.  Offers no complaints, comfortable appearing.    REVIEW OF SYSTEMS: unable to obtain 2/2 pts mental status, pt denies pain but otherwise unable to specify     Vital Signs Last 24 Hrs  T(C): 37.6 (07 Oct 2023 08:04), Max: 37.6 (07 Oct 2023 08:04)  T(F): 99.6 (07 Oct 2023 08:04), Max: 99.6 (07 Oct 2023 08:04)  HR: 95 (07 Oct 2023 08:04) (83 - 95)  BP: 137/74 (07 Oct 2023 08:04) (137/74 - 146/85)  BP(mean): --  RR: 18 (07 Oct 2023 08:04) (18 - 18)  SpO2: 95% (07 Oct 2023 08:04) (95% - 100%)    Parameters below as of 07 Oct 2023 08:04  Patient On (Oxygen Delivery Method): nasal cannula        PHYSICAL EXAM:    Constitutional: NAD, awake and alert, oriented x 1, cachectic   HEENT: PERR, hard of hearing, MMM  Neck: Soft and supple, No LAD, No JVD  Respiratory: Coughing up yellow thick phlegm, assisted by oropharyngeal suctioning. Breath sounds diminished in all fields, clear anteriorly   Cardiovascular: S1 and S2, regular rate and rhythm, no Murmurs, gallops or rubs  Gastrointestinal: Bowel Sounds present, soft, nontender, nondistended, no guarding, no rebound  Extremities: + 2 pitting edema to RLE  Vascular: 2+ peripheral pulses  Neurological: A/O x 1   Musculoskeletal: unable to assess 2/2 patient unable to follow commands  Skin: shiny BL LE with intermittent ecchymosis, cellulitis improved       MEDICATIONS  (STANDING):  apixaban 5 milliGRAM(s) Enteral Tube every 12 hours  ascorbic acid 500 milliGRAM(s) Oral daily  aspirin  chewable 81 milliGRAM(s) Oral daily  atorvastatin 20 milliGRAM(s) Oral at bedtime  carbidopa/levodopa  25/100 1 Tablet(s) Oral <User Schedule>  carbidopa/levodopa  25/100 1.5 Tablet(s) Oral <User Schedule>  cefTRIAXone Injectable. 1000 milliGRAM(s) IV Push every 24 hours  donepezil 5 milliGRAM(s) Oral at bedtime  doxazosin 2 milliGRAM(s) Oral at bedtime  doxycycline monohydrate Capsule 100 milliGRAM(s) Oral every 12 hours  furosemide   Injectable 40 milliGRAM(s) IV Push daily  lisinopril 2.5 milliGRAM(s) Oral every 12 hours  metoprolol tartrate 25 milliGRAM(s) Oral daily  pantoprazole    Tablet 40 milliGRAM(s) Oral before breakfast  QUEtiapine 25 milliGRAM(s) Oral two times a day  sodium chloride 3%  Inhalation 4 milliLiter(s) Inhalation every 12 hours    MEDICATIONS  (PRN):                                8.7    5.39  )-----------( 107      ( 06 Oct 2023 11:34 )             26.6     10-06    140  |  109<H>  |  31<H>  ----------------------------<  150<H>  3.3<L>   |  28  |  0.60    Ca    7.6<L>      06 Oct 2023 11:34      CAPILLARY BLOOD GLUCOSE            Urinalysis Basic - ( 06 Oct 2023 11:34 )    Color: x / Appearance: x / SG: x / pH: x  Gluc: 150 mg/dL / Ketone: x  / Bili: x / Urobili: x   Blood: x / Protein: x / Nitrite: x   Leuk Esterase: x / RBC: x / WBC x   Sq Epi: x / Non Sq Epi: x / Bacteria: x        Assessment and Plan:  87-year-old Male with PMhx BPH, diverticulosis, GERD, HTN, necrotizing enterocolitis, PEG tube in place, tube feeding dependent, osteoporosis, Parkinson's dementia, TIA, CVA, prostate CA, sensorineural hearing loss admitted through ED for worsening RLE, LUE edema. Patient A&OX1 at baseline, history provided by family. Recently d/c'd from [facility] 9/29 s/p dx of RLE DVT (started on Eliquis), cellulitis, and "fluid on lungs".     #Acute respiratory failure: Due to Acute on chronic HFpEF with Pulmonary Edema and Effusions complicated by moderate-severe AS and aspiration pneumonia / enterorhinovirus URI:  - chest xray: effusions   - c/w IV lasix 40mg qd  - TTE: moderate-severe AS;   - cardiology recs --> c/w ACE and metoprolol  - s/p IV zosyn for probable gram neg belkis bacteria   - thoracic surgery consult appreciated, no indication for thoracentesis due to clinical improvement.   - cont hypertonic saline nebulizer tx to assist with secretion clearing   - supportive care    # RLE DVT:  - c/w apixaban     # Cellulitis RLE: Improving   - c/w ceftriaxone qd and doxycycline 100 mg PO b.i.d. per ID x 7 day course   - elevate leg      #Pancytopenia likely 2/2 cellulitis and URI:  IMPROVING       # Severe Protein Calorie Malnutrition:  - c/w PEG tube feedings due to dysphagia   - supplements per nutrition recommendations    - aspiration precautions, sit pt upright during feedings      # Normocytic Anemia: Stable  - heme consult appreciated likely d/t bone marrow suppression 2/2 ongoing infections, antibiotic use, recent hospitalization possibly dilutional/d/t nutritional deficiency      # GERD, HTN, Parkinson's Dementia  - c/w home meds: pantoprazole, lisinopril, metoprolol, seroquel, carvidopa-levodopa,     # Unchanged L2 compression fx/rib and R clavicle fx/R renal cyst incidental findings on CT  - f/u outpatient     # DVT Prophylaxis  - c/w ASA, apixaban     Code status:  - DNR/DNI  - high risk for re-admission despite efforts. no further limitations set on care at present.     Dispo:  - updated son and wife via telephone 10/5.   - d/c planning once medically ready

## 2023-10-07 NOTE — PROGRESS NOTE ADULT - SUBJECTIVE AND OBJECTIVE BOX
Subjective:    pat better, sitting in bed, no respiratory distress.    Home Medications:  aspirin 81 mg oral tablet, chewable: 1 tab(s) by PEG tube once a day (30 Sep 2023 18:38)  atorvastatin 20 mg oral tablet: 1 tab(s) by PEG tube once a day (at bedtime) (30 Sep 2023 18:38)  carbidopa-levodopa 25 mg-100 mg oral tablet: 1.5 tab(s) by gastrostomy tube 2 times a day at 8 AM and 12 PM (30 Sep 2023 18:34)  carbidopa-levodopa 25 mg-100 mg oral tablet: 1 tab(s) by gastrostomy tube 2 times a day at 4 PM and 8 PM (30 Sep 2023 18:34)  donepezil 5 mg oral tablet: 1 tab(s) by PEG tube once a day (at bedtime) (30 Sep 2023 18:38)  doxazosin 2 mg oral tablet: 1 tab(s) by PEG tube once a day (at bedtime) (30 Sep 2023 18:34)  Eliquis 5 mg oral tablet: 1 tab(s) orally 2 times a day (30 Sep 2023 18:38)  lansoprazole 30 mg oral tablet, disintegratin cap(s) by PEG tube once a day (30 Sep 2023 18:38)  lisinopril 2.5 mg oral tablet: 1 tab(s) by PEG tube 2 times a day (30 Sep 2023 18:38)  metoprolol tartrate 50 mg oral tablet: 0.5 tab(s) by PEG tube once a day (30 Sep 2023 18:38)  QUEtiapine 25 mg oral tablet: 1 tab(s) orally 2 times a day (30 Sep 2023 21:50)  Vitamin C 500 mg/5 mL oral liquid: 5 milliliter(s) orally once a day (30 Sep 2023 21:50)    MEDICATIONS  (STANDING):  apixaban 5 milliGRAM(s) Enteral Tube every 12 hours  ascorbic acid 500 milliGRAM(s) Oral daily  aspirin  chewable 81 milliGRAM(s) Oral daily  atorvastatin 20 milliGRAM(s) Oral at bedtime  carbidopa/levodopa  25/100 1 Tablet(s) Oral <User Schedule>  carbidopa/levodopa  25/100 1.5 Tablet(s) Oral <User Schedule>  cefTRIAXone Injectable. 1000 milliGRAM(s) IV Push every 24 hours  donepezil 5 milliGRAM(s) Oral at bedtime  doxazosin 2 milliGRAM(s) Oral at bedtime  doxycycline monohydrate Capsule 100 milliGRAM(s) Oral every 12 hours  furosemide   Injectable 40 milliGRAM(s) IV Push daily  lisinopril 2.5 milliGRAM(s) Oral every 12 hours  metoprolol tartrate 25 milliGRAM(s) Oral daily  pantoprazole    Tablet 40 milliGRAM(s) Oral before breakfast  QUEtiapine 25 milliGRAM(s) Oral two times a day  sodium chloride 3%  Inhalation 4 milliLiter(s) Inhalation every 12 hours    MEDICATIONS  (PRN):      Allergies    Originally Entered as [Unknown] reaction to [fresh fruits] (Unknown)  apple (Anaphylaxis)  raw, fresh fruits- [place] family (apple, pear, apricot, peach, fig); processed/cooked is ok. (Anaphylaxis)  No Known Drug Allergies    Intolerances        Vital Signs Last 24 Hrs  T(C): 37.6 (07 Oct 2023 08:04), Max: 37.6 (07 Oct 2023 08:04)  T(F): 99.6 (07 Oct 2023 08:04), Max: 99.6 (07 Oct 2023 08:04)  HR: 95 (07 Oct 2023 08:04) (83 - 95)  BP: 137/74 (07 Oct 2023 08:04) (137/74 - 146/85)  BP(mean): --  RR: 18 (07 Oct 2023 08:04) (18 - 18)  SpO2: 95% (07 Oct 2023 08:04) (95% - 100%)    Parameters below as of 07 Oct 2023 08:04  Patient On (Oxygen Delivery Method): nasal cannula          PHYSICAL EXAMINATION:    NECK:  Supple. No lymphadenopathy. Jugular venous pressure not elevated. Carotids equal.   HEART:   The cardiac impulse has a normal quality. Reg., Nl S1 and S2.  There are no murmurs, rubs or gallops noted  CHEST:  Chest crackles to auscultation. Normal respiratory effort.  ABDOMEN:  Soft and nontender.   EXTREMITIES:  There is no edema.       LABS:                        8.7    5.39  )-----------( 107      ( 06 Oct 2023 11:34 )             26.6     10-06    140  |  109<H>  |  31<H>  ----------------------------<  150<H>  3.3<L>   |  28  |  0.60    Ca    7.6<L>      06 Oct 2023 11:34        Urinalysis Basic - ( 06 Oct 2023 11:34 )    Color: x / Appearance: x / SG: x / pH: x  Gluc: 150 mg/dL / Ketone: x  / Bili: x / Urobili: x   Blood: x / Protein: x / Nitrite: x   Leuk Esterase: x / RBC: x / WBC x   Sq Epi: x / Non Sq Epi: x / Bacteria: x

## 2023-10-08 LAB
BUN SERPL-MCNC: 30 MG/DL — HIGH (ref 7–23)
CALCIUM SERPL-MCNC: 7.8 MG/DL — LOW (ref 8.5–10.1)
CHLORIDE SERPL-SCNC: 111 MMOL/L — HIGH (ref 96–108)
CO2 SERPL-SCNC: 31 MMOL/L — SIGNIFICANT CHANGE UP (ref 22–31)
CREAT SERPL-MCNC: 0.48 MG/DL — LOW (ref 0.5–1.3)
EGFR: 100 ML/MIN/1.73M2 — SIGNIFICANT CHANGE UP
GLUCOSE SERPL-MCNC: 117 MG/DL — HIGH (ref 70–99)
HCT VFR BLD CALC: 27.7 % — LOW (ref 39–50)
HGB BLD-MCNC: 8.8 G/DL — LOW (ref 13–17)
MCHC RBC-ENTMCNC: 28.2 PG — SIGNIFICANT CHANGE UP (ref 27–34)
MCHC RBC-ENTMCNC: 31.8 GM/DL — LOW (ref 32–36)
MCV RBC AUTO: 88.8 FL — SIGNIFICANT CHANGE UP (ref 80–100)
NT-PROBNP SERPL-SCNC: 360 PG/ML — SIGNIFICANT CHANGE UP (ref 0–450)
PLATELET # BLD AUTO: 104 K/UL — LOW (ref 150–400)
POTASSIUM SERPL-MCNC: 4 MMOL/L — SIGNIFICANT CHANGE UP (ref 3.5–5.3)
POTASSIUM SERPL-SCNC: 4 MMOL/L — SIGNIFICANT CHANGE UP (ref 3.5–5.3)
RBC # BLD: 3.12 M/UL — LOW (ref 4.2–5.8)
RBC # FLD: 16.2 % — HIGH (ref 10.3–14.5)
SODIUM SERPL-SCNC: 142 MMOL/L — SIGNIFICANT CHANGE UP (ref 135–145)
WBC # BLD: 4.39 K/UL — SIGNIFICANT CHANGE UP (ref 3.8–10.5)
WBC # FLD AUTO: 4.39 K/UL — SIGNIFICANT CHANGE UP (ref 3.8–10.5)

## 2023-10-08 PROCEDURE — 99232 SBSQ HOSP IP/OBS MODERATE 35: CPT

## 2023-10-08 PROCEDURE — 71045 X-RAY EXAM CHEST 1 VIEW: CPT | Mod: 26

## 2023-10-08 RX ORDER — FUROSEMIDE 40 MG
40 TABLET ORAL DAILY
Refills: 0 | Status: DISCONTINUED | OUTPATIENT
Start: 2023-10-08 | End: 2023-10-14

## 2023-10-08 RX ADMIN — LISINOPRIL 2.5 MILLIGRAM(S): 2.5 TABLET ORAL at 08:30

## 2023-10-08 RX ADMIN — QUETIAPINE FUMARATE 25 MILLIGRAM(S): 200 TABLET, FILM COATED ORAL at 08:28

## 2023-10-08 RX ADMIN — QUETIAPINE FUMARATE 25 MILLIGRAM(S): 200 TABLET, FILM COATED ORAL at 21:08

## 2023-10-08 RX ADMIN — CARBIDOPA AND LEVODOPA 1 TABLET(S): 25; 100 TABLET ORAL at 21:08

## 2023-10-08 RX ADMIN — APIXABAN 5 MILLIGRAM(S): 2.5 TABLET, FILM COATED ORAL at 21:07

## 2023-10-08 RX ADMIN — CARBIDOPA AND LEVODOPA 1 TABLET(S): 25; 100 TABLET ORAL at 16:48

## 2023-10-08 RX ADMIN — LISINOPRIL 2.5 MILLIGRAM(S): 2.5 TABLET ORAL at 21:10

## 2023-10-08 RX ADMIN — ATORVASTATIN CALCIUM 20 MILLIGRAM(S): 80 TABLET, FILM COATED ORAL at 21:09

## 2023-10-08 RX ADMIN — SODIUM CHLORIDE 4 MILLILITER(S): 9 INJECTION INTRAMUSCULAR; INTRAVENOUS; SUBCUTANEOUS at 08:10

## 2023-10-08 RX ADMIN — CARBIDOPA AND LEVODOPA 1.5 TABLET(S): 25; 100 TABLET ORAL at 08:28

## 2023-10-08 RX ADMIN — DONEPEZIL HYDROCHLORIDE 5 MILLIGRAM(S): 10 TABLET, FILM COATED ORAL at 21:11

## 2023-10-08 RX ADMIN — APIXABAN 5 MILLIGRAM(S): 2.5 TABLET, FILM COATED ORAL at 08:28

## 2023-10-08 RX ADMIN — CARBIDOPA AND LEVODOPA 1.5 TABLET(S): 25; 100 TABLET ORAL at 13:53

## 2023-10-08 RX ADMIN — PANTOPRAZOLE SODIUM 40 MILLIGRAM(S): 20 TABLET, DELAYED RELEASE ORAL at 08:29

## 2023-10-08 RX ADMIN — Medication 500 MILLIGRAM(S): at 08:29

## 2023-10-08 RX ADMIN — Medication 2 MILLIGRAM(S): at 21:10

## 2023-10-08 RX ADMIN — Medication 25 MILLIGRAM(S): at 08:28

## 2023-10-08 RX ADMIN — Medication 40 MILLIGRAM(S): at 08:27

## 2023-10-08 RX ADMIN — Medication 40 MILLIGRAM(S): at 13:54

## 2023-10-08 RX ADMIN — Medication 81 MILLIGRAM(S): at 08:28

## 2023-10-08 NOTE — PROGRESS NOTE ADULT - SUBJECTIVE AND OBJECTIVE BOX
CC: Sepsis   SUBJECTIVE:   HPI: 87-year-old Male with PMHx BPH, diverticulosis, GERD, HTN, necrotizing enterocolitis, PEG tube in place, tube feeding dependent, osteoporosis, Parkinson's dementia, TIA, CVA, prostate CA, sensorineural hearing loss presented to ED for evaluation of worsening RLE/LUE edema. Pt unable to provide history, A&OX1 at baseline. Son and wife at bedside during admission to provide history. State that [facility] with RLE DVT (started on Eliquis) and treated for cellulitis. [facility] and presented to [place] ED 9/30 as family noticed worsening edema. Pts son states they were told the patient had ulcers in his intestines, considered this risk prior to initiation of Eliquis. Son endorses pt having brown stools, denies melena/hematochezia. States pt previously treated for aspiration PNA and identified as high risk. Reports that [facility] stated that [facility] has [facility] in [facility].     S:  10/1: no dyspnea, confused  10/2: laying in bed, denies pain or dyspnea. on room air. wife at bedside and updated on plan.  10/3:   10/4: pt examined laying in bed. denies pain or dyspnea. no acute distress. on room air.   10/5: pt examined laying in bed. oropharyngeal suctioning provided with yellow thick phlegm. no acute distress. on 2 L O2 via NC. no family at bedside during time of assessment.   10/6: more alert and conversive today, denies pain or discomfort. no excess secretions   10/7: Awake, alert, confused at baseline.  Offers no complaints, comfortable appearing.  10/8: Lying in bed, weak, lethargic appearing but responsive to commands.  Pt is confused but comfortable appearing.    REVIEW OF SYSTEMS: unable to obtain 2/2 pts mental status, pt denies pain but otherwise unable to specify     Vital Signs Last 24 Hrs  T(C): 36.4 (08 Oct 2023 08:54), Max: 36.9 (07 Oct 2023 21:52)  T(F): 97.6 (08 Oct 2023 08:54), Max: 98.5 (07 Oct 2023 21:52)  HR: 74 (08 Oct 2023 08:54) (69 - 74)  BP: 135/65 (08 Oct 2023 08:54) (121/65 - 139/93)  BP(mean): --  RR: 18 (08 Oct 2023 08:54) (18 - 18)  SpO2: 100% (08 Oct 2023 08:54) (100% - 100%)    Parameters below as of 08 Oct 2023 08:54  Patient On (Oxygen Delivery Method): nasal cannula  O2 Flow (L/min): 2        PHYSICAL EXAM:    Constitutional: NAD, awake and alert, oriented x 1, cachectic   HEENT: PERR, hard of hearing, MMM  Neck: Soft and supple, No LAD, No JVD  Respiratory: Coughing up yellow thick phlegm, assisted by oropharyngeal suctioning. Breath sounds diminished in all fields, clear anteriorly   Cardiovascular: S1 and S2, regular rate and rhythm, no Murmurs, gallops or rubs  Gastrointestinal: Bowel Sounds present, soft, nontender, nondistended, no guarding, no rebound  Extremities: + 2 pitting edema to RLE  Vascular: 2+ peripheral pulses  Neurological: A/O x 1   Musculoskeletal: unable to assess 2/2 patient unable to follow commands  Skin: shiny BL LE with intermittent ecchymosis, cellulitis improved       MEDICATIONS  (STANDING):  apixaban 5 milliGRAM(s) Enteral Tube every 12 hours  ascorbic acid 500 milliGRAM(s) Oral daily  aspirin  chewable 81 milliGRAM(s) Oral daily  atorvastatin 20 milliGRAM(s) Oral at bedtime  carbidopa/levodopa  25/100 1 Tablet(s) Oral <User Schedule>  carbidopa/levodopa  25/100 1.5 Tablet(s) Oral <User Schedule>  donepezil 5 milliGRAM(s) Oral at bedtime  doxazosin 2 milliGRAM(s) Oral at bedtime  furosemide    Tablet 40 milliGRAM(s) Oral daily  lisinopril 2.5 milliGRAM(s) Oral every 12 hours  metoprolol tartrate 25 milliGRAM(s) Oral daily  pantoprazole    Tablet 40 milliGRAM(s) Oral before breakfast  QUEtiapine 25 milliGRAM(s) Oral two times a day  sodium chloride 3%  Inhalation 4 milliLiter(s) Inhalation every 12 hours    MEDICATIONS  (PRN):                                8.8    4.39  )-----------( 104      ( 08 Oct 2023 08:04 )             27.7     10-08    142  |  111<H>  |  30<H>  ----------------------------<  117<H>  4.0   |  31  |  0.48<L>    Ca    7.8<L>      08 Oct 2023 08:04      CAPILLARY BLOOD GLUCOSE            Urinalysis Basic - ( 08 Oct 2023 08:04 )    Color: x / Appearance: x / SG: x / pH: x  Gluc: 117 mg/dL / Ketone: x  / Bili: x / Urobili: x   Blood: x / Protein: x / Nitrite: x   Leuk Esterase: x / RBC: x / WBC x   Sq Epi: x / Non Sq Epi: x / Bacteria: x        Assessment and Plan:  87-year-old Male with PMhx BPH, diverticulosis, GERD, HTN, necrotizing enterocolitis, PEG tube in place, tube feeding dependent, osteoporosis, Parkinson's dementia, TIA, CVA, prostate CA, sensorineural hearing loss admitted through ED for worsening RLE, LUE edema. Patient A&OX1 at baseline, history provided by family. Recently d/c'd from [facility] 9/29 s/p dx of RLE DVT (started on Eliquis), cellulitis, and "fluid on lungs".     #Acute respiratory failure: Due to Acute on chronic HFpEF with Pulmonary Edema and Effusions complicated by moderate-severe AS and aspiration pneumonia / enterorhinovirus URI:  - repeat chest xray: effusions   - BNP normalized  - IV lasix 40mg qd --> change to oral daily  - TTE: moderate-severe AS;   - cardiology recs --> c/w ACE and metoprolol  - s/p IV zosyn for probable gram neg belkis bacteria   - thoracic surgery consult appreciated, no indication for thoracentesis due to clinical improvement.   - cont hypertonic saline nebulizer tx to assist with secretion clearing   - supportive care    # RLE DVT:  - c/w apixaban     # Cellulitis RLE: RESOLVING  - completed ceftriaxone qd and doxycycline 100 mg PO b.i.d. per ID x 7 day course   - elevate leg      #Pancytopenia likely 2/2 cellulitis and URI:  IMPROVING       # Severe Protein Calorie Malnutrition:  - c/w PEG tube feedings due to dysphagia   - supplements per nutrition recommendations    - aspiration precautions, sit pt upright during feedings      # Normocytic Anemia: Stable  - heme consult appreciated likely d/t bone marrow suppression 2/2 ongoing infections, antibiotic use, recent hospitalization possibly dilutional/d/t nutritional deficiency      # GERD, HTN, Parkinson's Dementia  - c/w home meds: pantoprazole, lisinopril, metoprolol, seroquel, carvidopa-levodopa,     # Unchanged L2 compression fx/rib and R clavicle fx/R renal cyst incidental findings on CT  - f/u outpatient     # DVT Prophylaxis  - c/w ASA, apixaban     Code status:  - DNR/DNI  - high risk for re-admission despite efforts. no further limitations set on care at present.     Dispo:  - updated son and wife via telephone 10/5.   - d/c planning once medically ready, likely tomorrow home with home hospice?

## 2023-10-08 NOTE — PROGRESS NOTE ADULT - ASSESSMENT
86 y/o Male with h/o BPH, RLE DVT, diverticulosis, GERD, HTN, PEG placement, necrotizing enterocolitis, osteoporosis, Parkinson's dementia, PEG placement, TIA prostate cancer, hearing loss, old CVA was admitted on 9/30 for worsening RLE / LUE swelling. Patient is unable to provide history, son and wife at bedside provides history. States that [facility] with [facility] D[facility] and treated for cellulitis. He was discharged on the day PTA, and this AM family noticed patient's RLE and LUE swelling was worsening. Patient's son states they were told patient had ulcers in intestines, and considered risk of this prior to starting Eliquis. Son states patient has brown stool, no melena / hematochezia. States patient is an aspiration risk, and previously treated for aspiration PNA as well. [facility] was also told at [facility] that [facility] had [facility] in his lungs. In ER he received ceftriaxone.     PROBLEMS:    Low grade fever-URI with rhinovirus  B/l pleural effusions with pulmonary congestion  RLE DVT  Metabolic encephalopathy   Parkinsons disease  HTN (hypertension)  Prostate cancer  Nonrheumatic aortic valve insufficiency  Chronic rhinitis    PLAN:    pulmonary better-pallaitive eval  Off abx  CXR  pleural effusion improved  Ct surgery no thorocentesis  Aspiration precautions  Supportive care  DVT-Eliquis

## 2023-10-08 NOTE — PROGRESS NOTE ADULT - SUBJECTIVE AND OBJECTIVE BOX
Subjective:    pat better, no new complaint, lying in bed. CXR improving effusion.    Home Medications:  aspirin 81 mg oral tablet, chewable: 1 tab(s) by PEG tube once a day (30 Sep 2023 18:38)  atorvastatin 20 mg oral tablet: 1 tab(s) by PEG tube once a day (at bedtime) (30 Sep 2023 18:38)  carbidopa-levodopa 25 mg-100 mg oral tablet: 1.5 tab(s) by gastrostomy tube 2 times a day at 8 AM and 12 PM (30 Sep 2023 18:34)  carbidopa-levodopa 25 mg-100 mg oral tablet: 1 tab(s) by gastrostomy tube 2 times a day at 4 PM and 8 PM (30 Sep 2023 18:34)  donepezil 5 mg oral tablet: 1 tab(s) by PEG tube once a day (at bedtime) (30 Sep 2023 18:38)  doxazosin 2 mg oral tablet: 1 tab(s) by PEG tube once a day (at bedtime) (30 Sep 2023 18:34)  Eliquis 5 mg oral tablet: 1 tab(s) orally 2 times a day (30 Sep 2023 18:38)  lansoprazole 30 mg oral tablet, disintegratin cap(s) by PEG tube once a day (30 Sep 2023 18:38)  lisinopril 2.5 mg oral tablet: 1 tab(s) by PEG tube 2 times a day (30 Sep 2023 18:38)  metoprolol tartrate 50 mg oral tablet: 0.5 tab(s) by PEG tube once a day (30 Sep 2023 18:38)  QUEtiapine 25 mg oral tablet: 1 tab(s) orally 2 times a day (30 Sep 2023 21:50)  Vitamin C 500 mg/5 mL oral liquid: 5 milliliter(s) orally once a day (30 Sep 2023 21:50)    MEDICATIONS  (STANDING):  apixaban 5 milliGRAM(s) Enteral Tube every 12 hours  ascorbic acid 500 milliGRAM(s) Oral daily  aspirin  chewable 81 milliGRAM(s) Oral daily  atorvastatin 20 milliGRAM(s) Oral at bedtime  carbidopa/levodopa  25/100 1 Tablet(s) Oral <User Schedule>  carbidopa/levodopa  25/100 1.5 Tablet(s) Oral <User Schedule>  donepezil 5 milliGRAM(s) Oral at bedtime  doxazosin 2 milliGRAM(s) Oral at bedtime  furosemide    Tablet 40 milliGRAM(s) Oral daily  lisinopril 2.5 milliGRAM(s) Oral every 12 hours  metoprolol tartrate 25 milliGRAM(s) Oral daily  pantoprazole    Tablet 40 milliGRAM(s) Oral before breakfast  QUEtiapine 25 milliGRAM(s) Oral two times a day  sodium chloride 3%  Inhalation 4 milliLiter(s) Inhalation every 12 hours    MEDICATIONS  (PRN):      Allergies    Originally Entered as [Unknown] reaction to [fresh fruits] (Unknown)  apple (Anaphylaxis)  raw, fresh fruits- reynal[employer] family (apple, pear, apricot, peach, fig); processed/cooked is ok. (Anaphylaxis)  No Known Drug Allergies    Intolerances        Vital Signs Last 24 Hrs  T(C): 36.4 (08 Oct 2023 08:54), Max: 36.9 (07 Oct 2023 21:52)  T(F): 97.6 (08 Oct 2023 08:54), Max: 98.5 (07 Oct 2023 21:52)  HR: 74 (08 Oct 2023 08:54) (69 - 74)  BP: 135/65 (08 Oct 2023 08:54) (121/65 - 139/93)  BP(mean): --  RR: 18 (08 Oct 2023 08:54) (18 - 18)  SpO2: 100% (08 Oct 2023 08:54) (100% - 100%)    Parameters below as of 08 Oct 2023 08:54  Patient On (Oxygen Delivery Method): nasal cannula  O2 Flow (L/min): 2        PHYSICAL EXAMINATION:    NECK:  Supple. No lymphadenopathy. Jugular venous pressure not elevated. Carotids equal.   HEART:   The cardiac impulse has a normal quality. Reg., Nl S1 and S2.  There are no murmurs, rubs or gallops noted  CHEST:  Chest crackles to auscultation. Normal respiratory effort.  ABDOMEN:  Soft and nontender.   EXTREMITIES:  There is no edema.       LABS:                        8.8    4.39  )-----------( 104      ( 08 Oct 2023 08:04 )             27.7     10-08    142  |  111<H>  |  30<H>  ----------------------------<  117<H>  4.0   |  31  |  0.48<L>    Ca    7.8<L>      08 Oct 2023 08:04        Urinalysis Basic - ( 08 Oct 2023 08:04 )    Color: x / Appearance: x / SG: x / pH: x  Gluc: 117 mg/dL / Ketone: x  / Bili: x / Urobili: x   Blood: x / Protein: x / Nitrite: x   Leuk Esterase: x / RBC: x / WBC x   Sq Epi: x / Non Sq Epi: x / Bacteria: x

## 2023-10-09 PROCEDURE — 99232 SBSQ HOSP IP/OBS MODERATE 35: CPT

## 2023-10-09 PROCEDURE — 99231 SBSQ HOSP IP/OBS SF/LOW 25: CPT

## 2023-10-09 RX ADMIN — Medication 40 MILLIGRAM(S): at 09:58

## 2023-10-09 RX ADMIN — LISINOPRIL 2.5 MILLIGRAM(S): 2.5 TABLET ORAL at 21:34

## 2023-10-09 RX ADMIN — LISINOPRIL 2.5 MILLIGRAM(S): 2.5 TABLET ORAL at 09:58

## 2023-10-09 RX ADMIN — DONEPEZIL HYDROCHLORIDE 5 MILLIGRAM(S): 10 TABLET, FILM COATED ORAL at 21:26

## 2023-10-09 RX ADMIN — Medication 81 MILLIGRAM(S): at 09:58

## 2023-10-09 RX ADMIN — Medication 25 MILLIGRAM(S): at 09:58

## 2023-10-09 RX ADMIN — ATORVASTATIN CALCIUM 20 MILLIGRAM(S): 80 TABLET, FILM COATED ORAL at 21:26

## 2023-10-09 RX ADMIN — Medication 2 MILLIGRAM(S): at 21:29

## 2023-10-09 RX ADMIN — CARBIDOPA AND LEVODOPA 1.5 TABLET(S): 25; 100 TABLET ORAL at 12:24

## 2023-10-09 RX ADMIN — QUETIAPINE FUMARATE 25 MILLIGRAM(S): 200 TABLET, FILM COATED ORAL at 21:28

## 2023-10-09 RX ADMIN — APIXABAN 5 MILLIGRAM(S): 2.5 TABLET, FILM COATED ORAL at 09:58

## 2023-10-09 RX ADMIN — Medication 500 MILLIGRAM(S): at 09:58

## 2023-10-09 RX ADMIN — PANTOPRAZOLE SODIUM 40 MILLIGRAM(S): 20 TABLET, DELAYED RELEASE ORAL at 09:57

## 2023-10-09 RX ADMIN — QUETIAPINE FUMARATE 25 MILLIGRAM(S): 200 TABLET, FILM COATED ORAL at 09:58

## 2023-10-09 RX ADMIN — APIXABAN 5 MILLIGRAM(S): 2.5 TABLET, FILM COATED ORAL at 21:26

## 2023-10-09 RX ADMIN — CARBIDOPA AND LEVODOPA 1 TABLET(S): 25; 100 TABLET ORAL at 16:04

## 2023-10-09 RX ADMIN — CARBIDOPA AND LEVODOPA 1 TABLET(S): 25; 100 TABLET ORAL at 21:26

## 2023-10-09 RX ADMIN — CARBIDOPA AND LEVODOPA 1.5 TABLET(S): 25; 100 TABLET ORAL at 09:57

## 2023-10-09 NOTE — PROGRESS NOTE ADULT - SUBJECTIVE AND OBJECTIVE BOX
CC: Sepsis   SUBJECTIVE:   HPI: 87-year-old Male with PMHx BPH, diverticulosis, GERD, HTN, necrotizing enterocolitis, PEG tube in place, tube feeding dependent, osteoporosis, Parkinson's dementia, TIA, CVA, prostate CA, sensorineural hearing loss presented to ED for evaluation of worsening RLE/LUE edema. Pt unable to provide history, A&OX1 at baseline. Son and wife at bedside during admission to provide history. [facility] that [facility] with RLE DVT (started on Eliquis) and treated for cellulitis. [facility] and presented to [place] ED 9/30 as family noticed worsening edema. Pts son states they were told the patient had ulcers in his intestines, considered this risk prior to initiation of Eliquis. Son endorses pt having brown stools, denies melena/hematochezia. States pt previously treated for aspiration PNA and identified as high risk. [facility].     S:  10/1: no dyspnea, confused  10/2: laying in bed, denies pain or dyspnea. on room air. wife at bedside and updated on plan.  10/3:   10/4: pt examined laying in bed. denies pain or dyspnea. no acute distress. on room air.   10/5: pt examined laying in bed. oropharyngeal suctioning provided with yellow thick phlegm. no acute distress. on 2 L O2 via NC. no family at bedside during time of assessment.   10/6: more alert and conversive today, denies pain or discomfort. no excess secretions   10/7: Awake, alert, confused at baseline.  Offers no complaints, comfortable appearing.  10/8: Lying in bed, weak, lethargic appearing but responsive to commands.  Pt is confused but comfortable appearing.  10/9: more alert and conversive today. following commands. appearing comfortable       REVIEW OF SYSTEMS: unable to obtain 2/2 pts mental status, pt denies pain but otherwise unable to specify     Vital Signs Last 24 Hrs  T(C): 36.4 (09 Oct 2023 08:43), Max: 36.9 (08 Oct 2023 21:00)  T(F): 97.6 (09 Oct 2023 08:43), Max: 98.5 (08 Oct 2023 21:00)  HR: 72 (09 Oct 2023 08:43) (72 - 76)  BP: 134/72 (09 Oct 2023 08:43) (111/65 - 134/72)  BP(mean): --  RR: 18 (09 Oct 2023 08:43) (18 - 18)  SpO2: 100% (09 Oct 2023 08:43) (100% - 100%)    Parameters below as of 09 Oct 2023 08:43  Patient On (Oxygen Delivery Method): nasal cannula  O2 Flow (L/min): 2      PHYSICAL EXAM:    Constitutional: NAD, awake and alert, oriented x 1, cachectic   HEENT: PERR, hard of hearing, MMM  Neck: Soft and supple, No LAD, No JVD  Respiratory: Coughing up yellow thick phlegm, assisted by oropharyngeal suctioning. Breath sounds diminished in all fields, clear anteriorly   Cardiovascular: S1 and S2, regular rate and rhythm, no Murmurs, gallops or rubs  Gastrointestinal: Bowel Sounds present, soft, nontender, nondistended, no guarding, no rebound  Extremities: + 2 pitting edema to RLE  Vascular: 2+ peripheral pulses  Neurological: A/O x 1   Musculoskeletal: unable to assess 2/2 patient unable to follow commands  Skin: shiny BL LE with intermittent ecchymosis, cellulitis improved       MEDICATIONS  (STANDING):  apixaban 5 milliGRAM(s) Enteral Tube every 12 hours  ascorbic acid 500 milliGRAM(s) Oral daily  aspirin  chewable 81 milliGRAM(s) Oral daily  atorvastatin 20 milliGRAM(s) Oral at bedtime  carbidopa/levodopa  25/100 1 Tablet(s) Oral <User Schedule>  carbidopa/levodopa  25/100 1.5 Tablet(s) Oral <User Schedule>  donepezil 5 milliGRAM(s) Oral at bedtime  doxazosin 2 milliGRAM(s) Oral at bedtime  furosemide    Tablet 40 milliGRAM(s) Oral daily  lisinopril 2.5 milliGRAM(s) Oral every 12 hours  metoprolol tartrate 25 milliGRAM(s) Oral daily  pantoprazole    Tablet 40 milliGRAM(s) Oral before breakfast  QUEtiapine 25 milliGRAM(s) Oral two times a day  sodium chloride 3%  Inhalation 4 milliLiter(s) Inhalation every 12 hours    MEDICATIONS  (PRN):                                8.8    4.39  )-----------( 104      ( 08 Oct 2023 08:04 )             27.7     10-08    142  |  111<H>  |  30<H>  ----------------------------<  117<H>  4.0   |  31  |  0.48<L>    Ca    7.8<L>      08 Oct 2023 08:04      CAPILLARY BLOOD GLUCOSE            Urinalysis Basic - ( 08 Oct 2023 08:04 )    Color: x / Appearance: x / SG: x / pH: x  Gluc: 117 mg/dL / Ketone: x  / Bili: x / Urobili: x   Blood: x / Protein: x / Nitrite: x   Leuk Esterase: x / RBC: x / WBC x   Sq Epi: x / Non Sq Epi: x / Bacteria: x        Assessment and Plan:  87-year-old Male with PMhx BPH, diverticulosis, GERD, HTN, necrotizing enterocolitis, PEG tube in place, tube feeding dependent, osteoporosis, Parkinson's dementia, TIA, CVA, prostate CA, sensorineural hearing loss admitted through ED for worsening RLE, LUE edema. Patient A&OX1 at baseline, history provided by family. Recently d/c'd from [facility] 9/29 s/p dx of RLE DVT (started on Eliquis), cellulitis, and "fluid on lungs".     #Acute respiratory failure: Due to Acute on chronic HFpEF with Pulmonary Edema and Effusions complicated by moderate-severe AS and aspiration pneumonia / enterorhinovirus URI:  - repeat chest xray: effusions   - BNP normalized  - IV lasix 40mg qd --> change to oral daily tolerating well   - TTE: moderate-severe AS;   - cardiology recs --> c/w ACE and metoprolol  - s/p IV zosyn for probable gram neg belkis bacteria   - thoracic surgery consult appreciated, no indication for thoracentesis due to clinical improvement.   - cont hypertonic saline nebulizer tx to assist with secretion clearing   - supportive care    # RLE DVT:  - c/w apixaban     # Cellulitis RLE: RESOLVING  - completed ceftriaxone qd and doxycycline 100 mg PO b.i.d. per ID x 7 day course   - elevate leg      #Pancytopenia likely 2/2 cellulitis and URI:  IMPROVING       # Severe Protein Calorie Malnutrition:  - c/w PEG tube feedings due to dysphagia   - supplements per nutrition recommendations    - aspiration precautions, sit pt upright during feedings      # Normocytic Anemia: Stable  - heme consult appreciated likely d/t bone marrow suppression 2/2 ongoing infections, antibiotic use, recent hospitalization possibly dilutional/d/t nutritional deficiency      # GERD, HTN, Parkinson's Dementia  - c/w home meds: pantoprazole, lisinopril, metoprolol, seroquel, carvidopa-levodopa,     # Unchanged L2 compression fx/rib and R clavicle fx/R renal cyst incidental findings on CT  - f/u outpatient     # DVT Prophylaxis  - c/w ASA, apixaban     Code status:  - DNR/DNI  - high risk for re-admission despite efforts. no further limitations set on care at present.     Dispo:  - updated son and wife via telephone 10/5.   - d/c planning in progress. suzette vs home with pall care              CC: Sepsis   SUBJECTIVE:   HPI: 87-year-old Male with PMHx BPH, diverticulosis, GERD, HTN, necrotizing enterocolitis, PEG tube in place, tube feeding dependent, osteoporosis, Parkinson's dementia, TIA, CVA, prostate CA, sensorineural hearing loss presented to ED for evaluation of worsening RLE/LUE edema. Pt unable to provide history, A&OX1 at baseline. Son and wife at bedside during admission to provide history. [facility] that [facility] with RLE DVT (started on Eliquis) and treated for cellulitis. [facility] and presented to [facility] ED 9/30 as family noticed worsening edema. Pts son states they were told the patient had ulcers in his intestines, considered this risk prior to initiation of Eliquis. Son endorses pt having brown stools, denies melena/hematochezia. States pt previously treated for aspiration PNA and identified as high risk. [facility] that [facility] stated that the patient has fluid in his lungs.     S:  10/1: no dyspnea, confused  10/2: laying in bed, denies pain or dyspnea. on room air. wife at bedside and updated on plan.  10/3:   10/4: pt examined laying in bed. denies pain or dyspnea. no acute distress. on room air.   10/5: pt examined laying in bed. oropharyngeal suctioning provided with yellow thick phlegm. no acute distress. on 2 L O2 via NC. no family at bedside during time of assessment.   10/6: more alert and conversive today, denies pain or discomfort. no excess secretions   10/7: Awake, alert, confused at baseline.  Offers no complaints, comfortable appearing.  10/8: Lying in bed, weak, lethargic appearing but responsive to commands.  Pt is confused but comfortable appearing.  10/9: more alert and conversive today. following commands. appearing comfortable       REVIEW OF SYSTEMS: unable to obtain 2/2 pts mental status, pt denies pain but otherwise unable to specify     Vital Signs Last 24 Hrs  T(C): 36.4 (09 Oct 2023 08:43), Max: 36.9 (08 Oct 2023 21:00)  T(F): 97.6 (09 Oct 2023 08:43), Max: 98.5 (08 Oct 2023 21:00)  HR: 72 (09 Oct 2023 08:43) (72 - 76)  BP: 134/72 (09 Oct 2023 08:43) (111/65 - 134/72)  BP(mean): --  RR: 18 (09 Oct 2023 08:43) (18 - 18)  SpO2: 100% (09 Oct 2023 08:43) (100% - 100%)    Parameters below as of 09 Oct 2023 08:43  Patient On (Oxygen Delivery Method): nasal cannula  O2 Flow (L/min): 2      PHYSICAL EXAM:    Constitutional: NAD, awake and alert, oriented x 1, cachectic   HEENT: PERR, hard of hearing, MMM  Neck: Soft and supple, No LAD, No JVD  Respiratory: Coughing up yellow thick phlegm, assisted by oropharyngeal suctioning. Breath sounds diminished in all fields, clear anteriorly   Cardiovascular: S1 and S2, regular rate and rhythm, no Murmurs, gallops or rubs  Gastrointestinal: Bowel Sounds present, soft, nontender, nondistended, no guarding, no rebound  Extremities: + 2 pitting edema to RLE  Vascular: 2+ peripheral pulses  Neurological: A/O x 1   Musculoskeletal: unable to assess 2/2 patient unable to follow commands  Skin: shiny BL LE with intermittent ecchymosis, cellulitis improved       MEDICATIONS  (STANDING):  apixaban 5 milliGRAM(s) Enteral Tube every 12 hours  ascorbic acid 500 milliGRAM(s) Oral daily  aspirin  chewable 81 milliGRAM(s) Oral daily  atorvastatin 20 milliGRAM(s) Oral at bedtime  carbidopa/levodopa  25/100 1 Tablet(s) Oral <User Schedule>  carbidopa/levodopa  25/100 1.5 Tablet(s) Oral <User Schedule>  donepezil 5 milliGRAM(s) Oral at bedtime  doxazosin 2 milliGRAM(s) Oral at bedtime  furosemide    Tablet 40 milliGRAM(s) Oral daily  lisinopril 2.5 milliGRAM(s) Oral every 12 hours  metoprolol tartrate 25 milliGRAM(s) Oral daily  pantoprazole    Tablet 40 milliGRAM(s) Oral before breakfast  QUEtiapine 25 milliGRAM(s) Oral two times a day  sodium chloride 3%  Inhalation 4 milliLiter(s) Inhalation every 12 hours    MEDICATIONS  (PRN):                                8.8    4.39  )-----------( 104      ( 08 Oct 2023 08:04 )             27.7     10-08    142  |  111<H>  |  30<H>  ----------------------------<  117<H>  4.0   |  31  |  0.48<L>    Ca    7.8<L>      08 Oct 2023 08:04      CAPILLARY BLOOD GLUCOSE            Urinalysis Basic - ( 08 Oct 2023 08:04 )    Color: x / Appearance: x / SG: x / pH: x  Gluc: 117 mg/dL / Ketone: x  / Bili: x / Urobili: x   Blood: x / Protein: x / Nitrite: x   Leuk Esterase: x / RBC: x / WBC x   Sq Epi: x / Non Sq Epi: x / Bacteria: x        Assessment and Plan:  87-year-old Male with PMhx BPH, diverticulosis, GERD, HTN, necrotizing enterocolitis, PEG tube in place, tube feeding dependent, osteoporosis, Parkinson's dementia, TIA, CVA, prostate CA, sensorineural hearing loss admitted through ED for worsening RLE, LUE edema. Patient A&OX1 at baseline, history provided by family. Recently d/c'd from [facility] 9/29 s/p dx of RLE DVT (started on Eliquis), cellulitis, and "fluid on lungs".     #Acute respiratory failure: Due to Acute on chronic HFpEF with Pulmonary Edema and Effusions complicated by moderate-severe AS and aspiration pneumonia / enterorhinovirus URI:  - repeat chest xray: effusions   - BNP normalized  - IV lasix 40mg qd --> change to oral daily tolerating well   - TTE: moderate-severe AS;   - cardiology recs --> c/w ACE and metoprolol  - s/p IV zosyn for probable gram neg belkis bacteria   - thoracic surgery consult appreciated, no indication for thoracentesis due to clinical improvement.   - cont hypertonic saline nebulizer tx to assist with secretion clearing   - supportive care    # RLE DVT:  - c/w apixaban     # Cellulitis RLE: RESOLVING  - completed ceftriaxone qd and doxycycline 100 mg PO b.i.d. per ID x 7 day course   - elevate leg      #Pancytopenia likely 2/2 cellulitis and URI:  IMPROVING       # Severe Protein Calorie Malnutrition:  - c/w PEG tube feedings due to dysphagia   - supplements per nutrition recommendations    - aspiration precautions, sit pt upright during feedings      # Normocytic Anemia: Stable  - heme consult appreciated likely d/t bone marrow suppression 2/2 ongoing infections, antibiotic use, recent hospitalization possibly dilutional/d/t nutritional deficiency      # GERD, HTN, Parkinson's Dementia  - c/w home meds: pantoprazole, lisinopril, metoprolol, seroquel, carvidopa-levodopa,     # Unchanged L2 compression fx/rib and R clavicle fx/R renal cyst incidental findings on CT  - f/u outpatient     # DVT Prophylaxis  - c/w ASA, apixaban     Code status:  - DNR/DNI  - high risk for re-admission despite efforts. no further limitations set on care at present.     Dispo:  - updated son and wife via telephone 10/5.   - d/c planning in progress.

## 2023-10-09 NOTE — PROGRESS NOTE ADULT - ASSESSMENT
88 y/o Male with h/o BPH, RLE DVT, diverticulosis, GERD, HTN, PEG placement, necrotizing enterocolitis, osteoporosis, Parkinson's dementia, PEG placement, TIA prostate cancer, hearing loss, old CVA was admitted on 9/30 for worsening RLE / LUE swelling. Patient is unable to provide history, son and wife at bedside provides history. States that [facility] and treated for cellulitis. He was discharged on the day PTA, and this AM family noticed patient's RLE and LUE swelling was worsening. Patient's son states they were told patient had ulcers in intestines, and considered risk of this prior to starting Eliquis. Son states patient has brown stool, no melena / hematochezia. States patient is an aspiration risk, and previously treated for aspiration PNA as well. [facility] was also [facility] that [facility] had [facility] in his lungs. In ER he received ceftriaxone.     PROBLEMS:    Low grade fever-URI with rhinovirus  B/l pleural effusions with pulmonary congestion  RLE DVT  Metabolic encephalopathy   Parkinsons disease  HTN (hypertension)  Prostate cancer  Nonrheumatic aortic valve insufficiency  Chronic rhinitis    PLAN:    pulmonary better-pallaitive eval  Off abx  CXR  pleural effusion improved  Ct surgery no thorocentesis  Aspiration precautions  Supportive care  DVT-Eliquis

## 2023-10-09 NOTE — PROGRESS NOTE ADULT - ASSESSMENT
88 y/o M w/ PMH of BPH, diverticulosis, GERD, HTN, PED placement, necrotizing enterocolitis, osteoporosis, parkinson's dementia, TIA prostate cancer, hearing loss, CVA, p/w worsening RLE / LUE swelling. Patient is unable to provide history, son and wife at bedside provides history. States that [facility] with [facility]LE D[facility] and treated for cellulitis. He was discharged yesterday, and this AM family noticed patient's RLE and LUE swelling was worsening. Patient's son states they were told patient had ulcers in intestines, and considered risk of this prior to starting Eliquis. Son states patient has brown stool, no melena / hematochezia. States patient is an aspiration risk, and previously treated for aspiration PNA as well. [facility] was also [facility] that patient had [facility] in his lungs. In the morning patient also c/o HA to son.       1. Pulmonary edema and pleural effusions  -suspect CHF exacerbation  -IV lasix -> now on PO lasix daily  -cardiology following  -blood cultures and ucx pending NGTD  -off antibiotics now  -possible asp PNA; patient has PEG receiving bolus TF   -ID following  -pulmonary following  -thoracics consulted for possible thoracentesis: not pursuing, continue medical management at this time    2. Parkinson's dementia  -per family, recent functional decline since January of this year  -dysphagia with PEG placement in January 2023  -on bolus TF at home  -patient with recurrent aspiration PNA  -baseline wheelchair bound  -poor prognosis, multiple rehospitalization in past 2 months  -Palliative strongly suggesting hospice to family; wife not ready for hospice at this time; no further limits in care set per family      Process of Care   --Reviewed dx/treatment problems and alignment with Goals of Care        Physical Aspects of Care   --Pain   no nonverbal indicators of pain  c/w current management     --Bowel Regimen   risk for constipation d/t immobility   daily dulcolax as needed    --Dyspnea   on 2LNC  comfortable and in NAD     --Nausea Vomiting   denies     --Weakness   PT as tolerated    WCB at baseline      Psychological and Psychiatric Aspects of Care:    --Grief/ Bereavement: emotional support provided   --Hx of psychiatric dx: none   --Pastoral Care Available PRN        Social Aspects of Care   --SW involved        Cultural Aspects   -Primary Language: English           Goals of Care: MOLST with current limitations of DNR/DNI and trial NIV. Spoke with wife and daughter at length on 10/4 and 10/6. Palliative strongly suggested patient returning home on hospice. Wife not ready for hospice, but is starting to consider. Family understanding of poor prognosis. No further limits set in care.          We discussed Palliative Care team being a supportive team when a patient has ongoing illnesses.  We also discussed that it is not an end of life care service, but can help navigate symptoms and emotional support throughout their hospital stay here.           Ethical and Legal Aspects: NA           Capacity- patient A&Ox2, hx of Parkinson's dementia; does not have medical decision making capacity at this time       HCP/Surrogate: Dona Holder, wife [phone]        Code Status: DNR/DNI trial NIV    MOLST: prior MOLST in chart, family confirmed limitations of DNR/DNI trial NIV     Dispo Plan: home with home care vs. home hospice          Discussed With: Dr. Resendiz & Sarah Goetz NP          Case coordinated with attending and SW and RN            Time Spent: 45 minutes including the care, coordination and counseling of this patient, 50% of which was spent coordinating and counseling.         88 y/o M w/ PMH of BPH, diverticulosis, GERD, HTN, PED placement, necrotizing enterocolitis, osteoporosis, parkinson's dementia, TIA prostate cancer, hearing loss, CVA, p/w worsening RLE / LUE swelling. Patient is unable to provide history, son and wife at bedside provides history. States that [facility] with [facility]LE D[facility] and treated for cellulitis. He was discharged yesterday, and this AM family noticed patient's RLE and LUE swelling was worsening. Patient's son states they were told patient had ulcers in intestines, and considered risk of this prior to starting Eliquis. Son states patient has brown stool, no melena / hematochezia. States patient is an aspiration risk, and previously treated for aspiration PNA as well. [facility] was also [facility] that patient had fluid in his lungs. In the morning patient also c/o HA to son.       1. Pulmonary edema and pleural effusions  -suspect CHF exacerbation  -IV lasix -> now on PO lasix daily  -cardiology following  -blood cultures and ucx pending NGTD  -off antibiotics now  -possible asp PNA; patient has PEG receiving bolus TF   -ID following  -pulmonary following  -thoracics consulted for possible thoracentesis: not pursuing, continue medical management at this time    2. Parkinson's dementia  -per family, recent functional decline since January of this year  -dysphagia with PEG placement in January 2023  -on bolus TF at home  -patient with recurrent aspiration PNA  -baseline wheelchair bound  -poor prognosis, multiple rehospitalization in past 2 months  -Palliative strongly suggesting hospice to family; wife not ready for hospice at this time; no further limits in care set per family      Process of Care   --Reviewed dx/treatment problems and alignment with Goals of Care        Physical Aspects of Care   --Pain   no nonverbal indicators of pain  c/w current management     --Bowel Regimen   risk for constipation d/t immobility   daily dulcolax as needed    --Dyspnea   on 2LNC  comfortable and in NAD     --Nausea Vomiting   denies     --Weakness   PT as tolerated    WCB at baseline      Psychological and Psychiatric Aspects of Care:    --Grief/ Bereavement: emotional support provided   --Hx of psychiatric dx: none   --Pastoral Care Available PRN        Social Aspects of Care   --SW involved        Cultural Aspects   -Primary Language: English           Goals of Care: MOLST with current limitations of DNR/DNI and trial NIV. Spoke with wife and daughter at length on 10/4 and 10/6. Palliative strongly suggested patient returning home on hospice. Wife not ready for hospice, but is starting to consider. Family understanding of poor prognosis. Palliative spoke with family at length 10/6: not ready for hospice, no limits set in care. Family has palliative's contact info and can reach out at any time with further questions/support.        We discussed Palliative Care team being a supportive team when a patient has ongoing illnesses.  We also discussed that it is not an end of life care service, but can help navigate symptoms and emotional support throughout their hospital stay here.           Ethical and Legal Aspects: NA           Capacity- patient A&Ox2, hx of Parkinson's dementia; does not have medical decision making capacity at this time       HCP/Surrogate: Dona Holder, wife [phone]        Code Status: DNR/DNI trial NIV    MOLST: prior MOLST in chart, family confirmed limitations of DNR/DNI trial NIV     Dispo Plan: home with home care vs. home hospice          Discussed With: Dr. Resendiz & Sarah Goetz NP          Case coordinated with attending and SW and RN            Time Spent: 45 minutes including the care, coordination and counseling of this patient, 50% of which was spent coordinating and counseling.

## 2023-10-09 NOTE — PROGRESS NOTE ADULT - SUBJECTIVE AND OBJECTIVE BOX
HPI: 86 y/o M w/ PMH of BPH, diverticulosis, GERD, HTN, PED placement, necrotizing enterocolitis, osteoporosis, parkinson's dementia, TIA prostate cancer, hearing loss, CVA, p/w worsening RLE / LUE swelling. Patient is unable to provide history, son and wife at bedside provides history. [facility] that [facility] with [facility] D[facility] and treated for cellulitis. He was discharged yesterday, and this AM family noticed patient's RLE and LUE swelling was worsening. Patient's son states they were told patient had ulcers in intestines, and considered risk of this prior to starting Eliquis. Son states patient has brown stool, no melena / hematochezia. States patient is an aspiration risk, and previously treated for aspiration PNA as well. [facility] that [facility] had [facility] his [facility]. In the morning patient also c/o HA to son.     10/5: Seen and examined at bedside. More alert today. A&Ox1. Speech unintelligible. In NAD.  10/6: Seen and examined at bedside. Arouses to verbal stimuli. A&Ox1. Minimally verbal, speech more clear today. In NAD. Spoke with patient's wife and son- considering hospice but not ready to make decision yet.  10/9: Seen and examined at bedside. Patient awake, alert, more verbal today. A&Ox2. In NAD.       CODE STATUS: DNR/DNI trial NIV        Pain and Dyspnea:     no nonverbal indicators of pain  on 2LNC, in NAD      Review of Systems:    Unable to obtain/Limited due to: poor historian        PHYSICAL EXAM:    Vital Signs Last 24 Hrs  T(C): 36.4 (09 Oct 2023 08:43), Max: 36.9 (08 Oct 2023 21:00)  T(F): 97.6 (09 Oct 2023 08:43), Max: 98.5 (08 Oct 2023 21:00)  HR: 72 (09 Oct 2023 08:43) (72 - 76)  BP: 134/72 (09 Oct 2023 08:43) (111/65 - 134/72)  BP(mean): --  RR: 18 (09 Oct 2023 08:43) (18 - 18)  SpO2: 100% (09 Oct 2023 08:43) (100% - 100%)    Parameters below as of 09 Oct 2023 08:43  Patient On (Oxygen Delivery Method): nasal cannula  O2 Flow (L/min): 2    Daily     Daily     PPSV2:  20%  FAST: 7D    General: Awake, alert. In NAD.   HEENT: Dry mucous membranes. On 2LNC.  Lungs: CTA bilaterally.  Cardiac: Regular rate and rhythm. S1S2.  GI: +PEG.  : No suprapubic tenderness.  Ext: LUE +2 edema. RLE +2 edema and erythema. +2 pedal pulses.  Neuro: A&Ox2. Limited exam. No focal neuro deficits.      LABS and RADIOLOGY: REVIEWED

## 2023-10-09 NOTE — PROGRESS NOTE ADULT - SUBJECTIVE AND OBJECTIVE BOX
Subjective:    pat better, sitting in bed, no new complaint.    Home Medications:  aspirin 81 mg oral tablet, chewable: 1 tab(s) by PEG tube once a day (30 Sep 2023 18:38)  atorvastatin 20 mg oral tablet: 1 tab(s) by PEG tube once a day (at bedtime) (30 Sep 2023 18:38)  carbidopa-levodopa 25 mg-100 mg oral tablet: 1.5 tab(s) by gastrostomy tube 2 times a day at 8 AM and 12 PM (30 Sep 2023 18:34)  carbidopa-levodopa 25 mg-100 mg oral tablet: 1 tab(s) by gastrostomy tube 2 times a day at 4 PM and 8 PM (30 Sep 2023 18:34)  donepezil 5 mg oral tablet: 1 tab(s) by PEG tube once a day (at bedtime) (30 Sep 2023 18:38)  doxazosin 2 mg oral tablet: 1 tab(s) by PEG tube once a day (at bedtime) (30 Sep 2023 18:34)  Eliquis 5 mg oral tablet: 1 tab(s) orally 2 times a day (30 Sep 2023 18:38)  lansoprazole 30 mg oral tablet, disintegratin cap(s) by PEG tube once a day (30 Sep 2023 18:38)  lisinopril 2.5 mg oral tablet: 1 tab(s) by PEG tube 2 times a day (30 Sep 2023 18:38)  metoprolol tartrate 50 mg oral tablet: 0.5 tab(s) by PEG tube once a day (30 Sep 2023 18:38)  QUEtiapine 25 mg oral tablet: 1 tab(s) orally 2 times a day (30 Sep 2023 21:50)  Vitamin C 500 mg/5 mL oral liquid: 5 milliliter(s) orally once a day (30 Sep 2023 21:50)    MEDICATIONS  (STANDING):  apixaban 5 milliGRAM(s) Enteral Tube every 12 hours  ascorbic acid 500 milliGRAM(s) Oral daily  aspirin  chewable 81 milliGRAM(s) Oral daily  atorvastatin 20 milliGRAM(s) Oral at bedtime  carbidopa/levodopa  25/100 1 Tablet(s) Oral <User Schedule>  carbidopa/levodopa  25/100 1.5 Tablet(s) Oral <User Schedule>  donepezil 5 milliGRAM(s) Oral at bedtime  doxazosin 2 milliGRAM(s) Oral at bedtime  furosemide    Tablet 40 milliGRAM(s) Oral daily  lisinopril 2.5 milliGRAM(s) Oral every 12 hours  metoprolol tartrate 25 milliGRAM(s) Oral daily  pantoprazole    Tablet 40 milliGRAM(s) Oral before breakfast  QUEtiapine 25 milliGRAM(s) Oral two times a day  sodium chloride 3%  Inhalation 4 milliLiter(s) Inhalation every 12 hours    MEDICATIONS  (PRN):      Allergies    Originally Entered as [Unknown] reaction to [fresh fruits] (Unknown)  apple (Anaphylaxis)  raw, fresh fruits- reyn[employer] family (apple, pear, apricot, peach, fig); processed/cooked is ok. (Anaphylaxis)  No Known Drug Allergies    Intolerances        Vital Signs Last 24 Hrs  T(C): 36.7 (09 Oct 2023 15:21), Max: 36.9 (08 Oct 2023 21:00)  T(F): 98 (09 Oct 2023 15:21), Max: 98.5 (08 Oct 2023 21:00)  HR: 73 (09 Oct 2023 15:21) (72 - 76)  BP: 158/77 (09 Oct 2023 15:21) (131/76 - 158/77)  BP(mean): --  RR: 18 (09 Oct 2023 15:21) (18 - 18)  SpO2: 100% (09 Oct 2023 15:21) (100% - 100%)    Parameters below as of 09 Oct 2023 15:21  Patient On (Oxygen Delivery Method): nasal cannula  O2 Flow (L/min): 2        PHYSICAL EXAMINATION:    NECK:  Supple. No lymphadenopathy. Jugular venous pressure not elevated. Carotids equal.   HEART:   The cardiac impulse has a normal quality. Reg., Nl S1 and S2.  There are no murmurs, rubs or gallops noted  CHEST:  Chest crackles to auscultation. Normal respiratory effort.  ABDOMEN:  Soft and nontender.   EXTREMITIES:  There is no edema.       LABS:                        8.8    4.39  )-----------( 104      ( 08 Oct 2023 08:04 )             27.7     10-08    142  |  111<H>  |  30<H>  ----------------------------<  117<H>  4.0   |  31  |  0.48<L>    Ca    7.8<L>      08 Oct 2023 08:04        Urinalysis Basic - ( 08 Oct 2023 08:04 )    Color: x / Appearance: x / SG: x / pH: x  Gluc: 117 mg/dL / Ketone: x  / Bili: x / Urobili: x   Blood: x / Protein: x / Nitrite: x   Leuk Esterase: x / RBC: x / WBC x   Sq Epi: x / Non Sq Epi: x / Bacteria: x

## 2023-10-10 ENCOUNTER — TRANSCRIPTION ENCOUNTER (OUTPATIENT)
Age: 88
End: 2023-10-10

## 2023-10-10 PROCEDURE — 93971 EXTREMITY STUDY: CPT | Mod: 26,RT

## 2023-10-10 PROCEDURE — 99239 HOSP IP/OBS DSCHRG MGMT >30: CPT

## 2023-10-10 RX ORDER — FUROSEMIDE 40 MG
1 TABLET ORAL
Qty: 0 | Refills: 0 | DISCHARGE
Start: 2023-10-10

## 2023-10-10 RX ADMIN — CARBIDOPA AND LEVODOPA 1 TABLET(S): 25; 100 TABLET ORAL at 13:51

## 2023-10-10 RX ADMIN — APIXABAN 5 MILLIGRAM(S): 2.5 TABLET, FILM COATED ORAL at 10:32

## 2023-10-10 RX ADMIN — CARBIDOPA AND LEVODOPA 1.5 TABLET(S): 25; 100 TABLET ORAL at 12:15

## 2023-10-10 RX ADMIN — Medication 81 MILLIGRAM(S): at 10:33

## 2023-10-10 RX ADMIN — Medication 40 MILLIGRAM(S): at 10:33

## 2023-10-10 RX ADMIN — LISINOPRIL 2.5 MILLIGRAM(S): 2.5 TABLET ORAL at 10:33

## 2023-10-10 RX ADMIN — QUETIAPINE FUMARATE 25 MILLIGRAM(S): 200 TABLET, FILM COATED ORAL at 10:34

## 2023-10-10 RX ADMIN — CARBIDOPA AND LEVODOPA 1.5 TABLET(S): 25; 100 TABLET ORAL at 10:33

## 2023-10-10 RX ADMIN — Medication 500 MILLIGRAM(S): at 10:32

## 2023-10-10 RX ADMIN — PANTOPRAZOLE SODIUM 40 MILLIGRAM(S): 20 TABLET, DELAYED RELEASE ORAL at 10:34

## 2023-10-10 RX ADMIN — Medication 25 MILLIGRAM(S): at 10:34

## 2023-10-10 NOTE — PROGRESS NOTE ADULT - ASSESSMENT
88 y/o Male with h/o BPH, RLE DVT, diverticulosis, GERD, HTN, PEG placement, necrotizing enterocolitis, osteoporosis, Parkinson's dementia, PEG placement, TIA prostate cancer, hearing loss, old CVA was admitted on 9/30 for worsening RLE / LUE swelling. Patient is unable to provide history, son and wife at bedside provides history. [facility] that [facility] and treated for cellulitis. He was discharged on the day PTA, and this AM family noticed patient's RLE and LUE swelling was worsening. Patient's son states they were told patient had ulcers in intestines, and considered risk of this prior to starting Eliquis. Son states patient has brown stool, no melena / hematochezia. States patient is an aspiration risk, and previously treated for aspiration PNA as well. [facility] also [facility] that [facility] had [facility] in his lungs. In ER he received ceftriaxone.     PROBLEMS:    Low grade fever-URI with rhinovirus  B/l pleural effusions with pulmonary congestion  RLE DVT  Metabolic encephalopathy   Parkinsons disease  HTN (hypertension)  Prostate cancer  Nonrheumatic aortic valve insufficiency  Chronic rhinitis    PLAN:    pulmonary better  pallaitive   Off abx  CXR  pleural effusion improved  Ct surgery no thorocentesis  Aspiration precautions  Supportive care  DVT-Eliquis         Double lumen PICC place to right brachial vein.  37 cm in length, 0 cm exposed. Initial arm circumference 27 cm. Lot #[id number] .

## 2023-10-10 NOTE — PHYSICAL THERAPY INITIAL EVALUATION ADULT - ADDITIONAL COMMENTS
The aides -16.6 H / week -are from [facility] The aides -16.6 H / week -are from [facility],37.5H HHA via OOP.  handicap accessible bathroom, shower chair, grab bars, WC, RW, ramp to enter home.,

## 2023-10-10 NOTE — DISCHARGE NOTE PROVIDER - NSDCCAREPROVSEEN_GEN_ALL_CORE_FT
Marv, Dameon Mcconnell, Anuj Tamez, Megan Reyes, Marcie Andino, Ninfa Palacios, Shantelle Goetz, Sarah Howard, Scott Doss, Joao Austin, Morenita Shaikh, David Iverson, Kaitlyn Cunningham, Aysha Cordova, Cordelia

## 2023-10-10 NOTE — PHYSICAL THERAPY INITIAL EVALUATION ADULT - DIAGNOSIS, PT EVAL
Pulmonary edema and pleural effusions, Parkinson's dementia, Old L2 compression fx/rib and R clavicle fx, Acute respiratory failure: Due to Acute on chronic HFpEF with Pulmonary Edema and Effusions complicated by moderate-severe AS and aspiration pneumonia / enterorhinovirus URI:

## 2023-10-10 NOTE — PHYSICAL THERAPY INITIAL EVALUATION ADULT - IMPAIRMENTS FOUND, PT EVAL
arousal, attention, and cognition/cognitive impairment/fine motor/gait, locomotion, and balance/gross motor/integumentary integrity/joint integrity and mobility/muscle strength

## 2023-10-10 NOTE — DISCHARGE NOTE PROVIDER - INSTRUCTIONS
This patient is being managed with:   Diet NPO with Tube Feed-  Tube Feeding Modality: Gastrostomy  [place] Peptide 1.5  Total Volume for 24 Hours (mL): 1440  Continuous  Starting Tube Feed Rate {mL per Hour}: 20  Increase Tube Feed Rate by (mL): 20     Every 4 hours  Until Goal Tube Feed Rate (mL per Hour): 60  Tube Feed Duration (in Hours): 24  Tube Feed Start Time: 00:00  Free Water Flush  Free Water Flush Instructions:  free water flushes of 30 cc/hr; adjust prn to maintain hydration as per MD  Entered: Oct  1 2023 12:27PM

## 2023-10-10 NOTE — DISCHARGE NOTE PROVIDER - NSDCPNSUBOBJ_GEN_ALL_CORE
ros unable to obtain due to advanced dementia       PHYSICAL EXAM:    Constitutional: NAD, awake and alert, oriented x 1, cachectic   HEENT: PERR, hard of hearing, MMM  Neck: Soft and supple, No LAD, No JVD  Respiratory: Coughing up yellow thick phlegm, assisted by oropharyngeal suctioning. Breath sounds diminished in all fields, clear anteriorly   Cardiovascular: S1 and S2, regular rate and rhythm, no Murmurs, gallops or rubs  Gastrointestinal: Bowel Sounds present, soft, nontender, nondistended, no guarding, no rebound  Extremities: + 2 pitting edema to RLE  Vascular: 2+ peripheral pulses  Neurological: A/O x 1   Musculoskeletal: unable to assess 2/2 patient unable to follow commands  Skin: shiny BL LE with intermittent ecchymosis, cellulitis improved                               8.8    4.39  )-----------( 104      ( 08 Oct 2023 08:04 )             27.7     10-08    142  |  111<H>  |  30<H>  ----------------------------<  117<H>  4.0   |  31  |  0.48<L>    Ca    7.8<L>      08 Oct 2023 08:04      CAPILLARY BLOOD GLUCOSE      Urinalysis Basic - ( 08 Oct 2023 08:04 )    Color: x / Appearance: x / SG: x / pH: x  Gluc: 117 mg/dL / Ketone: x  / Bili: x / Urobili: x   Blood: x / Protein: x / Nitrite: x   Leuk Esterase: x / RBC: x / WBC x   Sq Epi: x / Non Sq Epi: x / Bacteria: x    < from: US Duplex Venous Lower Ext Ltd, Right (10.10.23 @ 11:16) >      IMPRESSION:  Acute deep venous thrombosis: above the knee.    Right leg deep vein thrombosis, not significantly changed..        --- End of Report ---    MARILYN JOHNSON MD; Attending Radiologist  This document has been electronically signed. Oct 10 2023 12:00PM    < end of copied text >

## 2023-10-10 NOTE — DISCHARGE NOTE PROVIDER - HOSPITAL COURSE
SUBJECTIVE:   HPI: 87-year-old Male with PMHx BPH, diverticulosis, GERD, HTN, necrotizing enterocolitis, PEG tube in place, tube feeding dependent, osteoporosis, Parkinson's dementia, TIA, CVA, prostate CA, sensorineural hearing loss presented to ED for evaluation of worsening RLE/LUE edema. Pt unable to provide history, A&OX1 at baseline. Son and wife at bedside during admission to provide history. State that [facility] with RLE DVT (started on Eliquis) and treated for cellulitis. [facility] and presented to [facility] ED 9/30 as family noticed worsening edema. Pts son states they were told the patient had ulcers in his intestines, considered this risk prior to initiation of Eliquis. Son endorses pt having brown stools, denies melena/hematochezia. States pt previously treated for aspiration PNA and identified as high risk. [facility] that [facility] stated that [facility] has [facility] in his lungs.       pt is 86 y/o male who was admitted for acute on chronic resp failure due to HFpEF/ pancytopenia, and cellulitis with concern for aspiration PNA. pt w/ moderate pulmonary edema and effusion was given IV Lasix w/ improvement in dyspnea. pt was switched to PO lasix w/ improvement. pt was placed on zosyn for aspiration PNA. pt was followed by pulm Dr. Mcconnell - placed on hypretonic saline for secretion clearing, pt was also evaled by Thoracic - no intervention given improvement.   pt tube feeds held due to mucoid plugging and thick secretions. pt required frequent suctioning and oral care.   pt cellulitis of right leg was managed PO doxy and IV ceftriaxone pt was kept on his A/C for his recent hx of DVT - repeat US w/ Deep venous thrombosis: above and below the knee. for pancytopenia - he was evaled by heme/onc which heme Dr. Escobar - infectious process was treated and WBC, platelets improved. labs and vitals were trended.   pt at high risk for recurrent aspiration evaled by Pall care - no other limits set beyond Dnr/dni - pt was recc for home hospice they have declined thus far.         #Acute respiratory failure: Due to Acute on chronic HFpEF with Pulmonary Edema and Effusions complicated by moderate-severe AS and aspiration pneumonia / enterorhinovirus URI:  - repeat chest xray: effusions   - BNP normalized  - IV lasix 40mg qd --> change to oral daily tolerating well   - TTE: moderate-severe AS;   - cardiology recs --> c/w ACE and metoprolol  - s/p IV zosyn for probable gram neg belkis bacteria   - thoracic surgery consult appreciated, no indication for thoracentesis due to clinical improvement.   - cont hypertonic saline nebulizer tx to assist with secretion clearing   - supportive care    # RLE DVT:  - c/w apixaban   - repeat U/S with DVT     # Cellulitis RLE: RESOLVING  - completed ceftriaxone qd and doxycycline 100 mg PO b.i.d. per ID x 7 day course   - elevate leg      #Pancytopenia likely 2/2 cellulitis and URI:  IMPROVING       # Severe Protein Calorie Malnutrition:  - c/w PEG tube feedings due to dysphagia   - supplements per nutrition recommendations    - aspiration precautions, sit pt upright during feedings      # Normocytic Anemia: Stable  - heme consult appreciated likely d/t bone marrow suppression 2/2 ongoing infections, antibiotic use, recent hospitalization possibly dilutional/d/t nutritional deficiency      # GERD, HTN, Parkinson's Dementia  - c/w home meds: pantoprazole, lisinopril, metoprolol, seroquel, carvidopa-levodopa,     # Unchanged L2 compression fx/rib and R clavicle fx/R renal cyst incidental findings on CT  - f/u outpatient     # DVT Prophylaxis  - c/w ASA, apixaban     # functional quadriplegia   - due to advanced age and dementia   - total care per RN   - recc to dc home with hospice vs home with pall care/ home assistive devices       Code status:  - DNR/DNI  - high risk for re-admission despite efforts. no further limitations set on care at present. recc for hospice upon dc, family not agreeable     Dispo:  - d/c plan - home with [facility]   spent _57___ mins preparing dc.        SUBJECTIVE:   HPI: 87-year-old Male with PMHx BPH, diverticulosis, GERD, HTN, necrotizing enterocolitis, PEG tube in place, tube feeding dependent, osteoporosis, Parkinson's dementia, TIA, CVA, prostate CA, sensorineural hearing loss presented to ED for evaluation of worsening RLE/LUE edema. Pt unable to provide history, A&OX1 at baseline. Son and wife at bedside during admission to provide history. State that [facility] with RLE DVT (started on Eliquis) and treated for cellulitis. [facility] and presented to [facility] ED 9/30 as family noticed worsening edema. Pts son states they were told the patient had ulcers in his intestines, considered this risk prior to initiation of Eliquis. Son endorses pt having brown stools, denies melena/hematochezia. States pt previously treated for aspiration PNA and identified as high risk. [facility] that [facility] stated that the patient has fluid in his lungs.       pt is 88 y/o male who was admitted for acute on chronic resp failure due to HFpEF/ pancytopenia, and cellulitis with concern for aspiration PNA. pt w/ moderate pulmonary edema and effusion was given IV Lasix w/ improvement in dyspnea. pt was switched to PO lasix w/ improvement. pt was placed on zosyn for aspiration PNA. pt was followed by pulm Dr. Mcconnell - placed on hypretonic saline for secretion clearing, pt was also evaled by Thoracic - no intervention given improvement.   pt tube feeds held due to mucoid plugging and thick secretions. pt required frequent suctioning and oral care.   pt cellulitis of right leg was managed PO doxy and IV ceftriaxone pt was kept on his A/C for his recent hx of DVT - repeat US w/ Deep venous thrombosis: above and below the knee. for pancytopenia - he was evaled by heme/onc which heme Dr. Escobar - infectious process was treated and WBC, platelets improved. labs and vitals were trended.   pt at high risk for recurrent aspiration evaled by Pall care - no other limits set beyond Dnr/dni - pt was recc for home hospice they have declined thus far.         #Acute respiratory failure: Due to Acute on chronic HFpEF with Pulmonary Edema and Effusions complicated by moderate-severe AS and aspiration pneumonia / enterorhinovirus URI:  - repeat chest xray: effusions   - BNP normalized  - IV lasix 40mg qd --> change to oral daily tolerating well   - TTE: moderate-severe AS;   - cardiology recs --> c/w ACE and metoprolol  - s/p IV zosyn for probable gram neg belkis bacteria   - thoracic surgery consult appreciated, no indication for thoracentesis due to clinical improvement.   - cont hypertonic saline nebulizer tx to assist with secretion clearing   - supportive care    # RLE DVT:  - c/w apixaban   - repeat U/S with DVT     # Cellulitis RLE: RESOLVING  - completed ceftriaxone qd and doxycycline 100 mg PO b.i.d. per ID x 7 day course   - elevate leg      #Pancytopenia likely 2/2 cellulitis and URI:  IMPROVING       # Severe Protein Calorie Malnutrition:  - c/w PEG tube feedings due to dysphagia   - supplements per nutrition recommendations    - aspiration precautions, sit pt upright during feedings      # Normocytic Anemia: Stable  - heme consult appreciated likely d/t bone marrow suppression 2/2 ongoing infections, antibiotic use, recent hospitalization possibly dilutional/d/t nutritional deficiency      # GERD, HTN, Parkinson's Dementia  - c/w home meds: pantoprazole, lisinopril, metoprolol, seroquel, carvidopa-levodopa,     # Unchanged L2 compression fx/rib and R clavicle fx/R renal cyst incidental findings on CT  - f/u outpatient     # DVT Prophylaxis  - c/w ASA, apixaban     # functional quadriplegia   - due to advanced age and dementia   - total care per RN   - recc to dc home with hospice vs home with pall care/ home assistive devices       Code status:  - DNR/DNI  - high risk for re-admission despite efforts. no further limitations set on care at present. recc for hospice upon dc, family not agreeable     Dispo:  - d/c plan - home with [facility]   spent _57___ mins preparing dc.     Attending Attestation:  Pt seen and examined with CARY Goetz. I personally had a face to face encounter with the patient, examined the patient myself and reviewed the plan of care with the patient and said CARY Goetz. I agree with the assessment and plan of CARY Goetz as stated and discussed.

## 2023-10-10 NOTE — PHYSICAL THERAPY INITIAL EVALUATION ADULT - PERTINENT HX OF CURRENT PROBLEM, REHAB EVAL
88 y/o Male with h/o BPH, RLE DVT, diverticulosis, GERD, HTN, PEG placement, necrotizing enterocolitis, osteoporosis, Parkinson's dementia, PEG placement, TIA prostate cancer, hearing loss, old CVA was admitted on 9/30 for worsening RLE / LUE swelling. [facility] with [facility]LE D[facility] and treated for cellulitis. He was discharged yesterday, and this AM family noticed patient's RLE and LUE swelling was worsening. Patient's son states they were told patient had ulcers in intestines, patient has brown stool, patient is an aspiration risk, poor prognosis, multiple rehospitalization in past 2 months, Palliative strongly suggesting hospice to family; wife not ready for hospice at this time;

## 2023-10-10 NOTE — DISCHARGE NOTE PROVIDER - NSDCFUADDINST_GEN_ALL_CORE_FT
NPO with Tube Feed:   Tube Feeding Modality: Gastrostomy  Jevity 1.5 Ranjit (JEVITY1.5) or [employer] peptide 1.5  Total Volume for 24 Hours (mL): 1440  Bolus  Total Volume of Bolus (mL):  240  Total # of Feeds: 4  Tube Feed Frequency: Every 4 hours   Tube Feed Start Time: 12:00  Bolus Feed Rate (mL per Hour): 360   Bolus Feed Duration (in Hours): 1  Bolus   Total Volume per Flush (mL): 200   Frequency: Every 4 Hours (10-01-23 @ 11:41) [Active]

## 2023-10-10 NOTE — DISCHARGE NOTE PROVIDER - NSDCMRMEDTOKEN_GEN_ALL_CORE_FT
aspirin 81 mg oral tablet, chewable: 1 tab(s) by PEG tube once a day  atorvastatin 20 mg oral tablet: 1 tab(s) by PEG tube once a day (at bedtime)  carbidopa-levodopa 25 mg-100 mg oral tablet: 1.5 tab(s) by gastrostomy tube 2 times a day at 8 AM and 12 PM  carbidopa-levodopa 25 mg-100 mg oral tablet: 1 tab(s) by gastrostomy tube 2 times a day at 4 PM and 8 PM  donepezil 5 mg oral tablet: 1 tab(s) by PEG tube once a day (at bedtime)  doxazosin 2 mg oral tablet: 1 tab(s) by PEG tube once a day (at bedtime)  Eliquis 5 mg oral tablet: 1 tab(s) orally 2 times a day  furosemide 40 mg oral tablet: 1 tab(s) orally once a day  lansoprazole 30 mg oral tablet, disintegratin cap(s) by PEG tube once a day  lisinopril 2.5 mg oral tablet: 1 tab(s) by PEG tube 2 times a day  metoprolol tartrate 50 mg oral tablet: 0.5 tab(s) by PEG tube once a day  QUEtiapine 25 mg oral tablet: 1 tab(s) orally 2 times a day  Vitamin C 500 mg/5 mL oral liquid: 5 milliliter(s) orally once a day

## 2023-10-10 NOTE — DISCHARGE NOTE PROVIDER - NSDCCPCAREPLAN_GEN_ALL_CORE_FT
PRINCIPAL DISCHARGE DIAGNOSIS  Diagnosis: Cellulitis  Assessment and Plan of Treatment: Cellulitis is a skin infection caused by bacteria. Cellulitis may go away on its own or you may need treatment. Your healthcare provider may draw a Stebbins around the outside edges of your cellulitis.- Symptoms of this condition include: Redness, streaking, or spotting on the skin. -Swollen area of the skin.   Tenderness or pain when an area of the skin is touched. -Warm skin. -A fever. -Chills. Blisters.  Elevate the area above the level of your heart as often as you can. This will help decrease swelling and pain. Seek care immediately if: Your wound gets larger and more painful.  You feel a crackling under your skin when you touch it. You have purple dots or bumps on your skin, or you see bleeding under your skin. You have new swelling and pain in your legs. -The red, warm, swollen,. -You see red streaks coming from the infected area. Contact your healthcare provider if: You have a fever-Your fever or pain does not go away or gets worse.-The area does not get smaller after 2 days of antibiotics.-Your skin is flaking or peeling off.-You have questions or concerns about your condition or care.      SECONDARY DISCHARGE DIAGNOSES  Diagnosis: DVT, lower extremity  Assessment and Plan of Treatment: WHAT IS A DEEP VEIN THROMBOSIS? A deep vein thrombosis (DVT) is a blood clot that forms in a deep vein of the body.  THINGS TO DO: (1) Continue to elevate your legs above the level of your heart when you sit or lie down, as often as you can to help decrease swelling and pain (2) Maintain a healthy weight (3) Change your body position or move around often. Move and stretch in your seat several times each hour if you travel by car or work at a desk (4) Do not smoke - nicotine and other chemicals in cigarettes and cigars can damage blood vessels and make it more difficult to manage your DVT.   MONITOR THESE SIGNS OR SYMPTOMS: (1) Worsening lightheadedness (2) Worsening shortness of breath (3) New or worsening chest pain (4) Your arm or leg feels warm, tender, and swollen. If you experience any of these, DO alert your primary care provider, or return to the Emergency Department if you feel very sick.

## 2023-10-10 NOTE — PROGRESS NOTE ADULT - SUBJECTIVE AND OBJECTIVE BOX
Subjective:    pat lying in bed, no new complaints, sleepy but arousable.    Home Medications:  aspirin 81 mg oral tablet, chewable: 1 tab(s) by PEG tube once a day (30 Sep 2023 18:38)  atorvastatin 20 mg oral tablet: 1 tab(s) by PEG tube once a day (at bedtime) (30 Sep 2023 18:38)  carbidopa-levodopa 25 mg-100 mg oral tablet: 1.5 tab(s) by gastrostomy tube 2 times a day at 8 AM and 12 PM (30 Sep 2023 18:34)  carbidopa-levodopa 25 mg-100 mg oral tablet: 1 tab(s) by gastrostomy tube 2 times a day at 4 PM and 8 PM (30 Sep 2023 18:34)  donepezil 5 mg oral tablet: 1 tab(s) by PEG tube once a day (at bedtime) (30 Sep 2023 18:38)  doxazosin 2 mg oral tablet: 1 tab(s) by PEG tube once a day (at bedtime) (30 Sep 2023 18:34)  Eliquis 5 mg oral tablet: 1 tab(s) orally 2 times a day (30 Sep 2023 18:38)  lansoprazole 30 mg oral tablet, disintegratin cap(s) by PEG tube once a day (30 Sep 2023 18:38)  lisinopril 2.5 mg oral tablet: 1 tab(s) by PEG tube 2 times a day (30 Sep 2023 18:38)  metoprolol tartrate 50 mg oral tablet: 0.5 tab(s) by PEG tube once a day (30 Sep 2023 18:38)  QUEtiapine 25 mg oral tablet: 1 tab(s) orally 2 times a day (30 Sep 2023 21:50)  Vitamin C 500 mg/5 mL oral liquid: 5 milliliter(s) orally once a day (30 Sep 2023 21:50)    MEDICATIONS  (STANDING):  apixaban 5 milliGRAM(s) Enteral Tube every 12 hours  ascorbic acid 500 milliGRAM(s) Oral daily  aspirin  chewable 81 milliGRAM(s) Oral daily  atorvastatin 20 milliGRAM(s) Oral at bedtime  carbidopa/levodopa  25/100 1 Tablet(s) Oral <User Schedule>  carbidopa/levodopa  25/100 1.5 Tablet(s) Oral <User Schedule>  donepezil 5 milliGRAM(s) Oral at bedtime  doxazosin 2 milliGRAM(s) Oral at bedtime  furosemide    Tablet 40 milliGRAM(s) Oral daily  lisinopril 2.5 milliGRAM(s) Oral every 12 hours  metoprolol tartrate 25 milliGRAM(s) Oral daily  pantoprazole    Tablet 40 milliGRAM(s) Oral before breakfast  QUEtiapine 25 milliGRAM(s) Oral two times a day  sodium chloride 3%  Inhalation 4 milliLiter(s) Inhalation every 12 hours    MEDICATIONS  (PRN):      Allergies    Originally Entered as [Unknown] reaction to [fresh fruits] (Unknown)  apple (Anaphylaxis)  raw, fresh fruits- reynaldo family (apple, pear, apricot, peach, fig); processed/cooked is ok. (Anaphylaxis)  No Known Drug Allergies    Intolerances        Vital Signs Last 24 Hrs  T(C): 37 (10 Oct 2023 08:16), Max: 37 (10 Oct 2023 08:16)  T(F): 98.6 (10 Oct 2023 08:16), Max: 98.6 (10 Oct 2023 08:16)  HR: 80 (10 Oct 2023 08:16) (73 - 80)  BP: 125/64 (10 Oct 2023 08:16) (125/64 - 158/77)  BP(mean): --  RR: 18 (10 Oct 2023 08:16) (18 - 18)  SpO2: 100% (10 Oct 2023 08:16) (100% - 100%)    Parameters below as of 10 Oct 2023 08:16  Patient On (Oxygen Delivery Method): nasal cannula          PHYSICAL EXAMINATION:    NECK:  Supple. No lymphadenopathy. Jugular venous pressure not elevated. Carotids equal.   HEART:   The cardiac impulse has a normal quality. Reg., Nl S1 and S2.  There are no murmurs, rubs or gallops noted  CHEST:  Chest crackles to auscultation. Normal respiratory effort.  ABDOMEN:  Soft and nontender.   EXTREMITIES:  There is no edema.       LABS:

## 2023-10-10 NOTE — PHYSICAL THERAPY INITIAL EVALUATION ADULT - NSPTDISCHREC_GEN_A_CORE
Pt is not a PT candidate, unable to follow commands, Rigidity+, dozes off between PT intervention, Pt would need LTC, DC from PT, D/w RN Tonie/Long Term Care/SNF/No skilled PT needs
No

## 2023-10-10 NOTE — PHYSICAL THERAPY INITIAL EVALUATION ADULT - GENERAL OBSERVATIONS, REHAB EVAL
Pt was found lying in bed with IV, PEG tube on, pt listing on his Rt side, drifts into sleep during PT intervention, rigidity+, unable to follow commands except squeeze hands

## 2023-10-10 NOTE — DISCHARGE NOTE PROVIDER - CARE PROVIDER_API CALL
Miko Arredondo  Cardiovascular Disease  [address] [place] [address]  Phone: [phone]  Fax: [phone]  Follow Up Time:

## 2023-10-10 NOTE — DISCHARGE NOTE PROVIDER - DETAILS OF MALNUTRITION DIAGNOSIS/DIAGNOSES
This patient has been assessed with a concern for Malnutrition and was treated during this hospitalization for the following Nutrition diagnosis/diagnoses:     -  10/01/2023: Severe protein-calorie malnutrition   -  10/01/2023: Underweight (BMI < 19)

## 2023-10-11 PROCEDURE — 99231 SBSQ HOSP IP/OBS SF/LOW 25: CPT

## 2023-10-11 PROCEDURE — 99232 SBSQ HOSP IP/OBS MODERATE 35: CPT

## 2023-10-11 RX ADMIN — Medication 25 MILLIGRAM(S): at 09:27

## 2023-10-11 RX ADMIN — QUETIAPINE FUMARATE 25 MILLIGRAM(S): 200 TABLET, FILM COATED ORAL at 21:45

## 2023-10-11 RX ADMIN — APIXABAN 5 MILLIGRAM(S): 2.5 TABLET, FILM COATED ORAL at 09:25

## 2023-10-11 RX ADMIN — LISINOPRIL 2.5 MILLIGRAM(S): 2.5 TABLET ORAL at 09:27

## 2023-10-11 RX ADMIN — APIXABAN 5 MILLIGRAM(S): 2.5 TABLET, FILM COATED ORAL at 21:45

## 2023-10-11 RX ADMIN — ATORVASTATIN CALCIUM 20 MILLIGRAM(S): 80 TABLET, FILM COATED ORAL at 21:45

## 2023-10-11 RX ADMIN — SODIUM CHLORIDE 4 MILLILITER(S): 9 INJECTION INTRAMUSCULAR; INTRAVENOUS; SUBCUTANEOUS at 08:49

## 2023-10-11 RX ADMIN — Medication 40 MILLIGRAM(S): at 09:27

## 2023-10-11 RX ADMIN — CARBIDOPA AND LEVODOPA 1 TABLET(S): 25; 100 TABLET ORAL at 21:45

## 2023-10-11 RX ADMIN — CARBIDOPA AND LEVODOPA 1.5 TABLET(S): 25; 100 TABLET ORAL at 09:25

## 2023-10-11 RX ADMIN — DONEPEZIL HYDROCHLORIDE 5 MILLIGRAM(S): 10 TABLET, FILM COATED ORAL at 21:45

## 2023-10-11 RX ADMIN — CARBIDOPA AND LEVODOPA 1 TABLET(S): 25; 100 TABLET ORAL at 14:08

## 2023-10-11 RX ADMIN — LISINOPRIL 2.5 MILLIGRAM(S): 2.5 TABLET ORAL at 21:45

## 2023-10-11 RX ADMIN — SODIUM CHLORIDE 4 MILLILITER(S): 9 INJECTION INTRAMUSCULAR; INTRAVENOUS; SUBCUTANEOUS at 20:24

## 2023-10-11 RX ADMIN — Medication 2 MILLIGRAM(S): at 21:45

## 2023-10-11 RX ADMIN — CARBIDOPA AND LEVODOPA 1.5 TABLET(S): 25; 100 TABLET ORAL at 11:52

## 2023-10-11 RX ADMIN — PANTOPRAZOLE SODIUM 40 MILLIGRAM(S): 20 TABLET, DELAYED RELEASE ORAL at 09:26

## 2023-10-11 RX ADMIN — Medication 500 MILLIGRAM(S): at 09:28

## 2023-10-11 RX ADMIN — Medication 81 MILLIGRAM(S): at 09:27

## 2023-10-11 RX ADMIN — QUETIAPINE FUMARATE 25 MILLIGRAM(S): 200 TABLET, FILM COATED ORAL at 09:28

## 2023-10-11 NOTE — PROGRESS NOTE ADULT - SUBJECTIVE AND OBJECTIVE BOX
CC: failure to thrive  SUBJECTIVE:   HPI: 87-year-old Male with PMHx BPH, diverticulosis, GERD, HTN, necrotizing enterocolitis, PEG tube in place, tube feeding dependent, osteoporosis, Parkinson's dementia, TIA, CVA, prostate CA, sensorineural hearing loss presented to ED for evaluation of worsening RLE/LUE edema. Pt unable to provide history, A&OX1 at baseline. Son and wife at bedside during admission to provide history. [facility] that [facility] RLE D[facility] (started on Eliquis) and treated for cellulitis. [facility] and presented to [facility] ED 9/30 as family noticed worsening edema. Pts son states they were told the patient had ulcers in his intestines, considered this risk prior to initiation of Eliquis. Son endorses pt having brown stools, denies melena/hematochezia. States pt previously treated for aspiration PNA and identified as high risk. [facility] stated [facility] in [facility] lungs.       pt is 88 y/o male who was admitted for acute on chronic resp failure due to HFpEF/ pancytopenia, and cellulitis with concern for aspiration PNA. pt w/ moderate pulmonary edema and effusion was given IV Lasix w/ improvement in dyspnea. pt was switched to PO lasix w/ improvement. pt was placed on zosyn for aspiration PNA. pt was followed by pulm Dr. Mcconnell - placed on hypretonic saline for secretion clearing, pt was also evaled by Thoracic - no intervention given improvement.   pt tube feeds held due to mucoid plugging and thick secretions. pt required frequent suctioning and oral care.   pt cellulitis of right leg was managed PO doxy and IV ceftriaxone pt was kept on his A/C for his recent hx of DVT - repeat US w/ Deep venous thrombosis: above and below the knee. for pancytopenia - he was evaled by heme/onc which heme Dr. Escobar - infectious process was treated and WBC, platelets improved. labs and vitals were trended.   pt at high risk for recurrent aspiration evaled by Pall care - no other limits set beyond Dnr/dni - pt was recc for home hospice they have declined thus far.       S:  10/11: Pt lying in bed, comfortable, status quo, medically stable and ready for discharge home with home hospice services.    REVIEW OF SYSTEMS: Unable to obtain due to dementia.    Vital Signs Last 24 Hrs  T(C): 36.9 (11 Oct 2023 07:38), Max: 36.9 (11 Oct 2023 07:38)  T(F): 98.4 (11 Oct 2023 07:38), Max: 98.4 (11 Oct 2023 07:38)  HR: 75 (11 Oct 2023 07:38) (75 - 77)  BP: 127/57 (11 Oct 2023 07:38) (125/69 - 128/67)  BP(mean): --  RR: 18 (11 Oct 2023 07:38) (18 - 19)  SpO2: 100% (11 Oct 2023 07:38) (99% - 100%)    Parameters below as of 11 Oct 2023 07:38  Patient On (Oxygen Delivery Method): nasal cannula    PHYSICAL EXAM:    Constitutional: NAD, confused, lethargic, weak appearing, thin, cachecatic  HEENT: PERR, EOMI, Normal Hearing, MMM  Neck: Soft and supple  Respiratory: Breath sounds are clear bilaterally, No wheezing, rales or rhonchi  Cardiovascular: S1 and S2, regular rate and rhythm, no Murmurs, gallops or rubs  Gastrointestinal: Bowel Sounds present, soft, nontender, nondistended, no guarding, no rebound  Extremities: No peripheral edema  Neurological: A/O x0, no focal deficits in my limited exam    MEDICATIONS  (STANDING):  apixaban 5 milliGRAM(s) Enteral Tube every 12 hours  ascorbic acid 500 milliGRAM(s) Oral daily  aspirin  chewable 81 milliGRAM(s) Oral daily  atorvastatin 20 milliGRAM(s) Oral at bedtime  carbidopa/levodopa  25/100 1 Tablet(s) Oral <User Schedule>  carbidopa/levodopa  25/100 1.5 Tablet(s) Oral <User Schedule>  donepezil 5 milliGRAM(s) Oral at bedtime  doxazosin 2 milliGRAM(s) Oral at bedtime  furosemide    Tablet 40 milliGRAM(s) Oral daily  lisinopril 2.5 milliGRAM(s) Oral every 12 hours  metoprolol tartrate 25 milliGRAM(s) Oral daily  pantoprazole    Tablet 40 milliGRAM(s) Oral before breakfast  QUEtiapine 25 milliGRAM(s) Oral two times a day  sodium chloride 3%  Inhalation 4 milliLiter(s) Inhalation every 12 hours    MEDICATIONS  (PRN):        #Acute respiratory failure: Due to Acute on chronic HFpEF with Pulmonary Edema and Effusions complicated by moderate-severe AS and aspiration pneumonia / enterorhinovirus URI:  - repeat chest xray: effusions   - BNP normalized  - IV lasix 40mg qd --> change to oral daily tolerating well   - TTE: moderate-severe AS;   - cardiology recs --> c/w ACE and metoprolol  - s/p IV zosyn for probable gram neg belkis bacteria   - thoracic surgery consult appreciated, no indication for thoracentesis due to clinical improvement.   - cont hypertonic saline nebulizer tx to assist with secretion clearing   - supportive care    # RLE DVT:  - c/w apixaban   - U/S with DVT.     # Cellulitis RLE: RESOLVING  - completed ceftriaxone qd and doxycycline 100 mg PO b.i.d. per ID x 7 day course   - elevate leg      #Pancytopenia likely 2/2 cellulitis and URI:  IMPROVING       # Severe Protein Calorie Malnutrition:  - c/w PEG tube feedings due to dysphagia   - supplements per nutrition recommendations    - aspiration precautions, sit pt upright during feedings      # Normocytic Anemia: Stable  - heme consult appreciated likely d/t bone marrow suppression 2/2 ongoing infections, antibiotic use, recent hospitalization possibly dilutional/d/t nutritional deficiency      # GERD, HTN, Parkinson's Dementia  - c/w home meds: pantoprazole, lisinopril, metoprolol, seroquel, carvidopa-levodopa,     # Unchanged L2 compression fx/rib and R clavicle fx/R renal cyst incidental findings on CT  - f/u outpatient     # DVT Prophylaxis  - c/w ASA, apixaban     # functional quadriplegia   - due to advanced age and dementia   - total care per RN   - recc to dc home with hospice       Code status:  - DNR/DNI    Dispo:  - d/c home with home hospice.

## 2023-10-11 NOTE — PROGRESS NOTE ADULT - ASSESSMENT
88 y/o Male with h/o BPH, RLE DVT, diverticulosis, GERD, HTN, PEG placement, necrotizing enterocolitis, osteoporosis, Parkinson's dementia, PEG placement, TIA prostate cancer, hearing loss, old CVA was admitted on 9/30 for worsening RLE / LUE swelling. Patient is unable to provide history, son and wife at bedside provides history. States that [facility] and treated for cellulitis. He was discharged on the day PTA, and this AM family noticed patient's RLE and LUE swelling was worsening. Patient's son states they were told patient had ulcers in intestines, and considered risk of this prior to starting Eliquis. Son states patient has brown stool, no melena / hematochezia. States patient is an aspiration risk, and previously treated for aspiration PNA as well. [facility] also [facility] that [facility] had [facility] in his lungs. In ER he received ceftriaxone.     PROBLEMS:    Low grade fever-URI with rhinovirus  B/l pleural effusions with pulmonary congestion  RLE DVT  Metabolic encephalopathy   Parkinsons disease  HTN (hypertension)  Prostate cancer  Nonrheumatic aortic valve insufficiency  Chronic rhinitis    PLAN:    pulmonary better-decd planning  pallaitive   Off abx  CXR  pleural effusion improved  Ct surgery no thorocentesis  Aspiration precautions  Supportive care  DVT-Eliquis

## 2023-10-11 NOTE — PROGRESS NOTE ADULT - PROBLEM/PLAN-1
DISPLAY PLAN FREE TEXT
Vaccine status unknown

## 2023-10-11 NOTE — PROGRESS NOTE ADULT - SUBJECTIVE AND OBJECTIVE BOX
HPI: 86 y/o M w/ PMH of BPH, diverticulosis, GERD, HTN, PED placement, necrotizing enterocolitis, osteoporosis, parkinson's dementia, TIA prostate cancer, hearing loss, CVA, p/w worsening RLE / LUE swelling. Patient is unable to provide history, son and wife at bedside provides history. States that [facility] with RLE D[facility] and treated for cellulitis. He was discharged yesterday, and this AM family noticed patient's RLE and LUE swelling was worsening. Patient's son states they were told patient had ulcers in intestines, and considered risk of this prior to starting Eliquis. Son states patient has brown stool, no melena / hematochezia. States patient is an aspiration risk, and previously treated for aspiration PNA as well. [facility] was also [facility] that [facility] had [facility] in his lungs. In the morning patient also c/o HA to son.     10/5: Seen and examined at bedside. More alert today. A&Ox1. Speech unintelligible. In NAD.  10/6: Seen and examined at bedside. Arouses to verbal stimuli. A&Ox1. Minimally verbal, speech more clear today. In NAD. Spoke with patient's wife and son- considering hospice but not ready to make decision yet.  10/9: Seen and examined at bedside. Patient awake, alert, more verbal today. A&Ox2. In NAD.   10/11: Patient seen and examined at bedside. Lethargic, moans but not opening eyes, difficult to arouse. Family agreed to home hospice with [facility].       CODE STATUS: DNR/DNI trial NIV      Pain and Dyspnea:     no nonverbal indicators of pain  on 2LNC, in NAD      Review of Systems:    Unable to obtain/Limited due to: lethargic/ unable to provide        PHYSICAL EXAM:    Vital Signs Last 24 Hrs  T(C): 36.9 (11 Oct 2023 07:38), Max: 36.9 (11 Oct 2023 07:38)  T(F): 98.4 (11 Oct 2023 07:38), Max: 98.4 (11 Oct 2023 07:38)  HR: 75 (11 Oct 2023 07:38) (75 - 77)  BP: 127/57 (11 Oct 2023 07:38) (125/69 - 128/67)  BP(mean): --  RR: 18 (11 Oct 2023 07:38) (18 - 19)  SpO2: 100% (11 Oct 2023 07:38) (99% - 100%)    Parameters below as of 11 Oct 2023 07:38  Patient On (Oxygen Delivery Method): nasal cannula      Daily     Daily Weight in k.5 (11 Oct 2023 05:43)    PPSV2:  20%  FAST: 7D    General: Lethargic, difficult to arouse, moaning but not opening eyes.  HEENT: Dry mucous membranes. On 2LNC.  Lungs: CTA bilaterally.  Cardiac: Regular rate and rhythm. S1S2.  GI: +PEG.  : No suprapubic tenderness.  Ext: LUE +2 edema. RLE +2 edema and erythema. +2 pedal pulses.  Neuro: Lethargic. Limited exam.        LABS and RADIOLOGY: REVIEWED

## 2023-10-11 NOTE — PROGRESS NOTE ADULT - PROBLEM SELECTOR PROBLEM 1
Parkinson's disease dementia

## 2023-10-11 NOTE — PROGRESS NOTE ADULT - ASSESSMENT
86 y/o M w/ PMH of BPH, diverticulosis, GERD, HTN, PED placement, necrotizing enterocolitis, osteoporosis, parkinson's dementia, TIA prostate cancer, hearing loss, CVA, p/w worsening RLE / LUE swelling. Patient is unable to provide history, son and wife at bedside provides history. States that [facility] with RLE D[facility] and treated for cellulitis. He was discharged yesterday, and this AM family noticed patient's RLE and LUE swelling was worsening. Patient's son states they were told patient had ulcers in intestines, and considered risk of this prior to starting Eliquis. Son states patient has brown stool, no melena / hematochezia. States patient is an aspiration risk, and previously treated for aspiration PNA as well. [facility] also [facility] that [facility] had [facility] in his lungs. In the morning patient also c/o HA to son.       1. Pulmonary edema and pleural effusions  -suspect CHF exacerbation  -IV lasix -> now on PO lasix daily  -cardiology following  -blood cultures and ucx pending NGTD  -off antibiotics now  -possible asp PNA; patient has PEG receiving bolus TF   -ID following  -pulmonary following  -thoracics consulted for possible thoracentesis: not pursuing, continue medical management at this time    2. Parkinson's dementia  -per family, recent functional decline since January of this year  -dysphagia with PEG placement in January 2023  -on bolus TF at home  -patient with recurrent aspiration PNA  -baseline wheelchair bound  -poor prognosis, multiple rehospitalization in past 2 months  -Palliative strongly suggesting hospice to family; family agreed to home with hospice 10/10      Process of Care   --Reviewed dx/treatment problems and alignment with Goals of Care        Physical Aspects of Care   --Pain   no nonverbal indicators of pain  c/w current management     --Bowel Regimen   risk for constipation d/t immobility   daily dulcolax as needed    --Dyspnea   on 2LNC  comfortable and in NAD     --Nausea Vomiting   denies     --Weakness   PT as tolerated    WCB at baseline      Psychological and Psychiatric Aspects of Care:    --Grief/ Bereavement: emotional support provided   --Hx of psychiatric dx: none   --Pastoral Care Available PRN        Social Aspects of Care   --SW involved        Cultural Aspects   -Primary Language: English           Goals of Care: MOLST with current limitations of DNR/DNI and trial NIV. Spoke with wife and daughter at length on 10/4 and 10/6. Palliative strongly suggested patient returning home on hospice. Wife not ready for hospice, but is starting to consider. Family understanding of poor prognosis. Palliative spoke with family at length 10/6: not ready for hospice, no limits set in care. Family has palliative's contact info and can reach out at any time with further questions/support.  10/10: Family agreed to home with hospice services through [facility] as per SW notes.        We discussed Palliative Care team being a supportive team when a patient has ongoing illnesses.  We also discussed that it is not an end of life care service, but can help navigate symptoms and emotional support throughout their hospital stay here.           Ethical and Legal Aspects: NA           Capacity- patient A&OxO today, hx of Parkinson's dementia; does not have medical decision making capacity at this time       HCP/Surrogate: Dona Holder, wife [phone]        Code Status: DNR/DNI trial NIV    MOLST: prior MOLST in chart, family confirmed limitations of DNR/DNI trial NIV     Dispo Plan: home with home hospice through [facility]          Discussed With: Dr. Resendiz & Sarah Goetz NP          Case coordinated with attending and SW and RN            Time Spent: 45 minutes including the care, coordination and counseling of this patient, 50% of which was spent coordinating and counseling.

## 2023-10-11 NOTE — PROGRESS NOTE ADULT - SUBJECTIVE AND OBJECTIVE BOX
Subjective:    pat lying in bed, awake, possible home hospice, no respiratory distress.    Home Medications:  aspirin 81 mg oral tablet, chewable: 1 tab(s) by PEG tube once a day (30 Sep 2023 18:38)  atorvastatin 20 mg oral tablet: 1 tab(s) by PEG tube once a day (at bedtime) (30 Sep 2023 18:38)  carbidopa-levodopa 25 mg-100 mg oral tablet: 1.5 tab(s) by gastrostomy tube 2 times a day at 8 AM and 12 PM (30 Sep 2023 18:34)  carbidopa-levodopa 25 mg-100 mg oral tablet: 1 tab(s) by gastrostomy tube 2 times a day at 4 PM and 8 PM (30 Sep 2023 18:34)  donepezil 5 mg oral tablet: 1 tab(s) by PEG tube once a day (at bedtime) (30 Sep 2023 18:38)  doxazosin 2 mg oral tablet: 1 tab(s) by PEG tube once a day (at bedtime) (30 Sep 2023 18:34)  Eliquis 5 mg oral tablet: 1 tab(s) orally 2 times a day (30 Sep 2023 18:38)  furosemide 40 mg oral tablet: 1 tab(s) orally once a day (10 Oct 2023 14:09)  lansoprazole 30 mg oral tablet, disintegratin cap(s) by PEG tube once a day (30 Sep 2023 18:38)  lisinopril 2.5 mg oral tablet: 1 tab(s) by PEG tube 2 times a day (30 Sep 2023 18:38)  metoprolol tartrate 50 mg oral tablet: 0.5 tab(s) by PEG tube once a day (30 Sep 2023 18:38)  QUEtiapine 25 mg oral tablet: 1 tab(s) orally 2 times a day (30 Sep 2023 21:50)  Vitamin C 500 mg/5 mL oral liquid: 5 milliliter(s) orally once a day (30 Sep 2023 21:50)    MEDICATIONS  (STANDING):  apixaban 5 milliGRAM(s) Enteral Tube every 12 hours  ascorbic acid 500 milliGRAM(s) Oral daily  aspirin  chewable 81 milliGRAM(s) Oral daily  atorvastatin 20 milliGRAM(s) Oral at bedtime  carbidopa/levodopa  25/100 1 Tablet(s) Oral <User Schedule>  carbidopa/levodopa  25/100 1.5 Tablet(s) Oral <User Schedule>  donepezil 5 milliGRAM(s) Oral at bedtime  doxazosin 2 milliGRAM(s) Oral at bedtime  furosemide    Tablet 40 milliGRAM(s) Oral daily  lisinopril 2.5 milliGRAM(s) Oral every 12 hours  metoprolol tartrate 25 milliGRAM(s) Oral daily  pantoprazole    Tablet 40 milliGRAM(s) Oral before breakfast  QUEtiapine 25 milliGRAM(s) Oral two times a day  sodium chloride 3%  Inhalation 4 milliLiter(s) Inhalation every 12 hours    MEDICATIONS  (PRN):      Allergies    Originally Entered as [Unknown] reaction to [fresh fruits] (Unknown)  apple (Anaphylaxis)  raw, fresh fruits- reynaldo family (apple, pear, apricot, peach, fig); processed/cooked is ok. (Anaphylaxis)  No Known Drug Allergies    Intolerances        Vital Signs Last 24 Hrs  T(C): 36.9 (11 Oct 2023 07:38), Max: 36.9 (11 Oct 2023 07:38)  T(F): 98.4 (11 Oct 2023 07:38), Max: 98.4 (11 Oct 2023 07:38)  HR: 75 (11 Oct 2023 07:38) (75 - 77)  BP: 127/57 (11 Oct 2023 07:38) (125/69 - 128/67)  BP(mean): --  RR: 18 (11 Oct 2023 07:38) (18 - 19)  SpO2: 100% (11 Oct 2023 07:38) (99% - 100%)    Parameters below as of 11 Oct 2023 07:38  Patient On (Oxygen Delivery Method): nasal cannula          PHYSICAL EXAMINATION:    NECK:  Supple. No lymphadenopathy. Jugular venous pressure not elevated. Carotids equal.   HEART:   The cardiac impulse has a normal quality. Reg., Nl S1 and S2.  There are no murmurs, rubs or gallops noted  CHEST:  Chest crackles to auscultation. Normal respiratory effort.  ABDOMEN:  Soft and nontender.   EXTREMITIES:  There is no edema.       LABS:        US Duplex Venous Lower Ext Ltd, Right (10.10.23 @ 11:16) >  IMPRESSION:  Acute deep venous thrombosis: above the knee.    Right leg deep vein thrombosis, not significantly changed..     Xray Chest 1 View-PORTABLE IMMEDIATE (Xray Chest 1 View-PORTABLE IMMEDIATE .) (10.08.23 @ 09:00) >  Bibasilar hazy effusions, basilar atelectasis and bilateral moderate   effusions. No significant change in comparison with prior. The   cardiomediastinal silhouette is normal. Bones are grossly normal.    IMPRESSION: Findings as above.

## 2023-10-12 PROCEDURE — 99232 SBSQ HOSP IP/OBS MODERATE 35: CPT

## 2023-10-12 RX ADMIN — LISINOPRIL 2.5 MILLIGRAM(S): 2.5 TABLET ORAL at 22:11

## 2023-10-12 RX ADMIN — DONEPEZIL HYDROCHLORIDE 5 MILLIGRAM(S): 10 TABLET, FILM COATED ORAL at 22:10

## 2023-10-12 RX ADMIN — QUETIAPINE FUMARATE 25 MILLIGRAM(S): 200 TABLET, FILM COATED ORAL at 10:40

## 2023-10-12 RX ADMIN — Medication 2 MILLIGRAM(S): at 22:11

## 2023-10-12 RX ADMIN — SODIUM CHLORIDE 4 MILLILITER(S): 9 INJECTION INTRAMUSCULAR; INTRAVENOUS; SUBCUTANEOUS at 08:10

## 2023-10-12 RX ADMIN — CARBIDOPA AND LEVODOPA 1.5 TABLET(S): 25; 100 TABLET ORAL at 10:39

## 2023-10-12 RX ADMIN — ATORVASTATIN CALCIUM 20 MILLIGRAM(S): 80 TABLET, FILM COATED ORAL at 22:10

## 2023-10-12 RX ADMIN — SODIUM CHLORIDE 4 MILLILITER(S): 9 INJECTION INTRAMUSCULAR; INTRAVENOUS; SUBCUTANEOUS at 20:39

## 2023-10-12 RX ADMIN — Medication 25 MILLIGRAM(S): at 10:40

## 2023-10-12 RX ADMIN — Medication 81 MILLIGRAM(S): at 10:39

## 2023-10-12 RX ADMIN — CARBIDOPA AND LEVODOPA 1 TABLET(S): 25; 100 TABLET ORAL at 20:15

## 2023-10-12 RX ADMIN — CARBIDOPA AND LEVODOPA 1 TABLET(S): 25; 100 TABLET ORAL at 14:23

## 2023-10-12 RX ADMIN — Medication 500 MILLIGRAM(S): at 10:40

## 2023-10-12 RX ADMIN — PANTOPRAZOLE SODIUM 40 MILLIGRAM(S): 20 TABLET, DELAYED RELEASE ORAL at 10:40

## 2023-10-12 RX ADMIN — QUETIAPINE FUMARATE 25 MILLIGRAM(S): 200 TABLET, FILM COATED ORAL at 22:06

## 2023-10-12 RX ADMIN — APIXABAN 5 MILLIGRAM(S): 2.5 TABLET, FILM COATED ORAL at 22:11

## 2023-10-12 RX ADMIN — Medication 40 MILLIGRAM(S): at 10:41

## 2023-10-12 RX ADMIN — APIXABAN 5 MILLIGRAM(S): 2.5 TABLET, FILM COATED ORAL at 10:41

## 2023-10-12 RX ADMIN — LISINOPRIL 2.5 MILLIGRAM(S): 2.5 TABLET ORAL at 10:43

## 2023-10-12 NOTE — PROGRESS NOTE ADULT - SUBJECTIVE AND OBJECTIVE BOX
CC: failure to thrive  SUBJECTIVE:   HPI: 87-year-old Male with PMHx BPH, diverticulosis, GERD, HTN, necrotizing enterocolitis, PEG tube in place, tube feeding dependent, osteoporosis, Parkinson's dementia, TIA, CVA, prostate CA, sensorineural hearing loss presented to ED for evaluation of worsening RLE/LUE edema. Pt unable to provide history, A&OX1 at baseline. Son and wife at bedside during admission to provide history. [facility] that [facility] with RLE DVT (started on Eliquis) and treated for cellulitis. [facility] and presented to [facility] ED 9/30 as family noticed worsening edema. Pts son states they were told the patient had ulcers in his intestines, considered this risk prior to initiation of Eliquis. Son endorses pt having brown stools, denies melena/hematochezia. States pt previously treated for aspiration PNA and identified as high risk. [facility] stated that [facility] has [facility] in his lungs.       pt is 88 y/o male who was admitted for acute on chronic resp failure due to HFpEF/ pancytopenia, and cellulitis with concern for aspiration PNA. pt w/ moderate pulmonary edema and effusion was given IV Lasix w/ improvement in dyspnea. pt was switched to PO lasix w/ improvement. pt was placed on zosyn for aspiration PNA. pt was followed by pulm Dr. Mcconnell - placed on hypretonic saline for secretion clearing, pt was also evaled by Thoracic - no intervention given improvement.   pt tube feeds held due to mucoid plugging and thick secretions. pt required frequent suctioning and oral care.   pt cellulitis of right leg was managed PO doxy and IV ceftriaxone pt was kept on his A/C for his recent hx of DVT - repeat US w/ Deep venous thrombosis: above and below the knee. for pancytopenia - he was evaled by heme/onc which heme Dr. Escobar - infectious process was treated and WBC, platelets improved. labs and vitals were trended.   pt at high risk for recurrent aspiration evaled by Pall care - no other limits set beyond Dnr/dni - pt was recc for home hospice they have declined thus far.       S:  10/11: Pt lying in bed, comfortable, status quo, medically stable and ready for discharge home with home hospice services.  10/12:  Status quo, no new events. Awaiting d/c home with home hospice services.    REVIEW OF SYSTEMS: Unable to obtain due to dementia.    Vital Signs Last 24 Hrs  T(C): 37.3 (12 Oct 2023 09:30), Max: 37.3 (12 Oct 2023 09:30)  T(F): 99.2 (12 Oct 2023 09:30), Max: 99.2 (12 Oct 2023 09:30)  HR: 84 (12 Oct 2023 09:30) (75 - 84)  BP: 157/86 (12 Oct 2023 09:30) (132/62 - 159/85)  BP(mean): --  RR: 17 (12 Oct 2023 09:30) (17 - 19)  SpO2: 100% (12 Oct 2023 09:30) (99% - 100%)    Parameters below as of 12 Oct 2023 09:30  Patient On (Oxygen Delivery Method): nasal cannula  O2 Flow (L/min): 2      PHYSICAL EXAM:    Constitutional: NAD, confused, lethargic, weak appearing, thin, cachectic  HEENT: PERR, EOMI, Normal Hearing, MMM  Neck: Soft and supple  Respiratory: Breath sounds are clear bilaterally, No wheezing, rales or rhonchi  Cardiovascular: S1 and S2, regular rate and rhythm, no Murmurs, gallops or rubs  Gastrointestinal: Bowel Sounds present, soft, nontender, nondistended, no guarding, no rebound  Extremities: No peripheral edema  Neurological: A/O x0, no focal deficits in my limited exam    MEDICATIONS  (STANDING):  apixaban 5 milliGRAM(s) Enteral Tube every 12 hours  ascorbic acid 500 milliGRAM(s) Oral daily  aspirin  chewable 81 milliGRAM(s) Oral daily  atorvastatin 20 milliGRAM(s) Oral at bedtime  carbidopa/levodopa  25/100 1 Tablet(s) Oral <User Schedule>  carbidopa/levodopa  25/100 1.5 Tablet(s) Oral <User Schedule>  donepezil 5 milliGRAM(s) Oral at bedtime  doxazosin 2 milliGRAM(s) Oral at bedtime  furosemide    Tablet 40 milliGRAM(s) Oral daily  lisinopril 2.5 milliGRAM(s) Oral every 12 hours  metoprolol tartrate 25 milliGRAM(s) Oral daily  pantoprazole    Tablet 40 milliGRAM(s) Oral before breakfast  QUEtiapine 25 milliGRAM(s) Oral two times a day  sodium chloride 3%  Inhalation 4 milliLiter(s) Inhalation every 12 hours    MEDICATIONS  (PRN):        A/P:  #Acute respiratory failure: Due to Acute on chronic HFpEF with Pulmonary Edema and Effusions complicated by moderate-severe AS and aspiration pneumonia / enterorhinovirus URI:  - repeat chest xray: effusions   - BNP normalized  - IV lasix 40mg qd --> change to oral daily tolerating well   - TTE: moderate-severe AS;   - cardiology recs --> c/w ACE and metoprolol  - s/p IV zosyn for probable gram neg belkis bacteria   - thoracic surgery consult appreciated, no indication for thoracentesis due to clinical improvement.   - cont hypertonic saline nebulizer tx to assist with secretion clearing   - supportive care    # RLE DVT:  - c/w apixaban   - U/S with DVT.     # Cellulitis RLE: RESOLVED  - completed ceftriaxone qd and doxycycline 100 mg PO b.i.d. per ID x 7 day course   - elevate leg      #Pancytopenia likely 2/2 cellulitis and URI:  IMPROVING and stable.        # Severe Protein Calorie Malnutrition:  - c/w PEG tube feedings due to dysphagia   - supplements per nutrition recommendations    - aspiration precautions, sit pt upright during feedings      # Normocytic Anemia: Stable  - heme consult appreciated likely d/t bone marrow suppression 2/2 ongoing infections, antibiotic use, recent hospitalization possibly dilutional/d/t nutritional deficiency      # GERD, HTN, Parkinson's Dementia  - c/w home meds: pantoprazole, lisinopril, metoprolol, seroquel, carvidopa-levodopa,     # Unchanged L2 compression fx/rib and R clavicle fx/R renal cyst incidental findings on CT  - f/u outpatient     # DVT Prophylaxis  - c/w ASA, apixaban     # functional quadriplegia   - due to advanced age and dementia   - total care per RN   - recc to dc home with hospice       Code status:  - DNR/DNI    Dispo:  - d/c home with home hospice.

## 2023-10-12 NOTE — PROGRESS NOTE ADULT - ASSESSMENT
86 y/o Male with h/o BPH, RLE DVT, diverticulosis, GERD, HTN, PEG placement, necrotizing enterocolitis, osteoporosis, Parkinson's dementia, PEG placement, TIA prostate cancer, hearing loss, old CVA was admitted on 9/30 for worsening RLE / LUE swelling. Patient is unable to provide history, son and wife at bedside provides history. States that [facility] and treated for cellulitis. He was discharged on the day PTA, and this AM family noticed patient's RLE and LUE swelling was worsening. Patient's son states they were told patient had ulcers in intestines, and considered risk of this prior to starting Eliquis. Son states patient has brown stool, no melena / hematochezia. States patient is an aspiration risk, and previously treated for aspiration PNA as well. [facility] was also [facility] that [facility] had [facility] in his lungs. In ER he received ceftriaxone.     PROBLEMS:    Low grade fever-URI with rhinovirus  B/l pleural effusions with pulmonary congestion  RLE DVT  Metabolic encephalopathy   Parkinsons disease  HTN (hypertension)  Prostate cancer  Nonrheumatic aortic valve insufficiency  Chronic rhinitis    PLAN:    pulmonary better-decd planning  pallaitive   Off abx  CXR  pleural effusion improved  Ct surgery no thorocentesis  Aspiration precautions  Supportive care  DVT-Eliquis

## 2023-10-12 NOTE — PROGRESS NOTE ADULT - SUBJECTIVE AND OBJECTIVE BOX
Subjective:    pat awake-no respiratory distress.    Home Medications:  aspirin 81 mg oral tablet, chewable: 1 tab(s) by PEG tube once a day (30 Sep 2023 18:38)  atorvastatin 20 mg oral tablet: 1 tab(s) by PEG tube once a day (at bedtime) (30 Sep 2023 18:38)  carbidopa-levodopa 25 mg-100 mg oral tablet: 1.5 tab(s) by gastrostomy tube 2 times a day at 8 AM and 12 PM (30 Sep 2023 18:34)  carbidopa-levodopa 25 mg-100 mg oral tablet: 1 tab(s) by gastrostomy tube 2 times a day at 4 PM and 8 PM (30 Sep 2023 18:34)  donepezil 5 mg oral tablet: 1 tab(s) by PEG tube once a day (at bedtime) (30 Sep 2023 18:38)  doxazosin 2 mg oral tablet: 1 tab(s) by PEG tube once a day (at bedtime) (30 Sep 2023 18:34)  Eliquis 5 mg oral tablet: 1 tab(s) orally 2 times a day (30 Sep 2023 18:38)  furosemide 40 mg oral tablet: 1 tab(s) orally once a day (10 Oct 2023 14:09)  lansoprazole 30 mg oral tablet, disintegratin cap(s) by PEG tube once a day (30 Sep 2023 18:38)  lisinopril 2.5 mg oral tablet: 1 tab(s) by PEG tube 2 times a day (30 Sep 2023 18:38)  metoprolol tartrate 50 mg oral tablet: 0.5 tab(s) by PEG tube once a day (30 Sep 2023 18:38)  QUEtiapine 25 mg oral tablet: 1 tab(s) orally 2 times a day (30 Sep 2023 21:50)  Vitamin C 500 mg/5 mL oral liquid: 5 milliliter(s) orally once a day (30 Sep 2023 21:50)    MEDICATIONS  (STANDING):  apixaban 5 milliGRAM(s) Enteral Tube every 12 hours  ascorbic acid 500 milliGRAM(s) Oral daily  aspirin  chewable 81 milliGRAM(s) Oral daily  atorvastatin 20 milliGRAM(s) Oral at bedtime  carbidopa/levodopa  25/100 1 Tablet(s) Oral <User Schedule>  carbidopa/levodopa  25/100 1.5 Tablet(s) Oral <User Schedule>  donepezil 5 milliGRAM(s) Oral at bedtime  doxazosin 2 milliGRAM(s) Oral at bedtime  furosemide    Tablet 40 milliGRAM(s) Oral daily  lisinopril 2.5 milliGRAM(s) Oral every 12 hours  metoprolol tartrate 25 milliGRAM(s) Oral daily  pantoprazole    Tablet 40 milliGRAM(s) Oral before breakfast  QUEtiapine 25 milliGRAM(s) Oral two times a day  sodium chloride 3%  Inhalation 4 milliLiter(s) Inhalation every 12 hours    MEDICATIONS  (PRN):      Allergies    Originally Entered as [Unknown] reaction to [fresh fruits] (Unknown)  apple (Anaphylaxis)  raw, fresh fruits- reynaldo family (apple, pear, apricot, peach, fig); processed/cooked is ok. (Anaphylaxis)  No Known Drug Allergies    Intolerances        Vital Signs Last 24 Hrs  T(C): 36.4 (12 Oct 2023 15:50), Max: 37.3 (12 Oct 2023 09:30)  T(F): 97.5 (12 Oct 2023 15:50), Max: 99.2 (12 Oct 2023 09:30)  HR: 69 (12 Oct 2023 15:50) (69 - 84)  BP: 117/61 (12 Oct 2023 15:50) (117/61 - 159/85)  BP(mean): --  RR: 17 (12 Oct 2023 15:50) (17 - 18)  SpO2: 100% (12 Oct 2023 15:50) (99% - 100%)    Parameters below as of 12 Oct 2023 15:50  Patient On (Oxygen Delivery Method): nasal cannula  O2 Flow (L/min): 2        PHYSICAL EXAMINATION:    NECK:  Supple. No lymphadenopathy. Jugular venous pressure not elevated. Carotids equal.   HEART:   The cardiac impulse has a normal quality. Reg., Nl S1 and S2.  There are no murmurs, rubs or gallops noted  CHEST:  Chest is clear to auscultation. Normal respiratory effort.  ABDOMEN:  Soft and nontender.   EXTREMITIES:  There is no edema.       LABS:

## 2023-10-13 PROCEDURE — 71045 X-RAY EXAM CHEST 1 VIEW: CPT | Mod: 26

## 2023-10-13 PROCEDURE — 99232 SBSQ HOSP IP/OBS MODERATE 35: CPT

## 2023-10-13 RX ORDER — DONEPEZIL HYDROCHLORIDE 10 MG/1
1 TABLET, FILM COATED ORAL
Qty: 30 | Refills: 0
Start: 2023-10-13 | End: 2023-11-11

## 2023-10-13 RX ORDER — LANSOPRAZOLE 15 MG/1
1 CAPSULE, DELAYED RELEASE ORAL
Qty: 30 | Refills: 0
Start: 2023-10-13 | End: 2023-11-11

## 2023-10-13 RX ORDER — QUETIAPINE FUMARATE 200 MG/1
1 TABLET, FILM COATED ORAL
Refills: 0 | DISCHARGE

## 2023-10-13 RX ORDER — QUETIAPINE FUMARATE 200 MG/1
1 TABLET, FILM COATED ORAL
Qty: 60 | Refills: 0
Start: 2023-10-13 | End: 2023-11-11

## 2023-10-13 RX ORDER — LANSOPRAZOLE 15 MG/1
1 CAPSULE, DELAYED RELEASE ORAL
Refills: 0 | DISCHARGE

## 2023-10-13 RX ORDER — FUROSEMIDE 40 MG
1 TABLET ORAL
Qty: 30 | Refills: 0
Start: 2023-10-13 | End: 2023-11-11

## 2023-10-13 RX ORDER — DONEPEZIL HYDROCHLORIDE 10 MG/1
1 TABLET, FILM COATED ORAL
Refills: 0 | DISCHARGE

## 2023-10-13 RX ORDER — METOPROLOL TARTRATE 50 MG
0.5 TABLET ORAL
Refills: 0 | DISCHARGE

## 2023-10-13 RX ORDER — METOPROLOL TARTRATE 50 MG
0.5 TABLET ORAL
Qty: 15 | Refills: 0
Start: 2023-10-13 | End: 2023-11-11

## 2023-10-13 RX ORDER — ATORVASTATIN CALCIUM 80 MG/1
1 TABLET, FILM COATED ORAL
Qty: 30 | Refills: 0
Start: 2023-10-13 | End: 2023-11-11

## 2023-10-13 RX ORDER — LISINOPRIL 2.5 MG/1
1 TABLET ORAL
Refills: 0 | DISCHARGE

## 2023-10-13 RX ORDER — CARBIDOPA AND LEVODOPA 25; 100 MG/1; MG/1
1.5 TABLET ORAL
Qty: 90 | Refills: 0
Start: 2023-10-13 | End: 2023-11-11

## 2023-10-13 RX ORDER — LISINOPRIL 2.5 MG/1
1 TABLET ORAL
Qty: 60 | Refills: 0
Start: 2023-10-13 | End: 2023-11-11

## 2023-10-13 RX ORDER — APIXABAN 2.5 MG/1
1 TABLET, FILM COATED ORAL
Qty: 60 | Refills: 0
Start: 2023-10-13 | End: 2023-11-11

## 2023-10-13 RX ORDER — ACETAMINOPHEN 500 MG
1 TABLET ORAL
Qty: 12 | Refills: 0
Start: 2023-10-13 | End: 2023-10-15

## 2023-10-13 RX ORDER — ATORVASTATIN CALCIUM 80 MG/1
1 TABLET, FILM COATED ORAL
Refills: 0 | DISCHARGE

## 2023-10-13 RX ORDER — APIXABAN 2.5 MG/1
1 TABLET, FILM COATED ORAL
Refills: 0 | DISCHARGE

## 2023-10-13 RX ORDER — ASPIRIN/CALCIUM CARB/MAGNESIUM 324 MG
1 TABLET ORAL
Qty: 30 | Refills: 0
Start: 2023-10-13 | End: 2023-11-11

## 2023-10-13 RX ORDER — PROCHLORPERAZINE MALEATE 5 MG
1 TABLET ORAL
Qty: 6 | Refills: 0
Start: 2023-10-13 | End: 2023-10-15

## 2023-10-13 RX ORDER — CARBIDOPA AND LEVODOPA 25; 100 MG/1; MG/1
1 TABLET ORAL
Qty: 60 | Refills: 0
Start: 2023-10-13 | End: 2023-11-11

## 2023-10-13 RX ORDER — ASCORBIC ACID 60 MG
5 TABLET,CHEWABLE ORAL
Refills: 0 | DISCHARGE

## 2023-10-13 RX ORDER — APIXABAN 2.5 MG/1
2.5 TABLET, FILM COATED ORAL EVERY 12 HOURS
Refills: 0 | Status: DISCONTINUED | OUTPATIENT
Start: 2023-10-13 | End: 2023-10-13

## 2023-10-13 RX ORDER — ASCORBIC ACID 60 MG
5 TABLET,CHEWABLE ORAL
Qty: 150 | Refills: 0
Start: 2023-10-13 | End: 2023-11-11

## 2023-10-13 RX ORDER — DOXAZOSIN MESYLATE 4 MG
1 TABLET ORAL
Qty: 30 | Refills: 0
Start: 2023-10-13 | End: 2023-11-11

## 2023-10-13 RX ORDER — ASPIRIN/CALCIUM CARB/MAGNESIUM 324 MG
1 TABLET ORAL
Refills: 0 | DISCHARGE

## 2023-10-13 RX ORDER — MORPHINE SULFATE 50 MG/1
0 CAPSULE, EXTENDED RELEASE ORAL
Qty: 3 | Refills: 0
Start: 2023-10-13

## 2023-10-13 RX ADMIN — SODIUM CHLORIDE 4 MILLILITER(S): 9 INJECTION INTRAMUSCULAR; INTRAVENOUS; SUBCUTANEOUS at 08:06

## 2023-10-13 RX ADMIN — DONEPEZIL HYDROCHLORIDE 5 MILLIGRAM(S): 10 TABLET, FILM COATED ORAL at 21:29

## 2023-10-13 RX ADMIN — CARBIDOPA AND LEVODOPA 1.5 TABLET(S): 25; 100 TABLET ORAL at 13:17

## 2023-10-13 RX ADMIN — SODIUM CHLORIDE 4 MILLILITER(S): 9 INJECTION INTRAMUSCULAR; INTRAVENOUS; SUBCUTANEOUS at 21:07

## 2023-10-13 RX ADMIN — APIXABAN 5 MILLIGRAM(S): 2.5 TABLET, FILM COATED ORAL at 21:29

## 2023-10-13 RX ADMIN — CARBIDOPA AND LEVODOPA 1 TABLET(S): 25; 100 TABLET ORAL at 16:55

## 2023-10-13 RX ADMIN — ATORVASTATIN CALCIUM 20 MILLIGRAM(S): 80 TABLET, FILM COATED ORAL at 21:29

## 2023-10-13 RX ADMIN — PANTOPRAZOLE SODIUM 40 MILLIGRAM(S): 20 TABLET, DELAYED RELEASE ORAL at 11:17

## 2023-10-13 RX ADMIN — Medication 40 MILLIGRAM(S): at 11:18

## 2023-10-13 RX ADMIN — LISINOPRIL 2.5 MILLIGRAM(S): 2.5 TABLET ORAL at 11:21

## 2023-10-13 RX ADMIN — Medication 81 MILLIGRAM(S): at 11:17

## 2023-10-13 RX ADMIN — CARBIDOPA AND LEVODOPA 1.5 TABLET(S): 25; 100 TABLET ORAL at 10:16

## 2023-10-13 RX ADMIN — QUETIAPINE FUMARATE 25 MILLIGRAM(S): 200 TABLET, FILM COATED ORAL at 11:18

## 2023-10-13 RX ADMIN — Medication 25 MILLIGRAM(S): at 11:18

## 2023-10-13 RX ADMIN — Medication 2 MILLIGRAM(S): at 21:30

## 2023-10-13 RX ADMIN — LISINOPRIL 2.5 MILLIGRAM(S): 2.5 TABLET ORAL at 21:30

## 2023-10-13 RX ADMIN — QUETIAPINE FUMARATE 25 MILLIGRAM(S): 200 TABLET, FILM COATED ORAL at 21:29

## 2023-10-13 RX ADMIN — APIXABAN 5 MILLIGRAM(S): 2.5 TABLET, FILM COATED ORAL at 11:17

## 2023-10-13 RX ADMIN — Medication 500 MILLIGRAM(S): at 11:17

## 2023-10-13 NOTE — PROGRESS NOTE ADULT - NUTRITIONAL ASSESSMENT
This patient has been assessed with a concern for Malnutrition and has been determined to have a diagnosis/diagnoses of Severe protein-calorie malnutrition and Underweight (BMI < 19).    This patient is being managed with:   Diet NPO with Tube Feed-  Tube Feeding Modality: Gastrostomy  [place] Peptide 1.5  Total Volume for 24 Hours (mL): 1440  Continuous  Starting Tube Feed Rate {mL per Hour}: 20  Increase Tube Feed Rate by (mL): 20     Every 4 hours  Until Goal Tube Feed Rate (mL per Hour): 60  Tube Feed Duration (in Hours): 24  Tube Feed Start Time: 00:00  Free Water Flush  Free Water Flush Instructions:  free water flushes of 30 cc/hr; adjust prn to maintain hydration as per MD  Entered: Oct  1 2023 12:27PM  

## 2023-10-13 NOTE — PROGRESS NOTE ADULT - SUBJECTIVE AND OBJECTIVE BOX
Subjective:    pat no new complaint, lying in bed, no respiratory distress.    MEDICATIONS  (STANDING):  apixaban 5 milliGRAM(s) Enteral Tube every 12 hours  ascorbic acid 500 milliGRAM(s) Oral daily  aspirin  chewable 81 milliGRAM(s) Oral daily  atorvastatin 20 milliGRAM(s) Oral at bedtime  carbidopa/levodopa  25/100 1 Tablet(s) Oral <User Schedule>  carbidopa/levodopa  25/100 1.5 Tablet(s) Oral <User Schedule>  donepezil 5 milliGRAM(s) Oral at bedtime  doxazosin 2 milliGRAM(s) Oral at bedtime  furosemide    Tablet 40 milliGRAM(s) Oral daily  lisinopril 2.5 milliGRAM(s) Oral every 12 hours  metoprolol tartrate 25 milliGRAM(s) Oral daily  pantoprazole    Tablet 40 milliGRAM(s) Oral before breakfast  QUEtiapine 25 milliGRAM(s) Oral two times a day  sodium chloride 3%  Inhalation 4 milliLiter(s) Inhalation every 12 hours    MEDICATIONS  (PRN):      Allergies    Originally Entered as [Unknown] reaction to [fresh fruits] (Unknown)  apple (Anaphylaxis)  raw, fresh fruits- reynaldo family (apple, pear, apricot, peach, fig); processed/cooked is ok. (Anaphylaxis)  No Known Drug Allergies    Intolerances        Vital Signs Last 24 Hrs  T(C): 37.1 (13 Oct 2023 07:51), Max: 37.1 (12 Oct 2023 22:08)  T(F): 98.7 (13 Oct 2023 07:51), Max: 98.8 (12 Oct 2023 22:08)  HR: 69 (13 Oct 2023 07:51) (69 - 78)  BP: 154/56 (13 Oct 2023 07:51) (117/61 - 155/87)  BP(mean): --  RR: 18 (12 Oct 2023 22:08) (17 - 18)  SpO2: 100% (13 Oct 2023 07:51) (99% - 100%)    Parameters below as of 13 Oct 2023 07:51  Patient On (Oxygen Delivery Method): nasal cannula          PHYSICAL EXAMINATION:    NECK:  Supple. No lymphadenopathy. Jugular venous pressure not elevated. Carotids equal.   HEART:   The cardiac impulse has a normal quality. Reg., Nl S1 and S2.  There are no murmurs, rubs or gallops noted  CHEST:  Chest crackles to auscultation. Normal respiratory effort.  ABDOMEN:  Soft and nontender.   EXTREMITIES:  There is no edema.       LABS:

## 2023-10-13 NOTE — PROGRESS NOTE ADULT - SUBJECTIVE AND OBJECTIVE BOX
CC: failure to thrive  SUBJECTIVE:   HPI: 87-year-old Male with PMHx BPH, diverticulosis, GERD, HTN, necrotizing enterocolitis, PEG tube in place, tube feeding dependent, osteoporosis, Parkinson's dementia, TIA, CVA, prostate CA, sensorineural hearing loss presented to ED for evaluation of worsening RLE/LUE edema. Pt unable to provide history, A&OX1 at baseline. Son and wife at bedside during admission to provide history. [facility] that [facility] with RLE DVT (started on Eliquis) and treated for cellulitis. [facility] and presented to [facility] ED 9/30 as family noticed worsening edema. Pts son states they were told the patient had ulcers in his intestines, considered this risk prior to initiation of Eliquis. Son endorses pt having brown stools, denies melena/hematochezia. States pt previously treated for aspiration PNA and identified as high risk. [facility] that [facility] has [facility] in his lungs.       pt is 86 y/o male who was admitted for acute on chronic resp failure due to HFpEF/ pancytopenia, and cellulitis with concern for aspiration PNA. pt w/ moderate pulmonary edema and effusion was given IV Lasix w/ improvement in dyspnea. pt was switched to PO lasix w/ improvement. pt was placed on zosyn for aspiration PNA. pt was followed by pulm Dr. Mcconnell - placed on hypretonic saline for secretion clearing, pt was also evaled by Thoracic - no intervention given improvement.   pt tube feeds held due to mucoid plugging and thick secretions. pt required frequent suctioning and oral care.   pt cellulitis of right leg was managed PO doxy and IV ceftriaxone pt was kept on his A/C for his recent hx of DVT - repeat US w/ Deep venous thrombosis: above and below the knee. for pancytopenia - he was evaled by heme/onc which heme Dr. Escobar - infectious process was treated and WBC, platelets improved. labs and vitals were trended.   pt at high risk for recurrent aspiration evaled by Pall care - no other limits set beyond Dnr/dni - pt was recc for home hospice they have declined thus far.       S:  10/11: Pt lying in bed, comfortable, status quo, medically stable and ready for discharge home with home hospice services.  10/12:  Status quo, no new events. Awaiting d/c home with home hospice services.  10/13: Awaiting home aides in order to take patient home. No fever, chills, status quo, awake, alert, comfortable appearing.    REVIEW OF SYSTEMS: Unable to obtain due to dementia.    Vital Signs Last 24 Hrs  T(C): 37.1 (13 Oct 2023 07:51), Max: 37.1 (12 Oct 2023 22:08)  T(F): 98.7 (13 Oct 2023 07:51), Max: 98.8 (12 Oct 2023 22:08)  HR: 69 (13 Oct 2023 07:51) (69 - 78)  BP: 154/56 (13 Oct 2023 07:51) (117/61 - 155/87)  BP(mean): --  RR: 18 (12 Oct 2023 22:08) (17 - 18)  SpO2: 100% (13 Oct 2023 07:51) (99% - 100%)    Parameters below as of 13 Oct 2023 07:51  Patient On (Oxygen Delivery Method): nasal cannula        PHYSICAL EXAM:    Constitutional: NAD, confused, lethargic, weak appearing, thin, cachectic  HEENT: PERR, EOMI, Normal Hearing, MMM  Neck: Soft and supple  Respiratory: Breath sounds are clear bilaterally, No wheezing, rales or rhonchi  Cardiovascular: S1 and S2, regular rate and rhythm, no Murmurs, gallops or rubs  Gastrointestinal: Bowel Sounds present, soft, nontender, nondistended, no guarding, no rebound  Extremities: No peripheral edema  Neurological: A/O x0, no focal deficits in my limited exam    MEDICATIONS  (STANDING):  apixaban 5 milliGRAM(s) Enteral Tube every 12 hours  ascorbic acid 500 milliGRAM(s) Oral daily  aspirin  chewable 81 milliGRAM(s) Oral daily  atorvastatin 20 milliGRAM(s) Oral at bedtime  carbidopa/levodopa  25/100 1 Tablet(s) Oral <User Schedule>  carbidopa/levodopa  25/100 1.5 Tablet(s) Oral <User Schedule>  donepezil 5 milliGRAM(s) Oral at bedtime  doxazosin 2 milliGRAM(s) Oral at bedtime  furosemide    Tablet 40 milliGRAM(s) Oral daily  lisinopril 2.5 milliGRAM(s) Oral every 12 hours  metoprolol tartrate 25 milliGRAM(s) Oral daily  pantoprazole    Tablet 40 milliGRAM(s) Oral before breakfast  QUEtiapine 25 milliGRAM(s) Oral two times a day  sodium chloride 3%  Inhalation 4 milliLiter(s) Inhalation every 12 hours    MEDICATIONS  (PRN):        A/P:  #Acute respiratory failure: Due to Acute on chronic HFpEF with Pulmonary Edema and Effusions complicated by moderate-severe AS and aspiration pneumonia / enterorhinovirus URI:  - repeat chest xray: effusions   - BNP normalized  - IV lasix 40mg qd --> change to oral daily tolerating well   - TTE: moderate-severe AS;   - cardiology recs --> c/w ACE and metoprolol  - s/p IV zosyn for probable gram neg belkis bacteria   - thoracic surgery consult appreciated, no indication for thoracentesis due to clinical improvement.   - cont hypertonic saline nebulizer tx to assist with secretion clearing   - supportive care    # RLE DVT:  - U/S with DVT --> c/w eliquis  x 3-6 months     # Cellulitis RLE: RESOLVED  - completed ceftriaxone qd and doxycycline 100 mg PO b.i.d. per ID x 7 day course   - elevate leg      #Pancytopenia likely 2/2 cellulitis and URI:  IMPROVING and stable.        # Severe Protein Calorie Malnutrition:  - c/w PEG tube feedings due to dysphagia   - supplements per nutrition recommendations    - aspiration precautions, sit pt upright during feedings      # Normocytic Anemia: Stable  - heme consult appreciated likely d/t bone marrow suppression 2/2 ongoing infections, antibiotic use, recent hospitalization possibly dilutional/d/t nutritional deficiency      # GERD, HTN, Parkinson's Dementia  - c/w home meds: pantoprazole, lisinopril, metoprolol, seroquel, carvidopa-levodopa,     # Unchanged L2 compression fx/rib and R clavicle fx/R renal cyst incidental findings on CT  - f/u outpatient     # DVT Prophylaxis  - c/w ASA, apixaban     # functional quadriplegia   - due to advanced age and dementia   - total care per RN   - recc to dc home with hospice       Code status:  - DNR/DNI    Dispo:  - d/c home with home hospice.  Alternate level of care.

## 2023-10-13 NOTE — CHART NOTE - NSCHARTNOTEFT_GEN_A_CORE
Palliative spoke with family at length on 10/4 and 10/6. Family was not ready for hospice at that time but since then has agreed to home hospice services with [facility]. Goals of care clear at this time. Pending discharge home. Palliative will sign off, please re-consult if needed.
Dietitian BRIEF Note 10/12/23    Plan for pt to be d/c home w/ home hospice services with comfort feeds. RD spoke w/ RD Susan Sturgess from [facility] and pt and family agreeable to following home TF order for d/c:    Nurys Funding Profiles Standard 1.4 @ 60 cc/hr x 16 hours daily (daytime only) with 60 cc water flushes AC/PC.     RD will continue to monitor PO intake, labs, hydration, and wt prn.  Aletha Schaffer, MS, RDN, CNSC, [phone]  Certified Nutrition

## 2023-10-13 NOTE — PROGRESS NOTE ADULT - ASSESSMENT
86 y/o Male with h/o BPH, RLE DVT, diverticulosis, GERD, HTN, PEG placement, necrotizing enterocolitis, osteoporosis, Parkinson's dementia, PEG placement, TIA prostate cancer, hearing loss, old CVA was admitted on 9/30 for worsening RLE / LUE swelling. Patient is unable to provide history, son and wife at bedside provides history. [facility] that [facility] and treated for cellulitis. He was discharged on the day PTA, and this AM family noticed patient's RLE and LUE swelling was worsening. Patient's son states they were told patient had ulcers in intestines, and considered risk of this prior to starting Eliquis. Son states patient has brown stool, no melena / hematochezia. States patient is an aspiration risk, and previously treated for aspiration PNA as well. [facility] also [facility] that [facility] had [facility] in his lungs. In ER he received ceftriaxone.     PROBLEMS:    pulmonary unchanged-decd-home hospice  Low grade fever-URI with rhinovirus  B/l pleural effusions with pulmonary congestion  RLE DVT  Metabolic encephalopathy   Parkinsons disease  HTN (hypertension)  Prostate cancer  Nonrheumatic aortic valve insufficiency  Chronic rhinitis    PLAN:    pulmonary better-decd planning  pallaitive   Off abx  CXR  pleural effusion improved  Ct surgery no thorocentesis  Aspiration precautions  Supportive care  DVT-Eliquis

## 2023-10-14 ENCOUNTER — TRANSCRIPTION ENCOUNTER (OUTPATIENT)
Age: 88
End: 2023-10-14

## 2023-10-14 VITALS — OXYGEN SATURATION: 99 %

## 2023-10-14 RX ADMIN — Medication 81 MILLIGRAM(S): at 08:38

## 2023-10-14 RX ADMIN — SODIUM CHLORIDE 4 MILLILITER(S): 9 INJECTION INTRAMUSCULAR; INTRAVENOUS; SUBCUTANEOUS at 08:07

## 2023-10-14 RX ADMIN — CARBIDOPA AND LEVODOPA 1.5 TABLET(S): 25; 100 TABLET ORAL at 13:23

## 2023-10-14 RX ADMIN — APIXABAN 5 MILLIGRAM(S): 2.5 TABLET, FILM COATED ORAL at 08:37

## 2023-10-14 RX ADMIN — Medication 500 MILLIGRAM(S): at 08:35

## 2023-10-14 RX ADMIN — Medication 25 MILLIGRAM(S): at 08:36

## 2023-10-14 RX ADMIN — CARBIDOPA AND LEVODOPA 1 TABLET(S): 25; 100 TABLET ORAL at 03:14

## 2023-10-14 RX ADMIN — PANTOPRAZOLE SODIUM 40 MILLIGRAM(S): 20 TABLET, DELAYED RELEASE ORAL at 08:42

## 2023-10-14 RX ADMIN — LISINOPRIL 2.5 MILLIGRAM(S): 2.5 TABLET ORAL at 08:40

## 2023-10-14 RX ADMIN — QUETIAPINE FUMARATE 25 MILLIGRAM(S): 200 TABLET, FILM COATED ORAL at 08:37

## 2023-10-14 RX ADMIN — Medication 40 MILLIGRAM(S): at 08:39

## 2023-10-14 RX ADMIN — CARBIDOPA AND LEVODOPA 1.5 TABLET(S): 25; 100 TABLET ORAL at 08:35

## 2023-10-14 NOTE — PROGRESS NOTE ADULT - REASON FOR ADMISSION
PCP: Dr. Gulshan Browning   Heme: Denies having one   Pulm: Doesn't recall name   Cardio: Dr. Alejandro Arredondo
sepsis
PCP: Dr. Gulshan Browning   Heme: Denies having one   Pulm: Doesn't recall name   Cardio: Dr. Alejandro Arredondo
sepsis
PCP: Dr. Gulshan Browning   Heme: Denies having one   Pulm: Doesn't recall name   Cardio: Dr. Alejandro Arredondo

## 2023-10-14 NOTE — DISCHARGE NOTE NURSING/CASE MANAGEMENT/SOCIAL WORK - PATIENT PORTAL LINK FT
You can access the FollowMyHealth Patient Portal offered by [facility] by registering at the following website: [email or web address]. By joining [employer]’s FollowMyHealth portal, you will also be able to view your health information using other applications (apps) compatible with our system.

## 2023-10-14 NOTE — PROGRESS NOTE ADULT - SUBJECTIVE AND OBJECTIVE BOX
Subjective:    pat better, lying in bed, no respiratory distress.    MEDICATIONS  (STANDING):  apixaban 5 milliGRAM(s) Enteral Tube every 12 hours  ascorbic acid 500 milliGRAM(s) Oral daily  aspirin  chewable 81 milliGRAM(s) Oral daily  atorvastatin 20 milliGRAM(s) Oral at bedtime  carbidopa/levodopa  25/100 1 Tablet(s) Oral <User Schedule>  carbidopa/levodopa  25/100 1.5 Tablet(s) Oral <User Schedule>  donepezil 5 milliGRAM(s) Oral at bedtime  doxazosin 2 milliGRAM(s) Oral at bedtime  furosemide    Tablet 40 milliGRAM(s) Oral daily  lisinopril 2.5 milliGRAM(s) Oral every 12 hours  metoprolol tartrate 25 milliGRAM(s) Oral daily  pantoprazole    Tablet 40 milliGRAM(s) Oral before breakfast  QUEtiapine 25 milliGRAM(s) Oral two times a day  sodium chloride 3%  Inhalation 4 milliLiter(s) Inhalation every 12 hours    MEDICATIONS  (PRN):      Allergies    Originally Entered as [Unknown] reaction to [fresh fruits] (Unknown)  apple (Anaphylaxis)  raw, fresh fruits- reynaldo family (apple, pear, apricot, peach, fig); processed/cooked is ok. (Anaphylaxis)  No Known Drug Allergies    Intolerances        Vital Signs Last 24 Hrs  T(C): 36.5 (14 Oct 2023 07:44), Max: 37 (13 Oct 2023 21:26)  T(F): 97.7 (14 Oct 2023 07:44), Max: 98.6 (13 Oct 2023 21:26)  HR: 108 (14 Oct 2023 08:05) (77 - 112)  BP: 118/61 (14 Oct 2023 07:44) (118/61 - 139/70)  BP(mean): --  RR: 18 (14 Oct 2023 07:44) (18 - 19)  SpO2: 99% (14 Oct 2023 08:05) (99% - 100%)    Parameters below as of 14 Oct 2023 08:05  Patient On (Oxygen Delivery Method): nasal cannula          PHYSICAL EXAMINATION:    NECK:  Supple. No lymphadenopathy. Jugular venous pressure not elevated. Carotids equal.   HEART:   The cardiac impulse has a normal quality. Reg., Nl S1 and S2.  There are no murmurs, rubs or gallops noted  CHEST:  Chest crackles to auscultation. Normal respiratory effort.  ABDOMEN:  Soft and nontender.   EXTREMITIES:  There is no edema.       LABS:

## 2023-10-14 NOTE — PROGRESS NOTE ADULT - PROVIDER SPECIALTY LIST ADULT
Hospitalist
Infectious Disease
Pulmonology
Hospitalist
Palliative Care
Pulmonology
Cardiology
Cardiology
Hospitalist
Infectious Disease
Palliative Care
Pulmonology
Thoracic Surgery
Hospitalist
Palliative Care
Palliative Care

## 2023-10-14 NOTE — PROGRESS NOTE ADULT - ASSESSMENT
88 y/o Male with h/o BPH, RLE DVT, diverticulosis, GERD, HTN, PEG placement, necrotizing enterocolitis, osteoporosis, Parkinson's dementia, PEG placement, TIA prostate cancer, hearing loss, old CVA was admitted on 9/30 for worsening RLE / LUE swelling. Patient is unable to provide history, son and wife at bedside provides history. [facility] that [facility] and treated for cellulitis. He was discharged on the day PTA, and this AM family noticed patient's RLE and LUE swelling was worsening. Patient's son states they were told patient had ulcers in intestines, and considered risk of this prior to starting Eliquis. Son states patient has brown stool, no melena / hematochezia. States patient is an aspiration risk, and previously treated for aspiration PNA as well. [facility] had [facility] in his lungs. In ER he received ceftriaxone.     PROBLEMS:    pulmonary unchanged-decd-home hospice  Low grade fever-URI with rhinovirus  B/l pleural effusions with pulmonary congestion  RLE DVT  Metabolic encephalopathy   Parkinsons disease  HTN (hypertension)  Prostate cancer  Nonrheumatic aortic valve insufficiency  Chronic rhinitis    PLAN:    pulmonary better-decd planning  pallaitive   Off abx  CXR  pleural effusion improved  Ct surgery no thorocentesis  Aspiration precautions  Supportive care  DVT-Eliquis

## 2023-10-14 NOTE — DISCHARGE NOTE NURSING/CASE MANAGEMENT/SOCIAL WORK - NSDCVIVACCINE_GEN_ALL_CORE_FT
Tdap; 28-Sep-2020 10:30; Abdulaziz Martins (RN); [employer]; [id number] (Exp. Date: 09-Jul-2022); IntraMuscular; Deltoid Left.; 0.5 milliLiter(s); VIS (VIS Published: 09-May-2013, VIS Presented: 28-Sep-2020);   Tdap; 03-Nov-2021 21:44; Terese Feliciano (JORDAN); [employer]; [id number] (Exp. Date: 15-Jul-2023); IntraMuscular; Deltoid Left.; 0.5 milliLiter(s); VIS (VIS Published: 09-May-2013, VIS Presented: 03-Nov-2021);

## 2023-10-20 NOTE — CDI QUERY NOTE - NSCDIOTHERTXTBX_GEN_ALL_CORE_HH
This patient was admitted with cellulitis, CHF exacerbation, and your clarification is needed regarding the diagnosis of sepsis:    Progress Note Adult Hospitalist Nurse Practitioner/Attending 10-9-23 @ 11:17  CC: Sepsis   SUBJECTIVE:     Progress Note Adult Infectious Disease Attending 10-3-23 13:27  1. Low grade fever. B/l pleural effusions with pulmonary congestion. RLE DVT. URI with rhinovirus. Parkinson disease. Metabolic encephalopathy.   -doubt pneumonia    HP Adult GOC Attending 9-30-23 @ 21:34  *Severe sepsis 2/2 URI / possible early aspiration PNA / cellulitis   -RVP: +Rhinovirus     Patient met 2 SIRS criteria on admission:  WBC Count: 1.89 K/uL (09.30.23 @ 12:58)  HR: 97 on 9-30-23    Patient had some elevated heart rates over 90 and WBC <3 throughout admission.    Can the diagnosis of sepsis be clarified?  -Sepsis ruled in, present on admission.  -Sepsis ruled out.  -Other, please specify:

## 2023-10-23 DIAGNOSIS — E87.20 ACIDOSIS, UNSPECIFIED: ICD-10-CM

## 2023-10-23 DIAGNOSIS — Z79.01 LONG TERM (CURRENT) USE OF ANTICOAGULANTS: ICD-10-CM

## 2023-10-23 DIAGNOSIS — G93.41 METABOLIC ENCEPHALOPATHY: ICD-10-CM

## 2023-10-23 DIAGNOSIS — J31.0 CHRONIC RHINITIS: ICD-10-CM

## 2023-10-23 DIAGNOSIS — Z66 DO NOT RESUSCITATE: ICD-10-CM

## 2023-10-23 DIAGNOSIS — B96.89 OTHER SPECIFIED BACTERIAL AGENTS AS THE CAUSE OF DISEASES CLASSIFIED ELSEWHERE: ICD-10-CM

## 2023-10-23 DIAGNOSIS — J06.9 ACUTE UPPER RESPIRATORY INFECTION, UNSPECIFIED: ICD-10-CM

## 2023-10-23 DIAGNOSIS — D61.818 OTHER PANCYTOPENIA: ICD-10-CM

## 2023-10-23 DIAGNOSIS — E43 UNSPECIFIED SEVERE PROTEIN-CALORIE MALNUTRITION: ICD-10-CM

## 2023-10-23 DIAGNOSIS — J96.21 ACUTE AND CHRONIC RESPIRATORY FAILURE WITH HYPOXIA: ICD-10-CM
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
## 2023-10-23 DIAGNOSIS — R53.2 FUNCTIONAL QUADRIPLEGIA: ICD-10-CM

## 2023-10-23 DIAGNOSIS — I35.0 NONRHEUMATIC AORTIC (VALVE) STENOSIS: ICD-10-CM

## 2023-10-23 DIAGNOSIS — I82.461 ACUTE EMBOLISM AND THROMBOSIS OF RIGHT CALF MUSCULAR VEIN: ICD-10-CM

## 2023-10-23 DIAGNOSIS — Z85.46 PERSONAL HISTORY OF MALIGNANT NEOPLASM OF PROSTATE: ICD-10-CM

## 2023-10-23 DIAGNOSIS — M80.08XA AGE-RELATED OSTEOPOROSIS WITH CURRENT PATHOLOGICAL FRACTURE, VERTEBRA(E), INITIAL ENCOUNTER FOR FRACTURE: ICD-10-CM

## 2023-10-23 DIAGNOSIS — Z86.73 PERSONAL HISTORY OF TRANSIENT ISCHEMIC ATTACK (TIA), AND CEREBRAL INFARCTION WITHOUT RESIDUAL DEFICITS: ICD-10-CM

## 2023-10-23 DIAGNOSIS — G20.A1 PARKINSON'S DISEASE WITHOUT DYSKINESIA, WITHOUT MENTION OF FLUCTUATIONS: ICD-10-CM

## 2023-10-23 DIAGNOSIS — B97.10 UNSPECIFIED ENTEROVIRUS AS THE CAUSE OF DISEASES CLASSIFIED ELSEWHERE: ICD-10-CM
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
## 2023-10-23 DIAGNOSIS — K57.30 DIVERTICULOSIS OF LARGE INTESTINE WITHOUT PERFORATION OR ABSCESS WITHOUT BLEEDING: ICD-10-CM

## 2023-10-23 DIAGNOSIS — I50.33 ACUTE ON CHRONIC DIASTOLIC (CONGESTIVE) HEART FAILURE: ICD-10-CM

## 2023-10-23 DIAGNOSIS — E83.51 HYPOCALCEMIA: ICD-10-CM

## 2023-10-23 DIAGNOSIS — Z79.82 LONG TERM (CURRENT) USE OF ASPIRIN: ICD-10-CM

## 2023-10-23 DIAGNOSIS — I11.0 HYPERTENSIVE HEART DISEASE WITH HEART FAILURE: ICD-10-CM

## 2023-10-23 DIAGNOSIS — I82.411 ACUTE EMBOLISM AND THROMBOSIS OF RIGHT FEMORAL VEIN: ICD-10-CM

## 2023-10-23 DIAGNOSIS — Z93.1 GASTROSTOMY STATUS: ICD-10-CM

## 2023-10-23 DIAGNOSIS — R65.20 SEVERE SEPSIS WITHOUT SEPTIC SHOCK: ICD-10-CM

## 2023-10-23 DIAGNOSIS — A41.9 SEPSIS, UNSPECIFIED ORGANISM: ICD-10-CM

## 2023-10-23 DIAGNOSIS — F02.80 DEMENTIA IN OTHER DISEASES CLASSIFIED ELSEWHERE, UNSPECIFIED SEVERITY, WITHOUT BEHAVIORAL DISTURBANCE, PSYCHOTIC DISTURBANCE, MOOD DISTURBANCE, AND ANXIETY: ICD-10-CM

## 2023-10-23 DIAGNOSIS — T17.598A OTHER FOREIGN OBJECT IN BRONCHUS CAUSING OTHER INJURY, INITIAL ENCOUNTER: ICD-10-CM

## 2023-10-23 DIAGNOSIS — H90.3 SENSORINEURAL HEARING LOSS, BILATERAL: ICD-10-CM

## 2023-10-23 DIAGNOSIS — L03.115 CELLULITIS OF RIGHT LOWER LIMB: ICD-10-CM

## 2023-10-23 DIAGNOSIS — K21.9 GASTRO-ESOPHAGEAL REFLUX DISEASE WITHOUT ESOPHAGITIS: ICD-10-CM

## 2023-10-23 DIAGNOSIS — R62.7 ADULT FAILURE TO THRIVE: ICD-10-CM

## 2023-10-23 DIAGNOSIS — R13.10 DYSPHAGIA, UNSPECIFIED: ICD-10-CM

## 2023-11-30 NOTE — DISCHARGE NOTE PROVIDER - NSDCFUADDINST_GEN_ALL_CORE_FT
- discharge with Tello cath - void trial at urology office - Dr. Alexander in 3-5 days - call for appointment   - follow up with cardiology/PCP and neurology in 1-2 weeks  Pt lives in an apartment with his girlfriend. The apartment building has 0 [address] and 0 interior steps with elevator access to pts apartment.  Pt reports that prior to hospitalization he was I in all ADLs and ambulation without assistive devices.   DME: pt owns no DME

## 2024-02-08 NOTE — PROGRESS NOTE ADULT - CONVERSATION DETAILS
VM left on Rx Line     name is Iona Toribio. My YOB: 1962. The medicines are Levothyroxine 100MCG, Ventolin HFA and atorvastatin 40 milligram. The pharmacy is [facility] and their number is [phone]. Thank you. Bye, bye.  You received a voice mail from IONA TORIBIO.         This request is already is pending, notified patient that her request is pending.  
Palliative reached out to wife to offer support and answer any questions. Spoke with wife and son over the phone. Both with many questions about his current treatment plan and prognosis. All questions addressed.    Explained patient's poor prognosis and inevitable recurrent aspiration events. Son concerned that patient would starve to death if stopped feedings. Explained loss of sense of hunger as body slowly shuts down and intolerance of feedings at a certain point as evidenced by frequent aspirations. Strongly suggested option of pursuing home hospice. Son explains that he knows that his father's health is not improving, and understands that his disease is progressive. The family is still considering hospice but are not ready to make that decision now.     Emotional support provided to family. Explained that they can reach out to primary team on floor over the weekend if they were to change their minds about pursuing comfort measures/home hospice. Wife has our team's contact information and was told they could reach out at any point for support or any further questions. Palliative will continue to follow.

## 2024-03-13 NOTE — ED PROVIDER NOTE - CADM POA URETHRAL CATHETER
Moira    No Jorge Whitaker

## 2024-04-16 NOTE — ED PROVIDER NOTE - CHIEF COMPLAINT
The patient is a 87y Male complaining of cough. Class II - visualization of the soft palate, fauces, and uvula

## 2024-05-07 NOTE — PATIENT PROFILE ADULT - PATIENT REPRESENTATIVE: ( YOU CAN CHOOSE ANY PERSON THAT CAN ASSIST YOU WITH YOUR HEALTH CARE PREFERENCES, DOES NOT HAVE TO BE A SPOUSE, IMMEDIATE FAMILY OR SIGNIFICANT OTHER/PARTNER)
yes Detail Level: Zone Photo Preface (Leave Blank If You Do Not Want): Photographs were obtained today

## 2024-05-16 NOTE — ED ADULT TRIAGE NOTE - CHIEF COMPLAINT QUOTE
Pt presents to ED s/p fall at home. + head strike, - LOC, - AC. GCS 15. Pt endorsing lower back pain. PMHx parkinsons. No obvious trauma noted. LAUREN Ely evaluated pt for neuro alert. Pt made neuro alert @1945. 36.3

## 2024-06-10 NOTE — ED PROVIDER NOTE - CLINICAL SUMMARY MEDICAL DECISION MAKING FREE TEXT BOX
elizabeth all pertinent systems normal
86 y/o M w/ PMH as above presenting w/ increased secretions. Pt overall no acute distress. Concern for pneumonia vs.. viral URI as cause of symptoms. Will perform infectious and metabolic work up. Obtain CT scan of chest for better evaluation of possible consolidation. Family also endorsing increased weakness, likely due to infectious process but will obtain CTH to eval for any acute intracranial pathology. Suspect pt will require admission. Plan for labs, imaging, EKG, meds PRN. Will reassess the need for additional interventions as clinically warranted. Refer to any progress notes for updates on clinical course and as a continuation of this MDM.

## 2024-08-15 NOTE — PATIENT PROFILE ADULT - NSTOBACCONEVERSMOKERY/N_GEN_A
Caller: Shamika Regalado    Relationship: Mother    Best call back number: [phone]     What is the best time to reach you: ANY    Who are you requesting to speak with (clinical staff, provider,  specific staff member): SADI RUELAS OR HER NURSE    What was the call regarding: SHE IS CALLING TO CHECK ON THIS. IS THERE ANY WAY THIS CAN BE SENT ASAP? SHE WOULD LIKE A CALL AS SOON AS IT IS SENT.     Is it okay if the provider responds through [facility]: NO   No

## 2024-09-24 NOTE — ED ADULT TRIAGE NOTE - HEIGHT IN FEET
Patient has started ketogenic diet, and will be starting at the gym with his friends  -recommend calorie counting and increased exercise   -will start Wegovy    5

## 2024-10-08 NOTE — SWALLOW BEDSIDE ASSESSMENT ADULT - SWALLOW EVAL: DIAGNOSIS
Pt c/o SOB for (a couple days) worse today, pt states these symptoms are similar to 2019 when he had a MI, ABCD intact.       Triage Assessment (Adult)       Row Name 10/08/24 1121          Triage Assessment    Airway WDL WDL        Respiratory WDL    Respiratory WDL X  SOB at rest        Skin Circulation/Temperature WDL    Skin Circulation/Temperature WDL WDL        Cardiac WDL    Cardiac WDL WDL        Peripheral/Neurovascular WDL    Peripheral Neurovascular WDL WDL        Cognitive/Neuro/Behavioral WDL    Cognitive/Neuro/Behavioral WDL WDL                      oral-pharyngeal dysphagia in a setting of PD and other comorbidities;  Needs PG tube feeds

## 2024-11-05 NOTE — DISCHARGE NOTE PROVIDER - NSDCHHCONTACT_GEN_ALL_CORE_FT
HPI    34 Y/o male is here for routine eye exam with no C/o about ocular health    Pt denies pain and discomfort   No f/f    Eye exam: no gtt   Last edited by Umu Alves MA on 11/5/2024  8:38 AM.            Assessment /Plan     For exam results, see Encounter Report.    Routine eye exam      No spec rx necessary, RTC yearly eye exam                   
As certified below, I, or a nurse practitioner or physician assistant working with me, had a face-to-face encounter that meets the physician face-to-face encounter requirements.

## 2025-03-02 NOTE — ED ADULT NURSE NOTE - CHIEF COMPLAINT QUOTE
Pt presents to ED s/p fall at home. + head strike, - LOC, - AC. GCS 15. Pt endorsing lower back pain. PMHx parkinsons. No obvious trauma noted. LAUREN Ely evaluated pt for neuro alert. Pt made neuro alert @1945. Left arm;

## 2025-07-11 NOTE — ED ADULT NURSE NOTE - NSICDXPASTMEDICALHX_GEN_ALL_CORE_FT
Patient moved to LD5 for IOL   PAST MEDICAL HISTORY:  Bacteremia     Benign prostatic hyperplasia with lower urinary tract symptoms     Chronic rhinitis     Colonic polyp     Diverticulosis     GERD (gastroesophageal reflux disease)     HTN (hypertension)     Necrotizing enterocolitis     Nonrheumatic aortic valve insufficiency     Osteoporosis     Parkinsons disease     Personal history of transient ischemic attack (TIA), and cerebral infarction without residual deficits     Prostate cancer     Sensorineural hearing loss (SNHL) of both ears     Syncope and collapse
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
## 2025-07-25 NOTE — SWALLOW BEDSIDE ASSESSMENT ADULT - POSITIONING
Anesthesia Transfer of Care Note    Patient: Cade Johnston    Procedure(s) Performed: Procedure(s) (LRB):  ROBOTIC TRANSORAL SURGERY (TORS) (Left)  DISSECTION, NECK, MODIFIED RADICAL (Left)    Patient location: PACU    Anesthesia Type: general    Transport from OR: Transported from OR on 6-10 L/min O2 by face mask with adequate spontaneous ventilation    Post pain: adequate analgesia    Post assessment: no apparent anesthetic complications and tolerated procedure well    Post vital signs: stable    Level of consciousness: awake, alert and oriented    Nausea/Vomiting: no nausea/vomiting    Complications: none    Transfer of care protocol was followed      Last vitals: Visit Vitals  BP (!) 157/76 (BP Location: Right arm, Patient Position: Lying)   Pulse 61   Temp 36.5 °C (97.7 °F) (Temporal)   Resp 20   Ht 6' (1.829 m)   Wt 99.9 kg (220 lb 3.8 oz)   SpO2 100%   BMI 29.87 kg/m²      upright (90 degrees)